# Patient Record
Sex: FEMALE | Race: WHITE | NOT HISPANIC OR LATINO | Employment: FULL TIME | URBAN - METROPOLITAN AREA
[De-identification: names, ages, dates, MRNs, and addresses within clinical notes are randomized per-mention and may not be internally consistent; named-entity substitution may affect disease eponyms.]

---

## 2017-11-28 ENCOUNTER — CLINICAL SUPPORT (OUTPATIENT)
Dept: URGENT CARE | Facility: CLINIC | Age: 37
End: 2017-11-28

## 2017-11-28 DIAGNOSIS — Z02.83 ENCOUNTER FOR DRUG SCREENING: Primary | ICD-10-CM

## 2017-11-28 LAB
CTP QC/QA: YES
POC 5 PANEL DRUG SCREEN: NEGATIVE

## 2017-11-28 PROCEDURE — 80305 DRUG TEST PRSMV DIR OPT OBS: CPT | Mod: QW,S$GLB,, | Performed by: PREVENTIVE MEDICINE

## 2018-12-17 ENCOUNTER — OFFICE VISIT (OUTPATIENT)
Dept: URGENT CARE | Facility: CLINIC | Age: 38
End: 2018-12-17
Payer: COMMERCIAL

## 2018-12-17 VITALS
DIASTOLIC BLOOD PRESSURE: 86 MMHG | BODY MASS INDEX: 37.82 KG/M2 | OXYGEN SATURATION: 98 % | HEIGHT: 65 IN | HEART RATE: 84 BPM | WEIGHT: 227 LBS | RESPIRATION RATE: 18 BRPM | TEMPERATURE: 97 F | SYSTOLIC BLOOD PRESSURE: 150 MMHG

## 2018-12-17 DIAGNOSIS — J02.9 ACUTE PHARYNGITIS, UNSPECIFIED ETIOLOGY: Primary | ICD-10-CM

## 2018-12-17 DIAGNOSIS — J02.9 SORE THROAT: ICD-10-CM

## 2018-12-17 LAB
CTP QC/QA: YES
CTP QC/QA: YES
HETEROPH AB SER QL: NEGATIVE
S PYO RRNA THROAT QL PROBE: NEGATIVE

## 2018-12-17 PROCEDURE — 3008F BODY MASS INDEX DOCD: CPT | Mod: CPTII,S$GLB,, | Performed by: NURSE PRACTITIONER

## 2018-12-17 PROCEDURE — 86308 HETEROPHILE ANTIBODY SCREEN: CPT | Mod: QW,S$GLB,, | Performed by: NURSE PRACTITIONER

## 2018-12-17 PROCEDURE — 87880 STREP A ASSAY W/OPTIC: CPT | Mod: QW,S$GLB,, | Performed by: NURSE PRACTITIONER

## 2018-12-17 PROCEDURE — 99203 OFFICE O/P NEW LOW 30 MIN: CPT | Mod: S$GLB,,, | Performed by: NURSE PRACTITIONER

## 2018-12-17 RX ORDER — ALPRAZOLAM 2 MG/1
1 TABLET ORAL 2 TIMES DAILY
COMMUNITY

## 2018-12-17 RX ORDER — LAMOTRIGINE 25 MG/1
25 TABLET ORAL DAILY
COMMUNITY
End: 2022-08-06

## 2018-12-17 RX ORDER — DEXTROAMPHETAMINE SACCHARATE, AMPHETAMINE ASPARTATE, DEXTROAMPHETAMINE SULFATE AND AMPHETAMINE SULFATE 7.5; 7.5; 7.5; 7.5 MG/1; MG/1; MG/1; MG/1
30 TABLET ORAL 2 TIMES DAILY
COMMUNITY

## 2018-12-17 RX ORDER — RISPERIDONE 2 MG/1
4 TABLET ORAL NIGHTLY
COMMUNITY
End: 2024-03-28

## 2018-12-17 NOTE — PROGRESS NOTES
"Subjective:       Patient ID: Teagan Griffith is a 38 y.o. female.    Vitals:  height is 5' 5" (1.651 m) and weight is 103 kg (227 lb). Her temperature is 97.4 °F (36.3 °C). Her blood pressure is 150/86 (abnormal) and her pulse is 84. Her respiration is 18 and oxygen saturation is 98%.     Chief Complaint: Sore Throat    Patient states she has a sore throat and fatigue. A co-worker was recently diagnosed with mono.       Sore Throat    This is a new problem. The current episode started yesterday. The problem has been gradually worsening. There has been no fever. The pain is at a severity of 8/10. The pain is severe. Pertinent negatives include no congestion, coughing, ear pain, shortness of breath, stridor or vomiting. She has tried acetaminophen for the symptoms. The treatment provided no relief.       Constitution: Positive for fatigue. Negative for chills, sweating and fever.   HENT: Positive for sore throat. Negative for ear pain, congestion, sinus pain, sinus pressure and voice change.    Neck: Negative for painful lymph nodes.   Eyes: Negative for eye redness.   Respiratory: Negative for chest tightness, cough, sputum production, bloody sputum, COPD, shortness of breath, stridor, wheezing and asthma.    Gastrointestinal: Negative for nausea and vomiting.   Musculoskeletal: Negative for muscle ache.   Skin: Negative for rash.   Allergic/Immunologic: Negative for seasonal allergies and asthma.   Hematologic/Lymphatic: Negative for swollen lymph nodes.       Objective:      Physical Exam   Constitutional: She is oriented to person, place, and time. She appears well-developed and well-nourished. She is cooperative.  Non-toxic appearance. She does not appear ill. No distress.   HENT:   Head: Normocephalic and atraumatic.   Right Ear: Hearing, tympanic membrane, external ear and ear canal normal.   Left Ear: Hearing, tympanic membrane, external ear and ear canal normal.   Nose: Nose normal. No mucosal edema, " rhinorrhea or nasal deformity. No epistaxis. Right sinus exhibits no maxillary sinus tenderness and no frontal sinus tenderness. Left sinus exhibits no maxillary sinus tenderness and no frontal sinus tenderness.   Mouth/Throat: Uvula is midline and mucous membranes are normal. No trismus in the jaw. Normal dentition. No uvula swelling. Posterior oropharyngeal erythema (mild) present. Tonsils are 1+ on the right. Tonsils are 1+ on the left.   Eyes: Conjunctivae and lids are normal. No scleral icterus.   Sclera clear bilat   Neck: Trachea normal, full passive range of motion without pain and phonation normal. Neck supple.   Cardiovascular: Normal rate, regular rhythm, normal heart sounds, intact distal pulses and normal pulses.   Pulmonary/Chest: Effort normal and breath sounds normal. No respiratory distress.   Abdominal: Soft. Normal appearance and bowel sounds are normal. She exhibits no distension. There is no tenderness.   Musculoskeletal: Normal range of motion. She exhibits no edema or deformity.   Neurological: She is alert and oriented to person, place, and time. She exhibits normal muscle tone. Coordination normal.   Skin: Skin is warm, dry and intact. She is not diaphoretic. No pallor.   Psychiatric: She has a normal mood and affect. Her speech is normal and behavior is normal. Judgment and thought content normal. Cognition and memory are normal.   Nursing note and vitals reviewed.      Results for orders placed or performed in visit on 12/17/18   POCT rapid strep A   Result Value Ref Range    Rapid Strep A Screen Negative Negative     Acceptable Yes    POCT Infectious mononucleosis antibody   Result Value Ref Range    Monospot Negative Negative     Acceptable Yes      Assessment:       1. Acute pharyngitis, unspecified etiology    2. Sore throat        Plan:         Acute pharyngitis, unspecified etiology    Sore throat  -     POCT rapid strep A  -     POCT Infectious  mononucleosis antibody            Patient Instructions     Increase fluid intake, rest, honey for sore throat.     You must understand that you've received an Urgent Care treatment only and that you may be released before all your medical problems are known or treated. You, the patient, will arrange for follow up care as instructed.  If your condition worsens we recommend that you receive another evaluation at the emergency room immediately or contact your primary medical clinics after hours call service to discuss your concerns.  Please return here or go to the Emergency Department for any concerns or worsening of condition.      Self-Care for Sore Throats    Sore throats happen for many reasons, such as colds, allergies, and infections caused by viruses or bacteria. In any case, your throat becomes red and sore. Your goal for self-care is to reduce your discomfort while giving your throat a chance to heal.  Moisten and soothe your throat  Tips include the following:  · Try a sip of water first thing after waking up.  · Keep your throat moist by drinking 6 or more glasses of clear liquids every day.  · Run a cool-air humidifier in your room overnight.  · Avoid cigarette smoke.   · Suck on throat lozenges, cough drops, hard candy, ice chips, or frozen fruit-juice bars. Use the sugar-free versions if your diet or medical condition requires them.  Gargle to ease irritation  Gargling every hour or 2 can ease irritation. Try gargling with 1 of these solutions:  · 1/4 teaspoon of salt in 1/2 cup of warm water  · An over-the-counter anesthetic gargle  Use medicine for more relief  Over-the-counter medicine can reduce sore throat symptoms. Ask your pharmacist if you have questions about which medicine to use:  · Ease pain with anesthetic sprays. Aspirin or an aspirin substitute also helps. Remember, never give aspirin to anyone 18 or younger, or if you are already taking blood thinners.   · For sore throats caused by  allergies, try antihistamines to block the allergic reaction.  · Remember: unless a sore throat is caused by a bacterial infection, antibiotics wont help you.  Prevent future sore throats  Prevention tips include the following:  · Stop smoking or reduce contact with secondhand smoke. Smoke irritates the tender throat lining.  · Limit contact with pets and with allergy-causing substances, such as pollen and mold.  · When youre around someone with a sore throat or cold, wash your hands often to keep viruses or bacteria from spreading.  · Dont strain your vocal cords.  Call your healthcare provider  Contact your healthcare provider if you have:  · A temperature over 101°F (38.3°C)  · White spots on the throat  · Great difficulty swallowing  · Trouble breathing  · A skin rash  · Recent exposure to someone else with strep bacteria  · Severe hoarseness and swollen glands in the neck or jaw   Date Last Reviewed: 8/1/2016  © 5321-7102 [employer]. [address] [place] [address], [place] [address]. All rights reserved. This information is not intended as a substitute for professional medical care. Always follow your healthcare professional's instructions.

## 2018-12-18 NOTE — PATIENT INSTRUCTIONS
Increase fluid intake, rest, honey for sore throat.     You must understand that you've received an Urgent Care treatment only and that you may be released before all your medical problems are known or treated. You, the patient, will arrange for follow up care as instructed.  If your condition worsens we recommend that you receive another evaluation at the emergency room immediately or contact your primary medical clinics after hours call service to discuss your concerns.  Please return here or go to the Emergency Department for any concerns or worsening of condition.      Self-Care for Sore Throats    Sore throats happen for many reasons, such as colds, allergies, and infections caused by viruses or bacteria. In any case, your throat becomes red and sore. Your goal for self-care is to reduce your discomfort while giving your throat a chance to heal.  Moisten and soothe your throat  Tips include the following:  · Try a sip of water first thing after waking up.  · Keep your throat moist by drinking 6 or more glasses of clear liquids every day.  · Run a cool-air humidifier in your room overnight.  · Avoid cigarette smoke.   · Suck on throat lozenges, cough drops, hard candy, ice chips, or frozen fruit-juice bars. Use the sugar-free versions if your diet or medical condition requires them.  Gargle to ease irritation  Gargling every hour or 2 can ease irritation. Try gargling with 1 of these solutions:  · 1/4 teaspoon of salt in 1/2 cup of warm water  · An over-the-counter anesthetic gargle  Use medicine for more relief  Over-the-counter medicine can reduce sore throat symptoms. Ask your pharmacist if you have questions about which medicine to use:  · Ease pain with anesthetic sprays. Aspirin or an aspirin substitute also helps. Remember, never give aspirin to anyone 18 or younger, or if you are already taking blood thinners.   · For sore throats caused by allergies, try antihistamines to block the allergic  reaction.  · Remember: unless a sore throat is caused by a bacterial infection, antibiotics wont help you.  Prevent future sore throats  Prevention tips include the following:  · Stop smoking or reduce contact with secondhand smoke. Smoke irritates the tender throat lining.  · Limit contact with pets and with allergy-causing substances, such as pollen and mold.  · When youre around someone with a sore throat or cold, wash your hands often to keep viruses or bacteria from spreading.  · Dont strain your vocal cords.  Call your healthcare provider  Contact your healthcare provider if you have:  · A temperature over 101°F (38.3°C)  · White spots on the throat  · Great difficulty swallowing  · Trouble breathing  · A skin rash  · Recent exposure to someone else with strep bacteria  · Severe hoarseness and swollen glands in the neck or jaw   Date Last Reviewed: 8/1/2016  © 0426-7735 [employer]. [address] [place] [address], [place] [address]. All rights reserved. This information is not intended as a substitute for professional medical care. Always follow your healthcare professional's instructions.

## 2019-01-14 ENCOUNTER — OFFICE VISIT (OUTPATIENT)
Dept: URGENT CARE | Facility: CLINIC | Age: 39
End: 2019-01-14
Payer: COMMERCIAL

## 2019-01-14 VITALS
RESPIRATION RATE: 19 BRPM | OXYGEN SATURATION: 99 % | TEMPERATURE: 97 F | BODY MASS INDEX: 37.82 KG/M2 | HEIGHT: 65 IN | WEIGHT: 227 LBS | DIASTOLIC BLOOD PRESSURE: 89 MMHG | SYSTOLIC BLOOD PRESSURE: 146 MMHG | HEART RATE: 93 BPM

## 2019-01-14 DIAGNOSIS — R31.9 URINARY TRACT INFECTION WITH HEMATURIA, SITE UNSPECIFIED: Primary | ICD-10-CM

## 2019-01-14 DIAGNOSIS — R30.0 DYSURIA: ICD-10-CM

## 2019-01-14 DIAGNOSIS — N39.0 URINARY TRACT INFECTION WITH HEMATURIA, SITE UNSPECIFIED: Primary | ICD-10-CM

## 2019-01-14 LAB
BILIRUB UR QL STRIP: NEGATIVE
GLUCOSE UR QL STRIP: NEGATIVE
KETONES UR QL STRIP: NEGATIVE
LEUKOCYTE ESTERASE UR QL STRIP: POSITIVE
PH, POC UA: 5 (ref 5–8)
POC BLOOD, URINE: POSITIVE
POC NITRATES, URINE: NEGATIVE
PROT UR QL STRIP: POSITIVE
SP GR UR STRIP: 1.02 (ref 1–1.03)
UROBILINOGEN UR STRIP-ACNC: NORMAL (ref 0.1–1.1)

## 2019-01-14 PROCEDURE — 81003 URINALYSIS AUTO W/O SCOPE: CPT | Mod: QW,S$GLB,, | Performed by: FAMILY MEDICINE

## 2019-01-14 PROCEDURE — 99214 OFFICE O/P EST MOD 30 MIN: CPT | Mod: 25,S$GLB,, | Performed by: FAMILY MEDICINE

## 2019-01-14 PROCEDURE — 99214 PR OFFICE/OUTPT VISIT, EST, LEVL IV, 30-39 MIN: ICD-10-PCS | Mod: 25,S$GLB,, | Performed by: FAMILY MEDICINE

## 2019-01-14 PROCEDURE — 81003 POCT URINALYSIS, DIPSTICK, AUTOMATED, W/O SCOPE: ICD-10-PCS | Mod: QW,S$GLB,, | Performed by: FAMILY MEDICINE

## 2019-01-14 PROCEDURE — 3008F PR BODY MASS INDEX (BMI) DOCUMENTED: ICD-10-PCS | Mod: CPTII,S$GLB,, | Performed by: FAMILY MEDICINE

## 2019-01-14 PROCEDURE — 3008F BODY MASS INDEX DOCD: CPT | Mod: CPTII,S$GLB,, | Performed by: FAMILY MEDICINE

## 2019-01-14 RX ORDER — NITROFURANTOIN 25; 75 MG/1; MG/1
100 CAPSULE ORAL 2 TIMES DAILY
Qty: 14 CAPSULE | Refills: 0 | Status: SHIPPED | OUTPATIENT
Start: 2019-01-14 | End: 2019-01-21

## 2019-01-14 RX ORDER — PHENAZOPYRIDINE HYDROCHLORIDE 200 MG/1
200 TABLET, FILM COATED ORAL 3 TIMES DAILY PRN
Qty: 10 TABLET | Refills: 0 | Status: SHIPPED | OUTPATIENT
Start: 2019-01-14 | End: 2022-03-31

## 2019-01-15 NOTE — PROGRESS NOTES
"Subjective:       Patient ID: Teagan Griffith is a 38 y.o. female.    Vitals:  height is 5' 5" (1.651 m) and weight is 103 kg (227 lb). Her oral temperature is 97.2 °F (36.2 °C). Her blood pressure is 146/89 (abnormal) and her pulse is 93. Her respiration is 19 and oxygen saturation is 99%.     Chief Complaint: Urinary Tract Infection    Urinary Tract Infection    This is a new problem. The current episode started today. The problem occurs every urination. The problem has been unchanged. The quality of the pain is described as burning. The pain is at a severity of 4/10. The pain is mild. She is sexually active. Pertinent negatives include no chills, frequency, hematuria, nausea, urgency, vomiting or rash. She has tried nothing for the symptoms. The treatment provided no relief.       Constitution: Negative for chills and fever.   Neck: Negative for painful lymph nodes.   Gastrointestinal: Negative for abdominal pain, nausea and vomiting.   Genitourinary: Negative for dysuria, frequency, urgency, urine decreased, hematuria, history of kidney stones, painful menstruation, irregular menstruation, missed menses, heavy menstrual bleeding, ovarian cysts, genital trauma, vaginal pain, vaginal discharge, vaginal bleeding, vaginal odor, painful intercourse, genital sore, painful ejaculation and pelvic pain.   Musculoskeletal: Negative for back pain.   Skin: Negative for rash, lesion and erythema.   Hematologic/Lymphatic: Negative for swollen lymph nodes.       Objective:      Physical Exam   Constitutional: She is oriented to person, place, and time. She appears well-developed and well-nourished.   HENT:   Head: Normocephalic and atraumatic.   Eyes: EOM are normal. Pupils are equal, round, and reactive to light.   Neck: Normal range of motion. Neck supple. No thyromegaly present.   Cardiovascular: Normal rate, regular rhythm and normal heart sounds. Exam reveals no gallop and no friction rub.   No murmur " heard.  Pulmonary/Chest: Breath sounds normal. No respiratory distress. She has no wheezes. She has no rales. She exhibits no tenderness.   Abdominal: Soft. Bowel sounds are normal. She exhibits no distension and no mass. There is no tenderness. There is no rebound and no guarding. No hernia.   Genitourinary:   Genitourinary Comments: Mild bladder pressure discomfort.  No cva tenderness.   Musculoskeletal: Normal range of motion. She exhibits no edema, tenderness or deformity.   Lymphadenopathy:     She has no cervical adenopathy.   Neurological: She is alert and oriented to person, place, and time. She displays normal reflexes. No cranial nerve deficit. She exhibits normal muscle tone. Coordination normal.   Skin: Skin is warm. Capillary refill takes less than 2 seconds. No rash noted. No erythema. No pallor.   Psychiatric: She has a normal mood and affect. Her behavior is normal. Judgment and thought content normal.   Vitals reviewed.      Assessment:       1. Urinary tract infection with hematuria, site unspecified    2. Dysuria        Plan:         Urinary tract infection with hematuria, site unspecified  -     nitrofurantoin, macrocrystal-monohydrate, (MACROBID) 100 MG capsule; Take 1 capsule (100 mg total) by mouth 2 (two) times daily. for 7 days  Dispense: 14 capsule; Refill: 0  -     phenazopyridine (PYRIDIUM) 200 MG tablet; Take 1 tablet (200 mg total) by mouth 3 (three) times daily as needed for Pain.  Dispense: 10 tablet; Refill: 0    Dysuria  -     POCT Urinalysis, Dipstick, Automated, W/O Scope          Patient Instructions     Bladder Infection, Female (Adult)    Urine is normally doesn't have any bacteria in it. But bacteria can get into the urinary tract from the skin around the rectum. Or they can travel in the blood from elsewhere in the body. Once they are in your urinary tract, they can cause infection in the urethra (urethritis), the bladder (cystitis), or the kidneys (pyelonephritis).  The most  "common place for an infection is in the bladder. This is called a bladder infection. This is one of the most common infections in women. Most bladder infections are easily treated. They are not serious unless the infection spreads to the kidney.  The phrases "bladder infection," "UTI," and "cystitis" are often used to describe the same thing. But they are not always the same. Cystitis is an inflammation of the bladder. The most common cause of cystitis is an infection.  Symptoms  The infection causes inflammation in the urethra and bladder. This causes many of the symptoms. The most common symptoms of a bladder infection are:  · Pain or burning when urinating  · Having to urinate more often than usual  · Urgent need to urinate  · Only a small amount of urine comes out  · Blood in urine  · Abdominal discomfort. This is usually in the lower abdomen above the pubic bone.  · Cloudy urine  · Strong- or bad-smelling urine  · Unable to urinate (urinary retention)  · Unable to hold urine in (urinary incontinence)  · Fever  · Loss of appetite  · Confusion (in older adults)  Causes  Bladder infections are not contagious. You can't get one from someone else, from a toilet seat, or from sharing a bath.  The most common cause of bladder infections is bacteria from the bowels. The bacteria get onto the skin around the opening of the urethra. From there, they can get into the urine and travel up to the bladder, causing inflammation and infection. This usually happens because of:  · Wiping improperly after urinating. Always wipe from front to back.  · Bowel incontinence  · Pregnancy  · Procedures such as having a catheter inserted  · Older age  · Not emptying your bladder. This can allow bacteria a chance to grow in your urine.  · Dehydration  · Constipation  · Sex  · Use of a diaphragm for birth control   Treatment  Bladder infections are diagnosed by a urine test. They are treated with antibiotics and usually clear up quickly " without complications. Treatment helps prevent a more serious kidney infection.  Medicines  Medicines can help in the treatment of a bladder infection:  · Take antibiotics until they are used up, even if you feel better. It is important to finish them to make sure the infection has cleared.  · You can use acetaminophen or ibuprofen for pain, fever, or discomfort, unless another medicine was prescribed. If you have chronic liver or kidney disease, talk with your healthcare provider before using these medicines. Also talk with your provider if you've ever had a stomach ulcer or gastrointestinal bleeding, or are taking blood-thinner medicines.  · If you are given phenazopydridine to reduce burning with urination, it will cause your urine to become a bright orange color. This can stain clothing.  Care and prevention  These self-care steps can help prevent future infections:  · Drink plenty of fluids to prevent dehydration and flush out your bladder. Do this unless you must restrict fluids for other health reasons, or your doctor told you not to.  · Proper cleaning after going to the bathroom is important. Wipe from front to back after using the toilet to prevent the spread of bacteria.  · Urinate more often. Don't try to hold urine in for a long time.  · Wear loose-fitting clothes and cotton underwear. Avoid tight-fitting pants.  · Improve your diet and prevent constipation. Eat more fresh fruit and vegetables, and fiber, and less junk and fatty foods.  · Avoid sex until your symptoms are gone.  · Avoid caffeine, alcohol, and spicy foods. These can irritate your bladder.  · Urinate right after intercourse to flush out your bladder.  · If you use birth control pills and have frequent bladder infections, discuss it with your doctor.  Follow-up care  Call your healthcare provider if all symptoms are not gone after 3 days of treatment. This is especially important if you have repeat infections.  If a culture was done, you  will be told if your treatment needs to be changed. If directed, you can call to find out the results.  If X-rays were done, you will be told if the results will affect your treatment.  Call 911  Call 911 if any of the following occur:  · Trouble breathing  · Hard to wake up or confusion  · Fainting or loss of consciousness  · Rapid heart rate  When to seek medical advice  Call your healthcare provider right away if any of these occur:  · Fever of 100.4ºF (38.0ºC) or higher, or as directed by your healthcare provider  · Symptoms are not better by the third day of treatment  · Back or belly (abdominal) pain that gets worse  · Repeated vomiting, or unable to keep medicine down  · Weakness or dizziness  · Vaginal discharge  · Pain, redness, or swelling in the outer vaginal area (labia)  Date Last Reviewed: 10/1/2016  © 5496-9523 [employer]. [address] [place] [address]. All rights reserved. This information is not intended as a substitute for professional medical care. Always follow your healthcare professional's instructions.      Follow up with your doctor in a few days as needed.  Return to the urgent care or go to the ER if symptoms get worse.    Ken Johnson MD

## 2019-01-15 NOTE — PATIENT INSTRUCTIONS

## 2019-10-14 ENCOUNTER — OFFICE VISIT (OUTPATIENT)
Dept: URGENT CARE | Facility: CLINIC | Age: 39
End: 2019-10-14
Payer: COMMERCIAL

## 2019-10-14 VITALS
SYSTOLIC BLOOD PRESSURE: 121 MMHG | BODY MASS INDEX: 37.82 KG/M2 | HEART RATE: 79 BPM | DIASTOLIC BLOOD PRESSURE: 80 MMHG | RESPIRATION RATE: 19 BRPM | HEIGHT: 65 IN | OXYGEN SATURATION: 97 % | WEIGHT: 227 LBS | TEMPERATURE: 97 F

## 2019-10-14 DIAGNOSIS — R05.9 COUGH: ICD-10-CM

## 2019-10-14 DIAGNOSIS — J40 SINOBRONCHITIS: Primary | ICD-10-CM

## 2019-10-14 DIAGNOSIS — J32.9 SINOBRONCHITIS: Primary | ICD-10-CM

## 2019-10-14 PROCEDURE — 99214 PR OFFICE/OUTPT VISIT, EST, LEVL IV, 30-39 MIN: ICD-10-PCS | Mod: 25,S$GLB,, | Performed by: NURSE PRACTITIONER

## 2019-10-14 PROCEDURE — 99214 OFFICE O/P EST MOD 30 MIN: CPT | Mod: 25,S$GLB,, | Performed by: NURSE PRACTITIONER

## 2019-10-14 PROCEDURE — 3008F PR BODY MASS INDEX (BMI) DOCUMENTED: ICD-10-PCS | Mod: CPTII,S$GLB,, | Performed by: NURSE PRACTITIONER

## 2019-10-14 PROCEDURE — 96372 PR INJECTION,THERAP/PROPH/DIAG2ST, IM OR SUBCUT: ICD-10-PCS | Mod: S$GLB,,, | Performed by: EMERGENCY MEDICINE

## 2019-10-14 PROCEDURE — 3008F BODY MASS INDEX DOCD: CPT | Mod: CPTII,S$GLB,, | Performed by: NURSE PRACTITIONER

## 2019-10-14 PROCEDURE — 96372 THER/PROPH/DIAG INJ SC/IM: CPT | Mod: S$GLB,,, | Performed by: EMERGENCY MEDICINE

## 2019-10-14 RX ORDER — DOXYCYCLINE 100 MG/1
100 CAPSULE ORAL 2 TIMES DAILY
Qty: 20 CAPSULE | Refills: 0 | Status: SHIPPED | OUTPATIENT
Start: 2019-10-14 | End: 2019-10-24

## 2019-10-14 RX ORDER — CODEINE PHOSPHATE AND GUAIFENESIN 10; 100 MG/5ML; MG/5ML
5 SOLUTION ORAL 3 TIMES DAILY PRN
Qty: 120 ML | Refills: 0 | Status: SHIPPED | OUTPATIENT
Start: 2019-10-14 | End: 2019-10-21

## 2019-10-14 RX ORDER — BETAMETHASONE SODIUM PHOSPHATE AND BETAMETHASONE ACETATE 3; 3 MG/ML; MG/ML
9 INJECTION, SUSPENSION INTRA-ARTICULAR; INTRALESIONAL; INTRAMUSCULAR; SOFT TISSUE
Status: COMPLETED | OUTPATIENT
Start: 2019-10-14 | End: 2019-10-14

## 2019-10-14 RX ADMIN — BETAMETHASONE SODIUM PHOSPHATE AND BETAMETHASONE ACETATE 9 MG: 3; 3 INJECTION, SUSPENSION INTRA-ARTICULAR; INTRALESIONAL; INTRAMUSCULAR; SOFT TISSUE at 06:10

## 2019-10-14 NOTE — PROGRESS NOTES
"Subjective:       Patient ID: Teagan Griffith is a 39 y.o. female.    Vitals:  height is 5' 5" (1.651 m) and weight is 103 kg (227 lb). Her oral temperature is 97.3 °F (36.3 °C). Her blood pressure is 121/80 and her pulse is 79. Her respiration is 19 and oxygen saturation is 97%.     Chief Complaint: Cough    Patient presents to clinic today with complaints of worsening cough and sinus congestion over the last week.  Patient reports initially start with a mild sore throat and postnasal drip.  Patient reports worsening cough at night.  Patient has been taking over-the-counter Delsym with mild relief.  Denies any fever or chills.    Cough   This is a new problem. The current episode started in the past 7 days (1 week). The problem has been gradually worsening. The problem occurs constantly. The cough is productive of sputum. Associated symptoms include nasal congestion, postnasal drip and a sore throat. Pertinent negatives include no chest pain, chills, ear congestion, ear pain, eye redness, fever, headaches, heartburn, hemoptysis, myalgias, rash, rhinorrhea, shortness of breath, sweats, weight loss or wheezing. The symptoms are aggravated by lying down. Treatments tried: delsym. The treatment provided mild relief. Her past medical history is significant for bronchitis. There is no history of asthma, bronchiectasis, COPD, emphysema, environmental allergies or pneumonia.       Constitution: Negative for chills, sweating, fatigue and fever.   HENT: Positive for congestion, postnasal drip and sore throat. Negative for ear pain, sinus pain, sinus pressure and voice change.    Neck: Negative for painful lymph nodes.   Cardiovascular: Negative for chest pain.   Eyes: Negative for eye redness.   Respiratory: Positive for cough and sputum production. Negative for chest tightness, bloody sputum, COPD, shortness of breath, stridor, wheezing and asthma.    Gastrointestinal: Negative for nausea, vomiting and heartburn. "   Musculoskeletal: Negative for muscle ache.   Skin: Negative for rash.   Allergic/Immunologic: Negative for environmental allergies, seasonal allergies and asthma.   Neurological: Negative for headaches.   Hematologic/Lymphatic: Negative for swollen lymph nodes.       Objective:      Physical Exam   Constitutional: She is oriented to person, place, and time. She appears well-developed and well-nourished. She is cooperative.  Non-toxic appearance. She does not have a sickly appearance. She does not appear ill. No distress.   HENT:   Head: Normocephalic and atraumatic.   Right Ear: Hearing, external ear and ear canal normal. Tympanic membrane is bulging.   Left Ear: Hearing, external ear and ear canal normal. Tympanic membrane is bulging.   Nose: Mucosal edema and rhinorrhea present. No nasal deformity. No epistaxis. Right sinus exhibits no maxillary sinus tenderness and no frontal sinus tenderness. Left sinus exhibits no maxillary sinus tenderness and no frontal sinus tenderness.   Mouth/Throat: Uvula is midline and mucous membranes are normal. No trismus in the jaw. Normal dentition. No uvula swelling. Posterior oropharyngeal erythema present. No oropharyngeal exudate or posterior oropharyngeal edema.   Eyes: Conjunctivae and lids are normal. No scleral icterus.   Neck: Trachea normal, full passive range of motion without pain and phonation normal. Neck supple. No neck rigidity. No edema and no erythema present.   Cardiovascular: Normal rate, regular rhythm, normal heart sounds, intact distal pulses and normal pulses.   Pulmonary/Chest: Effort normal. No respiratory distress. She has no decreased breath sounds. She has wheezes in the right upper field and the left upper field. She has no rhonchi.   Abdominal: Normal appearance.   Musculoskeletal: Normal range of motion. She exhibits no edema or deformity.   Neurological: She is alert and oriented to person, place, and time. She exhibits normal muscle tone.  Coordination normal.   Skin: Skin is warm, dry, intact, not diaphoretic and not pale.   Psychiatric: She has a normal mood and affect. Her speech is normal and behavior is normal. Judgment and thought content normal. Cognition and memory are normal.   Nursing note and vitals reviewed.        Assessment:       1. Sinobronchitis    2. Cough        Plan:         Sinobronchitis  -     betamethasone acetate-betamethasone sodium phosphate injection 9 mg  -     doxycycline (VIBRAMYCIN) 100 MG Cap; Take 1 capsule (100 mg total) by mouth 2 (two) times daily. for 10 days  Dispense: 20 capsule; Refill: 0    Cough  -     betamethasone acetate-betamethasone sodium phosphate injection 9 mg  -     guaifenesin-codeine 100-10 mg/5 ml (TUSSI-ORGANIDIN NR)  mg/5 mL syrup; Take 5 mLs by mouth 3 (three) times daily as needed for Cough.  Dispense: 120 mL; Refill: 0      Patient Instructions     Bronchitis, Antibiotic Treatment (Adult)    Bronchitis is an infection of the air passages (bronchial tubes) in your lungs. It often occurs when you have a cold. This illness is contagious during the first few days and is spread through the air by coughing and sneezing, or by direct contact (touching the sick person and then touching your own eyes, nose, or mouth).  Symptoms of bronchitis include cough with mucus (phlegm) and low-grade fever. Bronchitis usually lasts 7 to 14 days. Mild cases can be treated with simple home remedies. More severe infection is treated with an antibiotic.  Home care  Follow these guidelines when caring for yourself at home:  · If your symptoms are severe, rest at home for the first 2 to 3 days. When you go back to your usual activities, don't let yourself get too tired.  · Do not smoke. Also avoid being exposed to secondhand smoke.  · You may use over-the-counter medicines to control fever or pain, unless another medicine was prescribed. (Note: If you have chronic liver or kidney disease or have ever had a  stomach ulcer or gastrointestinal bleeding, talk with your healthcare provider before using these medicines. Also talk to your provider if you are taking medicine to prevent blood clots.) Aspirin should never be given to anyone younger than 18 years of age who is ill with a viral infection or fever. It may cause severe liver or brain damage.  · Your appetite may be poor, so a light diet is fine. Avoid dehydration by drinking 6 to 8 glasses of fluids per day (such as water, soft drinks, sports drinks, juices, tea, or soup). Extra fluids will help loosen secretions in the nose and lungs.  · Over-the-counter cough, cold, and sore-throat medicines will not shorten the length of the illness, but they may be helpful to reduce symptoms. (Note: Do not use decongestants if you have high blood pressure.)  · Finish all antibiotic medicine. Do this even if you are feeling better after only a few days.  Follow-up care  Follow up with your healthcare provider, or as advised. If you had an X-ray or ECG (electrocardiogram), a specialist will review it. You will be notified of any new findings that may affect your care.  Note: If you are age 65 or older, or if you have a chronic lung disease or condition that affects your immune system, or you smoke, talk to your healthcare provider about having pneumococcal vaccinations and a yearly influenza vaccination (flu shot).  When to seek medical advice  Call your healthcare provider right away if any of these occur:  · Fever of 100.4°F (38°C) or higher  · Coughing up increased amounts of colored sputum  · Weakness, drowsiness, headache, facial pain, ear pain, or a stiff neck  Call 911, or get immediate medical care  Contact emergency services right away if any of these occur.  · Coughing up blood  · Worsening weakness, drowsiness, headache, or stiff neck  · Trouble breathing, wheezing, or pain with breathing  Date Last Reviewed: 9/13/2015 © 2000-2017 [employer] [address]  [place] [address]. All rights reserved. This information is not intended as a substitute for professional medical care. Always follow your healthcare professional's instructions.      -Flonase daily.  -Cough syrup to take at night, do not drive or operate machinery while taking.  -Mucinex during the day.  -10 days of antibiotics.  -Steroid shot given here today.  Please follow up with your Primary care provider within 2-5 days if your signs and symptoms have not resolved or worsen.     If your condition worsens or fails to improve we recommend that you receive another evaluation at the emergency room immediately or contact your primary medical clinic to discuss your concerns.   You must understand that you have received an Urgent Care treatment only and that you may be released before all of your medical problems are known or treated. You, the patient, will arrange for follow up care as instructed.

## 2019-10-14 NOTE — PATIENT INSTRUCTIONS
Bronchitis, Antibiotic Treatment (Adult)    Bronchitis is an infection of the air passages (bronchial tubes) in your lungs. It often occurs when you have a cold. This illness is contagious during the first few days and is spread through the air by coughing and sneezing, or by direct contact (touching the sick person and then touching your own eyes, nose, or mouth).  Symptoms of bronchitis include cough with mucus (phlegm) and low-grade fever. Bronchitis usually lasts 7 to 14 days. Mild cases can be treated with simple home remedies. More severe infection is treated with an antibiotic.  Home care  Follow these guidelines when caring for yourself at home:  · If your symptoms are severe, rest at home for the first 2 to 3 days. When you go back to your usual activities, don't let yourself get too tired.  · Do not smoke. Also avoid being exposed to secondhand smoke.  · You may use over-the-counter medicines to control fever or pain, unless another medicine was prescribed. (Note: If you have chronic liver or kidney disease or have ever had a stomach ulcer or gastrointestinal bleeding, talk with your healthcare provider before using these medicines. Also talk to your provider if you are taking medicine to prevent blood clots.) Aspirin should never be given to anyone younger than 18 years of age who is ill with a viral infection or fever. It may cause severe liver or brain damage.  · Your appetite may be poor, so a light diet is fine. Avoid dehydration by drinking 6 to 8 glasses of fluids per day (such as water, soft drinks, sports drinks, juices, tea, or soup). Extra fluids will help loosen secretions in the nose and lungs.  · Over-the-counter cough, cold, and sore-throat medicines will not shorten the length of the illness, but they may be helpful to reduce symptoms. (Note: Do not use decongestants if you have high blood pressure.)  · Finish all antibiotic medicine. Do this even if you are feeling better after only a  few days.  Follow-up care  Follow up with your healthcare provider, or as advised. If you had an X-ray or ECG (electrocardiogram), a specialist will review it. You will be notified of any new findings that may affect your care.  Note: If you are age 65 or older, or if you have a chronic lung disease or condition that affects your immune system, or you smoke, talk to your healthcare provider about having pneumococcal vaccinations and a yearly influenza vaccination (flu shot).  When to seek medical advice  Call your healthcare provider right away if any of these occur:  · Fever of 100.4°F (38°C) or higher  · Coughing up increased amounts of colored sputum  · Weakness, drowsiness, headache, facial pain, ear pain, or a stiff neck  Call 911, or get immediate medical care  Contact emergency services right away if any of these occur.  · Coughing up blood  · Worsening weakness, drowsiness, headache, or stiff neck  · Trouble breathing, wheezing, or pain with breathing  Date Last Reviewed: 9/13/2015 © 2000-2017 [employer] [address] [place] [address]. All rights reserved. This information is not intended as a substitute for professional medical care. Always follow your healthcare professional's instructions.      -Flonase daily.  -Cough syrup to take at night, do not drive or operate machinery while taking.  -Mucinex during the day.  -10 days of antibiotics.  -Steroid shot given here today.  Please follow up with your Primary care provider within 2-5 days if your signs and symptoms have not resolved or worsen.     If your condition worsens or fails to improve we recommend that you receive another evaluation at the emergency room immediately or contact your primary medical clinic to discuss your concerns.   You must understand that you have received an Urgent Care treatment only and that you may be released before all of your medical problems are known or treated. You, the patient, will arrange  for follow up care as instructed.

## 2020-03-10 ENCOUNTER — OFFICE VISIT (OUTPATIENT)
Dept: URGENT CARE | Facility: CLINIC | Age: 40
End: 2020-03-10
Payer: COMMERCIAL

## 2020-03-10 VITALS
BODY MASS INDEX: 38.32 KG/M2 | HEART RATE: 80 BPM | OXYGEN SATURATION: 97 % | DIASTOLIC BLOOD PRESSURE: 78 MMHG | WEIGHT: 230 LBS | HEIGHT: 65 IN | SYSTOLIC BLOOD PRESSURE: 126 MMHG | RESPIRATION RATE: 18 BRPM | TEMPERATURE: 98 F

## 2020-03-10 DIAGNOSIS — J40 BRONCHITIS: Primary | ICD-10-CM

## 2020-03-10 PROCEDURE — 99214 PR OFFICE/OUTPT VISIT, EST, LEVL IV, 30-39 MIN: ICD-10-PCS | Mod: S$GLB,,, | Performed by: NURSE PRACTITIONER

## 2020-03-10 PROCEDURE — 99214 OFFICE O/P EST MOD 30 MIN: CPT | Mod: S$GLB,,, | Performed by: NURSE PRACTITIONER

## 2020-03-10 RX ORDER — LAMOTRIGINE 200 MG/1
200 TABLET ORAL DAILY
COMMUNITY
Start: 2020-02-15 | End: 2024-03-28 | Stop reason: SDUPTHER

## 2020-03-10 NOTE — PROGRESS NOTES
"Subjective:       Patient ID: Teagan Griffith is a 39 y.o. female.    Vitals:  height is 5' 5" (1.651 m) and weight is 104.3 kg (230 lb). Her temperature is 98.1 °F (36.7 °C). Her blood pressure is 126/78 and her pulse is 80. Her respiration is 18 and oxygen saturation is 97%.     Chief Complaint: URI    Patient reports productive cough x2 days. Cough that hurts her chest and throat when she coughs.  No fever, chills, chest pain, shortness of breath, difficulty breathing, abdominal pain, nausea vomiting diarrhea.  No history of asthma or COPD.  Patient is not concerned about her symptoms and thinks she has a cold but states her boss sent her here to be "checked out".      Constitution: Positive for fatigue. Negative for chills, sweating and fever.   HENT: Negative for sinus pressure and voice change.    Neck: Negative for painful lymph nodes.   Eyes: Negative for eye redness.   Respiratory: Positive for chest tightness. Negative for sputum production, bloody sputum, COPD, shortness of breath, stridor and asthma.    Musculoskeletal: Negative for muscle ache.   Allergic/Immunologic: Negative for seasonal allergies and asthma.   Hematologic/Lymphatic: Negative for swollen lymph nodes.       Objective:      Physical Exam   Constitutional: She is oriented to person, place, and time. She appears well-developed and well-nourished. She is cooperative.  Non-toxic appearance. She does not have a sickly appearance. She does not appear ill. No distress.   HENT:   Head: Normocephalic and atraumatic.   Right Ear: Hearing, tympanic membrane, external ear and ear canal normal.   Left Ear: Hearing, tympanic membrane, external ear and ear canal normal.   Nose: Nose normal. No mucosal edema, rhinorrhea or nasal deformity. No epistaxis. Right sinus exhibits no maxillary sinus tenderness and no frontal sinus tenderness. Left sinus exhibits no maxillary sinus tenderness and no frontal sinus tenderness.   Mouth/Throat: Uvula is " midline, oropharynx is clear and moist and mucous membranes are normal. No trismus in the jaw. Normal dentition. No uvula swelling. Cobblestoning present. No oropharyngeal exudate, posterior oropharyngeal edema or posterior oropharyngeal erythema. No tonsillar exudate.   Eyes: Pupils are equal, round, and reactive to light. Conjunctivae and lids are normal. No scleral icterus.   Neck: Trachea normal, normal range of motion, full passive range of motion without pain and phonation normal. Neck supple. No neck rigidity. No edema and no erythema present.   Cardiovascular: Normal rate, regular rhythm, normal heart sounds, intact distal pulses and normal pulses.   Pulmonary/Chest: Effort normal and breath sounds normal. No respiratory distress. She has no decreased breath sounds. She has no wheezes. She has no rhonchi. She has no rales.   Abdominal: Normal appearance.   Musculoskeletal: Normal range of motion. She exhibits no edema or deformity.   Lymphadenopathy:     She has no cervical adenopathy.   Neurological: She is alert and oriented to person, place, and time. She exhibits normal muscle tone. Coordination normal.   Skin: Skin is warm, dry, intact, not diaphoretic and not pale.   Psychiatric: She has a normal mood and affect. Her speech is normal and behavior is normal. Judgment and thought content normal. Cognition and memory are normal.   Nursing note and vitals reviewed.        Assessment:       1. Bronchitis        Plan:         Bronchitis         Reviewed previous pertinent office visits, PMH, PSH, fam hx  We discussed that this is likely a viral illness and will not benefit from antibiotics.  Recommended otc motrin or tylenol as needed for fever/aches unless contraindicated  Advised on return/follow-up precautions. Advised on ER precautions. Answered all patient questions. Patient verbalized understanding and voiced agreement with current treatment plan.    Patient Instructions     Bronchitis, Viral  (Adult)    You have a viral bronchitis. Bronchitis is inflammation and swelling of the lining of the lungs. This is often caused by an infection. Symptoms include a dry, hacking cough that is worse at night. The cough may bring up yellow-green mucus. You may also feel short of breath or wheeze. Other symptoms may include tiredness, chest discomfort, and chills.  Bronchitis that is caused by a virus is not treated with antibiotics. Instead, medicines may be given to help relieve symptoms. Symptoms can last up to 2 weeks, although the cough may last much longer.  This illness is contagious during the first few days and is spread through the air by coughing and sneezing, or by direct contact (touching the sick person and then touching your own eyes, nose, or mouth).  Most viral illnesses resolve within 10 to 14 days with rest and simple home remedies, although they may sometimes last for several weeks.  Home care  · If symptoms are severe, rest at home for the first 2 to 3 days. When you go back to your usual activities, don't let yourself get too tired.  · Do not smoke. Also avoid being exposed to secondhand smoke.  · You may use over-the-counter medicine to control fever or pain, unless another pain medicine was prescribed. (Note: If you have chronic liver or kidney disease or have ever had a stomach ulcer or gastrointestinal bleeding, talk with your healthcare provider before using these medicines. Also talk to your provider if you are taking medicine to prevent blood clots.) Aspirin should never be given to anyone younger than 18 years of age who is ill with a viral infection or fever. It may cause severe liver or brain damage.  · Your appetite may be poor, so a light diet is fine. Avoid dehydration by drinking 6 to 8 glasses of fluids per day (such as water, soft drinks, sports drinks, juices, tea, or soup). Extra fluids will help loosen secretions in the nose and lungs.  · Over-the-counter cough, cold, and  sore-throat medicines will not shorten the length of the illness, but they may help to reduce symptoms. (Note: Do not use decongestants if you have high blood pressure.)  Follow-up care  Follow up with your healthcare provider, or as advised. If you had an X-ray or ECG (electrocardiogram), a specialist will review it. You will be notified of any new findings that may affect your care.  Note: If you are age 65 or older, or if you have a chronic lung disease or condition that affects your immune system, or you smoke, talk to your healthcare provider about having pneumococcal vaccinations and a yearly influenza vaccination (flu shot).  When to seek medical advice  Call your healthcare provider right away if any of these occur:  · Fever of 100.4°F (38°C) or higher  · Coughing up increased amounts of colored sputum  · Weakness, drowsiness, headache, facial pain, ear pain, or a stiff neck  Call 911, or get immediate medical care  Contact emergency services right away if any of these occur:  · Coughing up blood  · Worsening weakness, drowsiness, headache, or stiff neck  · Trouble breathing, wheezing, or pain with breathing  Date Last Reviewed: 9/13/2015  © 9181-8533 [employer]. [address] [place] [address], [place] [address]. All rights reserved. This information is not intended as a substitute for professional medical care. Always follow your healthcare professional's instructions.

## 2020-03-10 NOTE — PATIENT INSTRUCTIONS
Bronchitis, Viral (Adult)    You have a viral bronchitis. Bronchitis is inflammation and swelling of the lining of the lungs. This is often caused by an infection. Symptoms include a dry, hacking cough that is worse at night. The cough may bring up yellow-green mucus. You may also feel short of breath or wheeze. Other symptoms may include tiredness, chest discomfort, and chills.  Bronchitis that is caused by a virus is not treated with antibiotics. Instead, medicines may be given to help relieve symptoms. Symptoms can last up to 2 weeks, although the cough may last much longer.  This illness is contagious during the first few days and is spread through the air by coughing and sneezing, or by direct contact (touching the sick person and then touching your own eyes, nose, or mouth).  Most viral illnesses resolve within 10 to 14 days with rest and simple home remedies, although they may sometimes last for several weeks.  Home care  · If symptoms are severe, rest at home for the first 2 to 3 days. When you go back to your usual activities, don't let yourself get too tired.  · Do not smoke. Also avoid being exposed to secondhand smoke.  · You may use over-the-counter medicine to control fever or pain, unless another pain medicine was prescribed. (Note: If you have chronic liver or kidney disease or have ever had a stomach ulcer or gastrointestinal bleeding, talk with your healthcare provider before using these medicines. Also talk to your provider if you are taking medicine to prevent blood clots.) Aspirin should never be given to anyone younger than 18 years of age who is ill with a viral infection or fever. It may cause severe liver or brain damage.  · Your appetite may be poor, so a light diet is fine. Avoid dehydration by drinking 6 to 8 glasses of fluids per day (such as water, soft drinks, sports drinks, juices, tea, or soup). Extra fluids will help loosen secretions in the nose and lungs.  · Over-the-counter  cough, cold, and sore-throat medicines will not shorten the length of the illness, but they may help to reduce symptoms. (Note: Do not use decongestants if you have high blood pressure.)  Follow-up care  Follow up with your healthcare provider, or as advised. If you had an X-ray or ECG (electrocardiogram), a specialist will review it. You will be notified of any new findings that may affect your care.  Note: If you are age 65 or older, or if you have a chronic lung disease or condition that affects your immune system, or you smoke, talk to your healthcare provider about having pneumococcal vaccinations and a yearly influenza vaccination (flu shot).  When to seek medical advice  Call your healthcare provider right away if any of these occur:  · Fever of 100.4°F (38°C) or higher  · Coughing up increased amounts of colored sputum  · Weakness, drowsiness, headache, facial pain, ear pain, or a stiff neck  Call 911, or get immediate medical care  Contact emergency services right away if any of these occur:  · Coughing up blood  · Worsening weakness, drowsiness, headache, or stiff neck  · Trouble breathing, wheezing, or pain with breathing  Date Last Reviewed: 9/13/2015  © 3228-0110 [employer]. [address] [place] [address], [place] [address]. All rights reserved. This information is not intended as a substitute for professional medical care. Always follow your healthcare professional's instructions.

## 2020-07-28 ENCOUNTER — OFFICE VISIT (OUTPATIENT)
Dept: URGENT CARE | Facility: CLINIC | Age: 40
End: 2020-07-28
Payer: COMMERCIAL

## 2020-07-28 VITALS
SYSTOLIC BLOOD PRESSURE: 140 MMHG | BODY MASS INDEX: 38.32 KG/M2 | RESPIRATION RATE: 14 BRPM | HEIGHT: 65 IN | DIASTOLIC BLOOD PRESSURE: 97 MMHG | OXYGEN SATURATION: 97 % | WEIGHT: 230 LBS | TEMPERATURE: 97 F | HEART RATE: 88 BPM

## 2020-07-28 DIAGNOSIS — R05.9 COUGH: Primary | ICD-10-CM

## 2020-07-28 PROCEDURE — 99214 PR OFFICE/OUTPT VISIT, EST, LEVL IV, 30-39 MIN: ICD-10-PCS | Mod: S$GLB,,, | Performed by: NURSE PRACTITIONER

## 2020-07-28 PROCEDURE — 99214 OFFICE O/P EST MOD 30 MIN: CPT | Mod: S$GLB,,, | Performed by: NURSE PRACTITIONER

## 2020-07-28 RX ORDER — PROMETHAZINE HYDROCHLORIDE AND DEXTROMETHORPHAN HYDROBROMIDE 6.25; 15 MG/5ML; MG/5ML
5 SYRUP ORAL
Qty: 118 ML | Refills: 0 | Status: SHIPPED | OUTPATIENT
Start: 2020-07-28 | End: 2020-08-07

## 2020-07-28 RX ORDER — ALBUTEROL SULFATE 90 UG/1
2 AEROSOL, METERED RESPIRATORY (INHALATION) EVERY 6 HOURS PRN
Qty: 18 G | Refills: 0 | Status: SHIPPED | OUTPATIENT
Start: 2020-07-28 | End: 2020-07-28 | Stop reason: CLARIF

## 2020-07-28 RX ORDER — SERTRALINE HYDROCHLORIDE 100 MG/1
50 TABLET, FILM COATED ORAL DAILY
COMMUNITY
Start: 2020-07-24 | End: 2024-03-28 | Stop reason: SDUPTHER

## 2020-07-28 NOTE — PATIENT INSTRUCTIONS
You have been diagnosed with a viral syndrome. Viral syndromes are self-limited and usually resolve within 10 days from onset of symptoms. Antibiotics are not effective against viral infections. It is important that you get lots of rest and drink plenty of fluids. Treatment is through symptomatic relief.    Below are suggestions for symptomatic relief:   -Tylenol every 4 hours OR ibuprofen every 6 hours as needed for pain/fever.   -Salt water gargles to soothe throat pain.   -Chloroseptic spray also helps to numb throat pain.   -Nasal saline spray reduces inflammation and dryness.   -Warm face compresses to help with facial sinus pain/pressure.   -Vicks vapor rub at night.   -Flonase OTC or Nasacort OTC for nasal congestion.   -Simple foods like chicken noodle soup.   -Delsym helps with coughing at night   -Zyrtec/Claritin during the day & Benadryl at night may help with allergies.     If you DO NOT have Hypertension or any history of palpitations, it is ok to take over the counter Sudafed or Mucinex D or Allegra-D or Claritin-D or Zyrtec-D.  If you do take one of the above, it is ok to combine that with plain over the counter Mucinex or Allegra or Claritin or Zyrtec. If, for example, you are taking Zyrtec -D, you can combine that with Mucinex, but not Mucinex-D.  If you are taking Mucinex-D, you can combine that with plain Allegra or Claritin or Zyrtec.   If you DO have Hypertension or palpitations, it is safe to take Coricidin HBP for relief of sinus symptoms.    Please return to clinic if symptoms have not subsided 10-14 days from onset, as your viral infection may have developed into a bacterial infection. It is at that time antibiotics would be indicated.         Cough, Chronic, Uncertain Cause (Adult)    Everyone has had a cough as part of the common cold, flu, or bronchitis. This kind of cough occurs along with an achy feeling, low-grade fever, nasal and sinus congestion, and a scratchy or sore throat. This  usually gets better in 2 to 3 weeks. A cough that lasts longer than 3 weeks may be due to other causes.  If your cough does not improve over the next 2 weeks, further testing may be needed. Follow up with your healthcare provider as advised. Cough suppressants may be recommended. Based on your exam today, the exact cause of your cough is not certain. Below are some common causes for persistent cough.  Smokers cough  Smokers cough doesnt go away. If you continue to smoke, it only gets worse. The cough is from irritation in the air passages. Talk to your healthcare provider about quitting. Medicines or nicotine-replacement products, like gum or the patch, may make quitting easier.  Postnasal drip  A cough that is worse at night may be due to postnasal drip. Excess mucus in the nose drains from the back of your nose to your throat. This triggers the cough reflex. Postnasal drip may be due to a sinus infection or allergy. Common allergens include dust, tobacco smoke (both inhaled and secondhand smoke), environmental pollutants, pollen, mold, pets, cleaning agents, room deodorizers, and chemical fumes. Over-the-counter antihistamines or decongestants may be helpful for allergies. A sinus infection may requires antibiotic treatment. See your healthcare provider if symptoms continue.  Medicines  Certain prescribed medicines can cause a chronic cough in some people:  · ACE inhibitors for high blood pressure. These include benazepril, captopril, enalapril, fosinopril, lisinopril, quinapril, ramipril, and others.  · Beta-blockers for high blood pressure and other conditions. These include propranolol, atenolol, metoprolol, nadolol, and others.  Let your healthcare provider know if you are taking any of these.  Asthma  Cough may be the only sign of mild asthma. You may have tests to find out if asthma is causing your cough. You may also take asthma medicine on a trial basis.  Acid reflux (heartburn, GERD)  The esophagus is  the tube that carries food from the mouth to the stomach. A valve at its lower end prevents stomach acids from flowing upward. If this valve does not work properly, acid from the stomach enters the esophagus. This may cause a burning pain in the upper abdomen or lower chest, belching, or cough. Symptoms are often worse when lying flat. Avoid eating or drinking before bedtime. Try using extra pillows to raise your upper body, or place 4-inch blocks under the head of your bed. You may try an over-the-counter antacid or an acid-blocking medicine such as famotidine, cimetidine, ranitidine, esomeprazole, lansoprazole, or omeprazole. Stronger medicines for this condition can be prescribed by your healthcare provider.  Follow-up care  Follow up with your healthcare provider, or as advised, if your cough does not improve. Further testing may be needed.  Note: If an X-ray was taken, a specialist will review it. You will be notified of any new findings that may affect your care.  When to seek medical advice  Call your healthcare provider right away if any of these occur:  · Mild wheezing or difficulty breathing  · Fever of 100.4ºF (38ºC) or higher, or as directed by your healthcare provider  · Unexpected weight loss  · Coughing up large amounts of colored sputum  · Night sweats (sheets and pajamas get soaking wet)  Call 911, or get immediate medical care  Contact emergency services right away if any of these occur:  · Coughing up blood  · Moderate to severe trouble breathing or wheezing  Date Last Reviewed: 9/13/2015 © 2000-2017 [employer]. [address] [place] [address], [place] [address]. All rights reserved. This information is not intended as a substitute for professional medical care. Always follow your healthcare professional's instructions.        Acute Bronchitis  Your healthcare provider has told you that you have acute bronchitis. Bronchitis is infection or inflammation of the bronchial tubes (airways in the  lungs). Normally, air moves easily in and out of the airways. Bronchitis narrows the airways, making it harder for air to flow in and out of the lungs. This causes symptoms such as shortness of breath, coughing up yellow or green mucus, and wheezing. Bronchitis can be acute or chronic. Acute means the condition comes on quickly and goes away in a short time, usually within 3 to 10 days. Chronic means a condition lasts a long time and often comes back.    What causes acute bronchitis?  Acute bronchitis almost always starts as a viral respiratory infection, such as a cold or the flu. Certain factors make it more likely for a cold or flu to turn into bronchitis. These include being very young, being elderly, having a heart or lung problem, or having a weak immune system. Cigarette smoking also makes bronchitis more likely.  When bronchitis develops, the airways become swollen. The airways may also become infected with bacteria. This is known as a secondary infection.  Diagnosing acute bronchitis  Your healthcare provider will examine you and ask about your symptoms and health history. You may also have a sputum culture to test the fluid in your lungs. Chest X-rays may be done to look for infection in the lungs.  Treating acute bronchitis  Bronchitis usually clears up as the cold or flu goes away. You can help feel better faster by doing the following:  · Take medicine as directed. You may be told to take ibuprofen or other over-the-counter medicines. These help relieve inflammation in your bronchial tubes. Your healthcare provider may prescribe an inhaler to help open up the bronchial tubes. Most of the time, acute bronchitis is caused by a viral infection. Antibiotics are usually not prescribed for viral infections.  · Drink plenty of fluids, such as water, juice, or warm soup. Fluids loosen mucus so that you can cough it up. This helps you breathe more easily. Fluids also prevent dehydration.  · Make sure you get  plenty of rest.  · Do not smoke. Do not allow anyone else to smoke in your home.  Recovery and follow-up  Follow up with your doctor as you are told. You will likely feel better in a week or two. But a dry cough can linger beyond that time. Let your doctor know if you still have symptoms (other than a dry cough) after 2 weeks, or if youre prone to getting bronchial infections. Take steps to protect yourself from future infections. These steps include stopping smoking and avoiding tobacco smoke, washing your hands often, and getting a yearly flu shot.  When to call your healthcare provider  Call the healthcare provider if you have any of the following:  · Fever of 100.4°F (38.0°C) or higher, or as advised  · Symptoms that get worse, or new symptoms  · Trouble breathing  · Symptoms that dont start to improve within a week, or within 3 days of taking antibiotics   Date Last Reviewed: 12/1/2016  © 0181-4199 [employer], [employer]. [address] [place] [address], [place] [address]. All rights reserved. This information is not intended as a substitute for professional medical care. Always follow your healthcare professional's instructions.

## 2020-07-28 NOTE — PROGRESS NOTES
"Subjective:       Patient ID: Teagan Griffith is a 40 y.o. female.    Vitals:  height is 5' 5" (1.651 m) and weight is 104.3 kg (230 lb). Her temperature is 97.1 °F (36.2 °C). Her blood pressure is 140/97 (abnormal) and her pulse is 88. Her respiration is 14 and oxygen saturation is 97%.     Chief Complaint: Cough    40 yr old female presents to the Urgent Care with complaint of cough associated with mild sore throat started today. Patient has hx of bronchitis. Patient denies any fever, SOB, nasal congestion, CP.    Cough  This is a new problem. The current episode started today. The problem has been unchanged. Associated symptoms include a sore throat. Pertinent negatives include no chest pain, chills, fever, headaches, myalgias, rash or shortness of breath. She has tried nothing for the symptoms.       Constitution: Negative for chills, fatigue and fever.   HENT: Positive for sore throat. Negative for congestion.    Neck: Negative for painful lymph nodes.   Cardiovascular: Negative for chest pain and leg swelling.   Eyes: Negative for double vision and blurred vision.   Respiratory: Positive for cough. Negative for shortness of breath.    Gastrointestinal: Negative for nausea, vomiting and diarrhea.   Genitourinary: Negative for dysuria, frequency, urgency and history of kidney stones.   Musculoskeletal: Negative for joint pain, joint swelling, muscle cramps and muscle ache.   Skin: Negative for color change, pale, rash and bruising.   Allergic/Immunologic: Negative for seasonal allergies.   Neurological: Negative for dizziness, history of vertigo, light-headedness, passing out and headaches.   Hematologic/Lymphatic: Negative for swollen lymph nodes.   Psychiatric/Behavioral: Negative for nervous/anxious, sleep disturbance and depression. The patient is not nervous/anxious.        Objective:      Physical Exam   Constitutional: She is oriented to person, place, and time. She appears well-developed. She is " cooperative.  Non-toxic appearance. She does not appear ill. No distress.   HENT:   Head: Normocephalic and atraumatic.   Ears:   Right Ear: External ear normal.   Left Ear: External ear normal.   Nose: Nose normal. No mucosal edema, rhinorrhea or nasal deformity. No epistaxis. Right sinus exhibits no maxillary sinus tenderness and no frontal sinus tenderness. Left sinus exhibits no maxillary sinus tenderness and no frontal sinus tenderness.   Mouth/Throat: Uvula is midline, oropharynx is clear and moist and mucous membranes are normal. No trismus in the jaw. Normal dentition. No uvula swelling. No oropharyngeal exudate, posterior oropharyngeal edema or posterior oropharyngeal erythema. Tonsils are 2+ on the right. Tonsils are 2+ on the left. No tonsillar exudate.   Eyes: Pupils are equal, round, and reactive to light. Conjunctivae, EOM and lids are normal. No scleral icterus.   Neck: Trachea normal, full passive range of motion without pain and phonation normal. Neck supple. No neck rigidity. No edema and no erythema present.   Cardiovascular: Normal rate, regular rhythm, normal heart sounds and normal pulses.   Pulses:       Radial pulses are 2+ on the right side and 2+ on the left side.   Pulmonary/Chest: Effort normal and breath sounds normal. No stridor. No respiratory distress. She has no decreased breath sounds. She has no wheezes. She has no rhonchi. She has no rales.   Musculoskeletal: Normal range of motion.         General: No deformity.   Neurological: She is alert and oriented to person, place, and time. She exhibits normal muscle tone. Coordination and gait normal.   Skin: Skin is warm, dry, intact, not diaphoretic and not pale. Capillary refill takes less than 2 seconds. Psychiatric: Her speech is normal and behavior is normal. Judgment and thought content normal.   Nursing note and vitals reviewed.        Assessment:       1. Cough        Plan:         Cough  -     promethazine-dextromethorphan  (PROMETHAZINE-DM) 6.25-15 mg/5 mL Syrp; Take 5 mLs by mouth every 4 to 6 hours as needed.  Dispense: 118 mL; Refill: 0  -     Discontinue: albuterol (PROVENTIL HFA) 90 mcg/actuation inhaler; Inhale 2 puffs into the lungs every 6 (six) hours as needed for Wheezing. Rescue  Dispense: 18 g; Refill: 0      Patient Instructions     You have been diagnosed with a viral syndrome. Viral syndromes are self-limited and usually resolve within 10 days from onset of symptoms. Antibiotics are not effective against viral infections. It is important that you get lots of rest and drink plenty of fluids. Treatment is through symptomatic relief.    Below are suggestions for symptomatic relief:   -Tylenol every 4 hours OR ibuprofen every 6 hours as needed for pain/fever.   -Salt water gargles to soothe throat pain.   -Chloroseptic spray also helps to numb throat pain.   -Nasal saline spray reduces inflammation and dryness.   -Warm face compresses to help with facial sinus pain/pressure.   -Vicks vapor rub at night.   -Flonase OTC or Nasacort OTC for nasal congestion.   -Simple foods like chicken noodle soup.   -Delsym helps with coughing at night   -Zyrtec/Claritin during the day & Benadryl at night may help with allergies.     If you DO NOT have Hypertension or any history of palpitations, it is ok to take over the counter Sudafed or Mucinex D or Allegra-D or Claritin-D or Zyrtec-D.  If you do take one of the above, it is ok to combine that with plain over the counter Mucinex or Allegra or Claritin or Zyrtec. If, for example, you are taking Zyrtec -D, you can combine that with Mucinex, but not Mucinex-D.  If you are taking Mucinex-D, you can combine that with plain Allegra or Claritin or Zyrtec.   If you DO have Hypertension or palpitations, it is safe to take Coricidin HBP for relief of sinus symptoms.    Please return to clinic if symptoms have not subsided 10-14 days from onset, as your viral infection may have developed into a  bacterial infection. It is at that time antibiotics would be indicated.         Cough, Chronic, Uncertain Cause (Adult)    Everyone has had a cough as part of the common cold, flu, or bronchitis. This kind of cough occurs along with an achy feeling, low-grade fever, nasal and sinus congestion, and a scratchy or sore throat. This usually gets better in 2 to 3 weeks. A cough that lasts longer than 3 weeks may be due to other causes.  If your cough does not improve over the next 2 weeks, further testing may be needed. Follow up with your healthcare provider as advised. Cough suppressants may be recommended. Based on your exam today, the exact cause of your cough is not certain. Below are some common causes for persistent cough.  Smokers cough  Smokers cough doesnt go away. If you continue to smoke, it only gets worse. The cough is from irritation in the air passages. Talk to your healthcare provider about quitting. Medicines or nicotine-replacement products, like gum or the patch, may make quitting easier.  Postnasal drip  A cough that is worse at night may be due to postnasal drip. Excess mucus in the nose drains from the back of your nose to your throat. This triggers the cough reflex. Postnasal drip may be due to a sinus infection or allergy. Common allergens include dust, tobacco smoke (both inhaled and secondhand smoke), environmental pollutants, pollen, mold, pets, cleaning agents, room deodorizers, and chemical fumes. Over-the-counter antihistamines or decongestants may be helpful for allergies. A sinus infection may requires antibiotic treatment. See your healthcare provider if symptoms continue.  Medicines  Certain prescribed medicines can cause a chronic cough in some people:  · ACE inhibitors for high blood pressure. These include benazepril, captopril, enalapril, fosinopril, lisinopril, quinapril, ramipril, and others.  · Beta-blockers for high blood pressure and other conditions. These include  propranolol, atenolol, metoprolol, nadolol, and others.  Let your healthcare provider know if you are taking any of these.  Asthma  Cough may be the only sign of mild asthma. You may have tests to find out if asthma is causing your cough. You may also take asthma medicine on a trial basis.  Acid reflux (heartburn, GERD)  The esophagus is the tube that carries food from the mouth to the stomach. A valve at its lower end prevents stomach acids from flowing upward. If this valve does not work properly, acid from the stomach enters the esophagus. This may cause a burning pain in the upper abdomen or lower chest, belching, or cough. Symptoms are often worse when lying flat. Avoid eating or drinking before bedtime. Try using extra pillows to raise your upper body, or place 4-inch blocks under the head of your bed. You may try an over-the-counter antacid or an acid-blocking medicine such as famotidine, cimetidine, ranitidine, esomeprazole, lansoprazole, or omeprazole. Stronger medicines for this condition can be prescribed by your healthcare provider.  Follow-up care  Follow up with your healthcare provider, or as advised, if your cough does not improve. Further testing may be needed.  Note: If an X-ray was taken, a specialist will review it. You will be notified of any new findings that may affect your care.  When to seek medical advice  Call your healthcare provider right away if any of these occur:  · Mild wheezing or difficulty breathing  · Fever of 100.4ºF (38ºC) or higher, or as directed by your healthcare provider  · Unexpected weight loss  · Coughing up large amounts of colored sputum  · Night sweats (sheets and pajamas get soaking wet)  Call 911, or get immediate medical care  Contact emergency services right away if any of these occur:  · Coughing up blood  · Moderate to severe trouble breathing or wheezing  Date Last Reviewed: 9/13/2015  © 9392-3005 [email or web address]. [address] [place] [address], [place]  [address]. All rights reserved. This information is not intended as a substitute for professional medical care. Always follow your healthcare professional's instructions.        Acute Bronchitis  Your healthcare provider has told you that you have acute bronchitis. Bronchitis is infection or inflammation of the bronchial tubes (airways in the lungs). Normally, air moves easily in and out of the airways. Bronchitis narrows the airways, making it harder for air to flow in and out of the lungs. This causes symptoms such as shortness of breath, coughing up yellow or green mucus, and wheezing. Bronchitis can be acute or chronic. Acute means the condition comes on quickly and goes away in a short time, usually within 3 to 10 days. Chronic means a condition lasts a long time and often comes back.    What causes acute bronchitis?  Acute bronchitis almost always starts as a viral respiratory infection, such as a cold or the flu. Certain factors make it more likely for a cold or flu to turn into bronchitis. These include being very young, being elderly, having a heart or lung problem, or having a weak immune system. Cigarette smoking also makes bronchitis more likely.  When bronchitis develops, the airways become swollen. The airways may also become infected with bacteria. This is known as a secondary infection.  Diagnosing acute bronchitis  Your healthcare provider will examine you and ask about your symptoms and health history. You may also have a sputum culture to test the fluid in your lungs. Chest X-rays may be done to look for infection in the lungs.  Treating acute bronchitis  Bronchitis usually clears up as the cold or flu goes away. You can help feel better faster by doing the following:  · Take medicine as directed. You may be told to take ibuprofen or other over-the-counter medicines. These help relieve inflammation in your bronchial tubes. Your healthcare provider may prescribe an inhaler to help open up the bronchial  tubes. Most of the time, acute bronchitis is caused by a viral infection. Antibiotics are usually not prescribed for viral infections.  · Drink plenty of fluids, such as water, juice, or warm soup. Fluids loosen mucus so that you can cough it up. This helps you breathe more easily. Fluids also prevent dehydration.  · Make sure you get plenty of rest.  · Do not smoke. Do not allow anyone else to smoke in your home.  Recovery and follow-up  Follow up with your doctor as you are told. You will likely feel better in a week or two. But a dry cough can linger beyond that time. Let your doctor know if you still have symptoms (other than a dry cough) after 2 weeks, or if youre prone to getting bronchial infections. Take steps to protect yourself from future infections. These steps include stopping smoking and avoiding tobacco smoke, washing your hands often, and getting a yearly flu shot.  When to call your healthcare provider  Call the healthcare provider if you have any of the following:  · Fever of 100.4°F (38.0°C) or higher, or as advised  · Symptoms that get worse, or new symptoms  · Trouble breathing  · Symptoms that dont start to improve within a week, or within 3 days of taking antibiotics   Date Last Reviewed: 12/1/2016  © 2096-4356 [employer]. [address] [place] [address], [place] [address]. All rights reserved. This information is not intended as a substitute for professional medical care. Always follow your healthcare professional's instructions.

## 2022-03-31 ENCOUNTER — OFFICE VISIT (OUTPATIENT)
Dept: URGENT CARE | Facility: CLINIC | Age: 42
End: 2022-03-31
Payer: COMMERCIAL

## 2022-03-31 VITALS
RESPIRATION RATE: 17 BRPM | OXYGEN SATURATION: 96 % | WEIGHT: 248 LBS | BODY MASS INDEX: 41.32 KG/M2 | SYSTOLIC BLOOD PRESSURE: 139 MMHG | HEIGHT: 65 IN | TEMPERATURE: 98 F | HEART RATE: 84 BPM | DIASTOLIC BLOOD PRESSURE: 91 MMHG

## 2022-03-31 DIAGNOSIS — N30.01 ACUTE CYSTITIS WITH HEMATURIA: ICD-10-CM

## 2022-03-31 DIAGNOSIS — R30.0 DYSURIA: Primary | ICD-10-CM

## 2022-03-31 LAB
BILIRUB UR QL STRIP: NEGATIVE
GLUCOSE UR QL STRIP: NEGATIVE
KETONES UR QL STRIP: NEGATIVE
LEUKOCYTE ESTERASE UR QL STRIP: NEGATIVE
PH, POC UA: 5 (ref 5–8)
POC BLOOD, URINE: POSITIVE
POC NITRATES, URINE: NEGATIVE
PROT UR QL STRIP: NEGATIVE
SP GR UR STRIP: 1.01 (ref 1–1.03)
UROBILINOGEN UR STRIP-ACNC: ABNORMAL (ref 0.1–1.1)

## 2022-03-31 PROCEDURE — 3008F BODY MASS INDEX DOCD: CPT | Mod: CPTII,S$GLB,,

## 2022-03-31 PROCEDURE — 3075F PR MOST RECENT SYSTOLIC BLOOD PRESS GE 130-139MM HG: ICD-10-PCS | Mod: CPTII,S$GLB,,

## 2022-03-31 PROCEDURE — 3075F SYST BP GE 130 - 139MM HG: CPT | Mod: CPTII,S$GLB,,

## 2022-03-31 PROCEDURE — 81003 URINALYSIS AUTO W/O SCOPE: CPT | Mod: QW,S$GLB,,

## 2022-03-31 PROCEDURE — 99213 PR OFFICE/OUTPT VISIT, EST, LEVL III, 20-29 MIN: ICD-10-PCS | Mod: S$GLB,,,

## 2022-03-31 PROCEDURE — 1160F RVW MEDS BY RX/DR IN RCRD: CPT | Mod: CPTII,S$GLB,,

## 2022-03-31 PROCEDURE — 1159F PR MEDICATION LIST DOCUMENTED IN MEDICAL RECORD: ICD-10-PCS | Mod: CPTII,S$GLB,,

## 2022-03-31 PROCEDURE — 1159F MED LIST DOCD IN RCRD: CPT | Mod: CPTII,S$GLB,,

## 2022-03-31 PROCEDURE — 1160F PR REVIEW ALL MEDS BY PRESCRIBER/CLIN PHARMACIST DOCUMENTED: ICD-10-PCS | Mod: CPTII,S$GLB,,

## 2022-03-31 PROCEDURE — 3080F PR MOST RECENT DIASTOLIC BLOOD PRESSURE >= 90 MM HG: ICD-10-PCS | Mod: CPTII,S$GLB,,

## 2022-03-31 PROCEDURE — 87086 URINE CULTURE/COLONY COUNT: CPT

## 2022-03-31 PROCEDURE — 3080F DIAST BP >= 90 MM HG: CPT | Mod: CPTII,S$GLB,,

## 2022-03-31 PROCEDURE — 3008F PR BODY MASS INDEX (BMI) DOCUMENTED: ICD-10-PCS | Mod: CPTII,S$GLB,,

## 2022-03-31 PROCEDURE — 81003 POCT URINALYSIS, DIPSTICK, AUTOMATED, W/O SCOPE: ICD-10-PCS | Mod: QW,S$GLB,,

## 2022-03-31 PROCEDURE — 99213 OFFICE O/P EST LOW 20 MIN: CPT | Mod: S$GLB,,,

## 2022-03-31 RX ORDER — PHENAZOPYRIDINE HYDROCHLORIDE 200 MG/1
200 TABLET, FILM COATED ORAL 3 TIMES DAILY PRN
Qty: 30 TABLET | Refills: 0 | Status: SHIPPED | OUTPATIENT
Start: 2022-03-31 | End: 2022-04-10

## 2022-03-31 RX ORDER — NITROFURANTOIN 25; 75 MG/1; MG/1
100 CAPSULE ORAL 2 TIMES DAILY
Qty: 10 CAPSULE | Refills: 0 | Status: SHIPPED | OUTPATIENT
Start: 2022-03-31 | End: 2022-04-05

## 2022-03-31 NOTE — PATIENT INSTRUCTIONS
- We have sent out your urine for a urine culture. We will call you once the results come back and change your antibiotic if appropriate.   - Take pyridium for your symptoms. Beware, this will turn your urine orange and can turn tears orange.    - You have been given an antibiotic to treat your condition today.    - Please complete the antibiotic as directed on the bottle.   - If you are female and on oral birth control pills, use additional methods to prevent pregnancy while on antibiotics and for one cycle after.   - you can take over-the-counter probiotics during and after antibiotic use to help preserve gut sharon and reduce gastrointestinal symptoms        - Rest.    - Drink plenty of fluids.    - Acetaminophen (tylenol) or Ibuprofen (advil,motrin) as directed as needed for fever/pain. Avoid tylenol if you have a history of liver disease. Do not take ibuprofen if you have a history of GI bleeding, kidney disease, or if you take blood thinners.     - You must understand that you have received an Urgent Care treatment only and that you may be released before all of your medical problems are known or treated.   - You, the patient, will arrange for follow up care as instructed.   - If your condition worsens or fails to improve we recommend that you receive another evaluation at the ER immediately or contact your PCP to discuss your concerns or return here.   - Follow up with your PCP or specialty clinic as directed in the next 1-2 weeks if not improved or as needed.  You can call [phone] to schedule an appointment with the appropriate provider.    If your symptoms do not improve or worsen, go to the emergency room immediately.

## 2022-04-02 LAB — BACTERIA UR CULT: NORMAL

## 2022-04-04 ENCOUNTER — TELEPHONE (OUTPATIENT)
Dept: URGENT CARE | Facility: CLINIC | Age: 42
End: 2022-04-04
Payer: COMMERCIAL

## 2022-04-04 NOTE — TELEPHONE ENCOUNTER
Pt did not respond. Left message.     ----- Message from Fany Ca MD sent at 4/4/2022 12:12 PM CDT -----  Ok to notify patient that her urine culture did not grow any bacteria. This indicates no signs of infection at this time. It is ok to complete the antibiotic if already started. Follow up with PCP or gynecologist if symptoms persist.

## 2022-08-05 ENCOUNTER — OFFICE VISIT (OUTPATIENT)
Dept: URGENT CARE | Facility: CLINIC | Age: 42
End: 2022-08-05
Payer: COMMERCIAL

## 2022-08-05 ENCOUNTER — TELEPHONE (OUTPATIENT)
Dept: URGENT CARE | Facility: CLINIC | Age: 42
End: 2022-08-05
Payer: COMMERCIAL

## 2022-08-05 VITALS
BODY MASS INDEX: 40.18 KG/M2 | HEART RATE: 91 BPM | TEMPERATURE: 98 F | RESPIRATION RATE: 20 BRPM | WEIGHT: 250 LBS | HEIGHT: 66 IN | OXYGEN SATURATION: 97 % | DIASTOLIC BLOOD PRESSURE: 72 MMHG | SYSTOLIC BLOOD PRESSURE: 104 MMHG

## 2022-08-05 DIAGNOSIS — R07.89 CHEST DISCOMFORT: Primary | ICD-10-CM

## 2022-08-05 DIAGNOSIS — Z76.89 ESTABLISHING CARE WITH NEW DOCTOR, ENCOUNTER FOR: ICD-10-CM

## 2022-08-05 PROCEDURE — 71046 XR CHEST PA AND LATERAL: ICD-10-PCS | Mod: FY,S$GLB,, | Performed by: RADIOLOGY

## 2022-08-05 PROCEDURE — 3008F BODY MASS INDEX DOCD: CPT | Mod: CPTII,S$GLB,, | Performed by: NURSE PRACTITIONER

## 2022-08-05 PROCEDURE — 99214 OFFICE O/P EST MOD 30 MIN: CPT | Mod: S$GLB,,, | Performed by: NURSE PRACTITIONER

## 2022-08-05 PROCEDURE — 1159F PR MEDICATION LIST DOCUMENTED IN MEDICAL RECORD: ICD-10-PCS | Mod: CPTII,S$GLB,, | Performed by: NURSE PRACTITIONER

## 2022-08-05 PROCEDURE — 3074F PR MOST RECENT SYSTOLIC BLOOD PRESSURE < 130 MM HG: ICD-10-PCS | Mod: CPTII,S$GLB,, | Performed by: NURSE PRACTITIONER

## 2022-08-05 PROCEDURE — 1159F MED LIST DOCD IN RCRD: CPT | Mod: CPTII,S$GLB,, | Performed by: NURSE PRACTITIONER

## 2022-08-05 PROCEDURE — 3078F DIAST BP <80 MM HG: CPT | Mod: CPTII,S$GLB,, | Performed by: NURSE PRACTITIONER

## 2022-08-05 PROCEDURE — 3078F PR MOST RECENT DIASTOLIC BLOOD PRESSURE < 80 MM HG: ICD-10-PCS | Mod: CPTII,S$GLB,, | Performed by: NURSE PRACTITIONER

## 2022-08-05 PROCEDURE — 3008F PR BODY MASS INDEX (BMI) DOCUMENTED: ICD-10-PCS | Mod: CPTII,S$GLB,, | Performed by: NURSE PRACTITIONER

## 2022-08-05 PROCEDURE — 99214 PR OFFICE/OUTPT VISIT, EST, LEVL IV, 30-39 MIN: ICD-10-PCS | Mod: S$GLB,,, | Performed by: NURSE PRACTITIONER

## 2022-08-05 PROCEDURE — 3074F SYST BP LT 130 MM HG: CPT | Mod: CPTII,S$GLB,, | Performed by: NURSE PRACTITIONER

## 2022-08-05 PROCEDURE — 71046 X-RAY EXAM CHEST 2 VIEWS: CPT | Mod: FY,S$GLB,, | Performed by: RADIOLOGY

## 2022-08-05 RX ORDER — LIDOCAINE HYDROCHLORIDE 20 MG/ML
10 SOLUTION OROPHARYNGEAL
Status: DISCONTINUED | OUTPATIENT
Start: 2022-08-05 | End: 2022-08-05 | Stop reason: HOSPADM

## 2022-08-05 RX ORDER — MAG HYDROX/ALUMINUM HYD/SIMETH 200-200-20
30 SUSPENSION, ORAL (FINAL DOSE FORM) ORAL
Status: DISCONTINUED | OUTPATIENT
Start: 2022-08-05 | End: 2022-08-05 | Stop reason: HOSPADM

## 2022-08-05 RX ADMIN — Medication 30 ML: at 10:08

## 2022-08-05 RX ADMIN — LIDOCAINE HYDROCHLORIDE 10 ML: 20 SOLUTION OROPHARYNGEAL at 10:08

## 2022-08-05 NOTE — PATIENT INSTRUCTIONS
Patient Instructions      Red Flag signs include; thunderclap headache, weakness, blurry vision or other sudden sensory deficit, new onset numbness or tingling, shortness of breath, chest pain, nausea, sweating or fatigue. Please go immediately to the Emergency Department with any of these signs.     Follow up with PCP or cardiologist as scheduled to wear your monitor. If you develop CP accompanied by shortness of breath go to ER.         GO STRAIGHT TO THE ER AND DO NOT EAT OR DRINK ANYTHING UNLESS A HEALTHCARE PROVIDER GIVES IT TO YOU.

## 2022-08-05 NOTE — PROGRESS NOTES
"Subjective:       Patient ID: Teagan Griffith is a 42 y.o. female.    Vitals:  height is 5' 6" (1.676 m) and weight is 113.4 kg (250 lb). Her oral temperature is 97.7 °F (36.5 °C). Her blood pressure is 104/72 and her pulse is 91. Her respiration is 20 and oxygen saturation is 97%.     Chief Complaint: Chest Pain    Patient states chest pain been going on for a few weeks off and on. She thought it was acid reflux so she took medication for it but no relief. Moving positions make the pain worse. The pain goes from the middle of her chest to her left and right side. She states she sometimes get pain in the lower back part of neck.    PROVIDER NOTE:    42-year-old female presents to Urgent for evaluation of chest discomfort that has been going on for weeks.  Patient states pain is worse when lying down.  She is soon there was acid reflux so she took some omeprazole from her .  States that it did not work.  Changing position makes pain worse.  It does radiate across the chest at times. Non- exertional pain. Patient also reports intermittent neck pain.  She denies shortness of breath, palpitations, difficulty breathing, blurred vision and dizziness.    Chest Pain   This is a new problem. The current episode started 1 to 4 weeks ago (a few weeks ago). The problem has been waxing and waning. Pain location: moves from middle to both sides. The pain is at a severity of 5/10. The pain is moderate. The pain does not radiate. Pertinent negatives include no abdominal pain, back pain, claudication, cough, diaphoresis, dizziness, exertional chest pressure, fever, headaches, hemoptysis, irregular heartbeat, leg pain, lower extremity edema, malaise/fatigue, nausea, near-syncope, numbness, orthopnea, palpitations, PND, shortness of breath, sputum production, syncope, vomiting or weakness. Associated with: moving positions. She has tried antacids for the symptoms. The treatment provided no relief.   Pertinent negatives for " past medical history include no aneurysm, no anxiety/panic attacks, no aortic aneurysm, no aortic dissection, no arrhythmia, no bicuspid aortic valve, no CAD, no cancer, no congenital heart disease, no connective tissue disease, no COPD, no CHF, no diabetes, no DVT, no hyperhomocysteinemia, no hyperlipidemia, no hypertension, no Kawasaki disease, no Marfan's syndrome, no MI, no mitral valve prolapse, no pacemaker, no PE, no PVD, no recent injury, no rheumatic fever, no seizures, no sickle cell disease, no sleep apnea, no spontaneous pneumothorax, no stimulant use, no strokes, no thyroid problem, no TIA, Doan syndrome and no valve disorder.   Pertinent negatives for family medical history include: no aortic dissection, no CAD, no connective tissue disease, no diabetes, no heart disease, no hyperlipidemia, no hypertension, no Marfan's syndrome, no early MI, no PE, no PVD, no sickle cell disease, no stroke, no sudden death and no TIA.       Constitution: Negative for sweating and fever.   Cardiovascular: Positive for chest pain. Negative for palpitations, sob on exertion and passing out.   Respiratory: Negative for chest tightness, cough, sputum production, bloody sputum, COPD and shortness of breath.    Gastrointestinal: Negative for abdominal pain, nausea and vomiting.   Musculoskeletal: Negative for back pain.   Neurological: Negative for dizziness, headaches, numbness and seizures.       Objective:      Physical Exam   HENT:   Head: Normocephalic and atraumatic.   Ears:   Right Ear: External ear normal.   Left Ear: External ear normal.   Eyes: Extraocular movement intact   Cardiovascular: Normal rate, regular rhythm and normal pulses.   Pulmonary/Chest: Effort normal and breath sounds normal. No accessory muscle usage or stridor. No apnea, no tachypnea and no bradypnea. No respiratory distress. She has no wheezes. She has no rhonchi. She has no rales. She exhibits no mass, no tenderness, no laceration, no  crepitus, no edema, no deformity, no swelling and no retraction.       Abdominal: Normal appearance. Soft.   Neurological: no focal deficit. She is alert.   Nursing note and vitals reviewed.           XR CHEST PA AND LATERAL    Result Date: 8/5/2022  EXAMINATION: XR CHEST PA AND LATERAL CLINICAL HISTORY: Other chest pain TECHNIQUE: PA and lateral views of the chest were performed. COMPARISON: None FINDINGS: Heart size normal.  The lungs are clear.  No pleural effusion     See above Electronically signed by: Rigo Gonsalez MD Date:    08/05/2022 Time:    10:48    Unsure if GI cocktail improved symptoms  Assessment:       1. Chest discomfort    2. Establishing care with new doctor, encounter for          Plan:     No definitive cause of chest pain determined in clinic   Pt will go to [facility] ER.  EKG WNL  EKG with no acute changes or ischemia. Will have pt to follow up in ED to rule out emergent cardiac diagnosis. She is aware that we can not rule out Acute MI, aortic stenosis, Myocardial ischemia, and PE in this setting. States she will go to ED. Refused EMS transport. States  will transport her.     Chest discomfort  -     LIDOcaine HCl 2% oral solution 10 mL  -     aluminum-magnesium hydroxide-simethicone 200-200-20 mg/5 mL suspension 30 mL  -     XR CHEST PA AND LATERAL; Future; Expected date: 08/05/2022  -     Ambulatory referral/consult to Family Practice  -     Refer to Emergency Dept.    Establishing care with new doctor, encounter for  -     Ambulatory referral/consult to Cardiology              Additional MDM:   Differential Diagnosis:   Symptom: Chest pain. <> The follow diagnoses were considered and will be evaluated: Cervical Radiculitis and Chest Wall Pain. The follow diagnoses were considered, but were felt to be of low probability: Acute MI, Aortic Stenosis, Atypical Chest Pain and Myocardial Ischemia.       Heart Failure Score:   COPD = No    Patient Instructions     Patient Instructions      Red  Flag signs include; thunderclap headache, weakness, blurry vision or other sudden sensory deficit, new onset numbness or tingling, shortness of breath, chest pain, nausea, sweating or fatigue. Please go immediately to the Emergency Department with any of these signs.     Follow up with PCP or cardiologist as scheduled to wear your monitor. If you develop CP accompanied by shortness of breath go to ER.         GO STRAIGHT TO THE ER AND DO NOT EAT OR DRINK ANYTHING UNLESS A HEALTHCARE PROVIDER GIVES IT TO YOU.

## 2022-08-05 NOTE — TELEPHONE ENCOUNTER
Called to follow up with patient. Pt states that she couldn't go today. States that she will go tomorrow. Was able to lay down. Pain started with change of position. States she has been sitting up for a while with no pain. Also took Gas X and unsure if it worked. States Tylenol helped with discomfort.

## 2022-08-06 ENCOUNTER — HOSPITAL ENCOUNTER (OUTPATIENT)
Facility: HOSPITAL | Age: 42
Discharge: HOME | End: 2022-08-07
Attending: EMERGENCY MEDICINE | Admitting: FAMILY MEDICINE
Payer: COMMERCIAL

## 2022-08-06 DIAGNOSIS — N28.89 RENAL MASS: Primary | ICD-10-CM

## 2022-08-06 DIAGNOSIS — E66.01 MORBID OBESITY DUE TO EXCESS CALORIES: ICD-10-CM

## 2022-08-06 DIAGNOSIS — R07.9 CHEST PAIN: ICD-10-CM

## 2022-08-06 DIAGNOSIS — R07.89 CHEST WALL PAIN: ICD-10-CM

## 2022-08-06 DIAGNOSIS — N28.89 RENAL MASS OF UNKNOWN NATURE: ICD-10-CM

## 2022-08-06 LAB
ALBUMIN SERPL BCP-MCNC: 3.7 G/DL (ref 3.5–5.2)
ALP SERPL-CCNC: 61 U/L (ref 55–135)
ALT SERPL W/O P-5'-P-CCNC: 19 U/L (ref 10–44)
ANION GAP SERPL CALC-SCNC: 9 MMOL/L (ref 8–16)
AST SERPL-CCNC: 12 U/L (ref 10–40)
BACTERIA #/AREA URNS AUTO: ABNORMAL /HPF
BASOPHILS # BLD AUTO: 0.02 K/UL (ref 0–0.2)
BASOPHILS NFR BLD: 0.2 % (ref 0–1.9)
BILIRUB SERPL-MCNC: 0.4 MG/DL (ref 0.1–1)
BILIRUB UR QL STRIP: NEGATIVE
BNP SERPL-MCNC: 25 PG/ML (ref 0–99)
BUN SERPL-MCNC: 15 MG/DL (ref 6–20)
CALCIUM SERPL-MCNC: 9.5 MG/DL (ref 8.7–10.5)
CHLORIDE SERPL-SCNC: 104 MMOL/L (ref 95–110)
CLARITY UR REFRACT.AUTO: ABNORMAL
CO2 SERPL-SCNC: 24 MMOL/L (ref 23–29)
COLOR UR AUTO: YELLOW
CREAT SERPL-MCNC: 0.8 MG/DL (ref 0.5–1.4)
D DIMER PPP IA.FEU-MCNC: 0.81 MG/L FEU
DIFFERENTIAL METHOD: ABNORMAL
EOSINOPHIL # BLD AUTO: 0.3 K/UL (ref 0–0.5)
EOSINOPHIL NFR BLD: 2.8 % (ref 0–8)
ERYTHROCYTE [DISTWIDTH] IN BLOOD BY AUTOMATED COUNT: 12.1 % (ref 11.5–14.5)
EST. GFR  (NO RACE VARIABLE): >60 ML/MIN/1.73 M^2
GLUCOSE SERPL-MCNC: 114 MG/DL (ref 70–110)
GLUCOSE UR QL STRIP: NEGATIVE
HCT VFR BLD AUTO: 32.4 % (ref 37–48.5)
HGB BLD-MCNC: 10.8 G/DL (ref 12–16)
HGB UR QL STRIP: NEGATIVE
HYALINE CASTS UR QL AUTO: 0 /LPF
IMM GRANULOCYTES # BLD AUTO: 0.04 K/UL (ref 0–0.04)
IMM GRANULOCYTES NFR BLD AUTO: 0.4 % (ref 0–0.5)
KETONES UR QL STRIP: NEGATIVE
LEUKOCYTE ESTERASE UR QL STRIP: ABNORMAL
LYMPHOCYTES # BLD AUTO: 2 K/UL (ref 1–4.8)
LYMPHOCYTES NFR BLD: 20.3 % (ref 18–48)
MCH RBC QN AUTO: 29 PG (ref 27–31)
MCHC RBC AUTO-ENTMCNC: 33.3 G/DL (ref 32–36)
MCV RBC AUTO: 87 FL (ref 82–98)
MICROSCOPIC COMMENT: ABNORMAL
MONOCYTES # BLD AUTO: 0.8 K/UL (ref 0.3–1)
MONOCYTES NFR BLD: 8.1 % (ref 4–15)
NEUTROPHILS # BLD AUTO: 6.7 K/UL (ref 1.8–7.7)
NEUTROPHILS NFR BLD: 68.2 % (ref 38–73)
NITRITE UR QL STRIP: NEGATIVE
NRBC BLD-RTO: 0 /100 WBC
PH UR STRIP: 5 [PH] (ref 5–8)
PLATELET # BLD AUTO: 282 K/UL (ref 150–450)
PMV BLD AUTO: 10.2 FL (ref 9.2–12.9)
POTASSIUM SERPL-SCNC: 3.9 MMOL/L (ref 3.5–5.1)
PROT SERPL-MCNC: 6.6 G/DL (ref 6–8.4)
PROT UR QL STRIP: ABNORMAL
RBC # BLD AUTO: 3.72 M/UL (ref 4–5.4)
RBC #/AREA URNS AUTO: 14 /HPF (ref 0–4)
SARS-COV-2 RDRP RESP QL NAA+PROBE: NEGATIVE
SODIUM SERPL-SCNC: 137 MMOL/L (ref 136–145)
SP GR UR STRIP: 1.01 (ref 1–1.03)
SQUAMOUS #/AREA URNS AUTO: 13 /HPF
TROPONIN I SERPL DL<=0.01 NG/ML-MCNC: <0.006 NG/ML (ref 0–0.03)
URN SPEC COLLECT METH UR: ABNORMAL
WBC # BLD AUTO: 9.85 K/UL (ref 3.9–12.7)
WBC #/AREA URNS AUTO: 34 /HPF (ref 0–5)

## 2022-08-06 PROCEDURE — 93010 EKG 12-LEAD: ICD-10-PCS | Mod: ,,, | Performed by: INTERNAL MEDICINE

## 2022-08-06 PROCEDURE — 93005 ELECTROCARDIOGRAM TRACING: CPT

## 2022-08-06 PROCEDURE — 96375 TX/PRO/DX INJ NEW DRUG ADDON: CPT | Mod: 59

## 2022-08-06 PROCEDURE — G0378 HOSPITAL OBSERVATION PER HR: HCPCS

## 2022-08-06 PROCEDURE — 85025 COMPLETE CBC W/AUTO DIFF WBC: CPT

## 2022-08-06 PROCEDURE — 99220 PR INITIAL OBSERVATION CARE,LEVL III: ICD-10-PCS | Mod: ,,, | Performed by: FAMILY MEDICINE

## 2022-08-06 PROCEDURE — 99284 EMERGENCY DEPT VISIT MOD MDM: CPT | Mod: ,,, | Performed by: EMERGENCY MEDICINE

## 2022-08-06 PROCEDURE — 25000003 PHARM REV CODE 250: Performed by: FAMILY MEDICINE

## 2022-08-06 PROCEDURE — 96374 THER/PROPH/DIAG INJ IV PUSH: CPT

## 2022-08-06 PROCEDURE — 80053 COMPREHEN METABOLIC PANEL: CPT

## 2022-08-06 PROCEDURE — 83880 ASSAY OF NATRIURETIC PEPTIDE: CPT

## 2022-08-06 PROCEDURE — 63600175 PHARM REV CODE 636 W HCPCS: Performed by: FAMILY MEDICINE

## 2022-08-06 PROCEDURE — 96376 TX/PRO/DX INJ SAME DRUG ADON: CPT

## 2022-08-06 PROCEDURE — 88112 PR  CYTOPATH, CELL ENHANCE TECH: ICD-10-PCS | Mod: 26,,, | Performed by: STUDENT IN AN ORGANIZED HEALTH CARE EDUCATION/TRAINING PROGRAM

## 2022-08-06 PROCEDURE — 85379 FIBRIN DEGRADATION QUANT: CPT

## 2022-08-06 PROCEDURE — 96376 TX/PRO/DX INJ SAME DRUG ADON: CPT | Mod: 59

## 2022-08-06 PROCEDURE — 99284 PR EMERGENCY DEPT VISIT,LEVEL IV: ICD-10-PCS | Mod: ,,, | Performed by: EMERGENCY MEDICINE

## 2022-08-06 PROCEDURE — 87086 URINE CULTURE/COLONY COUNT: CPT | Performed by: FAMILY MEDICINE

## 2022-08-06 PROCEDURE — 63600175 PHARM REV CODE 636 W HCPCS

## 2022-08-06 PROCEDURE — 88112 CYTOPATH CELL ENHANCE TECH: CPT | Performed by: STUDENT IN AN ORGANIZED HEALTH CARE EDUCATION/TRAINING PROGRAM

## 2022-08-06 PROCEDURE — 96361 HYDRATE IV INFUSION ADD-ON: CPT

## 2022-08-06 PROCEDURE — 93010 ELECTROCARDIOGRAM REPORT: CPT | Mod: ,,, | Performed by: INTERNAL MEDICINE

## 2022-08-06 PROCEDURE — 84484 ASSAY OF TROPONIN QUANT: CPT

## 2022-08-06 PROCEDURE — 25500020 PHARM REV CODE 255: Performed by: EMERGENCY MEDICINE

## 2022-08-06 PROCEDURE — 88112 CYTOPATH CELL ENHANCE TECH: CPT | Mod: 26,,, | Performed by: STUDENT IN AN ORGANIZED HEALTH CARE EDUCATION/TRAINING PROGRAM

## 2022-08-06 PROCEDURE — 99285 EMERGENCY DEPT VISIT HI MDM: CPT | Mod: 25

## 2022-08-06 PROCEDURE — 25000003 PHARM REV CODE 250

## 2022-08-06 PROCEDURE — 99220 PR INITIAL OBSERVATION CARE,LEVL III: CPT | Mod: ,,, | Performed by: FAMILY MEDICINE

## 2022-08-06 PROCEDURE — U0002 COVID-19 LAB TEST NON-CDC: HCPCS | Performed by: EMERGENCY MEDICINE

## 2022-08-06 PROCEDURE — 81001 URINALYSIS AUTO W/SCOPE: CPT | Performed by: FAMILY MEDICINE

## 2022-08-06 RX ORDER — MORPHINE SULFATE 4 MG/ML
4 INJECTION, SOLUTION INTRAMUSCULAR; INTRAVENOUS
Status: COMPLETED | OUTPATIENT
Start: 2022-08-06 | End: 2022-08-06

## 2022-08-06 RX ORDER — HYDROCODONE BITARTRATE AND ACETAMINOPHEN 10; 325 MG/1; MG/1
1 TABLET ORAL EVERY 6 HOURS PRN
Status: DISCONTINUED | OUTPATIENT
Start: 2022-08-06 | End: 2022-08-07 | Stop reason: HOSPADM

## 2022-08-06 RX ORDER — ALPRAZOLAM 0.5 MG/1
0.5 TABLET ORAL 3 TIMES DAILY PRN
Status: DISCONTINUED | OUTPATIENT
Start: 2022-08-06 | End: 2022-08-07 | Stop reason: HOSPADM

## 2022-08-06 RX ORDER — NAPROXEN SODIUM 220 MG
440 TABLET ORAL 2 TIMES DAILY PRN
Status: ON HOLD | COMMUNITY
End: 2022-08-07 | Stop reason: HOSPADM

## 2022-08-06 RX ORDER — NAPROXEN 500 MG/1
500 TABLET ORAL 2 TIMES DAILY WITH MEALS
Qty: 10 TABLET | Refills: 0 | Status: SHIPPED | OUTPATIENT
Start: 2022-08-06 | End: 2022-08-07 | Stop reason: SDUPTHER

## 2022-08-06 RX ORDER — SERTRALINE HYDROCHLORIDE 50 MG/1
50 TABLET, FILM COATED ORAL DAILY
Status: DISCONTINUED | OUTPATIENT
Start: 2022-08-06 | End: 2022-08-07 | Stop reason: HOSPADM

## 2022-08-06 RX ORDER — ONDANSETRON 2 MG/ML
8 INJECTION INTRAMUSCULAR; INTRAVENOUS EVERY 8 HOURS PRN
Status: DISCONTINUED | OUTPATIENT
Start: 2022-08-06 | End: 2022-08-07 | Stop reason: HOSPADM

## 2022-08-06 RX ORDER — ACETAMINOPHEN 500 MG
1000 TABLET ORAL EVERY 6 HOURS PRN
COMMUNITY
End: 2022-12-14 | Stop reason: SDUPTHER

## 2022-08-06 RX ORDER — KETOROLAC TROMETHAMINE 30 MG/ML
30 INJECTION, SOLUTION INTRAMUSCULAR; INTRAVENOUS EVERY 6 HOURS PRN
Status: DISCONTINUED | OUTPATIENT
Start: 2022-08-06 | End: 2022-08-07 | Stop reason: HOSPADM

## 2022-08-06 RX ORDER — KETOROLAC TROMETHAMINE 30 MG/ML
10 INJECTION, SOLUTION INTRAMUSCULAR; INTRAVENOUS
Status: COMPLETED | OUTPATIENT
Start: 2022-08-06 | End: 2022-08-06

## 2022-08-06 RX ORDER — LAMOTRIGINE 100 MG/1
200 TABLET ORAL DAILY
Status: DISCONTINUED | OUTPATIENT
Start: 2022-08-06 | End: 2022-08-07 | Stop reason: HOSPADM

## 2022-08-06 RX ORDER — HYDROCODONE BITARTRATE AND ACETAMINOPHEN 5; 325 MG/1; MG/1
1 TABLET ORAL EVERY 6 HOURS PRN
Status: DISCONTINUED | OUTPATIENT
Start: 2022-08-06 | End: 2022-08-07 | Stop reason: HOSPADM

## 2022-08-06 RX ORDER — RISPERIDONE 1 MG/1
4 TABLET ORAL NIGHTLY
Status: DISCONTINUED | OUTPATIENT
Start: 2022-08-06 | End: 2022-08-07 | Stop reason: HOSPADM

## 2022-08-06 RX ADMIN — SERTRALINE HYDROCHLORIDE 50 MG: 50 TABLET ORAL at 02:08

## 2022-08-06 RX ADMIN — LAMOTRIGINE 200 MG: 100 TABLET ORAL at 02:08

## 2022-08-06 RX ADMIN — HYDROCODONE BITARTRATE AND ACETAMINOPHEN 1 TABLET: 10; 325 TABLET ORAL at 03:08

## 2022-08-06 RX ADMIN — KETOROLAC TROMETHAMINE 30 MG: 30 INJECTION, SOLUTION INTRAMUSCULAR at 05:08

## 2022-08-06 RX ADMIN — KETOROLAC TROMETHAMINE 10 MG: 30 INJECTION, SOLUTION INTRAMUSCULAR; INTRAVENOUS at 04:08

## 2022-08-06 RX ADMIN — IOHEXOL 100 ML: 350 INJECTION, SOLUTION INTRAVENOUS at 05:08

## 2022-08-06 RX ADMIN — SODIUM CHLORIDE 500 ML: 0.9 INJECTION, SOLUTION INTRAVENOUS at 05:08

## 2022-08-06 RX ADMIN — MORPHINE SULFATE 4 MG: 4 INJECTION INTRAVENOUS at 05:08

## 2022-08-06 RX ADMIN — MORPHINE SULFATE 4 MG: 4 INJECTION INTRAVENOUS at 08:08

## 2022-08-06 NOTE — ED PROVIDER NOTES
ED Resident HAND-OFF NOTE:  6:15 AM 8/6/2022  Teagan Griffith is a 42 y.o. female who presented to the ED on 8/6/2022 and has been managed by  and Dr. Carr, who reports patient C/O intermittent chest pain that radiates to her neck and head.. I assumed care of patient from off-going ED physician team at 6:15 AM pending Second trop and CTA PE study.  CTA PE study displayed no signs of PE, but displayed Large partially visualized enhancing mass in the right kidney measuring at least 12 cm with adjacent fat stranding and prominent vessels highly concerning for renal cell carcinoma.  CT abdomen pelvis ordered to further evaluate renal mass.  Discussed findings with patient.  Repeat troponin negative.  Pain unlikely due to ACS.  Given additional 4 mg of morphine for pain control.  With new enlarged mass of the kidney with persistent pain discussed with Hospital Medicine who will admit patient for continued pain management and further workup of mass.  Plan discussed with patient who is agreeable with admission.    On my evaluation, Teagan Griffith appears well, hemodynamically stable and in NAD. Thus far, Teagan Griffith has received:  Medications   sodium chloride 0.9% bolus 500 mL (500 mLs Intravenous New Bag 8/6/22 0519)   ketorolac injection 9.999 mg (9.999 mg Intravenous Given 8/6/22 0448)   morphine injection 4 mg (4 mg Intravenous Given 8/6/22 0519)   iohexoL (OMNIPAQUE 350) injection 100 mL (100 mLs Intravenous Given 8/6/22 0546)           Disposition:  Patient admitted to observation  I have discussed and counseled Teagan Griffith regarding exam, results, diagnosis, treatment, and plan.  ______________________  Joe Funk MD  Emergency Medicine Resident  6:15 AM 8/6/2022           Joe Funk MD  Resident  08/06/22 4462

## 2022-08-06 NOTE — SUBJECTIVE & OBJECTIVE
Past Medical History:   Diagnosis Date    Depression        Past Surgical History:   Procedure Laterality Date     SECTION         Review of patient's allergies indicates:  No Known Allergies    No current facility-administered medications on file prior to encounter.     Current Outpatient Medications on File Prior to Encounter   Medication Sig    ALPRAZolam (XANAX) 0.5 MG tablet Take 0.5 mg by mouth 3 (three) times daily.    dextroamphetamine-amphetamine 10 mg Tab Take by mouth.    lamoTRIgine (LAMICTAL) 200 MG tablet     lamoTRIgine (LAMICTAL) 25 MG tablet Take 25 mg by mouth once daily.    risperiDONE (RISPERDAL) 0.25 MG Tab Take by mouth.    sertraline (ZOLOFT) 100 MG tablet 50 mg.     Family History    None       Tobacco Use    Smoking status: Former Smoker    Smokeless tobacco: Never Used   Substance and Sexual Activity    Alcohol use: Yes    Drug use: Not on file    Sexual activity: Not on file     Review of Systems   Constitutional:  Negative for activity change, appetite change, chills, diaphoresis, fatigue and fever.   HENT:  Negative for congestion, sinus pressure, sore throat and tinnitus.    Eyes:  Negative for visual disturbance.   Respiratory:  Negative for cough, chest tightness, shortness of breath and wheezing.    Cardiovascular:  Positive for chest pain. Negative for palpitations and leg swelling.   Gastrointestinal:  Negative for abdominal distention, abdominal pain, nausea and vomiting.   Endocrine: Negative for polydipsia, polyphagia and polyuria.   Genitourinary:  Negative for dysuria.   Musculoskeletal:  Negative for arthralgias and back pain.   Skin:  Negative for rash and wound.   Neurological:  Negative for dizziness, syncope, light-headedness and headaches.   Psychiatric/Behavioral:  Negative for confusion.      Objective:     Vital Signs (Most Recent):  Temp: 97.7 °F (36.5 °C) (22)  Pulse: 77 (22)  Resp: 20 (22)  BP: 107/62 (22)  SpO2:  95 % (08/06/22 0512) Vital Signs (24h Range):  Temp:  [97.7 °F (36.5 °C)] 97.7 °F (36.5 °C)  Pulse:  [77-89] 77  Resp:  [15-20] 20  SpO2:  [95 %-96 %] 95 %  BP: (107-123)/(62-66) 107/62        There is no height or weight on file to calculate BMI.    Physical Exam  Vitals and nursing note reviewed.   Constitutional:       General: She is not in acute distress.     Appearance: Normal appearance. She is well-developed. She is morbidly obese. She is not ill-appearing or toxic-appearing.   HENT:      Head: Normocephalic and atraumatic.      Right Ear: External ear normal.      Left Ear: External ear normal.      Nose: Nose normal.      Mouth/Throat:      Pharynx: Uvula midline.   Eyes:      General: Lids are normal.      Extraocular Movements: EOM normal.      Conjunctiva/sclera: Conjunctivae normal.      Pupils: Pupils are equal, round, and reactive to light.   Neck:      Thyroid: No thyroid mass.      Vascular: No JVD.      Trachea: Trachea normal.   Cardiovascular:      Rate and Rhythm: Normal rate and regular rhythm.      Heart sounds: Normal heart sounds, S1 normal and S2 normal.   Pulmonary:      Effort: Pulmonary effort is normal.      Breath sounds: Normal breath sounds.   Abdominal:      General: Bowel sounds are normal. There is no distension.      Palpations: Abdomen is soft.      Tenderness: There is no abdominal tenderness.   Musculoskeletal:      Cervical back: Full passive range of motion without pain, normal range of motion and neck supple.      Right lower leg: No edema.      Left lower leg: No edema.   Neurological:      Mental Status: She is alert.      Cranial Nerves: No cranial nerve deficit.      Sensory: No sensory deficit.   Psychiatric:         Speech: Speech normal.         Behavior: Behavior normal. Behavior is cooperative.         Thought Content: Thought content normal.         CRANIAL NERVES     CN III, IV, VI   Pupils are equal, round, and reactive to light.  Extraocular motions are  normal.      Significant Labs: All pertinent labs within the past 24 hours have been reviewed.  CBC:   Recent Labs   Lab 08/06/22  0325   WBC 9.85   HGB 10.8*   HCT 32.4*        CMP:   Recent Labs   Lab 08/06/22 0325      K 3.9      CO2 24   *   BUN 15   CREATININE 0.8   CALCIUM 9.5   PROT 6.6   ALBUMIN 3.7   BILITOT 0.4   ALKPHOS 61   AST 12   ALT 19   ANIONGAP 9     Cardiac Markers:   Recent Labs   Lab 08/06/22  0332   BNP 25     Magnesium: No results for input(s): MG in the last 48 hours.    Significant Imaging: I have reviewed all pertinent imaging results/findings within the past 24 hours.

## 2022-08-06 NOTE — ED PROVIDER NOTES
"Encounter Date: 2022       History     Chief Complaint   Patient presents with    Chest Pain     C/o intermittent chest pain x 2 days. Seen at urgent care, EKG and chest x-ray were done. Referred to come here for further evaluation. -cardiac history      42-year-old female with no pertinent past medical history presents to the ED complaining of intermittent chest pain that radiates to her neck and head that onset on Thursday afternoon.  Patient describes her pain to be lateral to her bilateral clavicles as well as substernal and rates the pain to be a 10/10 in intensity.  She states the pain is worsened when she is lying supine or changes positions.  She states that the pain feels like "air gas" and her chest.  She denies shortness of breath, abdominal pain, nausea, vomiting, diarrhea, weakness, fatigue, fever, chills, dysuria and hematuria.  No other complaints at this time.    The history is provided by the patient and the spouse.     Review of patient's allergies indicates:  No Known Allergies  Past Medical History:   Diagnosis Date    Depression      Past Surgical History:   Procedure Laterality Date     SECTION       History reviewed. No pertinent family history.  Social History     Tobacco Use    Smoking status: Former Smoker    Smokeless tobacco: Never Used   Substance Use Topics    Alcohol use: Yes     Review of Systems   Constitutional: Negative for activity change, chills and fever.   HENT: Negative for congestion, ear pain and sore throat.    Respiratory: Negative for shortness of breath and stridor.    Cardiovascular: Positive for chest pain. Negative for palpitations.   Gastrointestinal: Negative for abdominal pain, nausea and vomiting.   Genitourinary: Negative for dysuria and urgency.   Musculoskeletal: Positive for neck pain. Negative for back pain.   Skin: Negative for rash.   Allergic/Immunologic: Negative for environmental allergies, food allergies and immunocompromised state. "   Neurological: Positive for headaches. Negative for dizziness, syncope and weakness.   Hematological: Does not bruise/bleed easily.       Physical Exam     Initial Vitals [08/06/22 0227]   BP Pulse Resp Temp SpO2   123/66 89 15 97.7 °F (36.5 °C) 96 %      MAP       --         Physical Exam    Nursing note and vitals reviewed.  Constitutional: Vital signs are normal. She appears well-developed and well-nourished. She is not diaphoretic. She appears distressed.   Appears to be in mild distress due to symptoms.   HENT:   Head: Normocephalic and atraumatic.   Right Ear: External ear normal.   Left Ear: External ear normal.   Neck: Trachea normal. Neck supple. No thyroid mass present.   Normal range of motion.  Cardiovascular: Normal rate, regular rhythm, normal heart sounds and intact distal pulses. Exam reveals no gallop and no friction rub.    No murmur heard.  Pulmonary/Chest: Breath sounds normal. No respiratory distress. She has no wheezes. She has no rhonchi. She has no rales. She exhibits no tenderness.   No tenderness to palpation along the anterior chest wall.   Abdominal: Abdomen is soft. Bowel sounds are normal. She exhibits no distension. There is no abdominal tenderness. There is no rebound and no guarding.   Musculoskeletal:         General: No tenderness or edema. Normal range of motion.      Cervical back: Normal range of motion and neck supple.     Neurological: She is alert and oriented to person, place, and time. She has normal strength. No cranial nerve deficit or sensory deficit. GCS score is 15. GCS eye subscore is 4. GCS verbal subscore is 5. GCS motor subscore is 6.   Skin: Skin is warm and dry. Capillary refill takes less than 2 seconds. No rash noted.   Psychiatric: She has a normal mood and affect.         ED Course   Procedures  Labs Reviewed   CBC W/ AUTO DIFFERENTIAL - Abnormal; Notable for the following components:       Result Value    RBC 3.72 (*)     Hemoglobin 10.8 (*)     Hematocrit  32.4 (*)     All other components within normal limits   COMPREHENSIVE METABOLIC PANEL - Abnormal; Notable for the following components:    Glucose 114 (*)     All other components within normal limits   D DIMER, QUANTITATIVE - Abnormal; Notable for the following components:    D-Dimer 0.81 (*)     All other components within normal limits   URINALYSIS, REFLEX TO URINE CULTURE - Abnormal; Notable for the following components:    Appearance, UA Hazy (*)     Protein, UA 1+ (*)     Leukocytes, UA 1+ (*)     All other components within normal limits    Narrative:     Specimen Source->Urine   URINALYSIS MICROSCOPIC - Abnormal; Notable for the following components:    RBC, UA 14 (*)     WBC, UA 34 (*)     All other components within normal limits    Narrative:     Specimen Source->Urine   CULTURE, URINE   TROPONIN I   TROPONIN I   B-TYPE NATRIURETIC PEPTIDE   TROPONIN I   SARS-COV-2 RNA AMPLIFICATION, QUAL   CYTOLOGY SPECIMEN- URINE     EKG Readings: (Independently Interpreted)   Initial Reading: No STEMI. Rhythm: Normal Sinus Rhythm. Heart Rate: 84. Ectopy: No Ectopy. Conduction: Normal. ST Segments: Normal ST Segments. T Waves Flipped: V1 and V2. Axis: Normal.     ECG Results          EKG 12-lead (Final result)  Result time 08/06/22 10:29:46    Final result by Interface, Lab In [facility] (08/06/22 10:29:46)                 Narrative:    Test Reason : R07.9,    Vent. Rate : 084 BPM     Atrial Rate : 084 BPM     P-R Int : 202 ms          QRS Dur : 118 ms      QT Int : 394 ms       P-R-T Axes : 047 013 035 degrees     QTc Int : 465 ms    Normal sinus rhythm  Low voltage QRS  Incomplete left bundle branch block  Nonspecific ST abnormality  Abnormal ECG  When compared with ECG of 05-AUG-2022 09:41,  No significant change was found  Confirmed by Nathen TORRES MD ([id number]) on 8/6/2022 10:29:34 AM    Referred By: [email or web address]   SELF           Confirmed By:Nathen TORRES MD                            Imaging Results           CT Abdomen Pelvis   Without Contrast (Final result)  Result time 08/06/22 08:48:12    Final result by Aaron Sheriff MD (08/06/22 08:48:12)                 Impression:      Large solid centrally necrotic right renal mass highly concerning for renal cell carcinoma.  Urology follow-up and further evaluation with CT or MRI renal mass protocol recommended.    Scattered mildly prominent retroperitoneal mesenteric lymph nodes demonstrate normal morphology, presumably reactive in etiology, however metastatic disease would be difficult to definitively exclude.    Additional findings detailed above.    This report was flagged in Epic as abnormal.    Findings were communicated to Joe Funk MD by Rick Holder MD at the time of dictation of the concurrent CTA chest.      Electronically signed by: Aaron Sheriff MD  Date:    08/06/2022  Time:    08:48             Narrative:    EXAMINATION:  CT ABDOMEN PELVIS WITHOUT CONTRAST    CLINICAL HISTORY:  Abdominal pain, acute, nonlocalized;Kidney cancer, staging;    TECHNIQUE:  The patient was surveyed from the lung bases through the pelvis without the administration of oral or intravenous contrast and data was reconstructed for coronal, sagittal, and axial images.    COMPARISON:  Concurrent CTA chest 08/06/2022    FINDINGS:  Please refer to concurrent CTA chest report for evaluation of intrathoracic structures.    Liver is normal size and signal with no focal abnormality on this noncontrast evaluation.  Gallbladder is unremarkable.  No biliary dilatation.    Spleen, adrenal glands, and pancreas appear within normal limits.    Both renal collecting systems are opacified related to contrast administration on preceding CTA chest.  There is a 13.7 x 12.2 x 14.3 (AP x TV x CC) intermediate density solid right renal mass with central necrosis and scattered punctate calcifications are concerning for malignancy.  Adjacent peritoneal fat stranding predominantly along the lateral margin of this lesion  without definite peritoneal implants identified.  Lesion abuts and flattens the anterior muscle belly of the right psoas muscle.  No associated hydronephrosis.  Ureters normal course and caliber without asymmetric periureteral fat stranding.  Urinary bladder is distended with excreted contrast layering posteriorly.  Uterus is unremarkable.    Stomach is unremarkable.  Large small bowel are normal caliber without obstruction or inflammation.  The appendix is unremarkable.  Numerous shotty retroperitoneal and mesenteric lymph nodes, largest the midline mesentery measuring 0.9 cm short axis (axial image 128.)  No peritoneal free air.    Nonaneurysmal abdominal aorta with no significant calcific atherosclerosis.    No acute osseous abnormality or age advanced degenerative change.  No osseous lytic or blastic lesions identified.  Small fat containing umbilical hernia.                                CTA Chest Non-Coronary (PE Study) (Final result)  Result time 08/06/22 07:13:08    Final result by Rick Holder MD (08/06/22 07:13:08)                 Impression:      1. Evaluation is limited by suboptimal bolus timing.  No central or proximal segmental pulmonary emboli allowing for limitations.  2. Large partially visualized enhancing mass in the right kidney measuring at least 12 cm with adjacent fat stranding and prominent vessels highly concerning for renal cell carcinoma.  Recommend short-term urology follow-up and further evaluation with CT or MRI renal mass protocol when clinically appropriate.  3. Scattered linear subsegmental opacities and ground-glass densities in the lung bases, left greater than right, likely related to subsegmental atelectasis or developing infectious/inflammatory process.  4. Hepatic steatosis.  This report was flagged in Epic as containing an incidental finding.    Findings regarding right renal mass communicated to Joe Funk MD by Rick Holder MD via [employer] secure chat (with  acknowledgment) on 08/06/2022 at 07:10.    Electronically signed by resident: Genoveva Saab  Date:    08/06/2022  Time:    06:17    Electronically signed by: Rick Holder MD  Date:    08/06/2022  Time:    07:13             Narrative:    EXAMINATION:  CTA CHEST NON CORONARY    CLINICAL HISTORY:  Pulmonary embolism (PE) suspected, positive D-dimer;    TECHNIQUE:  Low dose axial images, sagittal and coronal reformations were obtained from the thoracic inlet to the lung bases following the IV administration of 100 mL of Omnipaque-350.  Scan technique was optimized to evaluate the pulmonary arteries.  MIP images were performed.    COMPARISON:  Chest x-ray 08/06/2022.    FINDINGS:  Suboptimal timing of contrast bolus.  Density of the main pulmonary artery is 190 HU (good quality study is greater than 300 HU).  With allowances for limitations there are no pulmonary artery filling defects to the proximal segmental level.  Linear subsegmental opacities and scattered ground-glass densities in the lung bases, left side greater than right.  No definite pulmonary nodules.  Mild pulmonary emphysema suspected.    Heart size is upper limit of normal.  No pericardial effusion.  The aorta is normal in course and caliber.  The main pulmonary artery is normal in diameter.  No mediastinal, hilar or axillary lymphadenopathy.  The visualized thyroid gland is normal.    Partially visualized heterogeneously enhancing right renal mass measuring up to 12.1 cm.  There is adjacent fat stranding and prominent vessels.  Hepatic steatosis noted.    Visualized bones and soft tissues are normal.                               X-Ray Chest PA And Lateral (Final result)  Result time 08/06/22 04:31:21    Final result by Danie Dodson MD (08/06/22 04:31:21)                 Impression:      No radiographic evidence of acute intrathoracic process.    Electronically signed by resident: Genoveva Saab  Date:    08/06/2022  Time:    04:17    Electronically  signed by: Danie Dodson MD  Date:    08/06/2022  Time:    04:31             Narrative:    EXAMINATION:  XR CHEST PA AND LATERAL    CLINICAL HISTORY:  Chest Pain;    TECHNIQUE:  PA and lateral views of the chest were performed.    COMPARISON:  Chest x-ray 08/05/2022    FINDINGS:  Cardiac silhouette appears within normal limits.  Lungs are symmetrically expanded without evidence of confluent airspace consolidation.  No significant volume of pleural fluid or pneumothorax appreciated.  Visualized osseous structures appear intact.                                 Medications   ALPRAZolam tablet 0.5 mg (has no administration in time range)   ondansetron injection 8 mg (has no administration in time range)   HYDROcodone-acetaminophen 5-325 mg per tablet 1 tablet (has no administration in time range)   HYDROcodone-acetaminophen  mg per tablet 1 tablet (1 tablet Oral Given 8/6/22 1501)   lamoTRIgine tablet 200 mg (200 mg Oral Given 8/6/22 1433)   risperiDONE tablet 4 mg (4 mg Oral Not Given 8/6/22 2100)   sertraline tablet 50 mg (50 mg Oral Given 8/6/22 1433)   NON FORMULARY MEDICATION 60 mg (has no administration in time range)   ketorolac injection 30 mg (30 mg Intravenous Given 8/6/22 1736)   ketorolac injection 9.999 mg (9.999 mg Intravenous Given 8/6/22 0448)   morphine injection 4 mg (4 mg Intravenous Given 8/6/22 0519)   sodium chloride 0.9% bolus 500 mL (0 mLs Intravenous Stopped 8/6/22 0635)   iohexoL (OMNIPAQUE 350) injection 100 mL (100 mLs Intravenous Given 8/6/22 0546)   morphine injection 4 mg (4 mg Intravenous Given 8/6/22 0803)     Medical Decision Making:   Initial Assessment:   42-year-old female who appears to be in mild distress due to symptoms presents the ED complaining of chest pain.  ABC's intact.  Physical exam is grossly unremarkable.  Differential Diagnosis:   ACS  Pneumonia/pneumothorax  Pericarditis  Costochondritis  Muscle strain  ED Management:  Following initial evaluation I gave the  patient 10 mg of Toradol to alleviate her reported chest pain symptoms.  Initial troponin, CBC, CXR, CMP and BNP are unremarkable.  D-dimer is elevated so I ordered a CT PE to rule out thrombo embolism in the lung.  On repeat evaluation patient reports consistent pain so I dosed her with 4 mg of morphine.  Patient is active pending COVID screening, repeat troponin and results of the CT PE.  Will sign out to oncoming provider.              Attending Attestation:   Physician Attestation Statement for Resident:  As the supervising MD   Physician Attestation Statement: I have personally seen and examined this patient.   I agree with the above history. -:   As the supervising MD I agree with the above PE.    As the supervising MD I agree with the above treatment, course, plan, and disposition.  I have reviewed the following: old records at this facility.                         Clinical Impression:   Final diagnoses:  [R07.9] Chest pain (Primary)  [R07.89] Chest wall pain          ED Disposition Condition    Observation               Cassia Mejia MD  Resident  08/06/22 [id number]       Kathie Carr MD  08/06/22 2711

## 2022-08-06 NOTE — HPI
Teagan Griffith is a 42 year old female with no prior past medical history presenting for chest pain. Urology was consulted for renal mass.     Patient states that she has had chest pain that radiates to her neck and head since Thursday. Denies any SOB, abdominal pain. Denies n/v. Denies any fevers, chills. Denies hematuria. No family history of  malignancy. Prior surgical history of C-sec. 20 p-y smoking history.    On assessment, patient is AF, HDS. WBC wnl, Cr 0.8 (baseline unclear). UA pending. CT shows a 13.7 x 13.7 x 12.0 right renal mass. There is no hydronephrosis bilaterally. Contralateral kidney normal in appearance. No evidence of local invasion or renal vein fullness. CT chest without any evidence of metastatic disease.

## 2022-08-06 NOTE — ED NOTES
Patient states that her chest pain has been ongoing for month, but that it worsened in the last few days. She reports that she has been unable lay down or move into other positions, without having pain. She denies any shortness of breath. She reports that the pain is exacerbated with deep breaths. States that the pain is in her bilateral chest as well as in the middle. She states that she gets a headache with this pain, and that it radiates into her neck and teeth. She states that the pain is severe and is a 10 out of 10.

## 2022-08-06 NOTE — HPI
41 yo F w medical hx significant for psychiatric problems was sent to the ER for further evaluation of CP. Chest pain been going on for a few weeks off and on. She thought it was acid reflux so she took medication for it but no relief. Moving positions make the pain worse. The pain goes from the middle of her chest to her left and right side. She states she sometimes get pain in the lower back part of neck. Pain is worse when lying down.     In the ED, she was given IV morphine and 10 mg of Toradol to alleviate her reported chest pain symptoms. Initial troponin, CBC, CXR, CMP and BNP are unremarkable. D-dimer is elevated so CTA PE ordered.  CTA PE study displayed no signs of PE, but displayed Large partially visualized enhancing mass in the right kidney measuring at least 12 cm with adjacent fat stranding and prominent vessels highly concerning for renal cell carcinoma. CT abdomen pelvis ordered to further evaluate renal mass. HM consulted to facilitate admission.

## 2022-08-06 NOTE — NURSING
Patient arrived to room awake and alert with family at bedside, no signs of distress noted, breathing even and unlabored, oriented to room and call bell system, bed in low position, call bell within reach, see flow sheet for assessment.

## 2022-08-06 NOTE — ASSESSMENT & PLAN NOTE
Teagan Griffith is a 42 year old female with no prior past medical history presenting for chest pain. Urology was consulted for renal mass.   - no present indication for urologic intervention  - recommend short interval urology follow up to discuss management of renal mass  - consider genetic screening in 43 yo F with renal mass, will discuss at follow up  - remainder of care per primary

## 2022-08-06 NOTE — ED NOTES
C/o upper chest/neck pain that remains 8/10, despite morphine admin 2 hours ago. States it did not decrease the pain. Movement exacerbates pain. Will notify med team.

## 2022-08-06 NOTE — CONSULTS
[facility] - Emergency Dept  Urology  Consult Note    Patient Name: Teagan Griffith  MRN: [id number]  Admission Date: 2022  Hospital Length of Stay: 0   Code Status: No Order   Attending Provider: Huyen Recinos MD   Consulting Provider: Herman Schultz MD  Primary Care Physician: Primary Doctor No  Principal Problem:Renal mass    Inpatient consult to Urology  Consult performed by: Herman Schultz MD  Consult ordered by: Huyen Recinos MD          Subjective:     HPI:  Teagan Griffith is a 42 year old female with no prior past medical history presenting for chest pain. Urology was consulted for renal mass.     Patient states that she has had chest pain that radiates to her neck and head since Thursday. Denies any SOB, abdominal pain. Denies n/v. Denies any fevers, chills. Denies hematuria. No family history of  malignancy. Prior surgical history of C-sec. 20 p-y smoking history.    On assessment, patient is AF, HDS. WBC wnl, Cr 0.8 (baseline unclear). UA pending. CT shows a 13.7 x 13.7 x 12.0 right renal mass. There is no hydronephrosis bilaterally. Contralateral kidney normal in appearance. No evidence of local invasion or renal vein fullness. CT chest without any evidence of metastatic disease.          Past Medical History:   Diagnosis Date    Depression        Past Surgical History:   Procedure Laterality Date     SECTION         Review of patient's allergies indicates:  No Known Allergies    Family History    None         Tobacco Use    Smoking status: Former Smoker    Smokeless tobacco: Never Used   Substance and Sexual Activity    Alcohol use: Yes    Drug use: Not on file    Sexual activity: Not on file       Review of Systems   Constitutional:  Negative for activity change, chills and fever.   Respiratory:  Negative for shortness of breath.    Cardiovascular:  Positive for chest pain.   Gastrointestinal:  Negative for abdominal pain, diarrhea, nausea and vomiting.    Genitourinary:  Negative for decreased urine volume, difficulty urinating, flank pain and hematuria.   Neurological:  Negative for dizziness and weakness.     Objective:     Temp:  [97.7 °F (36.5 °C)] 97.7 °F (36.5 °C)  Pulse:  [77-89] 77  Resp:  [15-20] 20  SpO2:  [95 %-96 %] 95 %  BP: (107-123)/(62-66) 107/62     There is no height or weight on file to calculate BMI.           Drains       None                   Physical Exam  Vitals reviewed.   Constitutional:       General: She is not in acute distress.     Appearance: She is well-developed. She is not diaphoretic.   HENT:      Head: Normocephalic and atraumatic.   Eyes:      General: No scleral icterus.  Cardiovascular:      Rate and Rhythm: Normal rate.   Pulmonary:      Effort: Pulmonary effort is normal. No respiratory distress.      Breath sounds: No stridor.   Abdominal:      General: There is no distension.      Palpations: Abdomen is soft.      Tenderness: There is no abdominal tenderness. There is no right CVA tenderness or left CVA tenderness.   Musculoskeletal:         General: Normal range of motion.      Cervical back: Normal range of motion.   Skin:     General: Skin is warm and dry.   Neurological:      Mental Status: She is alert.   Psychiatric:         Behavior: Behavior normal.       Significant Labs:    BMP:  Recent Labs   Lab 08/06/22  0325      K 3.9      CO2 24   BUN 15   CREATININE 0.8   CALCIUM 9.5       CBC:  Recent Labs   Lab 08/06/22  0325   WBC 9.85   HGB 10.8*   HCT 32.4*          Blood Culture: No results for input(s): LABBLOO in the last 168 hours.  Urine Culture: No results for input(s): LABURIN in the last 168 hours.  Urine Studies: No results for input(s): COLORU, APPEARANCEUA, PHUR, SPECGRAV, PROTEINUA, GLUCUA, KETONESU, BILIRUBINUA, OCCULTUA, NITRITE, UROBILINOGEN, LEUKOCYTESUR, RBCUA, WBCUA, BACTERIA, SQUAMEPITHEL, HYALINECASTS in the last 168 hours.    Invalid input(s): WRIGHTSUR    Significant  Imaging:  Findings as above                      Assessment and Plan:     * Renal mass  Teagan Griffith is a 42 year old female with no prior past medical history presenting for chest pain. Urology was consulted for renal mass.   - no present indication for urologic intervention  - recommend short interval urology follow up to discuss management of renal mass  - consider genetic screening in 43 yo F with renal mass, will discuss at follow up  - remainder of care per primary  - add on urine cytology      VTE Risk Mitigation (From admission, onward)    None          Herman Schultz MD  Urology  [facility] - Emergency Dept

## 2022-08-06 NOTE — H&P
[facility] - Emergency Dept  [facility] Medicine  History & Physical    Patient Name: Teagan Griffith  MRN: [id number]  Patient Class: OP- Observation  Admission Date: 2022  Attending Physician: Huyen Recinos MD  Primary Care Provider: Primary Doctor No      Patient information was obtained from patient, past medical records and ER records.     Subjective:     Principal Problem:Renal mass    Chief Complaint:   Chief Complaint   Patient presents with    Chest Pain     C/o intermittent chest pain x 2 days. Seen at urgent care, EKG and chest x-ray were done. Referred to come here for further evaluation. -cardiac history         HPI: 43 yo F w medical hx significant for psychiatric problems was sent to the ER for further evaluation of CP. Chest pain been going on for a few weeks off and on. She thought it was acid reflux so she took medication for it but no relief. Moving positions make the pain worse. The pain goes from the middle of her chest to her left and right side. She states she sometimes get pain in the lower back part of neck. Pain is worse when lying down.     In the ED, she was given IV morphine and 10 mg of Toradol to alleviate her reported chest pain symptoms. Initial troponin, CBC, CXR, CMP and BNP are unremarkable. D-dimer is elevated so CTA PE ordered.  CTA PE study displayed no signs of PE, but displayed Large partially visualized enhancing mass in the right kidney measuring at least 12 cm with adjacent fat stranding and prominent vessels highly concerning for renal cell carcinoma. CT abdomen pelvis ordered to further evaluate renal mass.  consulted to facilitate admission.       Past Medical History:   Diagnosis Date    Depression        Past Surgical History:   Procedure Laterality Date     SECTION         Review of patient's allergies indicates:  No Known Allergies    No current facility-administered medications on file prior to encounter.     Current Outpatient Medications on  File Prior to Encounter   Medication Sig    ALPRAZolam (XANAX) 0.5 MG tablet Take 0.5 mg by mouth 3 (three) times daily.    dextroamphetamine-amphetamine 10 mg Tab Take by mouth.    lamoTRIgine (LAMICTAL) 200 MG tablet     lamoTRIgine (LAMICTAL) 25 MG tablet Take 25 mg by mouth once daily.    risperiDONE (RISPERDAL) 0.25 MG Tab Take by mouth.    sertraline (ZOLOFT) 100 MG tablet 50 mg.     Family History    None       Tobacco Use    Smoking status: Former Smoker    Smokeless tobacco: Never Used   Substance and Sexual Activity    Alcohol use: Yes    Drug use: Not on file    Sexual activity: Not on file     Review of Systems   Constitutional:  Negative for activity change, appetite change, chills, diaphoresis, fatigue and fever.   HENT:  Negative for congestion, sinus pressure, sore throat and tinnitus.    Eyes:  Negative for visual disturbance.   Respiratory:  Negative for cough, chest tightness, shortness of breath and wheezing.    Cardiovascular:  Negative for chest pain, palpitations and leg swelling.   Gastrointestinal:  Negative for abdominal distention, abdominal pain, nausea and vomiting.   Endocrine: Negative for polydipsia, polyphagia and polyuria.   Genitourinary:  Negative for dysuria.   Musculoskeletal:  Negative for arthralgias and back pain.   Skin:  Negative for rash and wound.   Neurological:  Negative for dizziness, syncope, light-headedness and headaches.   Psychiatric/Behavioral:  Negative for confusion.      Objective:     Vital Signs (Most Recent):  Temp: 97.7 °F (36.5 °C) (08/06/22 0227)  Pulse: 77 (08/06/22 0512)  Resp: 20 (08/06/22 0803)  BP: 107/62 (08/06/22 0512)  SpO2: 95 % (08/06/22 0512) Vital Signs (24h Range):  Temp:  [97.7 °F (36.5 °C)] 97.7 °F (36.5 °C)  Pulse:  [77-89] 77  Resp:  [15-20] 20  SpO2:  [95 %-96 %] 95 %  BP: (107-123)/(62-66) 107/62        There is no height or weight on file to calculate BMI.    Physical Exam  Vitals and nursing note reviewed.    Constitutional:       General: She is not in acute distress.     Appearance: Normal appearance. She is well-developed. She is not ill-appearing or toxic-appearing.   HENT:      Head: Normocephalic and atraumatic.      Right Ear: External ear normal.      Left Ear: External ear normal.      Nose: Nose normal.      Mouth/Throat:      Pharynx: Uvula midline.   Eyes:      General: Lids are normal.      Extraocular Movements: EOM normal.      Conjunctiva/sclera: Conjunctivae normal.      Pupils: Pupils are equal, round, and reactive to light.   Neck:      Thyroid: No thyroid mass.      Vascular: No JVD.      Trachea: Trachea normal.   Cardiovascular:      Rate and Rhythm: Normal rate and regular rhythm.      Heart sounds: Normal heart sounds, S1 normal and S2 normal.   Pulmonary:      Effort: Pulmonary effort is normal.      Breath sounds: Normal breath sounds.   Abdominal:      General: Bowel sounds are normal. There is no distension.      Palpations: Abdomen is soft.      Tenderness: There is no abdominal tenderness.   Musculoskeletal:      Cervical back: Full passive range of motion without pain, normal range of motion and neck supple.      Right lower leg: No edema.      Left lower leg: No edema.   Neurological:      Mental Status: She is alert.      Cranial Nerves: No cranial nerve deficit.      Sensory: No sensory deficit.   Psychiatric:         Speech: Speech normal.         Behavior: Behavior normal. Behavior is cooperative.         Thought Content: Thought content normal.         CRANIAL NERVES     CN III, IV, VI   Pupils are equal, round, and reactive to light.  Extraocular motions are normal.      Significant Labs: All pertinent labs within the past 24 hours have been reviewed.  CBC:   Recent Labs   Lab 08/06/22  0325   WBC 9.85   HGB 10.8*   HCT 32.4*        CMP:   Recent Labs   Lab 08/06/22  0325      K 3.9      CO2 24   *   BUN 15   CREATININE 0.8   CALCIUM 9.5   PROT 6.6    ALBUMIN 3.7   BILITOT 0.4   ALKPHOS 61   AST 12   ALT 19   ANIONGAP 9     Cardiac Markers:   Recent Labs   Lab 08/06/22  0332   BNP 25     Magnesium: No results for input(s): MG in the last 48 hours.    Significant Imaging: I have reviewed all pertinent imaging results/findings within the past 24 hours.    Assessment/Plan:     Renal mass  Urology consulted  Discussed with resident-- biopsy considerations to be considered outpt  - no present indication for urologic intervention  - recommend short interval urology follow up to discuss management of renal mass    Chest pain  ?referred pain from mass. Pain meds prn  The pain feels better when she rubs her chest deeply  Check echo    Bipolar disorder/ MDD  - resume chronic home meds       VTE Risk Mitigation (From admission, onward)    None          Huyen Recinos MD  Department of Hospital Medicine   [facility] - Emergency Dept

## 2022-08-06 NOTE — ED NOTES
I have assumed care of the pt from LUKE Rosenbaum; two pt identifiers verbally confirmed w/ pt; pt is Teagan Griffith, : 1980    Pt awake and alert; resting on stretcher. Rec'd to BHall 2. VS stable; Pt denies pain at this time; no acute distress or discomfort reported or observed.  Pt ambulatory to restroom without assistance at this time, steady gait noted. Able to reposition self on stretcher. Bed locked in lowest position; side rails up and locked x 2;  personal belongings within reach. Pt denies needs or complaints at this time; will continue to monitor.

## 2022-08-06 NOTE — PHARMACY MED REC
"Admission Medication History     The home medication history was taken by Mary Mckee.    You may go to "Admission" then "Reconcile Home Medications" tabs to review and/or act upon these items.      The home medication list has been updated by the Pharmacy department.    Please read ALL comments highlighted in yellow.    Please address this information as you see fit.     Feel free to contact us if you have any questions or require assistance.        Medications listed below were obtained from: Patient/family and Medications brought from home  Medication Sig    acetaminophen (TYLENOL) 500 MG tablet   Take 1,000 mg by mouth every 6 (six) hours as needed for Pain.    ALPRAZolam (XANAX) 2 MG Tab   Take 1 mg by mouth 2 (two) times a day.    dextroamphetamine-amphetamine 30 mg Tab   Take 30 mg by mouth 2 (two) times a day.    lamoTRIgine (LAMICTAL) 200 MG tablet   Take 200 mg by mouth once daily.    naproxen sodium (ANAPROX) 220 MG tablet   Take 440 mg by mouth 2 (two) times daily as needed (pain).    risperiDONE (RISPERDAL) 2 MG tablet   Take 4 mg by mouth every evening.    sertraline (ZOLOFT) 100 MG tablet   Take 50 mg by mouth once daily.             Mary Mckee  EXT [id number]                    .          "

## 2022-08-06 NOTE — SUBJECTIVE & OBJECTIVE
Past Medical History:   Diagnosis Date    Depression        Past Surgical History:   Procedure Laterality Date     SECTION         Review of patient's allergies indicates:  No Known Allergies    Family History    None         Tobacco Use    Smoking status: Former Smoker    Smokeless tobacco: Never Used   Substance and Sexual Activity    Alcohol use: Yes    Drug use: Not on file    Sexual activity: Not on file       Review of Systems   Constitutional:  Negative for activity change, chills and fever.   Respiratory:  Negative for shortness of breath.    Cardiovascular:  Positive for chest pain.   Gastrointestinal:  Negative for abdominal pain, diarrhea, nausea and vomiting.   Genitourinary:  Negative for decreased urine volume, difficulty urinating, flank pain and hematuria.   Neurological:  Negative for dizziness and weakness.     Objective:     Temp:  [97.7 °F (36.5 °C)] 97.7 °F (36.5 °C)  Pulse:  [77-89] 77  Resp:  [15-20] 20  SpO2:  [95 %-96 %] 95 %  BP: (107-123)/(62-66) 107/62     There is no height or weight on file to calculate BMI.           Drains       None                   Physical Exam  Vitals reviewed.   Constitutional:       General: She is not in acute distress.     Appearance: She is well-developed. She is not diaphoretic.   HENT:      Head: Normocephalic and atraumatic.   Eyes:      General: No scleral icterus.  Cardiovascular:      Rate and Rhythm: Normal rate.   Pulmonary:      Effort: Pulmonary effort is normal. No respiratory distress.      Breath sounds: No stridor.   Abdominal:      General: There is no distension.      Palpations: Abdomen is soft.      Tenderness: There is no abdominal tenderness. There is no right CVA tenderness or left CVA tenderness.   Musculoskeletal:         General: Normal range of motion.      Cervical back: Normal range of motion.   Skin:     General: Skin is warm and dry.   Neurological:      Mental Status: She is alert.   Psychiatric:         Behavior:  Behavior normal.       Significant Labs:    BMP:  Recent Labs   Lab 08/06/22  0325      K 3.9      CO2 24   BUN 15   CREATININE 0.8   CALCIUM 9.5       CBC:  Recent Labs   Lab 08/06/22  0325   WBC 9.85   HGB 10.8*   HCT 32.4*          Blood Culture: No results for input(s): LABBLOO in the last 168 hours.  Urine Culture: No results for input(s): LABURIN in the last 168 hours.  Urine Studies: No results for input(s): COLORU, APPEARANCEUA, PHUR, SPECGRAV, PROTEINUA, GLUCUA, KETONESU, BILIRUBINUA, OCCULTUA, NITRITE, UROBILINOGEN, LEUKOCYTESUR, RBCUA, WBCUA, BACTERIA, SQUAMEPITHEL, HYALINECASTS in the last 168 hours.    Invalid input(s): WRIGHTSUR    Significant Imaging:  Findings as above

## 2022-08-07 VITALS
WEIGHT: 250 LBS | SYSTOLIC BLOOD PRESSURE: 96 MMHG | RESPIRATION RATE: 18 BRPM | HEART RATE: 68 BPM | TEMPERATURE: 97 F | BODY MASS INDEX: 40.18 KG/M2 | OXYGEN SATURATION: 93 % | DIASTOLIC BLOOD PRESSURE: 51 MMHG | HEIGHT: 66 IN

## 2022-08-07 LAB
ASCENDING AORTA: 2.99 CM
AV INDEX (PROSTH): 0.88
AV MEAN GRADIENT: 6 MMHG
AV PEAK GRADIENT: 10 MMHG
AV VALVE AREA: 3.18 CM2
AV VELOCITY RATIO: 0.85
BACTERIA UR CULT: NORMAL
BACTERIA UR CULT: NORMAL
BSA FOR ECHO PROCEDURE: 2.3 M2
CV ECHO LV RWT: 0.26 CM
DOP CALC AO PEAK VEL: 1.56 M/S
DOP CALC AO VTI: 30.42 CM
DOP CALC LVOT AREA: 3.6 CM2
DOP CALC LVOT DIAMETER: 2.14 CM
DOP CALC LVOT PEAK VEL: 1.33 M/S
DOP CALC LVOT STROKE VOLUME: 96.78 CM3
DOP CALCLVOT PEAK VEL VTI: 26.92 CM
E WAVE DECELERATION TIME: 323.76 MSEC
E/A RATIO: 1.61
E/E' RATIO: 8.18 M/S
ECHO LV POSTERIOR WALL: 0.68 CM (ref 0.6–1.1)
EJECTION FRACTION: 60 %
FRACTIONAL SHORTENING: 39 % (ref 28–44)
INTERVENTRICULAR SEPTUM: 0.77 CM (ref 0.6–1.1)
IVRT: 100.86 MSEC
LA MAJOR: 6.15 CM
LA MINOR: 5.75 CM
LA WIDTH: 4.05 CM
LEFT ATRIUM SIZE: 4.17 CM
LEFT ATRIUM VOLUME INDEX MOD: 28.5 ML/M2
LEFT ATRIUM VOLUME INDEX: 38.8 ML/M2
LEFT ATRIUM VOLUME MOD: 62.63 CM3
LEFT ATRIUM VOLUME: 85.32 CM3
LEFT INTERNAL DIMENSION IN SYSTOLE: 3.21 CM (ref 2.1–4)
LEFT VENTRICLE DIASTOLIC VOLUME INDEX: 60.18 ML/M2
LEFT VENTRICLE DIASTOLIC VOLUME: 132.4 ML
LEFT VENTRICLE MASS INDEX: 59 G/M2
LEFT VENTRICLE SYSTOLIC VOLUME INDEX: 18.7 ML/M2
LEFT VENTRICLE SYSTOLIC VOLUME: 41.21 ML
LEFT VENTRICULAR INTERNAL DIMENSION IN DIASTOLE: 5.25 CM (ref 3.5–6)
LEFT VENTRICULAR MASS: 130.44 G
LV LATERAL E/E' RATIO: 7.5 M/S
LV SEPTAL E/E' RATIO: 9 M/S
MV PEAK A VEL: 0.56 M/S
MV PEAK E VEL: 0.9 M/S
PISA TR MAX VEL: 2.26 M/S
RA MAJOR: 5.18 CM
RA PRESSURE: 3 MMHG
RA WIDTH: 3.85 CM
RIGHT VENTRICULAR END-DIASTOLIC DIMENSION: 3.26 CM
SINUS: 3.4 CM
STJ: 2.66 CM
TDI LATERAL: 0.12 M/S
TDI SEPTAL: 0.1 M/S
TDI: 0.11 M/S
TR MAX PG: 20 MMHG
TRICUSPID ANNULAR PLANE SYSTOLIC EXCURSION: 2.5 CM
TV REST PULMONARY ARTERY PRESSURE: 23 MMHG

## 2022-08-07 PROCEDURE — 99217 PR OBSERVATION CARE DISCHARGE: CPT | Mod: ,,, | Performed by: FAMILY MEDICINE

## 2022-08-07 PROCEDURE — 25000003 PHARM REV CODE 250: Performed by: FAMILY MEDICINE

## 2022-08-07 PROCEDURE — 99217 PR OBSERVATION CARE DISCHARGE: ICD-10-PCS | Mod: ,,, | Performed by: FAMILY MEDICINE

## 2022-08-07 PROCEDURE — G0378 HOSPITAL OBSERVATION PER HR: HCPCS

## 2022-08-07 RX ORDER — NAPROXEN 500 MG/1
500 TABLET ORAL 2 TIMES DAILY WITH MEALS
Qty: 10 TABLET | Refills: 0 | Status: SHIPPED | OUTPATIENT
Start: 2022-08-07 | End: 2022-08-12

## 2022-08-07 RX ORDER — CYCLOBENZAPRINE HCL 5 MG
5 TABLET ORAL 3 TIMES DAILY PRN
Qty: 15 TABLET | Refills: 0 | Status: SHIPPED | OUTPATIENT
Start: 2022-08-07 | End: 2022-08-17

## 2022-08-07 RX ADMIN — LAMOTRIGINE 200 MG: 100 TABLET ORAL at 08:08

## 2022-08-07 RX ADMIN — SERTRALINE HYDROCHLORIDE 50 MG: 50 TABLET ORAL at 08:08

## 2022-08-07 NOTE — DISCHARGE SUMMARY
[facility] Medicine  Discharge Summary      Patient Name: Teagan Griffith  MRN: [id number]  Patient Class: OP- Observation  Admission Date: 8/6/2022  Hospital Length of Stay: 0 days  Discharge Date and Time: 8/7/2022  3:45 PM  Discharging Provider: Huyen Recinos MD  Primary Care Provider: Primary Doctor [facility] Medicine Team: [facility] MED B Huyen Recinos MD      HPI:   41 yo F w medical hx significant for psychiatric problems was sent to the ER for further evaluation of CP. Chest pain been going on for a few weeks off and on. She thought it was acid reflux so she took medication for it but no relief. Moving positions make the pain worse. The pain goes from the middle of her chest to her left and right side. She states she sometimes get pain in the lower back part of neck. Pain is worse when lying down.     In the ED, she was given IV morphine and 10 mg of Toradol to alleviate her reported chest pain symptoms. Initial troponin, CBC, CXR, CMP and BNP are unremarkable. D-dimer is elevated so CTA PE ordered.  CTA PE study displayed no signs of PE, but displayed Large partially visualized enhancing mass in the right kidney measuring at least 12 cm with adjacent fat stranding and prominent vessels highly concerning for renal cell carcinoma. CT abdomen pelvis ordered to further evaluate renal mass.  consulted to facilitate admission.     Hospital Course:  She was given IV an oral anelgesics. Her pain improved and eventually resolved. Urology recommended outpt fu. Echo was wnl. She was cleared for discharge.       Consults:   Consults (From admission, onward)        Status Ordering Provider     Inpatient consult to Urology  Once        Provider:  (Not yet assigned)    HUYEN Kirkland        Renal mass  outpt fu w Urology.     Chest pain  ?referred pain from mass. Pain meds prn     Bipolar disorder/ MDD  - continue chronic home meds       Final Active Diagnoses:    Diagnosis  Date Noted POA    PRINCIPAL PROBLEM:  Renal mass [N28.89] 08/06/2022 Yes      Problems Resolved During this Admission:       Discharged Condition: stable    Disposition: Home or Self Care    Follow Up:   Follow-up Information     Your primary care doctor. Schedule an appointment as soon as possible for a visit .           [facility]. Schedule an appointment as soon as possible for a visit in 1 week.    Specialty: Internal Medicine  Contact information:  [address] [facility]  [facility] [address] [phone]  Additional information:  [facility]   Please park in surface lot and check in at central registration desk           Primary Doctor No Follow up.                     Patient Instructions:      Ambulatory referral/consult to Urology   Standing Status: Future   Referral Priority: Routine Referral Type: Consultation   Referral Reason: Specialty Services Required   Requested Specialty: Urology   Number of Visits Requested: 1     Diet Cardiac     Activity as tolerated       Significant Diagnostic Studies: Labs:   CMP   Recent Labs   Lab 08/06/22  0325      K 3.9      CO2 24   *   BUN 15   CREATININE 0.8   CALCIUM 9.5   PROT 6.6   ALBUMIN 3.7   BILITOT 0.4   ALKPHOS 61   AST 12   ALT 19   ANIONGAP 9    and CBC   Recent Labs   Lab 08/06/22  0325   WBC 9.85   HGB 10.8*   HCT 32.4*        Cardiac Graphics: Echocardiogram:   Transthoracic echo (TTE) complete (Cupid Only):   Results for orders placed or performed during the hospital encounter of 08/06/22   Echo   Result Value Ref Range    Ascending aorta 2.99 cm    STJ 2.66 cm    AV mean gradient 6 mmHg    Ao peak harsh 1.56 m/s    Ao VTI 30.42 cm    IVRT 100.86 msec    IVS 0.77 0.6 - 1.1 cm    LA size 4.17 cm    Left Atrium Major Axis 6.15 cm    Left Atrium Minor Axis 5.75 cm    LVIDd 5.25 3.5 - 6.0 cm    LVIDs 3.21 2.1 - 4.0 cm    LVOT diameter 2.14 cm    LVOT peak VTI 26.92 cm     Posterior Wall 0.68 0.6 - 1.1 cm    MV Peak A Zoran 0.56 m/s    E wave deceleration time 323.76 msec    MV Peak E Zoran 0.90 m/s    RA Major Axis 5.18 cm    RA Width 3.85 cm    RVDD 3.26 cm    Sinus 3.40 cm    TAPSE 2.50 cm    TR Max Zoran 2.26 m/s    TDI LATERAL 0.12 m/s    TDI SEPTAL 0.10 m/s    LA WIDTH 4.05 cm    LV Diastolic Volume 132.40 mL    LV Systolic Volume 41.21 mL    LVOT peak zoran 1.33 m/s    LA volume (mod) 62.63 cm3    LV LATERAL E/E' RATIO 7.50 m/s    LV SEPTAL E/E' RATIO 9.00 m/s    FS 39 %    LA volume 85.32 cm3    LV mass 130.44 g    Left Ventricle Relative Wall Thickness 0.26 cm    AV valve area 3.18 cm2    AV Velocity Ratio 0.85     AV index (prosthetic) 0.88     E/A ratio 1.61     Mean e' 0.11 m/s    LVOT area 3.6 cm2    LVOT stroke volume 96.78 cm3    AV peak gradient 10 mmHg    E/E' ratio 8.18 m/s    Triscuspid Valve Regurgitation Peak Gradient 20 mmHg    BSA 2.3 m2    LV Systolic Volume Index 18.7 mL/m2    LV Diastolic Volume Index 60.18 mL/m2    LA Volume Index 38.8 mL/m2    LV Mass Index 59 g/m2    LA Volume Index (Mod) 28.5 mL/m2    Right Atrial Pressure (from IVC) 3 mmHg    EF 60 %    TV rest pulmonary artery pressure 23 mmHg    Narrative    · The left ventricle is normal in size with normal systolic function.  · The estimated ejection fraction is 60%.  · Normal left ventricular diastolic function.  · Normal right ventricular size with normal right ventricular systolic   function.  · Mild left atrial enlargement.  · Mild right atrial enlargement.  · Normal central venous pressure (3 mmHg).  · The estimated PA systolic pressure is 23 mmHg.          Pending Diagnostic Studies:     Procedure Component Value Units Date/Time    Cytology, Urine [[id number]] Collected: 08/06/22 7500    Order Status: Sent Lab Status: In process Updated: 08/06/22 5114    Specimen: Urine          Medications:  Reconciled Home Medications:      Medication List      START taking these medications    cyclobenzaprine 5 MG  tablet  Commonly known as: FLEXERIL  Take 1 tablet (5 mg total) by mouth 3 (three) times daily as needed for Muscle spasms.     naproxen 500 MG tablet  Commonly known as: NAPROSYN  Take 1 tablet (500 mg total) by mouth 2 (two) times daily with meals. for 5 days        CONTINUE taking these medications    acetaminophen 500 MG tablet  Commonly known as: TYLENOL  Take 1,000 mg by mouth every 6 (six) hours as needed for Pain.     ALPRAZolam 2 MG Tab  Commonly known as: XANAX  Take 1 mg by mouth 2 (two) times a day.     dextroamphetamine-amphetamine 30 mg Tab  Take 30 mg by mouth 2 (two) times a day.     lamoTRIgine 200 MG tablet  Commonly known as: LAMICTAL  Take 200 mg by mouth once daily.     risperiDONE 2 MG tablet  Commonly known as: RISPERDAL  Take 4 mg by mouth every evening.     sertraline 100 MG tablet  Commonly known as: ZOLOFT  Take 50 mg by mouth once daily.        STOP taking these medications    naproxen sodium 220 MG tablet  Commonly known as: ANAPROX            Indwelling Lines/Drains at time of discharge:   Lines/Drains/Airways     None                 Time spent on the discharge of patient: 31 minutes  Patient examined at bedside on day of discharge. Exam findings stable. She was deemed safe for discharge.       Huyen Recinos MD  Department of Hospital Medicine  [facility] Surg

## 2022-08-07 NOTE — PLAN OF CARE
Problem: Adult Inpatient Plan of Care  Goal: Plan of Care Review  Outcome: Ongoing, Progressing  Goal: Patient-Specific Goal (Individualized)  Outcome: Ongoing, Progressing  Goal: Absence of Hospital-Acquired Illness or Injury  Outcome: Ongoing, Progressing  Goal: Optimal Comfort and Wellbeing  Outcome: Ongoing, Progressing  Goal: Readiness for Transition of Care  Outcome: Ongoing, Progressing     Problem: Bariatric Environmental Safety  Goal: Safety Maintained with Care  Outcome: Ongoing, Progressing       Plan of care reviewed with pt. VSS. PT took her own prescribed nightly medicine of Risperdal 4 mg. Nurse withheld hospital dose.

## 2022-08-08 ENCOUNTER — TELEPHONE (OUTPATIENT)
Dept: URGENT CARE | Facility: CLINIC | Age: 42
End: 2022-08-08
Payer: COMMERCIAL

## 2022-08-08 NOTE — HOSPITAL COURSE
She was given IV an oral anelgesics. Her pain improved and eventually resolved. Urology recommended outpt fu. Echo was wnl. She was cleared for discharge.

## 2022-08-08 NOTE — TELEPHONE ENCOUNTER
Patient aware and was recently hospitalized.    No concerns on EKG. Was hospitalized with elevated D-dimer; fortunately no PE on CTA. Incidental finding of kidney mass. Plan to follow up with Urology.

## 2022-08-09 ENCOUNTER — OFFICE VISIT (OUTPATIENT)
Dept: UROLOGY | Facility: CLINIC | Age: 42
End: 2022-08-09
Payer: COMMERCIAL

## 2022-08-09 ENCOUNTER — HOSPITAL ENCOUNTER (OUTPATIENT)
Dept: RADIOLOGY | Facility: HOSPITAL | Age: 42
Discharge: HOME | End: 2022-08-09
Attending: UROLOGY
Payer: COMMERCIAL

## 2022-08-09 VITALS
HEART RATE: 79 BPM | DIASTOLIC BLOOD PRESSURE: 81 MMHG | BODY MASS INDEX: 40.18 KG/M2 | HEIGHT: 66 IN | SYSTOLIC BLOOD PRESSURE: 122 MMHG | WEIGHT: 250 LBS

## 2022-08-09 DIAGNOSIS — N28.89 OTHER SPECIFIED DISORDERS OF KIDNEY AND URETER: ICD-10-CM

## 2022-08-09 DIAGNOSIS — N28.89 RENAL MASS: ICD-10-CM

## 2022-08-09 DIAGNOSIS — N28.89 RENAL MASS: Primary | ICD-10-CM

## 2022-08-09 LAB
FINAL PATHOLOGIC DIAGNOSIS: NORMAL
Lab: NORMAL
MICROSCOPIC EXAM: NORMAL

## 2022-08-09 PROCEDURE — 74183 MRI ABDOMEN W WO CONTRAST: ICD-10-PCS | Mod: 26,,, | Performed by: RADIOLOGY

## 2022-08-09 PROCEDURE — 74183 MRI ABD W/O CNTR FLWD CNTR: CPT | Mod: TC

## 2022-08-09 PROCEDURE — 1159F PR MEDICATION LIST DOCUMENTED IN MEDICAL RECORD: ICD-10-PCS | Mod: CPTII,S$GLB,, | Performed by: UROLOGY

## 2022-08-09 PROCEDURE — 74183 MRI ABD W/O CNTR FLWD CNTR: CPT | Mod: 26,,, | Performed by: RADIOLOGY

## 2022-08-09 PROCEDURE — 3074F PR MOST RECENT SYSTOLIC BLOOD PRESSURE < 130 MM HG: ICD-10-PCS | Mod: CPTII,S$GLB,, | Performed by: UROLOGY

## 2022-08-09 PROCEDURE — 3008F BODY MASS INDEX DOCD: CPT | Mod: CPTII,S$GLB,, | Performed by: UROLOGY

## 2022-08-09 PROCEDURE — 3074F SYST BP LT 130 MM HG: CPT | Mod: CPTII,S$GLB,, | Performed by: UROLOGY

## 2022-08-09 PROCEDURE — 99999 PR PBB SHADOW E&M-EST. PATIENT-LVL IV: ICD-10-PCS | Mod: PBBFAC,,, | Performed by: UROLOGY

## 2022-08-09 PROCEDURE — 1160F PR REVIEW ALL MEDS BY PRESCRIBER/CLIN PHARMACIST DOCUMENTED: ICD-10-PCS | Mod: CPTII,S$GLB,, | Performed by: UROLOGY

## 2022-08-09 PROCEDURE — 1160F RVW MEDS BY RX/DR IN RCRD: CPT | Mod: CPTII,S$GLB,, | Performed by: UROLOGY

## 2022-08-09 PROCEDURE — 99999 PR PBB SHADOW E&M-EST. PATIENT-LVL IV: CPT | Mod: PBBFAC,,, | Performed by: UROLOGY

## 2022-08-09 PROCEDURE — 99204 OFFICE O/P NEW MOD 45 MIN: CPT | Mod: S$GLB,,, | Performed by: UROLOGY

## 2022-08-09 PROCEDURE — A9585 GADOBUTROL INJECTION: HCPCS | Performed by: UROLOGY

## 2022-08-09 PROCEDURE — 3008F PR BODY MASS INDEX (BMI) DOCUMENTED: ICD-10-PCS | Mod: CPTII,S$GLB,, | Performed by: UROLOGY

## 2022-08-09 PROCEDURE — 99204 PR OFFICE/OUTPT VISIT, NEW, LEVL IV, 45-59 MIN: ICD-10-PCS | Mod: S$GLB,,, | Performed by: UROLOGY

## 2022-08-09 PROCEDURE — 25500020 PHARM REV CODE 255: Performed by: UROLOGY

## 2022-08-09 PROCEDURE — 1159F MED LIST DOCD IN RCRD: CPT | Mod: CPTII,S$GLB,, | Performed by: UROLOGY

## 2022-08-09 PROCEDURE — 3079F DIAST BP 80-89 MM HG: CPT | Mod: CPTII,S$GLB,, | Performed by: UROLOGY

## 2022-08-09 PROCEDURE — 3079F PR MOST RECENT DIASTOLIC BLOOD PRESSURE 80-89 MM HG: ICD-10-PCS | Mod: CPTII,S$GLB,, | Performed by: UROLOGY

## 2022-08-09 RX ORDER — GADOBUTROL 604.72 MG/ML
10 INJECTION INTRAVENOUS
Status: COMPLETED | OUTPATIENT
Start: 2022-08-09 | End: 2022-08-09

## 2022-08-09 RX ADMIN — GADOBUTROL 10 ML: 604.72 INJECTION INTRAVENOUS at 08:08

## 2022-08-09 NOTE — PROGRESS NOTES
Subjective:       Patient ID: Teagan Griffith is a 42 y.o. female.    Chief Complaint:  Right renal mass      History of Present Illness  HPI  Patient is a 42 y.o. female who is new to our clinic and referred by their PCP, Dr. Recinos for evaluation of a right renal mass.   Patient developed chest pain ~3-4 days ago.  Acute onset.  Was seen in urgent care and they recommended she be evaluated in the ER.  CXR was normal. CTA was negative for intrathoracic abnormality but did catch a right renal mass.  Subsequently had a CT abdomen w/o contrast--  CT scan from 22 was independently reviewed today and reveals a 14.3cm right renal mass, concerning for RCC.   Patient has a h/o .  No h/o smoking.     On anxiety/depression meds, no other meds.  No HTN.  No further CP.  No SOB.    Has been trying to lose weight---does get some HIGGINS, but not severe.     Works at a desk.     Has a family history of lymphoma, prostate cancer, lung cancer.  She has 1 kid who is a 20-year-old male.      Review of Systems  Review of Systems  All other systems reviewed and negative except pertinent positives noted in HPI.       Objective:     Physical Exam  Constitutional:       Appearance: Normal appearance. She is well-developed. She is not toxic-appearing.   HENT:      Head: Normocephalic.   Cardiovascular:      Rate and Rhythm: Normal rate.      Pulses: Normal pulses.   Pulmonary:      Effort: Pulmonary effort is normal. No tachypnea, accessory muscle usage or respiratory distress.   Abdominal:      General: There is no distension.      Palpations: Abdomen is soft. There is no fluid wave or mass.      Tenderness: There is no abdominal tenderness. There is no rebound.      Hernia: No hernia is present.          Comments: Liver/spleen non-palpable   Musculoskeletal:         General: Normal range of motion.   Skin:     Findings: No bruising or rash.   Neurological:      Mental Status: She is alert and oriented to person, place,  and time.   Psychiatric:         Speech: Speech normal.         Behavior: Behavior normal. Behavior is cooperative.         Thought Content: Thought content normal.         Lab Review  Lab Results   Component Value Date    COLORU Yellow 08/06/2022    SPECGRAV 1.015 08/06/2022    PHUR 5.0 08/06/2022    NITRITE Negative 08/06/2022    KETONESU Negative 08/06/2022         Assessment:        1. Renal mass            Plan:     Renal mass  -     Ambulatory referral/consult to Urology      - Long talk about renal mass and it's management.  Reviewed images.Discussed options including active surveillance, biopsy, minimally invasive techniques including HFRA, cryo. Discussed open and laparascopic surgical approaches. Discussed partial and radical nephrectomy. Discussed surgery preparation, surgery, recuperation, recovery, exercise restrictions. Discussed risks, benefits, and complications. Answered questions and addressed their concerns.    -MRI abdomen to assess renal vein involvement and local extent of renal mass.     -I have explained the risk, benefits, and alternatives of the procedure in detail.  The risks including but not limited to bleeding, injury to adjacent structures including the spleen, liver, lung, pancreas, colon and intestines, blood vessels in the abdomen including the renal artery or renal vein, or need for conversion to open nephrectomy were explained to the patient in depth. The patient voices understanding and all questions have been answered. The patient agrees to proceed as planned with a right robotic nephrectomy

## 2022-08-10 ENCOUNTER — PATIENT MESSAGE (OUTPATIENT)
Dept: SURGERY | Facility: HOSPITAL | Age: 42
End: 2022-08-10
Payer: COMMERCIAL

## 2022-08-10 DIAGNOSIS — N28.89 RENAL MASS: Primary | ICD-10-CM

## 2022-08-16 ENCOUNTER — TELEPHONE (OUTPATIENT)
Dept: PREADMISSION TESTING | Facility: HOSPITAL | Age: 42
End: 2022-08-16
Payer: COMMERCIAL

## 2022-08-16 NOTE — ANESTHESIA PAT ROS NOTE
08/16/2022  Teagan Griffith is a 42 y.o., female.      Pre-op Assessment          Review of Systems         Anesthesia Assessment: Preoperative EQUATION    Planned Procedure: Procedure(s) (LRB):  XI ROBOTIC NEPHRECTOMY (Right)  Requested Anesthesia Type:General/Regional  Surgeon: Justin Riley MD  Service: Urology  Known or anticipated Date of Surgery:9/9/2022    Surgeon notes: reviewed    Previous anesthesia records:Not available    Last PCP note: > 1 year ago   Subspecialty notes: Psychiatry, Urology    Other important co-morbidities:Per Epic: Morbid Obesity and Renal Mass, Depression      Tests already available:  Available tests,  within 3 months , within [facility] .   8/6/2022 CMP, CBC, EKG, ADELE (EF 60%)      Optimization:  Anesthesia Preop Clinic Assessment  Indicated.    Medical Opinion Indicated.           Plan:    Testing:  T&S   Pre-anesthesia  visit       Visit focus: concerns in complex and/or prolonged anesthesia, position other than supine     Consultation:IM Perioperative Hospitalist     Patient  has previously scheduled Medical Appointment: 8/29/2022    Navigation: Tests Scheduled.              Consults scheduled.             Results will be tracked by Preop Clinic.

## 2022-08-24 NOTE — PRE-PROCEDURE INSTRUCTIONS
Preop and medication instructions given and reviewed; instructed to hold vitamins, herbal supplements and NSAIDS one week prior to surgery;  Patient verbalized understanding.

## 2022-08-28 PROBLEM — F32.A DEPRESSION: Status: ACTIVE | Noted: 2022-08-28

## 2022-08-28 PROBLEM — K76.0 HEPATIC STEATOSIS: Status: ACTIVE | Noted: 2022-08-28

## 2022-08-28 NOTE — ASSESSMENT & PLAN NOTE
Does not have significant coagulopathy.    Recent INR: 1.0  Platelet count: 282,000  LFTs normal  Drinks 6 pack of beer daily  Spleen size normal      Encouraged healthy diet: avoid sugar intake and  fatty foods; avoid alcohol consumption. Increase physical activity; exercise at least 150 minutes weekly.  Would benefit from weight loss.

## 2022-08-28 NOTE — ASSESSMENT & PLAN NOTE
Treated with:  sertraline 100 mg daily/risperidone/Lamictal; controlled.   Followed per outside Psych  Denies suicidal/homicidal ideations  Recommend good sleep (7-8 hrs), healthy eating, and limit use of alcohol.

## 2022-08-29 ENCOUNTER — OFFICE VISIT (OUTPATIENT)
Dept: INTERNAL MEDICINE | Facility: CLINIC | Age: 42
End: 2022-08-29
Payer: COMMERCIAL

## 2022-08-29 ENCOUNTER — OFFICE VISIT (OUTPATIENT)
Dept: UROLOGY | Facility: CLINIC | Age: 42
End: 2022-08-29
Payer: COMMERCIAL

## 2022-08-29 ENCOUNTER — RESEARCH ENCOUNTER (OUTPATIENT)
Dept: RESEARCH | Facility: HOSPITAL | Age: 42
End: 2022-08-29
Payer: COMMERCIAL

## 2022-08-29 ENCOUNTER — LAB VISIT (OUTPATIENT)
Dept: LAB | Facility: HOSPITAL | Age: 42
End: 2022-08-29
Attending: ANESTHESIOLOGY
Payer: COMMERCIAL

## 2022-08-29 VITALS
HEIGHT: 66 IN | BODY MASS INDEX: 40.35 KG/M2 | DIASTOLIC BLOOD PRESSURE: 56 MMHG | OXYGEN SATURATION: 96 % | SYSTOLIC BLOOD PRESSURE: 114 MMHG | TEMPERATURE: 98 F | HEART RATE: 79 BPM

## 2022-08-29 DIAGNOSIS — N28.89 RENAL MASS: ICD-10-CM

## 2022-08-29 DIAGNOSIS — K76.0 HEPATIC STEATOSIS: ICD-10-CM

## 2022-08-29 DIAGNOSIS — Z01.818 PREOPERATIVE TESTING: ICD-10-CM

## 2022-08-29 DIAGNOSIS — E66.01 CLASS 3 SEVERE OBESITY WITH BODY MASS INDEX (BMI) OF 40.0 TO 44.9 IN ADULT, UNSPECIFIED OBESITY TYPE, UNSPECIFIED WHETHER SERIOUS COMORBIDITY PRESENT: ICD-10-CM

## 2022-08-29 DIAGNOSIS — R73.09 ELEVATED HEMOGLOBIN A1C: ICD-10-CM

## 2022-08-29 DIAGNOSIS — M54.50 RIGHT-SIDED LOW BACK PAIN WITHOUT SCIATICA, UNSPECIFIED CHRONICITY: ICD-10-CM

## 2022-08-29 DIAGNOSIS — Z78.9 ALCOHOL CONSUMPTION OF MORE THAN FOUR DRINKS PER DAY: ICD-10-CM

## 2022-08-29 DIAGNOSIS — F98.8 ATTENTION DEFICIT DISORDER, UNSPECIFIED HYPERACTIVITY PRESENCE: ICD-10-CM

## 2022-08-29 DIAGNOSIS — F32.A DEPRESSION, UNSPECIFIED DEPRESSION TYPE: ICD-10-CM

## 2022-08-29 DIAGNOSIS — R06.83 SNORING: ICD-10-CM

## 2022-08-29 DIAGNOSIS — R73.09 ELEVATED HEMOGLOBIN A1C: Primary | ICD-10-CM

## 2022-08-29 DIAGNOSIS — R94.31 ABNORMAL EKG: ICD-10-CM

## 2022-08-29 PROBLEM — E66.813 CLASS 3 SEVERE OBESITY WITH BODY MASS INDEX (BMI) OF 40.0 TO 44.9 IN ADULT: Status: ACTIVE | Noted: 2022-08-29

## 2022-08-29 LAB
ABO + RH BLD: NORMAL
BLD GP AB SCN CELLS X3 SERPL QL: NORMAL
ESTIMATED AVG GLUCOSE: 105 MG/DL (ref 68–131)
HBA1C MFR BLD: 5.3 % (ref 4–5.6)
INR PPP: 1 (ref 0.8–1.2)
PROTHROMBIN TIME: 10.3 SEC (ref 9–12.5)

## 2022-08-29 PROCEDURE — 1159F PR MEDICATION LIST DOCUMENTED IN MEDICAL RECORD: ICD-10-PCS | Mod: CPTII,S$GLB,, | Performed by: UROLOGY

## 2022-08-29 PROCEDURE — 3044F HG A1C LEVEL LT 7.0%: CPT | Mod: CPTII,S$GLB,, | Performed by: NURSE PRACTITIONER

## 2022-08-29 PROCEDURE — 3008F PR BODY MASS INDEX (BMI) DOCUMENTED: ICD-10-PCS | Mod: CPTII,S$GLB,, | Performed by: NURSE PRACTITIONER

## 2022-08-29 PROCEDURE — 36415 COLL VENOUS BLD VENIPUNCTURE: CPT | Performed by: NURSE PRACTITIONER

## 2022-08-29 PROCEDURE — 99999 PR PBB SHADOW E&M-EST. PATIENT-LVL III: CPT | Mod: PBBFAC,,, | Performed by: NURSE PRACTITIONER

## 2022-08-29 PROCEDURE — 99214 PR OFFICE/OUTPT VISIT, EST, LEVL IV, 30-39 MIN: ICD-10-PCS | Mod: S$GLB,,, | Performed by: NURSE PRACTITIONER

## 2022-08-29 PROCEDURE — 99214 OFFICE O/P EST MOD 30 MIN: CPT | Mod: S$GLB,,, | Performed by: NURSE PRACTITIONER

## 2022-08-29 PROCEDURE — 99499 NO LOS: ICD-10-PCS | Mod: S$GLB,,, | Performed by: UROLOGY

## 2022-08-29 PROCEDURE — 99999 PR PBB SHADOW E&M-EST. PATIENT-LVL II: CPT | Mod: PBBFAC,,,

## 2022-08-29 PROCEDURE — 1160F PR REVIEW ALL MEDS BY PRESCRIBER/CLIN PHARMACIST DOCUMENTED: ICD-10-PCS | Mod: CPTII,S$GLB,, | Performed by: UROLOGY

## 2022-08-29 PROCEDURE — 3044F PR MOST RECENT HEMOGLOBIN A1C LEVEL <7.0%: ICD-10-PCS | Mod: CPTII,S$GLB,, | Performed by: UROLOGY

## 2022-08-29 PROCEDURE — 99499 UNLISTED E&M SERVICE: CPT | Mod: S$GLB,,, | Performed by: UROLOGY

## 2022-08-29 PROCEDURE — 85610 PROTHROMBIN TIME: CPT | Performed by: NURSE PRACTITIONER

## 2022-08-29 PROCEDURE — 1160F RVW MEDS BY RX/DR IN RCRD: CPT | Mod: CPTII,S$GLB,, | Performed by: UROLOGY

## 2022-08-29 PROCEDURE — 83036 HEMOGLOBIN GLYCOSYLATED A1C: CPT | Performed by: NURSE PRACTITIONER

## 2022-08-29 PROCEDURE — 3008F BODY MASS INDEX DOCD: CPT | Mod: CPTII,S$GLB,, | Performed by: NURSE PRACTITIONER

## 2022-08-29 PROCEDURE — 99999 PR PBB SHADOW E&M-EST. PATIENT-LVL II: ICD-10-PCS | Mod: PBBFAC,,,

## 2022-08-29 PROCEDURE — 1159F MED LIST DOCD IN RCRD: CPT | Mod: CPTII,S$GLB,, | Performed by: UROLOGY

## 2022-08-29 PROCEDURE — 3044F HG A1C LEVEL LT 7.0%: CPT | Mod: CPTII,S$GLB,, | Performed by: UROLOGY

## 2022-08-29 PROCEDURE — 3074F SYST BP LT 130 MM HG: CPT | Mod: CPTII,S$GLB,, | Performed by: NURSE PRACTITIONER

## 2022-08-29 PROCEDURE — 3074F PR MOST RECENT SYSTOLIC BLOOD PRESSURE < 130 MM HG: ICD-10-PCS | Mod: CPTII,S$GLB,, | Performed by: NURSE PRACTITIONER

## 2022-08-29 PROCEDURE — 86901 BLOOD TYPING SEROLOGIC RH(D): CPT | Performed by: ANESTHESIOLOGY

## 2022-08-29 PROCEDURE — 99999 PR PBB SHADOW E&M-EST. PATIENT-LVL III: ICD-10-PCS | Mod: PBBFAC,,, | Performed by: NURSE PRACTITIONER

## 2022-08-29 PROCEDURE — 3044F PR MOST RECENT HEMOGLOBIN A1C LEVEL <7.0%: ICD-10-PCS | Mod: CPTII,S$GLB,, | Performed by: NURSE PRACTITIONER

## 2022-08-29 PROCEDURE — 3078F DIAST BP <80 MM HG: CPT | Mod: CPTII,S$GLB,, | Performed by: NURSE PRACTITIONER

## 2022-08-29 PROCEDURE — 3078F PR MOST RECENT DIASTOLIC BLOOD PRESSURE < 80 MM HG: ICD-10-PCS | Mod: CPTII,S$GLB,, | Performed by: NURSE PRACTITIONER

## 2022-08-29 RX ORDER — HEPARIN SODIUM 5000 [USP'U]/ML
5000 INJECTION, SOLUTION INTRAVENOUS; SUBCUTANEOUS EVERY 8 HOURS
Status: CANCELLED | OUTPATIENT
Start: 2022-08-29

## 2022-08-29 RX ORDER — KETOROLAC TROMETHAMINE 30 MG/ML
15 INJECTION, SOLUTION INTRAMUSCULAR; INTRAVENOUS
Status: CANCELLED | OUTPATIENT
Start: 2022-08-29 | End: 2022-09-01

## 2022-08-29 RX ORDER — CEFAZOLIN SODIUM 2 G/50ML
2 SOLUTION INTRAVENOUS
Status: CANCELLED | OUTPATIENT
Start: 2022-08-29

## 2022-08-29 RX ORDER — ACETAMINOPHEN 500 MG
1000 TABLET ORAL
Status: CANCELLED | OUTPATIENT
Start: 2022-08-29

## 2022-08-29 RX ORDER — SODIUM CHLORIDE 9 MG/ML
INJECTION, SOLUTION INTRAVENOUS CONTINUOUS
Status: CANCELLED | OUTPATIENT
Start: 2022-08-29

## 2022-08-29 RX ORDER — PREGABALIN 150 MG/1
150 CAPSULE ORAL
Status: CANCELLED | OUTPATIENT
Start: 2022-08-29 | End: 2022-08-30

## 2022-08-29 RX ORDER — LIDOCAINE HYDROCHLORIDE 10 MG/ML
1 INJECTION, SOLUTION EPIDURAL; INFILTRATION; INTRACAUDAL; PERINEURAL ONCE
Status: CANCELLED | OUTPATIENT
Start: 2022-08-29 | End: 2022-08-29

## 2022-08-29 NOTE — ASSESSMENT & PLAN NOTE
Drinks 6 pack of beer daily  Denies alcohol abuse- states started drinking heavily when she found out about renal mass  Audit-C score: 10 (alcohol misuse and possible liver damage)  At increased risk for alcohol withdrawal during the perioperative period.

## 2022-08-29 NOTE — PROGRESS NOTES
Urology ([facility]) H&P for upcoming procedure  Staff:  Justin Riley MD    CC: right renal mass    HPI:  Teagan Griffith is a 42 y.o. female with a right renal mass.     Patient was seen in ED for acute chest pain.  CXR was normal. CTA was negative for intrathoracic abnormality but did catch a right renal mass.  Subsequently had a CT abdomen w/o contrast-- CT scan from 22 was independently reviewed and reveals a 14.3 cm right renal mass, concerning for RCC.   Patient has a h/o .  No h/o smoking.      On anxiety/depression meds, no other meds.        She denies hematuria. Some complaints of right flank pain.    PMHX: depression  PSHX:     Hgb 10.8, Cr 0.8  Echo 22 EF 60%    Preop appointment with Malina Hernandez this morning. Labs to follow appointment    Patient wants to keep her tumor if possible.    Has a family history of lymphoma, prostate cancer, lung cancer.       ROS: Negative except for as stated above    Past Medical History:   Diagnosis Date    Depression        Past Surgical History:   Procedure Laterality Date     SECTION         Social History     Socioeconomic History    Marital status:    Tobacco Use    Smoking status: Former     Years: 20.00     Types: Cigarettes     Quit date: 2018     Years since quittin.6    Smokeless tobacco: Never   Substance and Sexual Activity    Alcohol use: Yes     Alcohol/week: 42.0 standard drinks     Types: 42 Cans of beer per week       Family History   Problem Relation Age of Onset    Drug abuse Mother     Prostate cancer Father        Review of patient's allergies indicates:  No Known Allergies    Current Outpatient Medications on File Prior to Visit   Medication Sig Dispense Refill    acetaminophen (TYLENOL) 500 MG tablet Take 1,000 mg by mouth every 6 (six) hours as needed for Pain.      ALPRAZolam (XANAX) 2 MG Tab Take 1 mg by mouth 2 (two) times a day.      dextroamphetamine-amphetamine 30 mg Tab Take 30 mg  "by mouth 2 (two) times a day.      lamoTRIgine (LAMICTAL) 200 MG tablet Take 200 mg by mouth once daily.      risperiDONE (RISPERDAL) 2 MG tablet Take 4 mg by mouth every evening.      sertraline (ZOLOFT) 100 MG tablet Take 50 mg by mouth once daily.       No current facility-administered medications on file prior to visit.       Anticoagulation:  No    Physical Exam:  Estimated body mass index is 40.35 kg/m² as calculated from the following:    Height as of an earlier encounter on 8/29/22: 5' 6" (1.676 m).    Weight as of 8/9/22: 113.4 kg (250 lb).     General: No acute distress, well developed. AAOx3  Head: Normocephalic, Atraumatic  Eyes: Extra-occular movements intact, No discharge  Neck: supple, symmetrical, trachea midline  Lungs: normal respiratory effort, no respiratory distress, no wheezes  CV: regular rate, 2+ pulses  Abdomen: soft, non-tender, non-distended, no organomegaly   MSK: no edema, no deformities, normal ROM  Skin: skin color, texture, turgor normal.  Neurologic: no focal deficits, sensation intact     Labs:    Urine dipstick today shows negative for all components.    Lab Results   Component Value Date    WBC 9.85 08/06/2022    HGB 10.8 (L) 08/06/2022    HCT 32.4 (L) 08/06/2022    MCV 87 08/06/2022     08/06/2022           BMP  Lab Results   Component Value Date     08/06/2022    K 3.9 08/06/2022     08/06/2022    CO2 24 08/06/2022    BUN 15 08/06/2022    CREATININE 0.8 08/06/2022    CALCIUM 9.5 08/06/2022    ANIONGAP 9 08/06/2022       Imaging:   MRI from 8/9/22 - right renal mass, 14 cm, enhancing at  mid and lower  pole, 1 right renal artery/ies, 2 right renal veins.    Chest imaging: CXR 8/6/22 - no signs of malignancy    Assessment: Teagan Griffith is a 42 y.o. female with right renal mass, suspicious for [id number] RCC    Plan:     1. To OR on 9/9/22 for right robotic nephrectomy  2. Ancef  3. Type and screen ordered preoperatively. The risks, benefits, and indications " of a blood transfusion were discussed. The patient was given a chance to ask questions and all questions answered to her satisfaction. Consent obtained.   4. The risks and benefits of participating in our clinical trial have been discussed and the patient has consented for the research study here at [facility].   5. The risks and benefits of nephrectomy were discussed with the patient in detail. The risks include but are not limited to bleeding possibly requiring a blood transfusion, infection, pain, injury to internal organs such as lung, liver, spleen, bowel, adrenal, incomplete removal of tumor if present. Risks may vary depending on reasons for removal of kidney, persistent flank pain or hernia at area of incision, urinary infection requiring antibiotics, air embolus, pulmonary embolus and possible conversion to an open surgery. The patient will also be consented for blood. Patient understands these risks and has agreed to proceed with surgery. Consent was obtained.  6. F/u Malina Hernandez note    Kodak Lr MD

## 2022-08-29 NOTE — H&P (VIEW-ONLY)
Urology ([facility]) H&P for upcoming procedure  Staff:  Justin Riley MD    CC: right renal mass    HPI:  Teagan Griffith is a 42 y.o. female with a right renal mass.     Patient was seen in ED for acute chest pain.  CXR was normal. CTA was negative for intrathoracic abnormality but did catch a right renal mass.  Subsequently had a CT abdomen w/o contrast-- CT scan from 22 was independently reviewed and reveals a 14.3 cm right renal mass, concerning for RCC.   Patient has a h/o .  No h/o smoking.      On anxiety/depression meds, no other meds.        She denies hematuria. Some complaints of right flank pain.    PMHX: depression  PSHX:     Hgb 10.8, Cr 0.8  Echo 22 EF 60%    Preop appointment with Malina Hernandez this morning. Labs to follow appointment    Patient wants to keep her tumor if possible.    Has a family history of lymphoma, prostate cancer, lung cancer.       ROS: Negative except for as stated above    Past Medical History:   Diagnosis Date    Depression        Past Surgical History:   Procedure Laterality Date     SECTION         Social History     Socioeconomic History    Marital status:    Tobacco Use    Smoking status: Former     Years: 20.00     Types: Cigarettes     Quit date: 2018     Years since quittin.6    Smokeless tobacco: Never   Substance and Sexual Activity    Alcohol use: Yes     Alcohol/week: 42.0 standard drinks     Types: 42 Cans of beer per week       Family History   Problem Relation Age of Onset    Drug abuse Mother     Prostate cancer Father        Review of patient's allergies indicates:  No Known Allergies    Current Outpatient Medications on File Prior to Visit   Medication Sig Dispense Refill    acetaminophen (TYLENOL) 500 MG tablet Take 1,000 mg by mouth every 6 (six) hours as needed for Pain.      ALPRAZolam (XANAX) 2 MG Tab Take 1 mg by mouth 2 (two) times a day.      dextroamphetamine-amphetamine 30 mg Tab Take 30 mg  "by mouth 2 (two) times a day.      lamoTRIgine (LAMICTAL) 200 MG tablet Take 200 mg by mouth once daily.      risperiDONE (RISPERDAL) 2 MG tablet Take 4 mg by mouth every evening.      sertraline (ZOLOFT) 100 MG tablet Take 50 mg by mouth once daily.       No current facility-administered medications on file prior to visit.       Anticoagulation:  No    Physical Exam:  Estimated body mass index is 40.35 kg/m² as calculated from the following:    Height as of an earlier encounter on 8/29/22: 5' 6" (1.676 m).    Weight as of 8/9/22: 113.4 kg (250 lb).     General: No acute distress, well developed. AAOx3  Head: Normocephalic, Atraumatic  Eyes: Extra-occular movements intact, No discharge  Neck: supple, symmetrical, trachea midline  Lungs: normal respiratory effort, no respiratory distress, no wheezes  CV: regular rate, 2+ pulses  Abdomen: soft, non-tender, non-distended, no organomegaly   MSK: no edema, no deformities, normal ROM  Skin: skin color, texture, turgor normal.  Neurologic: no focal deficits, sensation intact     Labs:    Urine dipstick today shows negative for all components.    Lab Results   Component Value Date    WBC 9.85 08/06/2022    HGB 10.8 (L) 08/06/2022    HCT 32.4 (L) 08/06/2022    MCV 87 08/06/2022     08/06/2022           BMP  Lab Results   Component Value Date     08/06/2022    K 3.9 08/06/2022     08/06/2022    CO2 24 08/06/2022    BUN 15 08/06/2022    CREATININE 0.8 08/06/2022    CALCIUM 9.5 08/06/2022    ANIONGAP 9 08/06/2022       Imaging:   MRI from 8/9/22 - right renal mass, 14 cm, enhancing at  mid and lower  pole, 1 right renal artery/ies, 2 right renal veins.    Chest imaging: CXR 8/6/22 - no signs of malignancy    Assessment: Teagan Griffith is a 42 y.o. female with right renal mass, suspicious for [id number] RCC    Plan:     1. To OR on 9/9/22 for right robotic nephrectomy  2. Ancef  3. Type and screen ordered preoperatively. The risks, benefits, and indications " of a blood transfusion were discussed. The patient was given a chance to ask questions and all questions answered to her satisfaction. Consent obtained.   4. The risks and benefits of participating in our clinical trial have been discussed and the patient has consented for the research study here at [facility].   5. The risks and benefits of nephrectomy were discussed with the patient in detail. The risks include but are not limited to bleeding possibly requiring a blood transfusion, infection, pain, injury to internal organs such as lung, liver, spleen, bowel, adrenal, incomplete removal of tumor if present. Risks may vary depending on reasons for removal of kidney, persistent flank pain or hernia at area of incision, urinary infection requiring antibiotics, air embolus, pulmonary embolus and possible conversion to an open surgery. The patient will also be consented for blood. Patient understands these risks and has agreed to proceed with surgery. Consent was obtained.  6. F/u Malina Hernandez note    Kodak Lr MD

## 2022-08-29 NOTE — PROGRESS NOTES
"[facility]  Progress Note    Patient Name: Teagan Griffith  MRN: [id number]  Date of Evaluation- 2022  PCP- Primary Doctor No    Future cases for Teagan Griffith [[id number]]       Case ID Status Date Time Guillermo Procedure Provider Location    [id number] [facility] 2022 11:30  XI ROBOTIC NEPHRECTOMY Justin Riley MD [[id number]] [facility] OR 2ND FLR            HPI:  This is a 42 y.o. female  who presents today with  for a preoperative evaluation in preparation for a Urology  procedure.  Scheduled for  right  robotic nephrectomy.  Surgery is indicated for right renal mass.   Patient is new to me.  Details of current problem: The duration of problem is  two weeks.  Patient presented to ER on 22 for chest pain. Workup followed with CTA chest done revealing incidental finding the right renal mass. Follow-up imaging with MRI abdomen shows enhancing right renal mass highly concerning for renal cell carcinoma.   Reports symptoms of  "uncomfortable" feeling to lower back and right flank area.  There are no aggravating factors.   Takes Tylenol ES prn for lower back and right flank pain.   Denies pain while sitting.   The history has been obtained by speaking with the patient and reviewing the electronic medical record and/or outside health information. Significant health conditions for the perioperative period are discussed below in assessment and plan.     Patient reports current health status to be Good.  Denies any new symptoms before surgery.        Subjective/ Objective:     Chief Complaint: Preoperative evaulation, perioperative medical management, and complication reduction plan.     Functional Capacity:  Can walk up one flight of stairs without CP/reports some SOB with activity.       Anesthesia issues: difficulty breathing during     Difficulty mouth opening: No    Steroid use in the last 12 months:  No    Dental Issues: No    Family anesthesia difficulty: None       Last " monthly period : 2022- reports irregular cycles    Family Hx of Thrombosis: None    Past Medical History:   Diagnosis Date    Depression          Past Medical History Pertinent Negatives:   Diagnosis Date Noted    Anemia 2022    Asthma 2022    CHF (congestive heart failure) 2022    Coronary artery disease 2022    Deep vein thrombosis 2022    Diabetes mellitus, type 2 2022    Disorder of kidney and ureter 2022    GERD (gastroesophageal reflux disease) 2022    Hyperlipidemia 2022    Hypertension 2022    Pulmonary embolism 2022    Seizures 2022    Stroke 2022    Thyroid disease 2022         Past Surgical History:   Procedure Laterality Date     SECTION         Review of Systems   Constitutional:  Negative for chills, fatigue, fever and unexpected weight change.   HENT:  Negative for dental problem, hearing loss, postnasal drip, rhinorrhea, sore throat, tinnitus and trouble swallowing.    Eyes:  Negative for photophobia, pain, discharge and visual disturbance.   Respiratory:  Negative for apnea, cough, chest tightness, shortness of breath and wheezing.         STOP BANG risk factors:  Snoring  BMI > 35     Cardiovascular:  Positive for chest pain (off and on; moves around from upper left to right side of chest). Negative for palpitations and leg swelling.   Gastrointestinal:  Positive for diarrhea. Negative for abdominal pain, blood in stool, constipation, nausea and vomiting.   Endocrine: Negative for cold intolerance and heat intolerance.   Genitourinary:  Positive for flank pain (right). Negative for decreased urine volume, difficulty urinating, dysuria, frequency, hematuria and urgency.        Recurrent UTIs  incomplete bladder emptying   Musculoskeletal:  Positive for back pain (lower; right mid back). Negative for arthralgias, neck pain and neck stiffness.   Skin:  Negative for rash and wound.   Neurological:   "Positive for numbness (N/T - BUE- reports carpal tunnel). Negative for dizziness, tremors, seizures, syncope, weakness and headaches.   Hematological:  Negative for adenopathy. Does not bruise/bleed easily.   Psychiatric/Behavioral:  Negative for confusion, sleep disturbance and suicidal ideas.             VITALS  Visit Vitals  BP (!) 114/56 (BP Location: Left arm, Patient Position: Sitting)   Pulse 79   Temp 97.7 °F (36.5 °C) (Oral)   Ht 5' 6" (1.676 m)   SpO2 96%   BMI 40.35 kg/m²          Physical Exam  Vitals reviewed.   Constitutional:       General: She is not in acute distress.     Appearance: She is well-developed. She is morbidly obese.   HENT:      Head: Normocephalic.      Nose: Nose normal.      Mouth/Throat:      Pharynx: No oropharyngeal exudate.   Eyes:      General:         Right eye: No discharge.         Left eye: No discharge.      Conjunctiva/sclera: Conjunctivae normal.      Pupils: Pupils are equal, round, and reactive to light.   Neck:      Thyroid: No thyromegaly.      Vascular: No carotid bruit or JVD.      Trachea: No tracheal deviation.   Cardiovascular:      Rate and Rhythm: Normal rate and regular rhythm.      Pulses:           Carotid pulses are 2+ on the right side and 2+ on the left side.       Dorsalis pedis pulses are 2+ on the right side and 2+ on the left side.        Posterior tibial pulses are 2+ on the right side and 2+ on the left side.      Heart sounds: Normal heart sounds. No murmur heard.     Comments: trace edema- BLE  Pulmonary:      Effort: Pulmonary effort is normal. No respiratory distress.      Breath sounds: Normal breath sounds. No stridor. No wheezing, rhonchi or rales.   Abdominal:      General: Bowel sounds are normal. There is no distension.      Palpations: Abdomen is soft.      Tenderness: There is no abdominal tenderness. There is no guarding.   Musculoskeletal:      Cervical back: Normal range of motion.   Lymphadenopathy:      Cervical: No cervical " adenopathy.   Skin:     General: Skin is warm and dry.      Capillary Refill: Capillary refill takes less than 2 seconds.      Findings: No erythema or rash.   Neurological:      Mental Status: She is alert and oriented to person, place, and time.      Coordination: Coordination normal.        Significant Labs:  Lab Results   Component Value Date    WBC 9.85 08/06/2022    HGB 10.8 (L) 08/06/2022    HCT 32.4 (L) 08/06/2022     08/06/2022    ALT 19 08/06/2022    AST 12 08/06/2022     08/06/2022    K 3.9 08/06/2022     08/06/2022    CREATININE 0.8 08/06/2022    BUN 15 08/06/2022    CO2 24 08/06/2022    INR 1.0 08/29/2022    HGBA1C 5.3 08/29/2022       Diagnostic Studies: No relevant studies.    EKG:   Results for orders placed or performed during the hospital encounter of 08/06/22   EKG 12-lead    Collection Time: 08/06/22  2:32 AM    Narrative    Test Reason : R07.9,    Vent. Rate : 084 BPM     Atrial Rate : 084 BPM     P-R Int : 202 ms          QRS Dur : 118 ms      QT Int : 394 ms       P-R-T Axes : 047 013 035 degrees     QTc Int : 465 ms    Normal sinus rhythm  Low voltage QRS  Incomplete left bundle branch block  Nonspecific ST abnormality  Abnormal ECG  When compared with ECG of 05-AUG-2022 09:41,  No significant change was found  Confirmed by Nathen TORRES MD ([id number]) on 8/6/2022 10:29:34 AM    Referred By: [email or web address]   SELF           Confirmed By:Nathen TORRES MD       2D ECHO:  TTE:  Results for orders placed or performed during the hospital encounter of 08/06/22   Echo   Result Value Ref Range    Ascending aorta 2.99 cm    STJ 2.66 cm    AV mean gradient 6 mmHg    Ao peak zoran 1.56 m/s    Ao VTI 30.42 cm    IVRT 100.86 msec    IVS 0.77 0.6 - 1.1 cm    LA size 4.17 cm    Left Atrium Major Axis 6.15 cm    Left Atrium Minor Axis 5.75 cm    LVIDd 5.25 3.5 - 6.0 cm    LVIDs 3.21 2.1 - 4.0 cm    LVOT diameter 2.14 cm    LVOT peak VTI 26.92 cm    Posterior Wall 0.68 0.6 - 1.1 cm    MV Peak A Zoran 0.56 m/s     E wave deceleration time 323.76 msec    MV Peak E Zoran 0.90 m/s    RA Major Axis 5.18 cm    RA Width 3.85 cm    RVDD 3.26 cm    Sinus 3.40 cm    TAPSE 2.50 cm    TR Max Zoran 2.26 m/s    TDI LATERAL 0.12 m/s    TDI SEPTAL 0.10 m/s    LA WIDTH 4.05 cm    LV Diastolic Volume 132.40 mL    LV Systolic Volume 41.21 mL    LVOT peak zoran 1.33 m/s    LA volume (mod) 62.63 cm3    LV LATERAL E/E' RATIO 7.50 m/s    LV SEPTAL E/E' RATIO 9.00 m/s    FS 39 %    LA volume 85.32 cm3    LV mass 130.44 g    Left Ventricle Relative Wall Thickness 0.26 cm    AV valve area 3.18 cm2    AV Velocity Ratio 0.85     AV index (prosthetic) 0.88     E/A ratio 1.61     Mean e' 0.11 m/s    LVOT area 3.6 cm2    LVOT stroke volume 96.78 cm3    AV peak gradient 10 mmHg    E/E' ratio 8.18 m/s    Triscuspid Valve Regurgitation Peak Gradient 20 mmHg    BSA 2.3 m2    LV Systolic Volume Index 18.7 mL/m2    LV Diastolic Volume Index 60.18 mL/m2    LA Volume Index 38.8 mL/m2    LV Mass Index 59 g/m2    LA Volume Index (Mod) 28.5 mL/m2    Right Atrial Pressure (from IVC) 3 mmHg    EF 60 %    TV rest pulmonary artery pressure 23 mmHg    Narrative    · The left ventricle is normal in size with normal systolic function.  · The estimated ejection fraction is 60%.  · Normal left ventricular diastolic function.  · Normal right ventricular size with normal right ventricular systolic   function.  · Mild left atrial enlargement.  · Mild right atrial enlargement.  · Normal central venous pressure (3 mmHg).  · The estimated PA systolic pressure is 23 mmHg.            Imaging  CTA chest 8/6/22  Impression:     1. Evaluation is limited by suboptimal bolus timing.  No central or proximal segmental pulmonary emboli allowing for limitations.  2. Large partially visualized enhancing mass in the right kidney measuring at least 12 cm with adjacent fat stranding and prominent vessels highly concerning for renal cell carcinoma.  Recommend short-term urology follow-up and further  evaluation with CT or MRI renal mass protocol when clinically appropriate.  3. Scattered linear subsegmental opacities and ground-glass densities in the lung bases, left greater than right, likely related to subsegmental atelectasis or developing infectious/inflammatory process.  4. Hepatic steatosis.  This report was flagged in Epic as containing an incidental finding.     Findings regarding right renal mass communicated to Joe Funk MD by Rick Holder MD via [facility] secure chat (with acknowledgment) on 08/06/2022 at 07:10.     Electronically signed by resident: Genoveva Saab  Date:                                            08/06/2022  Time:                                           06:17     Electronically signed by: Rick Holder MD  Date:                                            08/06/2022  Time:                                           07:13      MRI abdomen 8/9/22  Impression:     1. Heterogeneously enhancing solid right renal mass highly concerning for RCC. No evidence of filling defect or enhancing tumor thrombus in the right renal artery or vein.  Mildly prominent mesenteric lymph nodes, as above.  Metastatic disease difficult to definitively exclude.  2. Cholelithiasis.  3. Additional findings detailed above.  This report was flagged in Epic as abnormal.     Electronically signed by resident: Aaron Sheriff MD  Date:                                            08/10/2022  Time:                                           09:51     Electronically signed by: Pedrito Calloway MD  Date:                                            08/10/2022  Time:                                           10:15      Active Cardiac Conditions: None      Revised Cardiac Risk Index   High -Risk Surgery  Intraperitoneal; Intrathoracic; suprainguinal vascular Yes- + 1 No- 0  robotic   History of Ischemic Heart Disease   (Hx of MI/positive exercise test/current chest pain due to ischemia/use of nitrate therapy/EKG with  pathological Q waves) Yes- + 1 No- 0   History of CHF  (Pulmonary edema/bilateral rales or S3 gallop/PND/CXR showing pulmonary vascular redistribution) Yes- + 1 No- 0   History of CVA   (Prior stroke or TIA) Yes- + 1 No- 0   Pre-operative treatment with insulin Yes- + 1 No- 0   Pre-operative creatinine > 2mg/dl Yes- + 1 No- 0   Total: 0      Risk Status:  Estimated risk of cardiac complications after non-cardiac surgery using the Revised Cardiac Risk Index for Preoperative risk is 3.9 %      ARISCAT (Canet) risk index: Low: 1.6% risk of post-op pulmonary complications.    American Society of Anesthesiologists Physical Status classification (ASA): 3             No further cardiac workup needed prior to surgery.        Orders Placed This Encounter    Hemoglobin A1C    Protime-INR           Assessment/Plan:     Depression  Treated with:  sertraline 100 mg daily/risperidone/Lamictal; controlled.   Followed per outside Psych  Denies suicidal/homicidal ideations  Recommend good sleep (7-8 hrs), healthy eating, and limit use of alcohol.    Hepatic steatosis  Does not have significant coagulopathy.    Recent INR: 1.0  Platelet count: 282,000  LFTs normal  Drinks 6 pack of beer daily  Spleen size normal      Encouraged healthy diet: avoid sugar intake and  fatty foods; avoid alcohol consumption. Increase physical activity; exercise at least 150 minutes weekly.  Would benefit from weight loss.     Renal mass  Scheduled with Dr. Riley on 9/9/22 for right robotic nephrectomy.    ADD (attention deficit disorder)  Treated with Adderall; controlled. Managed per outside Psych.      Snoring  Denies INDIO. Possible sleep apnea: recommend caution with sedating medication in the perioperative period.   Not interested in Sleep Study    Class 3 severe obesity with body mass index (BMI) of 40.0 to 44.9 in adult  Patient would benefit from weight loss and has not set goals to achieve success.   Lifestyle changes should be made by eating  healthy, exercising at least 150 minutes weekly, and avoiding sedentary behavior.   Reports losing 75 lbs by walking and will start back after renal surgery.       Alcohol consumption of more than four drinks per day  Drinks 6 pack of beer daily  Denies alcohol abuse- states started drinking heavily when she found out about renal mass  Audit-C score: 10 (alcohol misuse and possible liver damage)  At increased risk for alcohol withdrawal during the perioperative period.       Discussion/Management of Perioperative Care    Thromboembolic prophylaxis (VTE) Care: Risk factors for thrombosis include: morbid obesity and surgical procedure.  I recommend prophylaxis of thromboembolism with the use of compression stockings/pneumatic devices, and/or pharmacologic agents. The benefits should outweigh the risks for pharmacologic prophylaxis in the perioperative period. I also encourage early ambulation if not contraindicated during the post-operative period.    Risk factors for post-operative pulmonary complications include:surgery > 3 hours. To reduce the risk of pulmonary complications, prophylactic recommendations include: incentive spirometry use/deep breathing, end tidal carbon dioxide monitoring, and pain control.    I recommend monitoring sodium during the perioperative period. Pt. is currently on an SSRI which can be associated with SIADH. Surgical procedures are associated with hypersecretion of ADH as well.    To reduce the risk of postoperative renal complications, I recommend the patient maintain adequate fluid volume status by drinking 2 liters of water daily.  Avoid/reduce NSAIDS and BRIDGES-2 inhibitors use as well as IV contrast for renal protection.    I recommend the use of appropriate prophylactic antibiotics to reduce the risk of surgical site infections.    The patient is at an increased risk for urinary retention due to : urological procedure. I recommend to avoid/decrease the use of benzodiazepines,  anticholinergics, and Benadryl in the perioperative period. I also recommend using opioids for the shortest period of time if possible.          This visit was focused on Preoperative evaluation, Perioperative Medical management, complication reduction plans. I suggest that the patient follows up with primary care or relevant sub specialists for ongoing health care.    I appreciate the opportunity to be involved in this patients care. Please feel free to contact me if there were any questions about this consultation.    Patient is optimized for surgery.    A1c ordered for elevated glucose on labs- normal result (5.3).  Discussed with anesthesia (re: abnormal EKG)- OK to proceed since Echo was normal.       Malina Hernandez, [place]  Perioperative Medicine  [facility]

## 2022-08-29 NOTE — ASSESSMENT & PLAN NOTE
Denies INDIO. Possible sleep apnea: recommend caution with sedating medication in the perioperative period.   Not interested in Sleep Study

## 2022-08-29 NOTE — OUTPATIENT SUBJECTIVE & OBJECTIVE
Outpatient Subjective & Objective      Chief Complaint: Preoperative evaulation, perioperative medical management, and complication reduction plan.     Functional Capacity:  Can walk up one flight of stairs without CP/reports some SOB with activity.       Anesthesia issues: difficulty breathing during     Difficulty mouth opening: No    Steroid use in the last 12 months:  No    Dental Issues: No    Family anesthesia difficulty: None       Last monthly period : 2022- reports irregular cycles    Family Hx of Thrombosis: None    Past Medical History:   Diagnosis Date    Depression          Past Medical History Pertinent Negatives:   Diagnosis Date Noted    Anemia 2022    Asthma 2022    CHF (congestive heart failure) 2022    Coronary artery disease 2022    Deep vein thrombosis 2022    Diabetes mellitus, type 2 2022    Disorder of kidney and ureter 2022    GERD (gastroesophageal reflux disease) 2022    Hyperlipidemia 2022    Hypertension 2022    Pulmonary embolism 2022    Seizures 2022    Stroke 2022    Thyroid disease 2022         Past Surgical History:   Procedure Laterality Date     SECTION         Review of Systems   Constitutional:  Negative for chills, fatigue, fever and unexpected weight change.   HENT:  Negative for dental problem, hearing loss, postnasal drip, rhinorrhea, sore throat, tinnitus and trouble swallowing.    Eyes:  Negative for photophobia, pain, discharge and visual disturbance.   Respiratory:  Negative for apnea, cough, chest tightness, shortness of breath and wheezing.         STOP BANG risk factors:  Snoring  BMI > 35     Cardiovascular:  Positive for chest pain (off and on; moves around from upper left to right side of chest). Negative for palpitations and leg swelling.   Gastrointestinal:  Positive for diarrhea. Negative for abdominal pain, blood in stool, constipation, nausea and  "vomiting.   Endocrine: Negative for cold intolerance and heat intolerance.   Genitourinary:  Positive for flank pain (right). Negative for decreased urine volume, difficulty urinating, dysuria, frequency, hematuria and urgency.        Recurrent UTIs  incomplete bladder emptying   Musculoskeletal:  Positive for back pain (lower; right mid back). Negative for arthralgias, neck pain and neck stiffness.   Skin:  Negative for rash and wound.   Neurological:  Positive for numbness (N/T - BUE- reports carpal tunnel). Negative for dizziness, tremors, seizures, syncope, weakness and headaches.   Hematological:  Negative for adenopathy. Does not bruise/bleed easily.   Psychiatric/Behavioral:  Negative for confusion, sleep disturbance and suicidal ideas.             VITALS  Visit Vitals  BP (!) 114/56 (BP Location: Left arm, Patient Position: Sitting)   Pulse 79   Temp 97.7 °F (36.5 °C) (Oral)   Ht 5' 6" (1.676 m)   SpO2 96%   BMI 40.35 kg/m²          Physical Exam  Vitals reviewed.   Constitutional:       General: She is not in acute distress.     Appearance: She is well-developed. She is morbidly obese.   HENT:      Head: Normocephalic.      Nose: Nose normal.      Mouth/Throat:      Pharynx: No oropharyngeal exudate.   Eyes:      General:         Right eye: No discharge.         Left eye: No discharge.      Conjunctiva/sclera: Conjunctivae normal.      Pupils: Pupils are equal, round, and reactive to light.   Neck:      Thyroid: No thyromegaly.      Vascular: No carotid bruit or JVD.      Trachea: No tracheal deviation.   Cardiovascular:      Rate and Rhythm: Normal rate and regular rhythm.      Pulses:           Carotid pulses are 2+ on the right side and 2+ on the left side.       Dorsalis pedis pulses are 2+ on the right side and 2+ on the left side.        Posterior tibial pulses are 2+ on the right side and 2+ on the left side.      Heart sounds: Normal heart sounds. No murmur heard.     Comments: trace edema- " BLE  Pulmonary:      Effort: Pulmonary effort is normal. No respiratory distress.      Breath sounds: Normal breath sounds. No stridor. No wheezing, rhonchi or rales.   Abdominal:      General: Bowel sounds are normal. There is no distension.      Palpations: Abdomen is soft.      Tenderness: There is no abdominal tenderness. There is no guarding.   Musculoskeletal:      Cervical back: Normal range of motion.   Lymphadenopathy:      Cervical: No cervical adenopathy.   Skin:     General: Skin is warm and dry.      Capillary Refill: Capillary refill takes less than 2 seconds.      Findings: No erythema or rash.   Neurological:      Mental Status: She is alert and oriented to person, place, and time.      Coordination: Coordination normal.        Significant Labs:  Lab Results   Component Value Date    WBC 9.85 08/06/2022    HGB 10.8 (L) 08/06/2022    HCT 32.4 (L) 08/06/2022     08/06/2022    ALT 19 08/06/2022    AST 12 08/06/2022     08/06/2022    K 3.9 08/06/2022     08/06/2022    CREATININE 0.8 08/06/2022    BUN 15 08/06/2022    CO2 24 08/06/2022    INR 1.0 08/29/2022    HGBA1C 5.3 08/29/2022       Diagnostic Studies: No relevant studies.    EKG:   Results for orders placed or performed during the hospital encounter of 08/06/22   EKG 12-lead    Collection Time: 08/06/22  2:32 AM    Narrative    Test Reason : R07.9,    Vent. Rate : 084 BPM     Atrial Rate : 084 BPM     P-R Int : 202 ms          QRS Dur : 118 ms      QT Int : 394 ms       P-R-T Axes : 047 013 035 degrees     QTc Int : 465 ms    Normal sinus rhythm  Low voltage QRS  Incomplete left bundle branch block  Nonspecific ST abnormality  Abnormal ECG  When compared with ECG of 05-AUG-2022 09:41,  No significant change was found  Confirmed by Nathen TORRES MD ([id number]) on 8/6/2022 10:29:34 AM    Referred By: LETIERR   SELF           Confirmed By:Nathen TORRES MD       2D ECHO:  TTE:  Results for orders placed or performed during the hospital  encounter of 08/06/22   Echo   Result Value Ref Range    Ascending aorta 2.99 cm    STJ 2.66 cm    AV mean gradient 6 mmHg    Ao peak zoran 1.56 m/s    Ao VTI 30.42 cm    IVRT 100.86 msec    IVS 0.77 0.6 - 1.1 cm    LA size 4.17 cm    Left Atrium Major Axis 6.15 cm    Left Atrium Minor Axis 5.75 cm    LVIDd 5.25 3.5 - 6.0 cm    LVIDs 3.21 2.1 - 4.0 cm    LVOT diameter 2.14 cm    LVOT peak VTI 26.92 cm    Posterior Wall 0.68 0.6 - 1.1 cm    MV Peak A Zoran 0.56 m/s    E wave deceleration time 323.76 msec    MV Peak E Zoran 0.90 m/s    RA Major Axis 5.18 cm    RA Width 3.85 cm    RVDD 3.26 cm    Sinus 3.40 cm    TAPSE 2.50 cm    TR Max Zoran 2.26 m/s    TDI LATERAL 0.12 m/s    TDI SEPTAL 0.10 m/s    LA WIDTH 4.05 cm    LV Diastolic Volume 132.40 mL    LV Systolic Volume 41.21 mL    LVOT peak zoran 1.33 m/s    LA volume (mod) 62.63 cm3    LV LATERAL E/E' RATIO 7.50 m/s    LV SEPTAL E/E' RATIO 9.00 m/s    FS 39 %    LA volume 85.32 cm3    LV mass 130.44 g    Left Ventricle Relative Wall Thickness 0.26 cm    AV valve area 3.18 cm2    AV Velocity Ratio 0.85     AV index (prosthetic) 0.88     E/A ratio 1.61     Mean e' 0.11 m/s    LVOT area 3.6 cm2    LVOT stroke volume 96.78 cm3    AV peak gradient 10 mmHg    E/E' ratio 8.18 m/s    Triscuspid Valve Regurgitation Peak Gradient 20 mmHg    BSA 2.3 m2    LV Systolic Volume Index 18.7 mL/m2    LV Diastolic Volume Index 60.18 mL/m2    LA Volume Index 38.8 mL/m2    LV Mass Index 59 g/m2    LA Volume Index (Mod) 28.5 mL/m2    Right Atrial Pressure (from IVC) 3 mmHg    EF 60 %    TV rest pulmonary artery pressure 23 mmHg    Narrative    · The left ventricle is normal in size with normal systolic function.  · The estimated ejection fraction is 60%.  · Normal left ventricular diastolic function.  · Normal right ventricular size with normal right ventricular systolic   function.  · Mild left atrial enlargement.  · Mild right atrial enlargement.  · Normal central venous pressure (3 mmHg).  ·  The estimated PA systolic pressure is 23 mmHg.            Imaging  CTA chest 8/6/22  Impression:     1. Evaluation is limited by suboptimal bolus timing.  No central or proximal segmental pulmonary emboli allowing for limitations.  2. Large partially visualized enhancing mass in the right kidney measuring at least 12 cm with adjacent fat stranding and prominent vessels highly concerning for renal cell carcinoma.  Recommend short-term urology follow-up and further evaluation with CT or MRI renal mass protocol when clinically appropriate.  3. Scattered linear subsegmental opacities and ground-glass densities in the lung bases, left greater than right, likely related to subsegmental atelectasis or developing infectious/inflammatory process.  4. Hepatic steatosis.  This report was flagged in Epic as containing an incidental finding.     Findings regarding right renal mass communicated to Joe Funk MD by Rick Holder MD via [employer] secure chat (with acknowledgment) on 08/06/2022 at 07:10.     Electronically signed by resident: Genoveva Saab  Date:                                            08/06/2022  Time:                                           06:17     Electronically signed by: Rick Holder MD  Date:                                            08/06/2022  Time:                                           07:13      MRI abdomen 8/9/22  Impression:     1. Heterogeneously enhancing solid right renal mass highly concerning for RCC. No evidence of filling defect or enhancing tumor thrombus in the right renal artery or vein.  Mildly prominent mesenteric lymph nodes, as above.  Metastatic disease difficult to definitively exclude.  2. Cholelithiasis.  3. Additional findings detailed above.  This report was flagged in Epic as abnormal.     Electronically signed by resident: Aaron Sheriff MD  Date:                                            08/10/2022  Time:                                           09:51      Electronically signed by: Pedrito Calloway MD  Date:                                            08/10/2022  Time:                                           10:15      Active Cardiac Conditions: None      Revised Cardiac Risk Index   High -Risk Surgery  Intraperitoneal; Intrathoracic; suprainguinal vascular Yes- + 1 No- 0  robotic   History of Ischemic Heart Disease   (Hx of MI/positive exercise test/current chest pain due to ischemia/use of nitrate therapy/EKG with pathological Q waves) Yes- + 1 No- 0   History of CHF  (Pulmonary edema/bilateral rales or S3 gallop/PND/CXR showing pulmonary vascular redistribution) Yes- + 1 No- 0   History of CVA   (Prior stroke or TIA) Yes- + 1 No- 0   Pre-operative treatment with insulin Yes- + 1 No- 0   Pre-operative creatinine > 2mg/dl Yes- + 1 No- 0   Total: 0      Risk Status:  Estimated risk of cardiac complications after non-cardiac surgery using the Revised Cardiac Risk Index for Preoperative risk is 3.9 %      ARISCAT (Canet) risk index: Low: 1.6% risk of post-op pulmonary complications.    American Society of Anesthesiologists Physical Status classification (ASA): 3             No further cardiac workup needed prior to surgery.    Outpatient Subjective & Objective

## 2022-08-29 NOTE — HPI
"This is a 42 y.o. female  who presents today with  for a preoperative evaluation in preparation for a Urology  procedure.  Scheduled for  right  robotic nephrectomy.  Surgery is indicated for right renal mass.   Patient is new to me.  Details of current problem: The duration of problem is  two weeks.  Patient presented to ER on 8/5/22 for chest pain. Workup followed with CTA chest done revealing incidental finding the right renal mass. Follow-up imaging with MRI abdomen shows enhancing right renal mass highly concerning for renal cell carcinoma.   Reports symptoms of  "uncomfortable" feeling to lower back and right flank area.  There are no aggravating factors.   Takes Tylenol ES prn for lower back and right flank pain.   Denies pain while sitting.   The history has been obtained by speaking with the patient and reviewing the electronic medical record and/or outside health information. Significant health conditions for the perioperative period are discussed below in assessment and plan.     Patient reports current health status to be Good.  Denies any new symptoms before surgery.    "

## 2022-08-29 NOTE — ASSESSMENT & PLAN NOTE
Patient would benefit from weight loss and has not set goals to achieve success.   Lifestyle changes should be made by eating healthy, exercising at least 150 minutes weekly, and avoiding sedentary behavior.   Reports losing 75 lbs by walking and will start back after renal surgery.

## 2022-08-31 NOTE — PROGRESS NOTES
Participant was consented for Dr. Ifeoma Dia 's renal stem cell study, IRB 2011.222 and the informed consent process was conducted by [employer] Lr MD. Please see scanned documents in Media tab reflecting the consenting process.

## 2022-09-08 ENCOUNTER — ANESTHESIA EVENT (OUTPATIENT)
Dept: SURGERY | Facility: HOSPITAL | Age: 42
DRG: 657 | End: 2022-09-08
Payer: COMMERCIAL

## 2022-09-08 ENCOUNTER — TELEPHONE (OUTPATIENT)
Dept: UROLOGY | Facility: CLINIC | Age: 42
End: 2022-09-08
Payer: COMMERCIAL

## 2022-09-08 NOTE — TELEPHONE ENCOUNTER
Called pt to confirm arrival time of 5 am for procedure on Fri 9/9/22. Gave pt NPO instructions and gave pt opportunity to ask questions. Pt verbalized understanding.     Pt was informed that only 1 person would be allowed to accompany them the morning of surgery.  Pt verbalized understanding.

## 2022-09-08 NOTE — ANESTHESIA PREPROCEDURE EVALUATION
[facility]  Anesthesia Pre-Operative Evaluation        Patient Name: Teagan Griffith  YOB: 1980  MRN: [id number]    SUBJECTIVE:     Pre-operative Evaluation for Procedure(s) (LRB):  XI ROBOTIC NEPHRECTOMY (Right)     2022    Teagan Griffith is a 42 y.o. female with a PMHx significant for morbid obesity, ADD, hepatic steatosis, and newly discovered right renal mass.     She now presents for the above procedure.      TTE:  Results for orders placed during the hospital encounter of 22  Interpretation Summary  · The left ventricle is normal in size with normal systolic function.  · The estimated ejection fraction is 60%.  · Normal left ventricular diastolic function.  · Normal right ventricular size with normal right ventricular systolic function.  · Mild left atrial enlargement.  · Mild right atrial enlargement.  · Normal central venous pressure (3 mmHg).  · The estimated PA systolic pressure is 23 mmHg.      Previous Airway: None documented.      Patient Active Problem List   Diagnosis    Renal mass    Depression    Hepatic steatosis    ADD (attention deficit disorder)    Snoring    Class 3 severe obesity with body mass index (BMI) of 40.0 to 44.9 in adult    Alcohol consumption of more than four drinks per day       Review of patient's allergies indicates:  No Known Allergies    Current Outpatient Medications   Medication Instructions    acetaminophen (TYLENOL) 1,000 mg, Oral, Every 6 hours PRN    ALPRAZolam (XANAX) 1 mg, Oral, 2 times daily    dextroamphetamine-amphetamine 30 mg Tab 30 mg, Oral, 2 times daily    lamoTRIgine (LAMICTAL) 200 mg, Oral, Daily    risperiDONE (RISPERDAL) 4 mg, Oral, Nightly    sertraline (ZOLOFT) 50 mg, Oral, Daily       Past Surgical History:   Procedure Laterality Date     SECTION         Social History     Substance and Sexual Activity   Drug Use Not on file     Alcohol Use: Not on file     Tobacco Use: Medium  Risk    Smoking Tobacco Use: Former    Smokeless Tobacco Use: Never       OBJECTIVE:     Vital Signs Range (Last 24H):         Significant Labs    Heme Profile  Lab Results   Component Value Date    WBC 9.85 08/06/2022    HGB 10.8 (L) 08/06/2022    HCT 32.4 (L) 08/06/2022     08/06/2022       Coagulation Studies  Lab Results   Component Value Date    LABPROT 10.3 08/29/2022    INR 1.0 08/29/2022       BMP  Lab Results   Component Value Date     08/06/2022    K 3.9 08/06/2022     08/06/2022    CO2 24 08/06/2022    BUN 15 08/06/2022    CREATININE 0.8 08/06/2022       Liver Function Tests  Lab Results   Component Value Date    AST 12 08/06/2022    ALT 19 08/06/2022    ALKPHOS 61 08/06/2022    BILITOT 0.4 08/06/2022    PROT 6.6 08/06/2022    ALBUMIN 3.7 08/06/2022       Lipid Profile  No results found for: CHOL, HDL, LDLDIRECT, TRIG    Endocrine Profile  Lab Results   Component Value Date    HGBA1C 5.3 08/29/2022       Diagnostic Studies  MRI abdomen 8/9/22  Impression:     1. Heterogeneously enhancing solid right renal mass highly concerning for RCC. No evidence of filling defect or enhancing tumor thrombus in the right renal artery or vein.  Mildly prominent mesenteric lymph nodes, as above.  Metastatic disease difficult to definitively exclude.  2. Cholelithiasis.  3. Additional findings detailed above.  This report was flagged in Epic as abnormal.      Cardiac Studies    EKG:   Results for orders placed or performed during the hospital encounter of 08/06/22   EKG 12-lead    Collection Time: 08/06/22  2:32 AM    Narrative    Test Reason : R07.9,    Vent. Rate : 084 BPM     Atrial Rate : 084 BPM     P-R Int : 202 ms          QRS Dur : 118 ms      QT Int : 394 ms       P-R-T Axes : 047 013 035 degrees     QTc Int : 465 ms    Normal sinus rhythm  Low voltage QRS  Incomplete left bundle branch block  Nonspecific ST abnormality  Abnormal ECG  When compared with ECG of 05-AUG-2022 09:41,  No  significant change was found  Confirmed by Nathen TORRES MD ([id number]) on 8/6/2022 10:29:34 AM    Referred By: [email or web address]   SELF           Confirmed By:Nathen TORRES MD         ASSESSMENT/PLAN:    09/08/2022  Teagan Griffith is a 42 y.o., female.      Pre-op Assessment    I have reviewed the Patient Summary Reports.     I have reviewed the Nursing Notes.    I have reviewed the Medications.     Review of Systems  Anesthesia Hx:  No problems with previous Anesthesia   Denies Personal Hx of Anesthesia complications.   Social:  Non-Smoker    Hematology/Oncology:  Hematology Normal   Oncology Normal     EENT/Dental:EENT/Dental Normal   Cardiovascular:  Cardiovascular Normal   Functional Capacity good / => 4 METS    Pulmonary:  Pulmonary Normal    Hepatic/GI:   Liver Disease,    Neurological:  Neurology Normal    Endocrine:  Morbid Obesity / BMI > 40  Psych:   Psychiatric History          Physical Exam  General: Alert, Well nourished, Cooperative and Oriented    Airway:  Mallampati: II   Mouth Opening: Normal  TM Distance: Normal  Tongue: Normal  Neck ROM: Normal ROM    Dental:        Anesthesia Plan  Type of Anesthesia, risks & benefits discussed:    Anesthesia Type: Gen ETT, Regional  Intra-op Monitoring Plan: Standard ASA Monitors and Art Line  Post Op Pain Control Plan: multimodal analgesia and IV/PO Opioids PRN  Induction:  IV  Airway Plan: Direct, Post-Induction  Informed Consent: Informed consent signed with the Patient and all parties understand the risks and agree with anesthesia plan.  All questions answered.   ASA Score: 2  Day of Surgery Review of History & Physical: H&P Update referred to the surgeon/provider.    Ready For Surgery From Anesthesia Perspective.     .

## 2022-09-09 ENCOUNTER — TELEPHONE (OUTPATIENT)
Dept: UROLOGY | Facility: CLINIC | Age: 42
End: 2022-09-09
Payer: COMMERCIAL

## 2022-09-09 ENCOUNTER — ANESTHESIA (OUTPATIENT)
Dept: SURGERY | Facility: HOSPITAL | Age: 42
DRG: 657 | End: 2022-09-09
Payer: COMMERCIAL

## 2022-09-09 NOTE — TELEPHONE ENCOUNTER
Called pt to confirm arrival time of 6 am for procedure on Wed Sep 14, 2022. Gave pt NPO instructions and gave pt opportunity to ask questions. Pt verbalized understanding.     Pt was informed that only 1 person would be allowed to accompany them the morning of surgery.  Pt verbalized understanding.

## 2022-09-13 ENCOUNTER — TELEPHONE (OUTPATIENT)
Dept: UROLOGY | Facility: CLINIC | Age: 42
End: 2022-09-13
Payer: COMMERCIAL

## 2022-09-14 ENCOUNTER — HOSPITAL ENCOUNTER (INPATIENT)
Facility: HOSPITAL | Age: 42
LOS: 1 days | Discharge: HOME | DRG: 657 | End: 2022-09-15
Attending: UROLOGY | Admitting: UROLOGY
Payer: COMMERCIAL

## 2022-09-14 DIAGNOSIS — N28.89 RENAL MASS: Primary | ICD-10-CM

## 2022-09-14 DIAGNOSIS — Z01.818 PREOPERATIVE TESTING: ICD-10-CM

## 2022-09-14 LAB
ABO + RH BLD: NORMAL
ANION GAP SERPL CALC-SCNC: 9 MMOL/L (ref 8–16)
BASOPHILS # BLD AUTO: 0.02 K/UL (ref 0–0.2)
BASOPHILS NFR BLD: 0.1 % (ref 0–1.9)
BLD GP AB SCN CELLS X3 SERPL QL: NORMAL
BUN SERPL-MCNC: 18 MG/DL (ref 6–20)
CALCIUM SERPL-MCNC: 8.5 MG/DL (ref 8.7–10.5)
CHLORIDE SERPL-SCNC: 107 MMOL/L (ref 95–110)
CO2 SERPL-SCNC: 22 MMOL/L (ref 23–29)
CREAT SERPL-MCNC: 1.1 MG/DL (ref 0.5–1.4)
DIFFERENTIAL METHOD: ABNORMAL
EOSINOPHIL # BLD AUTO: 0.1 K/UL (ref 0–0.5)
EOSINOPHIL NFR BLD: 0.4 % (ref 0–8)
ERYTHROCYTE [DISTWIDTH] IN BLOOD BY AUTOMATED COUNT: 13 % (ref 11.5–14.5)
EST. GFR  (NO RACE VARIABLE): >60 ML/MIN/1.73 M^2
GLUCOSE SERPL-MCNC: 149 MG/DL (ref 70–110)
HCT VFR BLD AUTO: 32.2 % (ref 37–48.5)
HGB BLD-MCNC: 10.8 G/DL (ref 12–16)
IMM GRANULOCYTES # BLD AUTO: 0.07 K/UL (ref 0–0.04)
IMM GRANULOCYTES NFR BLD AUTO: 0.5 % (ref 0–0.5)
LYMPHOCYTES # BLD AUTO: 1.2 K/UL (ref 1–4.8)
LYMPHOCYTES NFR BLD: 9 % (ref 18–48)
MCH RBC QN AUTO: 29.8 PG (ref 27–31)
MCHC RBC AUTO-ENTMCNC: 33.5 G/DL (ref 32–36)
MCV RBC AUTO: 89 FL (ref 82–98)
MONOCYTES # BLD AUTO: 0.3 K/UL (ref 0.3–1)
MONOCYTES NFR BLD: 2.2 % (ref 4–15)
NEUTROPHILS # BLD AUTO: 11.8 K/UL (ref 1.8–7.7)
NEUTROPHILS NFR BLD: 87.8 % (ref 38–73)
NRBC BLD-RTO: 0 /100 WBC
PLATELET # BLD AUTO: 300 K/UL (ref 150–450)
PMV BLD AUTO: 9.6 FL (ref 9.2–12.9)
POTASSIUM SERPL-SCNC: 4.2 MMOL/L (ref 3.5–5.1)
RBC # BLD AUTO: 3.63 M/UL (ref 4–5.4)
SODIUM SERPL-SCNC: 138 MMOL/L (ref 136–145)
WBC # BLD AUTO: 13.48 K/UL (ref 3.9–12.7)

## 2022-09-14 PROCEDURE — 76942 PR U/S GUIDANCE FOR NEEDLE GUIDANCE: ICD-10-PCS | Mod: 26,,, | Performed by: ANESTHESIOLOGY

## 2022-09-14 PROCEDURE — 25000003 PHARM REV CODE 250: Performed by: STUDENT IN AN ORGANIZED HEALTH CARE EDUCATION/TRAINING PROGRAM

## 2022-09-14 PROCEDURE — 71000033 HC RECOVERY, INTIAL HOUR: Performed by: UROLOGY

## 2022-09-14 PROCEDURE — 85025 COMPLETE CBC W/AUTO DIFF WBC: CPT | Performed by: STUDENT IN AN ORGANIZED HEALTH CARE EDUCATION/TRAINING PROGRAM

## 2022-09-14 PROCEDURE — 88342 IMHCHEM/IMCYTCHM 1ST ANTB: CPT | Performed by: PATHOLOGY

## 2022-09-14 PROCEDURE — 63600175 PHARM REV CODE 636 W HCPCS

## 2022-09-14 PROCEDURE — 63600175 PHARM REV CODE 636 W HCPCS: Performed by: STUDENT IN AN ORGANIZED HEALTH CARE EDUCATION/TRAINING PROGRAM

## 2022-09-14 PROCEDURE — 76942 ECHO GUIDE FOR BIOPSY: CPT | Performed by: STUDENT IN AN ORGANIZED HEALTH CARE EDUCATION/TRAINING PROGRAM

## 2022-09-14 PROCEDURE — 64999 UNLISTED PX NERVOUS SYSTEM: CPT | Mod: ,,, | Performed by: ANESTHESIOLOGY

## 2022-09-14 PROCEDURE — 25000003 PHARM REV CODE 250

## 2022-09-14 PROCEDURE — 88341 PR IHC OR ICC EACH ADD'L SINGLE ANTIBODY  STAINPR: ICD-10-PCS | Mod: 26,,, | Performed by: PATHOLOGY

## 2022-09-14 PROCEDURE — 88341 IMHCHEM/IMCYTCHM EA ADD ANTB: CPT | Mod: 26,,, | Performed by: PATHOLOGY

## 2022-09-14 PROCEDURE — 64999 PERIPHERAL BLOCK: ICD-10-PCS | Mod: ,,, | Performed by: ANESTHESIOLOGY

## 2022-09-14 PROCEDURE — 36620 ARTERIAL: ICD-10-PCS | Mod: 59,,, | Performed by: ANESTHESIOLOGY

## 2022-09-14 PROCEDURE — 88307 TISSUE EXAM BY PATHOLOGIST: CPT | Mod: 26,,, | Performed by: PATHOLOGY

## 2022-09-14 PROCEDURE — 36000712 HC OR TIME LEV V 1ST 15 MIN: Performed by: UROLOGY

## 2022-09-14 PROCEDURE — 36620 INSERTION CATHETER ARTERY: CPT | Mod: 59,,, | Performed by: ANESTHESIOLOGY

## 2022-09-14 PROCEDURE — 36000713 HC OR TIME LEV V EA ADD 15 MIN: Performed by: UROLOGY

## 2022-09-14 PROCEDURE — 27201423 OPTIME MED/SURG SUP & DEVICES STERILE SUPPLY: Performed by: UROLOGY

## 2022-09-14 PROCEDURE — 94761 N-INVAS EAR/PLS OXIMETRY MLT: CPT

## 2022-09-14 PROCEDURE — D9220A PRA ANESTHESIA: Mod: ,,, | Performed by: ANESTHESIOLOGY

## 2022-09-14 PROCEDURE — 11000001 HC ACUTE MED/SURG PRIVATE ROOM

## 2022-09-14 PROCEDURE — 71000015 HC POSTOP RECOV 1ST HR: Performed by: UROLOGY

## 2022-09-14 PROCEDURE — 86901 BLOOD TYPING SEROLOGIC RH(D): CPT | Performed by: STUDENT IN AN ORGANIZED HEALTH CARE EDUCATION/TRAINING PROGRAM

## 2022-09-14 PROCEDURE — 27201037 HC PRESSURE MONITORING SET UP

## 2022-09-14 PROCEDURE — D9220A PRA ANESTHESIA: ICD-10-PCS | Mod: ,,, | Performed by: ANESTHESIOLOGY

## 2022-09-14 PROCEDURE — 88307 PR  SURG PATH,LEVEL V: ICD-10-PCS | Mod: 26,,, | Performed by: PATHOLOGY

## 2022-09-14 PROCEDURE — 37000009 HC ANESTHESIA EA ADD 15 MINS: Performed by: UROLOGY

## 2022-09-14 PROCEDURE — 88342 CHG IMMUNOCYTOCHEMISTRY: ICD-10-PCS | Mod: 26,,, | Performed by: PATHOLOGY

## 2022-09-14 PROCEDURE — 37000008 HC ANESTHESIA 1ST 15 MINUTES: Performed by: UROLOGY

## 2022-09-14 PROCEDURE — 88307 TISSUE EXAM BY PATHOLOGIST: CPT | Performed by: PATHOLOGY

## 2022-09-14 PROCEDURE — 63600175 PHARM REV CODE 636 W HCPCS: Performed by: ANESTHESIOLOGY

## 2022-09-14 PROCEDURE — 50545 PR LAP, RADICAL NEPHRECTOMY: ICD-10-PCS | Mod: 22,RT,, | Performed by: UROLOGY

## 2022-09-14 PROCEDURE — 88341 IMHCHEM/IMCYTCHM EA ADD ANTB: CPT | Performed by: PATHOLOGY

## 2022-09-14 PROCEDURE — 76942 ECHO GUIDE FOR BIOPSY: CPT | Mod: 26,,, | Performed by: ANESTHESIOLOGY

## 2022-09-14 PROCEDURE — 80048 BASIC METABOLIC PNL TOTAL CA: CPT | Performed by: STUDENT IN AN ORGANIZED HEALTH CARE EDUCATION/TRAINING PROGRAM

## 2022-09-14 PROCEDURE — 88342 IMHCHEM/IMCYTCHM 1ST ANTB: CPT | Mod: 26,,, | Performed by: PATHOLOGY

## 2022-09-14 PROCEDURE — 50545 LAPARO RADICAL NEPHRECTOMY: CPT | Mod: 22,RT,, | Performed by: UROLOGY

## 2022-09-14 RX ORDER — POLYETHYLENE GLYCOL 3350 17 G/17G
17 POWDER, FOR SOLUTION ORAL DAILY
Status: DISCONTINUED | OUTPATIENT
Start: 2022-09-14 | End: 2022-09-15 | Stop reason: HOSPADM

## 2022-09-14 RX ORDER — SODIUM CHLORIDE 9 MG/ML
INJECTION, SOLUTION INTRAVENOUS CONTINUOUS
Status: DISCONTINUED | OUTPATIENT
Start: 2022-09-14 | End: 2022-09-15 | Stop reason: HOSPADM

## 2022-09-14 RX ORDER — PHENYLEPHRINE HCL IN 0.9% NACL 1 MG/10 ML
SYRINGE (ML) INTRAVENOUS
Status: DISCONTINUED | OUTPATIENT
Start: 2022-09-14 | End: 2022-09-14

## 2022-09-14 RX ORDER — ACETAMINOPHEN 500 MG
1000 TABLET ORAL
Status: COMPLETED | OUTPATIENT
Start: 2022-09-14 | End: 2022-09-14

## 2022-09-14 RX ORDER — PREGABALIN 150 MG/1
150 CAPSULE ORAL DAILY
Status: DISCONTINUED | OUTPATIENT
Start: 2022-09-15 | End: 2022-09-15 | Stop reason: HOSPADM

## 2022-09-14 RX ORDER — NEOSTIGMINE METHYLSULFATE 0.5 MG/ML
INJECTION, SOLUTION INTRAVENOUS
Status: DISCONTINUED | OUTPATIENT
Start: 2022-09-14 | End: 2022-09-14

## 2022-09-14 RX ORDER — RISPERIDONE 1 MG/1
4 TABLET ORAL NIGHTLY
Status: DISCONTINUED | OUTPATIENT
Start: 2022-09-14 | End: 2022-09-15 | Stop reason: HOSPADM

## 2022-09-14 RX ORDER — CEFAZOLIN SODIUM/WATER 2 G/20 ML
2 SYRINGE (ML) INTRAVENOUS
Status: COMPLETED | OUTPATIENT
Start: 2022-09-14 | End: 2022-09-14

## 2022-09-14 RX ORDER — OXYCODONE HYDROCHLORIDE 5 MG/1
5 TABLET ORAL EVERY 4 HOURS PRN
Status: DISCONTINUED | OUTPATIENT
Start: 2022-09-14 | End: 2022-09-15 | Stop reason: HOSPADM

## 2022-09-14 RX ORDER — SCOLOPAMINE TRANSDERMAL SYSTEM 1 MG/1
1 PATCH, EXTENDED RELEASE TRANSDERMAL
Status: DISCONTINUED | OUTPATIENT
Start: 2022-09-14 | End: 2022-09-14

## 2022-09-14 RX ORDER — CALCIUM CARBONATE 200(500)MG
1000 TABLET,CHEWABLE ORAL ONCE
Status: COMPLETED | OUTPATIENT
Start: 2022-09-14 | End: 2022-09-14

## 2022-09-14 RX ORDER — PROPOFOL 10 MG/ML
VIAL (ML) INTRAVENOUS
Status: DISCONTINUED | OUTPATIENT
Start: 2022-09-14 | End: 2022-09-14

## 2022-09-14 RX ORDER — SODIUM CHLORIDE, SODIUM LACTATE, POTASSIUM CHLORIDE, CALCIUM CHLORIDE 600; 310; 30; 20 MG/100ML; MG/100ML; MG/100ML; MG/100ML
INJECTION, SOLUTION INTRAVENOUS CONTINUOUS
Status: DISCONTINUED | OUTPATIENT
Start: 2022-09-14 | End: 2022-09-15 | Stop reason: HOSPADM

## 2022-09-14 RX ORDER — MIDAZOLAM HYDROCHLORIDE 1 MG/ML
2 INJECTION INTRAMUSCULAR; INTRAVENOUS
Status: DISPENSED | OUTPATIENT
Start: 2022-09-14

## 2022-09-14 RX ORDER — FENTANYL CITRATE 50 UG/ML
25 INJECTION, SOLUTION INTRAMUSCULAR; INTRAVENOUS EVERY 5 MIN PRN
Status: ACTIVE | OUTPATIENT
Start: 2022-09-14

## 2022-09-14 RX ORDER — ALPRAZOLAM 0.5 MG/1
1 TABLET ORAL 2 TIMES DAILY
Status: DISCONTINUED | OUTPATIENT
Start: 2022-09-14 | End: 2022-09-15 | Stop reason: HOSPADM

## 2022-09-14 RX ORDER — PROCHLORPERAZINE EDISYLATE 5 MG/ML
5 INJECTION INTRAMUSCULAR; INTRAVENOUS EVERY 30 MIN PRN
Status: ACTIVE | OUTPATIENT
Start: 2022-09-14

## 2022-09-14 RX ORDER — PREGABALIN 75 MG/1
150 CAPSULE ORAL
Status: COMPLETED | OUTPATIENT
Start: 2022-09-14 | End: 2022-09-14

## 2022-09-14 RX ORDER — HEPARIN SODIUM 5000 [USP'U]/ML
5000 INJECTION, SOLUTION INTRAVENOUS; SUBCUTANEOUS EVERY 8 HOURS
Status: DISCONTINUED | OUTPATIENT
Start: 2022-09-14 | End: 2022-09-14

## 2022-09-14 RX ORDER — ACETAMINOPHEN 500 MG
1000 TABLET ORAL EVERY 6 HOURS
Status: DISCONTINUED | OUTPATIENT
Start: 2022-09-14 | End: 2022-09-15 | Stop reason: HOSPADM

## 2022-09-14 RX ORDER — EPHEDRINE SULFATE 50 MG/ML
INJECTION, SOLUTION INTRAVENOUS
Status: DISCONTINUED | OUTPATIENT
Start: 2022-09-14 | End: 2022-09-14

## 2022-09-14 RX ORDER — KETAMINE HCL IN 0.9 % NACL 50 MG/5 ML
SYRINGE (ML) INTRAVENOUS
Status: DISCONTINUED | OUTPATIENT
Start: 2022-09-14 | End: 2022-09-14

## 2022-09-14 RX ORDER — KETOROLAC TROMETHAMINE 30 MG/ML
15 INJECTION, SOLUTION INTRAMUSCULAR; INTRAVENOUS
Status: DISCONTINUED | OUTPATIENT
Start: 2022-09-14 | End: 2022-09-14 | Stop reason: HOSPADM

## 2022-09-14 RX ORDER — HYDROMORPHONE HYDROCHLORIDE 1 MG/ML
0.2 INJECTION, SOLUTION INTRAMUSCULAR; INTRAVENOUS; SUBCUTANEOUS EVERY 5 MIN PRN
Status: DISPENSED | OUTPATIENT
Start: 2022-09-14

## 2022-09-14 RX ORDER — ROPIVACAINE HYDROCHLORIDE 5 MG/ML
INJECTION, SOLUTION EPIDURAL; INFILTRATION; PERINEURAL
Status: COMPLETED | OUTPATIENT
Start: 2022-09-14 | End: 2022-09-14

## 2022-09-14 RX ORDER — ROCURONIUM BROMIDE 10 MG/ML
INJECTION, SOLUTION INTRAVENOUS
Status: DISCONTINUED | OUTPATIENT
Start: 2022-09-14 | End: 2022-09-14

## 2022-09-14 RX ORDER — ONDANSETRON 2 MG/ML
INJECTION INTRAMUSCULAR; INTRAVENOUS
Status: DISCONTINUED | OUTPATIENT
Start: 2022-09-14 | End: 2022-09-14

## 2022-09-14 RX ORDER — VASOPRESSIN 20 [USP'U]/ML
INJECTION, SOLUTION INTRAMUSCULAR; SUBCUTANEOUS
Status: DISCONTINUED | OUTPATIENT
Start: 2022-09-14 | End: 2022-09-14

## 2022-09-14 RX ORDER — LAMOTRIGINE 100 MG/1
200 TABLET ORAL DAILY
Status: DISCONTINUED | OUTPATIENT
Start: 2022-09-14 | End: 2022-09-15 | Stop reason: HOSPADM

## 2022-09-14 RX ORDER — SCOLOPAMINE TRANSDERMAL SYSTEM 1 MG/1
1 PATCH, EXTENDED RELEASE TRANSDERMAL
Status: DISCONTINUED | OUTPATIENT
Start: 2022-09-14 | End: 2022-09-15 | Stop reason: HOSPADM

## 2022-09-14 RX ORDER — ONDANSETRON 8 MG/1
8 TABLET, ORALLY DISINTEGRATING ORAL EVERY 8 HOURS PRN
Status: DISCONTINUED | OUTPATIENT
Start: 2022-09-14 | End: 2022-09-15 | Stop reason: HOSPADM

## 2022-09-14 RX ORDER — SODIUM CHLORIDE 0.9 % (FLUSH) 0.9 %
10 SYRINGE (ML) INJECTION
Status: ACTIVE | OUTPATIENT
Start: 2022-09-14

## 2022-09-14 RX ORDER — METHOCARBAMOL 500 MG/1
1000 TABLET, FILM COATED ORAL 4 TIMES DAILY PRN
Status: DISCONTINUED | OUTPATIENT
Start: 2022-09-14 | End: 2022-09-15 | Stop reason: HOSPADM

## 2022-09-14 RX ORDER — FENTANYL CITRATE 50 UG/ML
INJECTION, SOLUTION INTRAMUSCULAR; INTRAVENOUS
Status: DISCONTINUED | OUTPATIENT
Start: 2022-09-14 | End: 2022-09-14

## 2022-09-14 RX ORDER — LIDOCAINE HYDROCHLORIDE 10 MG/ML
1 INJECTION, SOLUTION EPIDURAL; INFILTRATION; INTRACAUDAL; PERINEURAL ONCE
Status: DISCONTINUED | OUTPATIENT
Start: 2022-09-14 | End: 2022-09-14 | Stop reason: HOSPADM

## 2022-09-14 RX ORDER — DIPHENHYDRAMINE HYDROCHLORIDE 50 MG/ML
25 INJECTION INTRAMUSCULAR; INTRAVENOUS EVERY 6 HOURS PRN
Status: ACTIVE | OUTPATIENT
Start: 2022-09-14

## 2022-09-14 RX ORDER — FENTANYL CITRATE 50 UG/ML
25-200 INJECTION, SOLUTION INTRAMUSCULAR; INTRAVENOUS
Status: DISPENSED | OUTPATIENT
Start: 2022-09-14

## 2022-09-14 RX ORDER — DEXAMETHASONE SODIUM PHOSPHATE 4 MG/ML
INJECTION, SOLUTION INTRA-ARTICULAR; INTRALESIONAL; INTRAMUSCULAR; INTRAVENOUS; SOFT TISSUE
Status: DISCONTINUED | OUTPATIENT
Start: 2022-09-14 | End: 2022-09-14

## 2022-09-14 RX ORDER — HEPARIN SODIUM 5000 [USP'U]/ML
5000 INJECTION, SOLUTION INTRAVENOUS; SUBCUTANEOUS EVERY 8 HOURS
Status: DISCONTINUED | OUTPATIENT
Start: 2022-09-14 | End: 2022-09-15 | Stop reason: HOSPADM

## 2022-09-14 RX ORDER — SERTRALINE HYDROCHLORIDE 50 MG/1
50 TABLET, FILM COATED ORAL DAILY
Status: DISCONTINUED | OUTPATIENT
Start: 2022-09-14 | End: 2022-09-15 | Stop reason: HOSPADM

## 2022-09-14 RX ADMIN — Medication 100 MCG: at 09:09

## 2022-09-14 RX ADMIN — Medication 100 MCG: at 12:09

## 2022-09-14 RX ADMIN — PROPOFOL 150 MG: 10 INJECTION, EMULSION INTRAVENOUS at 08:09

## 2022-09-14 RX ADMIN — DEXAMETHASONE SODIUM PHOSPHATE 4 MG: 4 INJECTION INTRA-ARTICULAR; INTRALESIONAL; INTRAMUSCULAR; INTRAVENOUS; SOFT TISSUE at 09:09

## 2022-09-14 RX ADMIN — ROCURONIUM BROMIDE 20 MG: 10 INJECTION INTRAVENOUS at 11:09

## 2022-09-14 RX ADMIN — HEPARIN SODIUM 5000 UNITS: 5000 INJECTION INTRAVENOUS; SUBCUTANEOUS at 02:09

## 2022-09-14 RX ADMIN — OXYCODONE 5 MG: 5 TABLET ORAL at 11:09

## 2022-09-14 RX ADMIN — ROPIVACAINE HYDROCHLORIDE 20 ML: 5 INJECTION EPIDURAL; INFILTRATION; PERINEURAL at 07:09

## 2022-09-14 RX ADMIN — EPHEDRINE SULFATE 5 MG: 50 INJECTION INTRAVENOUS at 09:09

## 2022-09-14 RX ADMIN — ONDANSETRON 4 MG: 2 INJECTION INTRAMUSCULAR; INTRAVENOUS at 01:09

## 2022-09-14 RX ADMIN — ALPRAZOLAM 1 MG: 0.5 TABLET ORAL at 02:09

## 2022-09-14 RX ADMIN — ROCURONIUM BROMIDE 10 MG: 10 INJECTION INTRAVENOUS at 09:09

## 2022-09-14 RX ADMIN — HYDROMORPHONE HYDROCHLORIDE 0.2 MG: 1 INJECTION, SOLUTION INTRAMUSCULAR; INTRAVENOUS; SUBCUTANEOUS at 02:09

## 2022-09-14 RX ADMIN — OXYCODONE 5 MG: 5 TABLET ORAL at 07:09

## 2022-09-14 RX ADMIN — GLYCOPYRROLATE 0.6 MG: 0.2 INJECTION INTRAMUSCULAR; INTRAVENOUS at 01:09

## 2022-09-14 RX ADMIN — ALPRAZOLAM 1 MG: 0.5 TABLET ORAL at 09:09

## 2022-09-14 RX ADMIN — SODIUM CHLORIDE, SODIUM LACTATE, POTASSIUM CHLORIDE, AND CALCIUM CHLORIDE: .6; .31; .03; .02 INJECTION, SOLUTION INTRAVENOUS at 11:09

## 2022-09-14 RX ADMIN — SODIUM CHLORIDE, SODIUM GLUCONATE, SODIUM ACETATE, POTASSIUM CHLORIDE, MAGNESIUM CHLORIDE, SODIUM PHOSPHATE, DIBASIC, AND POTASSIUM PHOSPHATE: .53; .5; .37; .037; .03; .012; .00082 INJECTION, SOLUTION INTRAVENOUS at 10:09

## 2022-09-14 RX ADMIN — SODIUM CHLORIDE: 9 INJECTION, SOLUTION INTRAVENOUS at 07:09

## 2022-09-14 RX ADMIN — ROCURONIUM BROMIDE 20 MG: 10 INJECTION INTRAVENOUS at 12:09

## 2022-09-14 RX ADMIN — OXYCODONE 5 MG: 5 TABLET ORAL at 03:09

## 2022-09-14 RX ADMIN — PREGABALIN 150 MG: 75 CAPSULE ORAL at 06:09

## 2022-09-14 RX ADMIN — FENTANYL CITRATE 50 MCG: 50 INJECTION, SOLUTION INTRAMUSCULAR; INTRAVENOUS at 07:09

## 2022-09-14 RX ADMIN — Medication 200 MCG: at 12:09

## 2022-09-14 RX ADMIN — GLYCOPYRROLATE 0.2 MG: 0.2 INJECTION INTRAMUSCULAR; INTRAVENOUS at 09:09

## 2022-09-14 RX ADMIN — ROCURONIUM BROMIDE 10 MG: 10 INJECTION INTRAVENOUS at 10:09

## 2022-09-14 RX ADMIN — KETOROLAC TROMETHAMINE 15 MG: 30 INJECTION, SOLUTION INTRAMUSCULAR; INTRAVENOUS at 06:09

## 2022-09-14 RX ADMIN — NEOSTIGMINE METHYLSULFATE 5 MG: 0.5 INJECTION, SOLUTION INTRAVENOUS at 01:09

## 2022-09-14 RX ADMIN — SODIUM CHLORIDE, SODIUM GLUCONATE, SODIUM ACETATE, POTASSIUM CHLORIDE, MAGNESIUM CHLORIDE, SODIUM PHOSPHATE, DIBASIC, AND POTASSIUM PHOSPHATE: .53; .5; .37; .037; .03; .012; .00082 INJECTION, SOLUTION INTRAVENOUS at 08:09

## 2022-09-14 RX ADMIN — FENTANYL CITRATE 25 MCG: 50 INJECTION, SOLUTION INTRAMUSCULAR; INTRAVENOUS at 01:09

## 2022-09-14 RX ADMIN — Medication 10 MG: at 09:09

## 2022-09-14 RX ADMIN — ACETAMINOPHEN 1000 MG: 500 TABLET ORAL at 11:09

## 2022-09-14 RX ADMIN — SCOPALAMINE 1 PATCH: 1 PATCH, EXTENDED RELEASE TRANSDERMAL at 06:09

## 2022-09-14 RX ADMIN — METHOCARBAMOL 1000 MG: 500 TABLET ORAL at 04:09

## 2022-09-14 RX ADMIN — ROCURONIUM BROMIDE 20 MG: 10 INJECTION INTRAVENOUS at 09:09

## 2022-09-14 RX ADMIN — PROPOFOL 50 MG: 10 INJECTION, EMULSION INTRAVENOUS at 08:09

## 2022-09-14 RX ADMIN — Medication 2 G: at 12:09

## 2022-09-14 RX ADMIN — Medication 2 G: at 08:09

## 2022-09-14 RX ADMIN — CALCIUM CARBONATE (ANTACID) CHEW TAB 500 MG 1000 MG: 500 CHEW TAB at 04:09

## 2022-09-14 RX ADMIN — ACETAMINOPHEN 1000 MG: 500 TABLET ORAL at 06:09

## 2022-09-14 RX ADMIN — Medication 100 MCG: at 11:09

## 2022-09-14 RX ADMIN — FENTANYL CITRATE 100 MCG: 50 INJECTION, SOLUTION INTRAMUSCULAR; INTRAVENOUS at 08:09

## 2022-09-14 RX ADMIN — HEPARIN SODIUM 5000 UNITS: 5000 INJECTION INTRAVENOUS; SUBCUTANEOUS at 07:09

## 2022-09-14 RX ADMIN — ROCURONIUM BROMIDE 20 MG: 10 INJECTION INTRAVENOUS at 08:09

## 2022-09-14 RX ADMIN — HEPARIN SODIUM 5000 UNITS: 5000 INJECTION INTRAVENOUS; SUBCUTANEOUS at 10:09

## 2022-09-14 RX ADMIN — POLYETHYLENE GLYCOL 3350 17 G: 17 POWDER, FOR SOLUTION ORAL at 02:09

## 2022-09-14 RX ADMIN — RISPERIDONE 4 MG: 1 TABLET ORAL at 09:09

## 2022-09-14 RX ADMIN — ROCURONIUM BROMIDE 10 MG: 10 INJECTION INTRAVENOUS at 11:09

## 2022-09-14 RX ADMIN — Medication 20 MG: at 08:09

## 2022-09-14 RX ADMIN — MIDAZOLAM 2 MG: 1 INJECTION INTRAMUSCULAR; INTRAVENOUS at 07:09

## 2022-09-14 RX ADMIN — Medication 200 MCG: at 09:09

## 2022-09-14 RX ADMIN — SERTRALINE HYDROCHLORIDE 50 MG: 50 TABLET ORAL at 02:09

## 2022-09-14 RX ADMIN — ROCURONIUM BROMIDE 50 MG: 10 INJECTION INTRAVENOUS at 08:09

## 2022-09-14 RX ADMIN — Medication 10 MG: at 11:09

## 2022-09-14 RX ADMIN — Medication 10 MG: at 10:09

## 2022-09-14 RX ADMIN — SODIUM CHLORIDE, SODIUM LACTATE, POTASSIUM CHLORIDE, AND CALCIUM CHLORIDE: .6; .31; .03; .02 INJECTION, SOLUTION INTRAVENOUS at 02:09

## 2022-09-14 RX ADMIN — ACETAMINOPHEN 1000 MG: 500 TABLET ORAL at 05:09

## 2022-09-14 RX ADMIN — ROCURONIUM BROMIDE 10 MG: 10 INJECTION INTRAVENOUS at 08:09

## 2022-09-14 RX ADMIN — VASOPRESSIN 2 UNITS: 20 INJECTION INTRAVENOUS at 12:09

## 2022-09-14 RX ADMIN — Medication 100 MCG: at 10:09

## 2022-09-14 NOTE — OP NOTE
[facility]  Operative Note     Date: 09/14/2022      Pre-Op Diagnosis: right renal mass suspicious for renal cell carcinoma    Patient Active Problem List   Diagnosis    Renal mass    Depression    Hepatic steatosis    ADD (attention deficit disorder)    Snoring    Class 3 severe obesity with body mass index (BMI) of 40.0 to 44.9 in adult    Alcohol consumption of more than four drinks per day        Post-Op Diagnosis: same     Procedure(s) Performed:   1.  Right robotic radical nephrectomy    This was a complex case that went over and above the technical work required for a standard robotic nephrectomy billed under the appropriate code.  I am attaching a -22 modifier to reflect the additional 100% hours of work demanded by the size of the renal mass and saprophytic vessels, along with complex intraoperative decision making.       Specimen(s):   - Right kidney     Surgeon: Justin Riley MD     Bedside assistant: LAWRENCE Jose (no qualified resident available for bedside assistance)     Assistant: Patric Dejesus MD     Anesthesia: General endotracheal anesthesia     Indications: Teagan Griffith is a 42 y.o. female  with a 15 cm right renal mass suspicious for renal cell carcinoma.  After discussion of management options and risks and benefits associated with each the patient has elected to pursue surgical management.       Findings:   - very large right renal mass with many saprophytic vessels; kidney removed     EBL: 250 mL     Drains:   1.  16 Fr zaldivar catheter  2. 15 Fr Tino drain     Anesthesia: General endotracheal anesthesia     Complications:  none     Procedure in Detail: After discussion of risks and benefits of the procedure, informed consent was obtained.  The patient was brought to the operating room and placed supine on the operating table.  SCDs were applied and working prior to induction of anesthesia.  General anesthesia was administered. A 16 Fr Zaldivar catheter was  placed in the standard fashion with 10 ml sterile water used to inflate the balloon. An OG tube was placed. The patient was then moved into the modified flank position with the right side up. The patient was appropriately padded and secured to the table. The patient was then prepped and draped in the usual sterile fashion. Timeout was performed and preoperative antibiotics were confirmed.       A Veress needle was introduced into the abdomen. Entry into the peritoneal cavity was confirmed with the drop test and an initial insufflation pressure of <10mmHg. Aspiration during our drop test revealed no blood or succus. The peritoneal cavity was insufflated up to 15 mmHg and the Veress was removed. The location of the 8 mm camera port was marked with a marking pen and incised sharply using a 15 blade. The 8 mm port was then introduced.  The camera was passed through the port and the abdomen was inspected and found to be free of bowel or vascular injury. Using direct vision the other 2 robotic ports were placed, just below the right costal margin and lower on the right abdomen, ensuring at least 8 cm between ports to allow robotic mobility. A 12 mm assistant port was placed in the lower midline and a 5 mm assistant port was placed in the upper midline. Each trocar was introduced under direct vision ensuring no injury to the underlying abdominal contents.       Dissection began along the white line of Toldt, reflecting the colon medially away from the kidney. The hepatic reflection was released.  Care was taken to avoid injury to the duodenum. Once the colon was reflected, dissection was carried out until we identified the renal hilum.      Careful dissection of the hilum was performed. Numerous saprophytic vessels were taken down with clips and with bipolar cautery.  The renal vein was easily identified anteriorly.  There was 1 renal vein and 1 renal artery. The vessels were then skeletonized adequately. The vessels were  taken en-bloc with two staple loads. Hemostasis was excellent.      The kidney was then freed from the remaining superior, posterior, inferior, and lateral attachments. During the inferior dissection, there was a tear in the gonadal vein which was controlled with clips. Hemostasis was achieved. Once the kidney was completely freed from its attachments,  the robot was undocked and all trocars were removed. The 12 mm midline incision was extended from skin down to fascia to allow removal of the specimen. This was inspected and found to be intact. This was passed off the field for pathologic analysis.       The extraction site fascia was closed using running  0 PDS in a running fashion.  All skin incisions were closed using 4-0 monocryl. Dermabond was applied to all of the incisions.     The patient tolerated the procedure well and was transferred to the recovery room in stable condition.     Disposition: The patient will remain on the urology service overnight for observation.       Patric Dejesus MD     I have reviewed the operative note performed by Dr. Dejesus, and I concur with her/his documentation of Teagan Griffith. I was present for the critical or key portions of the procedure.

## 2022-09-14 NOTE — ANESTHESIA PROCEDURE NOTES
Peripheral Block    Patient location during procedure: pre-op   Block not for primary anesthetic.  Reason for block: at surgeon's request and post-op pain management   Post-op Pain Location: Right flank   Start time: 9/14/2022 7:44 AM  Timeout: 9/14/2022 7:38 AM   End time: 9/14/2022 7:50 AM    Staffing  Authorizing Provider: Dang Arredondo MD  Performing Provider: Matt Ellison DO    Preanesthetic Checklist  Completed: patient identified, IV checked, site marked, risks and benefits discussed, surgical consent, monitors and equipment checked, pre-op evaluation and timeout performed  Peripheral Block  Patient position: sitting  Prep: ChloraPrep  Patient monitoring: heart rate, cardiac monitor, continuous pulse ox, continuous capnometry and frequent blood pressure checks  Block type: erector spinae plane  Laterality: right  Injection technique: single shot  Interspace: T8-9    Needle  Needle type: Tuohy   Needle gauge: 17 G  Needle length: 3.5 in  Needle localization: anatomical landmarks and ultrasound guidance   -ultrasound image captured on disc.  Assessment  Injection assessment: negative aspiration, negative parasthesia and local visualized surrounding nerve  Paresthesia pain: none  Heart rate change: no  Slow fractionated injection: yes    Medications:    Medications: ropivacaine (NAROPIN) injection 0.5% - Perineural   20 mL - 9/14/2022 7:50:00 AM    Additional Notes  Patient tolerated well.  See [facility] RN record for vitals.

## 2022-09-14 NOTE — ANESTHESIA PROCEDURE NOTES
Arterial    Diagnosis: Kidney mass    Patient location during procedure: done in OR  Procedure start time: 9/14/2022 8:14 AM  Timeout: 9/14/2022 8:13 AM  Procedure end time: 9/14/2022 8:20 AM    Staffing  Authorizing Provider: Sreekanth Almonte Jr., MD  Performing Provider: Patric English DO    Anesthesiologist was present at the time of the procedure.    Preanesthetic Checklist  Completed: patient identified, IV checked, site marked, risks and benefits discussed, surgical consent, monitors and equipment checked, pre-op evaluation, timeout performed and anesthesia consent givenArterial  Skin Prep: chlorhexidine gluconate  Local Infiltration: none  Orientation: left  Location: radial    Catheter Size: 20 G  Catheter placement by Anatomical landmarks. Heme positive aspiration all ports. Insertion Attempts: 2  Assessment  Dressing: secured with tape and tegaderm and steri-strips  Patient: Tolerated well

## 2022-09-14 NOTE — NURSING TRANSFER
Nursing Transfer Note      9/14/2022     Reason patient is being transferred: post op    Transfer To: 542    Transfer via bed    Transfer with 2L NC O2    Transported by pct    Medicines sent: LR    Any special needs or follow-up needed: routine    Chart send with patient: Yes    Notified: spouse    Patient reassessed at: 1500

## 2022-09-14 NOTE — ANESTHESIA PROCEDURE NOTES
Intubation    Date/Time: 9/14/2022 8:14 AM  Performed by: Patric English DO  Authorized by: Sreekanth Almonte Jr., MD     Intubation:     Induction:  Intravenous    Intubated:  Postinduction    Mask Ventilation:  Easy with oral airway    Attempts:  1    Attempted By:  Resident anesthesiologist    Method of Intubation:  Direct    Blade:  Fuentes 3    Laryngeal View Grade: Grade I - full view of cords      Difficult Airway Encountered?: No      Airway Device:  Oral endotracheal tube    Airway Device Size:  7.0    Style/Cuff Inflation:  Cuffed (inflated to minimal occlusive pressure)    Inflation Amount (mL):  7    Tube secured:  21    Secured at:  The lips    Placement Verified By:  Capnometry and Revisualization with laryngoscopy    Complicating Factors:  None    Findings Post-Intubation:  BS equal bilateral and atraumatic/condition of teeth unchanged

## 2022-09-14 NOTE — TRANSFER OF CARE
Anesthesia Transfer of Care Note    Patient: Teagan Griffith    Procedure(s) Performed: Procedure(s) (LRB):  XI ROBOTIC NEPHRECTOMY (Right)    Patient location: PACU    Anesthesia Type: general    Transport from OR: Transported from OR on 6-10 L/min O2 by face mask with adequate spontaneous ventilation    Post pain: adequate analgesia    Post assessment: no apparent anesthetic complications and tolerated procedure well    Post vital signs: stable    Level of consciousness: awake    Nausea/Vomiting: no nausea/vomiting    Complications: none    Transfer of care protocol was followed      Last vitals:   Visit Vitals  BP (!) 115/59 (BP Location: Left arm, Patient Position: Lying)   Pulse 66   Temp 36.7 °C (98 °F) (Oral)   Resp 14   Wt 117.9 kg (260 lb)   SpO2 98%   Breastfeeding No   BMI 41.97 kg/m²

## 2022-09-15 ENCOUNTER — TELEPHONE (OUTPATIENT)
Dept: UROLOGY | Facility: CLINIC | Age: 42
End: 2022-09-15
Payer: COMMERCIAL

## 2022-09-15 VITALS
DIASTOLIC BLOOD PRESSURE: 73 MMHG | HEART RATE: 70 BPM | TEMPERATURE: 97 F | BODY MASS INDEX: 41.78 KG/M2 | RESPIRATION RATE: 21 BRPM | SYSTOLIC BLOOD PRESSURE: 109 MMHG | WEIGHT: 260 LBS | OXYGEN SATURATION: 95 % | HEIGHT: 66 IN

## 2022-09-15 LAB
ANION GAP SERPL CALC-SCNC: 9 MMOL/L (ref 8–16)
BASOPHILS # BLD AUTO: 0.01 K/UL (ref 0–0.2)
BASOPHILS NFR BLD: 0.1 % (ref 0–1.9)
BUN SERPL-MCNC: 12 MG/DL (ref 6–20)
CALCIUM SERPL-MCNC: 8.8 MG/DL (ref 8.7–10.5)
CHLORIDE SERPL-SCNC: 105 MMOL/L (ref 95–110)
CO2 SERPL-SCNC: 21 MMOL/L (ref 23–29)
CREAT SERPL-MCNC: 1 MG/DL (ref 0.5–1.4)
DIFFERENTIAL METHOD: ABNORMAL
EOSINOPHIL # BLD AUTO: 0 K/UL (ref 0–0.5)
EOSINOPHIL NFR BLD: 0.3 % (ref 0–8)
ERYTHROCYTE [DISTWIDTH] IN BLOOD BY AUTOMATED COUNT: 13.1 % (ref 11.5–14.5)
EST. GFR  (NO RACE VARIABLE): >60 ML/MIN/1.73 M^2
GLUCOSE SERPL-MCNC: 117 MG/DL (ref 70–110)
HCT VFR BLD AUTO: 29.2 % (ref 37–48.5)
HGB BLD-MCNC: 9.8 G/DL (ref 12–16)
IMM GRANULOCYTES # BLD AUTO: 0.03 K/UL (ref 0–0.04)
IMM GRANULOCYTES NFR BLD AUTO: 0.3 % (ref 0–0.5)
LYMPHOCYTES # BLD AUTO: 2.6 K/UL (ref 1–4.8)
LYMPHOCYTES NFR BLD: 26.2 % (ref 18–48)
MCH RBC QN AUTO: 29.4 PG (ref 27–31)
MCHC RBC AUTO-ENTMCNC: 33.6 G/DL (ref 32–36)
MCV RBC AUTO: 88 FL (ref 82–98)
MONOCYTES # BLD AUTO: 0.6 K/UL (ref 0.3–1)
MONOCYTES NFR BLD: 5.8 % (ref 4–15)
NEUTROPHILS # BLD AUTO: 6.7 K/UL (ref 1.8–7.7)
NEUTROPHILS NFR BLD: 67.3 % (ref 38–73)
NRBC BLD-RTO: 0 /100 WBC
PLATELET # BLD AUTO: 312 K/UL (ref 150–450)
PMV BLD AUTO: 10.2 FL (ref 9.2–12.9)
POTASSIUM SERPL-SCNC: 3.6 MMOL/L (ref 3.5–5.1)
RBC # BLD AUTO: 3.33 M/UL (ref 4–5.4)
SODIUM SERPL-SCNC: 135 MMOL/L (ref 136–145)
WBC # BLD AUTO: 9.95 K/UL (ref 3.9–12.7)

## 2022-09-15 PROCEDURE — 99900035 HC TECH TIME PER 15 MIN (STAT)

## 2022-09-15 PROCEDURE — 80048 BASIC METABOLIC PNL TOTAL CA: CPT | Performed by: STUDENT IN AN ORGANIZED HEALTH CARE EDUCATION/TRAINING PROGRAM

## 2022-09-15 PROCEDURE — 94799 UNLISTED PULMONARY SVC/PX: CPT

## 2022-09-15 PROCEDURE — 63600175 PHARM REV CODE 636 W HCPCS: Performed by: STUDENT IN AN ORGANIZED HEALTH CARE EDUCATION/TRAINING PROGRAM

## 2022-09-15 PROCEDURE — 25000003 PHARM REV CODE 250: Performed by: STUDENT IN AN ORGANIZED HEALTH CARE EDUCATION/TRAINING PROGRAM

## 2022-09-15 PROCEDURE — 36415 COLL VENOUS BLD VENIPUNCTURE: CPT | Performed by: STUDENT IN AN ORGANIZED HEALTH CARE EDUCATION/TRAINING PROGRAM

## 2022-09-15 PROCEDURE — 85025 COMPLETE CBC W/AUTO DIFF WBC: CPT | Performed by: STUDENT IN AN ORGANIZED HEALTH CARE EDUCATION/TRAINING PROGRAM

## 2022-09-15 PROCEDURE — 94761 N-INVAS EAR/PLS OXIMETRY MLT: CPT

## 2022-09-15 RX ORDER — OXYCODONE HYDROCHLORIDE 5 MG/1
5 TABLET ORAL EVERY 4 HOURS PRN
Qty: 10 TABLET | Refills: 0 | Status: SHIPPED | OUTPATIENT
Start: 2022-09-15 | End: 2022-10-04

## 2022-09-15 RX ORDER — IBUPROFEN 800 MG/1
800 TABLET ORAL 3 TIMES DAILY
Qty: 42 TABLET | Refills: 0 | Status: SHIPPED | OUTPATIENT
Start: 2022-09-15 | End: 2022-09-29

## 2022-09-15 RX ORDER — POLYETHYLENE GLYCOL 3350 17 G/17G
17 POWDER, FOR SOLUTION ORAL DAILY
Qty: 238 G | Refills: 0 | Status: SHIPPED | OUTPATIENT
Start: 2022-09-15 | End: 2022-09-29

## 2022-09-15 RX ADMIN — POLYETHYLENE GLYCOL 3350 17 G: 17 POWDER, FOR SOLUTION ORAL at 09:09

## 2022-09-15 RX ADMIN — ACETAMINOPHEN 1000 MG: 500 TABLET ORAL at 05:09

## 2022-09-15 RX ADMIN — OXYCODONE 5 MG: 5 TABLET ORAL at 05:09

## 2022-09-15 RX ADMIN — LAMOTRIGINE 200 MG: 100 TABLET ORAL at 09:09

## 2022-09-15 RX ADMIN — PREGABALIN 150 MG: 150 CAPSULE ORAL at 05:09

## 2022-09-15 RX ADMIN — HEPARIN SODIUM 5000 UNITS: 5000 INJECTION INTRAVENOUS; SUBCUTANEOUS at 06:09

## 2022-09-15 RX ADMIN — ALPRAZOLAM 1 MG: 0.5 TABLET ORAL at 09:09

## 2022-09-15 RX ADMIN — SERTRALINE HYDROCHLORIDE 50 MG: 50 TABLET ORAL at 09:09

## 2022-09-15 NOTE — SUBJECTIVE & OBJECTIVE
Interval History: AFVSS. Adequate UOP. Drain output of 170/90. Pain controlled. Tolerated diet.     Objective:     Temp:  [97.6 °F (36.4 °C)-99 °F (37.2 °C)] 98.4 °F (36.9 °C)  Pulse:  [63-80] 80  Resp:  [14-22] 16  SpO2:  [92 %-99 %] 93 %  BP: ()/(51-84) 94/51     Body mass index is 41.97 kg/m².           Drains       Drain  Duration                  Closed/Suction Drain 09/14/22 1301 Right Abdomen Bulb 15 Fr. <1 day         Urethral Catheter 09/14/22 0812 Non-latex;Straight-tip 16 Fr. <1 day                    Physical Exam  Vitals reviewed.   Constitutional:       General: She is not in acute distress.     Appearance: She is well-developed.   HENT:      Head: Normocephalic and atraumatic.   Cardiovascular:      Rate and Rhythm: Normal rate.   Pulmonary:      Effort: Pulmonary effort is normal. No respiratory distress.      Breath sounds: No stridor.   Abdominal:      General: There is no distension.      Palpations: Abdomen is soft.      Tenderness: There is no abdominal tenderness.      Comments: SSI CDI. Appropriately tender. RLQ drain with SSO.    Genitourinary:     Comments: Albright draining clear yellow  Musculoskeletal:         General: Normal range of motion.      Cervical back: Normal range of motion.   Skin:     General: Skin is warm and dry.   Neurological:      Mental Status: She is alert.   Psychiatric:         Behavior: Behavior normal.       Significant Labs:    BMP:  Recent Labs   Lab 09/14/22  1415      K 4.2      CO2 22*   BUN 18   CREATININE 1.1   CALCIUM 8.5*       CBC:   Recent Labs   Lab 09/14/22  1415   WBC 13.48*   HGB 10.8*   HCT 32.2*          All pertinent labs results from the past 24 hours have been reviewed.    Significant Imaging:  All pertinent imaging results/findings from the past 24 hours have been reviewed.

## 2022-09-15 NOTE — PLAN OF CARE
[facility]  Initial Discharge Assessment       Primary Care Provider: Primary Doctor No    Admission Diagnosis: Renal mass [N28.89]    Admission Date: 9/14/2022  Expected Discharge Date: 9/16/2022    Discharge Barriers Identified: None    Payor: BLUE CROSS BLUE SHIELD / Plan: [place] HMO / Product Type: HMO /     Extended Emergency Contact Information  Primary Emergency Contact: Matt Griffith   [facility]  Home Phone: [phone]  Mobile Phone: [phone]  Relation: Spouse    Discharge Plan A: Home with family  Discharge Plan B: Home Health, Home with family      [facility] - [place] - [address]  [address]  [place] [address]  Phone: [phone] Fax: [phone]    [id number] - [place] - [address] [place] [address] AT [facility]  [address] [place] [address]  [place] [address]  Phone: [phone] Fax: [phone]      Initial Assessment (most recent)       Adult Discharge Assessment - 09/15/22 0851          Discharge Assessment    Assessment Type Discharge Planning Assessment     Confirmed/corrected address, phone number and insurance Yes     Confirmed Demographics Correct on Facesheet     Source of Information patient;family   son    Communicated ARNULFO with patient/caregiver Yes     Lives With child(maverick), adult;spouse     Do you expect to return to your current living situation? Yes     Do you have help at home or someone to help you manage your care at home? Yes     Who are your caregiver(s) and their phone number(s)? Spouse     Prior to hospitilization cognitive status: Alert/Oriented     Current cognitive status: Alert/Oriented     Home Layout Bathroom on 2nd floor;Bedroom on 2nd floor     Equipment Currently Used at Home none     Patient currently being followed by outpatient case management? No     Do you currently have service(s) that help you manage your care at home? No     Do you take  prescription medications? Yes     Do you have prescription coverage? Yes     Do you have any problems affording any of your prescribed medications? No     Is the patient taking medications as prescribed? yes     How do you get to doctors appointments? family or friend will provide     Are you on dialysis? No     Do you take coumadin? No     Discharge Plan A Home with family     Discharge Plan B Home Health;Home with family     DME Needed Upon Discharge  none     Discharge Plan discussed with: Patient;Adult children     Discharge Barriers Identified None                   Patient lives with her  and 19 y/o son in a two story home with her bed/bath on second floor. She does not feel she will need any post acute services upon hospital discharge. Patient spouse will provide assistance/help with care once home. He also will provide transportation home.

## 2022-09-15 NOTE — PROGRESS NOTES
[facility]  Urology  Progress Note    Patient Name: Teagan Griffith  MRN: [id number]  Admission Date: 2022  Hospital Length of Stay: 1 days  Code Status: Full Code   Attending Provider: Justin Riley MD   Primary Care Physician: Primary Doctor No    Subjective:     HPI:  Teagan Griffith is a 42 y.o. female with a right renal mass.      Patient was seen in ED for acute chest pain.  CXR was normal. CTA was negative for intrathoracic abnormality but did catch a right renal mass.  Subsequently had a CT abdomen w/o contrast-- CT scan from 22 was independently reviewed and reveals a 14.3 cm right renal mass, concerning for RCC.   Patient has a h/o .  No h/o smoking.      On anxiety/depression meds, no other meds.         She denies hematuria. Some complaints of right flank pain.     PMHX: depression  PSHX:      Hgb 10.8, Cr 0.8  Echo 22 EF 60%     Preop appointment with Malina Hernandez this morning. Labs to follow appointment     Patient wants to keep her tumor if possible.     Has a family history of lymphoma, prostate cancer, lung cancer.       Interval History: AFVSS. Adequate UOP. Drain output of 170/90. Pain controlled. Tolerated diet.     Objective:     Temp:  [97.6 °F (36.4 °C)-99 °F (37.2 °C)] 98.4 °F (36.9 °C)  Pulse:  [63-80] 80  Resp:  [14-22] 16  SpO2:  [92 %-99 %] 93 %  BP: ()/(51-84) 94/51     Body mass index is 41.97 kg/m².           Drains       Drain  Duration                  Closed/Suction Drain 22 1301 Right Abdomen Bulb 15 Fr. <1 day         Urethral Catheter 22 0812 Non-latex;Straight-tip 16 Fr. <1 day                    Physical Exam  Vitals reviewed.   Constitutional:       General: She is not in acute distress.     Appearance: She is well-developed.   HENT:      Head: Normocephalic and atraumatic.   Cardiovascular:      Rate and Rhythm: Normal rate.   Pulmonary:      Effort: Pulmonary effort is normal. No respiratory distress.       Breath sounds: No stridor.   Abdominal:      General: There is no distension.      Palpations: Abdomen is soft.      Tenderness: There is no abdominal tenderness.      Comments: SSI CDI. Appropriately tender. RLQ drain with SSO.    Genitourinary:     Comments: Albright draining clear yellow  Musculoskeletal:         General: Normal range of motion.      Cervical back: Normal range of motion.   Skin:     General: Skin is warm and dry.   Neurological:      Mental Status: She is alert.   Psychiatric:         Behavior: Behavior normal.       Significant Labs:    BMP:  Recent Labs   Lab 09/14/22  1415      K 4.2      CO2 22*   BUN 18   CREATININE 1.1   CALCIUM 8.5*       CBC:   Recent Labs   Lab 09/14/22  1415   WBC 13.48*   HGB 10.8*   HCT 32.2*          All pertinent labs results from the past 24 hours have been reviewed.    Significant Imaging:  All pertinent imaging results/findings from the past 24 hours have been reviewed.                    Assessment/Plan:     * Renal mass  Teagan Griffith is a 42 y.o. female with a right renal mass.     -s/p right robotic nephrectomy 9/14  -Albright catheter removed. VT today  -f/u labs  -Drain with 190/70 output. Continue to monitor  -OOBTC  -Ambulate TID  -Regular diet        VTE Risk Mitigation (From admission, onward)         Ordered     heparin (porcine) injection 5,000 Units  Every 8 hours         09/14/22 1328     Place sequential compression device  Until discontinued         09/14/22 0613                ERIC DOLAN MD  Urology  [facility] - Surgery

## 2022-09-15 NOTE — PLAN OF CARE
[facility]  Discharge Final Note    Primary Care Provider: Primary Doctor No    Expected Discharge Date: 9/15/2022    Final Discharge Note (most recent)       Final Note - 09/15/22 1327          Final Note    Assessment Type Final Discharge Note     Anticipated Discharge Disposition Home or Self Care     What phone number can be called within the next 1-3 days to see how you are doing after discharge? [phone]     Hospital Resources/Appts/Education Provided Provided patient/caregiver with written discharge plan information;Appointments scheduled by Navigator/Coordinator                     Future Appointments   Date Time Provider Department Center   10/4/2022  9:15 AM Justin Riley MD [facility] UROLOG [facility]   11/2/2022  9:30 AM Emilee Rios MD [facility]          Contact Info       Justin Riley MD   Specialty: Urology    [address]  [place] [address]   Phone: [phone]       Next Steps: Follow up in 3 week(s)    Justin Riley MD   Specialty: Urology    [address] [place] [address]   Phone: [phone]       Next Steps: Follow up in 1 week(s)

## 2022-09-15 NOTE — TELEPHONE ENCOUNTER
9/21/2022 Status: Yamileth   Appt Time: 8:45 AM    Pt uses the Portal    ----- Message from Herman Schultz MD sent at 9/15/2022 12:09 PM CDT -----  Please make follow up appointment for the attached patient. 1 week with Dr. Riley, drain removal.    Thank you,  Wilber Schultz

## 2022-09-15 NOTE — NURSING
Nurses Note -- 4 Eyes      9/15/2022   3:00 PM      Skin assessed during: Admit      [x] No Pressure Injuries Present    []Prevention Measures Documented      [] Yes- Altered Skin Integrity Present or Discovered   [] LDA Added if Not in Epic (Describe Wound)   [] New Altered Skin Integrity was Present on Admit and Documented in LDA   [] Wound Image Taken    Wound Care Consulted? No    Attending Nurse:  Chris Bauman RN     Second RN/Staff Member:  PIEDAD Culp

## 2022-09-15 NOTE — NURSING
Patient's azldivar removed by Urology physician at 0632.  Patient is due to void. She is in no apparent distress, or pain.

## 2022-09-15 NOTE — NURSING
Dr. Schultz notified as patient is experiencing bleeding at the site of surgical operation. MD stated he is on way as dressing is beyond shadow of outlined blood at shift change. I can confirm that position of bulb drain was unchanged from shift change and that the presentation of drain remained as it was prior to patient becoming in my care, untouched by me. Patient states that she has been ambulating much throughout day. Gown saturated with blood, education provided. Verbal understanding of plan of care voiced.

## 2022-09-15 NOTE — PROGRESS NOTES
Urology paged regarding concerning bleeding from incisions that required assessment and beyond the scope of dressing changes. On assessment, drain not correctly placed on suction, serosanguineous drainage around drain saturating dressing. Drain placed back on suction and dressing changed. Nursing instructed on proper drain and wound care.

## 2022-09-15 NOTE — ASSESSMENT & PLAN NOTE
Teagan Griffith is a 42 y.o. female with a right renal mass.     -s/p right robotic nephrectomy 9/14  -Albright catheter removed. VT today  -f/u labs  -Drain with 190/70 output. Continue to monitor  -f/u labs

## 2022-09-15 NOTE — PROGRESS NOTES
AVS reviewed. IV removed. Discharge medications given, Patient transported off unit via wheelchair.

## 2022-09-15 NOTE — DISCHARGE INSTRUCTIONS
What to expect with your Nephrectomy.  [facility]  After surgery  You may or may not have a drain that is shaped like a grenade and put to suction  This drain usually may or may not come out on Post op day 1. If you go home with a drain, the nurses will teach you how to record the output and you will come back 3-5 days after you leave to have the drain removed in clinic.  You will have a catheter after your surgery. It should come out the next day and you should be able to void on your own before you leave. If you are a male and on a BPH medicine, we will need to make sure you restart that the night of your surgery.  The night of surgery we expect and hope that you will:  Walk - walking helps get the bowels moving. Also after your surgery, you are at a risk for a deep venous thrombosis (which is a clot in the legs that can form by remaining inactive or still for extended periods of time) and this can travel to your lungs and make you feel short of breath. This is a very serious condition. Walking helps prevent a DVT from occurring.  Eat - you do not have to eat a whole meal, but we want to make sure you can tolerate liquid and/or solid food without nausea and vomiting  Use your incentive spirometer - this is the breathing apparatus that helps you expand your lungs. If and when you have pain you will not want to take deep breaths. But if you dont take deep breaths, you are at risk for pneumonia. The incentive spirometer will help prevent this from occurring by expanding your lungs.  Symptoms you may experience immediately post-op:  Bloating and/or shoulder pain if you had a laparoscopic procedure - when we do this operation, we fill up your abdominal cavity with gas to better help us visualize the organs and allow our instruments to fit. After the surgery, not all the air can be removed and your body will eventually absorb this small amount of air. However this can make you feel bloated. In addition, when you  sit up, the air can sit right under a muscle (the diaphragm) which has connecting nerves to the shoulders, which could explain why you have shoulder pain.  Do not expect necessarily to have gas or to have a bowel movement - this goes along with the bloating, you may feel like you want to pass gas or have a bowel movement but you cant. This is normal and you will feel like this for a couple days. There are no pills to help with this. Small walks throughout the day should help with this.  Pain - your pain should be able to be controlled with medicines by mouth that we prescribe. It is important for you to tell us if you are on any pain medications at home before the surgery as you may need stronger pain meds while in the hospital.  You can go home when:  Pain is controlled with medicines by mouth  You are able to walk without difficulty or pain  You are tolerating a regulating diet  Your are voiding. If you cannot void we may have to replace a catheter and you will follow up 3-5 days after you leave to have a voiding trial. The nurses will teach you how to care for your catheter.  When you go home:  Activity  Continue to walk - small walks throughout the day are better than one long walk.   Do not lift anything greater than 8 pounds for 6 weeks - we want your abdominal wall muscles to heal.  Bowel Movements - Do not strain to have a bowel movement - the pain medicines will make you constipated. That is why we also ask you to take colace 2-3 x per day to help keep your bowels regular. If you are still having trouble, then you can also add Miralax once a day. Do not take any stool softeners if you are having diarrhea.  Drain - If you have a drain (not your catheter, but a separate drain) then record the output and bring it with you to your next appointment  Smoking - If you smoke, we encourage you STOP. Smoking interferes with the healing process and your prolong your healing with continued smoking.  Driving - Do not  drive while you are on pain meds or with your catheter in place.  Bathing - If you do not have a drain, you can shower 48 hours after your surgery. If you do have a drain, sponge bathe only until the drain is out.  Dressing - you can remove the dressings if there is no drainage or change them as needed if there is. The little sterile band-aid strips will fall off on their own in 10-14 days. If they have not fallen off then you can remove them yourself.  Restarting medicines -especially blood thinners (Aspirin, Plavix, Coumadin), Fish Oil. Discuss this with your physician prior to discharge.  When to return to the ER  Fever - If you have a fever >101.5, this could be due to a number of reasons such as infection of the urine or incision. If your catheter has been removed, you could possibly have a leak. It would be best to come to the ER so they can better evaluate you.  Severe pain - pain is expected, but severe or new onset of pain is not normal.   Inability to tolerate food or liquid with nausea and vomiting - it would be best to go to the ER for them to better evaluate you.

## 2022-09-15 NOTE — HPI
Teagan Griffith is a 42 y.o. female with a right renal mass.      Patient was seen in ED for acute chest pain.  CXR was normal. CTA was negative for intrathoracic abnormality but did catch a right renal mass.  Subsequently had a CT abdomen w/o contrast-- CT scan from 22 was independently reviewed and reveals a 14.3 cm right renal mass, concerning for RCC.   Patient has a h/o .  No h/o smoking.      On anxiety/depression meds, no other meds.         She denies hematuria. Some complaints of right flank pain.     PMHX: depression  PSHX:      Hgb 10.8, Cr 0.8  Echo 22 EF 60%     Preop appointment with Malina Hernandez this morning. Labs to follow appointment     Patient wants to keep her tumor if possible.     Has a family history of lymphoma, prostate cancer, lung cancer.

## 2022-09-16 NOTE — ANESTHESIA POSTPROCEDURE EVALUATION
Anesthesia Post Evaluation    Patient: Teagan Griffith    Procedure(s) Performed: Procedure(s) (LRB):  XI ROBOTIC NEPHRECTOMY (Right)    Final Anesthesia Type: general      Patient location during evaluation: PACU  Patient participation: Yes- Able to Participate  Level of consciousness: awake and alert  Post-procedure vital signs: reviewed and stable  Pain management: adequate  Airway patency: patent    PONV status at discharge: No PONV  Anesthetic complications: no      Cardiovascular status: blood pressure returned to baseline  Respiratory status: spontaneous ventilation and room air  Hydration status: euvolemic  Follow-up not needed.          Vitals Value Taken Time   /72 09/15/22 1228   Temp 36.2 °C (97.2 °F) 09/15/22 1133   Pulse 0 09/15/22 1228   Resp 20 09/15/22 1227   SpO2 91 % 09/15/22 1227   Vitals shown include unvalidated device data.      Event Time   Out of Recovery 15:00:00         Pain/Michelle Score: Pain Rating Prior to Med Admin: 7 (9/15/2022  5:22 AM)

## 2022-09-16 NOTE — DISCHARGE SUMMARY
[facility] - Surgery  Urology  Discharge Summary      Patient Name: Teagan Griffith  MRN: [id number]  Admission Date: 2022  Hospital Length of Stay: 1 days  Discharge Date and Time:  2022 6:27 AM  Attending Physician: No att. providers found   Discharging Provider: Herman Schultz MD  Primary Care Physician: Primary Doctor No    HPI:   Teagan Griffith is a 42 y.o. female with a right renal mass.      Patient was seen in ED for acute chest pain.  CXR was normal. CTA was negative for intrathoracic abnormality but did catch a right renal mass.  Subsequently had a CT abdomen w/o contrast-- CT scan from 22 was independently reviewed and reveals a 14.3 cm right renal mass, concerning for RCC.   Patient has a h/o .  No h/o smoking.      On anxiety/depression meds, no other meds.         She denies hematuria. Some complaints of right flank pain.     PMHX: depression  PSHX:      Hgb 10.8, Cr 0.8  Echo 22 EF 60%     Preop appointment with Malina Hernandez this morning. Labs to follow appointment     Patient wants to keep her tumor if possible.     Has a family history of lymphoma, prostate cancer, lung cancer.      Procedure(s) (LRB):  XI ROBOTIC NEPHRECTOMY (Right)     Indwelling Lines/Drains at time of discharge:   Lines/Drains/Airways       Drain  Duration                  Closed/Suction Drain 22 1301 Right Abdomen Bulb 15 Fr. 1 day                    Hospital Course (synopsis of major diagnoses, care, treatment, and services provided during the course of the hospital stay):    Patient was admitted to the hospital for procedures as described above, please see operative note for full details. Patient tolerated the procedure well, and was taken to PACU postoperatively for observation during their continued recovery from anesthesia. After an appropriate duration of observation, patient was deemed stable for transfer to the floor to continue their recovery. The postoperative course  was largely normal. Patient was able to void and ambulate normally. Patient was able to tolerate prescribed diet. Nausea and pain were controlled with oral medications. Patient was deemed stable for discharge on POD 1 and was discharged with appropriate medications, instructions, and follow up.    Medications and instructions as below.  For more thorough information, please refer to the hospital record and operative report.    Consults:     Significant Diagnostic Studies:     Pending Diagnostic Studies:       Procedure Component Value Units Date/Time    Specimen to Pathology, Surgery Urology [[id number]] Collected: 09/14/22 1307    Order Status: Sent Lab Status: In process Updated: 09/14/22 2231    Specimen: Tissue             Final Active Diagnoses:    Diagnosis Date Noted POA    PRINCIPAL PROBLEM:  Renal mass [N28.89] 08/06/2022 Yes      Problems Resolved During this Admission:       Discharged Condition: good    Disposition: Home or Self Care    Follow Up:   Follow-up Information       Justin Riley MD Follow up in 3 week(s).    Specialty: Urology  Contact information:  [address]  [facility] [address]  [phone]               Justin Riley MD Follow up in 1 week(s).    Specialty: Urology  Contact information:  [address]  [facility] [address]  [phone]                           Patient Instructions:      No driving until:   Order Comments: No driving while taking opioid pain medications.     Notify your health care provider if you experience any of the following:  temperature >100.4     Notify your health care provider if you experience any of the following:  persistent nausea and vomiting or diarrhea     Notify your health care provider if you experience any of the following:  severe uncontrolled pain     Notify your health care provider if you experience any of the following:  difficulty breathing or increased cough     Notify your health care provider if you experience any of the  following:  severe persistent headache     Notify your health care provider if you experience any of the following:  persistent dizziness, light-headedness, or visual disturbances     Notify your health care provider if you experience any of the following:  increased confusion or weakness     Lifting restrictions   Order Comments: Please do not lift more than 10 pounds for at least 6 weeks. Reassess at follow up clinic visit.     Change dressing (specify)   Order Comments: Dressing change as needed, continue to monitor and record drain output.     Type & Screen   Standing Status: Future Number of Occurrences: 1 Standing Exp. Date: 10/15/23     Shower on day dressing removed (No bath)   Order Comments: Okay to shower 48 hours after surgery. Gentle shower daily with mild soap and water. Please do not soak or submerge in water for at least 6 weeks. Reassess at follow up clinic visit.     Medications:  Reconciled Home Medications:      Medication List        START taking these medications      ibuprofen 800 MG tablet  Commonly known as: ADVIL,MOTRIN  Take 1 tablet (800 mg total) by mouth 3 (three) times daily. for 14 days     oxyCODONE 5 MG immediate release tablet  Commonly known as: ROXICODONE  Take 1 tablet (5 mg total) by mouth every 4 (four) hours as needed for Pain.     polyethylene glycol 17 gram/dose powder  Commonly known as: GLYCOLAX  Mix 1 capful (17 g) in liquid and drink by mouth once daily. for 14 days            CONTINUE taking these medications      acetaminophen 500 MG tablet  Commonly known as: TYLENOL  Take 1,000 mg by mouth every 6 (six) hours as needed for Pain.     ALPRAZolam 2 MG Tab  Commonly known as: XANAX  Take 1 mg by mouth 2 (two) times a day.     dextroamphetamine-amphetamine 30 mg Tab  Take 30 mg by mouth 2 (two) times a day.     lamoTRIgine 200 MG tablet  Commonly known as: LAMICTAL  Take 200 mg by mouth once daily.     risperiDONE 2 MG tablet  Commonly known as: RISPERDAL  Take 4 mg by  mouth every evening.     sertraline 100 MG tablet  Commonly known as: ZOLOFT  Take 50 mg by mouth once daily.              Time spent on the discharge of patient: 15 minutes    Herman Schultz MD  Urology  [facility]

## 2022-09-26 LAB
FINAL PATHOLOGIC DIAGNOSIS: NORMAL
GROSS: NORMAL
Lab: NORMAL

## 2022-09-28 DIAGNOSIS — C64.1 RENAL CELL CARCINOMA OF RIGHT KIDNEY: Primary | ICD-10-CM

## 2022-10-04 ENCOUNTER — OFFICE VISIT (OUTPATIENT)
Dept: UROLOGY | Facility: CLINIC | Age: 42
End: 2022-10-04
Payer: COMMERCIAL

## 2022-10-04 VITALS
DIASTOLIC BLOOD PRESSURE: 99 MMHG | HEIGHT: 66 IN | SYSTOLIC BLOOD PRESSURE: 143 MMHG | WEIGHT: 258 LBS | HEART RATE: 83 BPM | BODY MASS INDEX: 41.46 KG/M2

## 2022-10-04 DIAGNOSIS — C64.1 RENAL CELL CARCINOMA OF RIGHT KIDNEY: Primary | ICD-10-CM

## 2022-10-04 PROCEDURE — 3080F PR MOST RECENT DIASTOLIC BLOOD PRESSURE >= 90 MM HG: ICD-10-PCS | Mod: CPTII,S$GLB,, | Performed by: UROLOGY

## 2022-10-04 PROCEDURE — 3077F SYST BP >= 140 MM HG: CPT | Mod: CPTII,S$GLB,, | Performed by: UROLOGY

## 2022-10-04 PROCEDURE — 99999 PR PBB SHADOW E&M-EST. PATIENT-LVL III: CPT | Mod: PBBFAC,,, | Performed by: UROLOGY

## 2022-10-04 PROCEDURE — 1159F PR MEDICATION LIST DOCUMENTED IN MEDICAL RECORD: ICD-10-PCS | Mod: CPTII,S$GLB,, | Performed by: UROLOGY

## 2022-10-04 PROCEDURE — 3044F HG A1C LEVEL LT 7.0%: CPT | Mod: CPTII,S$GLB,, | Performed by: UROLOGY

## 2022-10-04 PROCEDURE — 3008F BODY MASS INDEX DOCD: CPT | Mod: CPTII,S$GLB,, | Performed by: UROLOGY

## 2022-10-04 PROCEDURE — 1160F RVW MEDS BY RX/DR IN RCRD: CPT | Mod: CPTII,S$GLB,, | Performed by: UROLOGY

## 2022-10-04 PROCEDURE — 99024 POSTOP FOLLOW-UP VISIT: CPT | Mod: S$GLB,,, | Performed by: UROLOGY

## 2022-10-04 PROCEDURE — 1160F PR REVIEW ALL MEDS BY PRESCRIBER/CLIN PHARMACIST DOCUMENTED: ICD-10-PCS | Mod: CPTII,S$GLB,, | Performed by: UROLOGY

## 2022-10-04 PROCEDURE — 3044F PR MOST RECENT HEMOGLOBIN A1C LEVEL <7.0%: ICD-10-PCS | Mod: CPTII,S$GLB,, | Performed by: UROLOGY

## 2022-10-04 PROCEDURE — 1159F MED LIST DOCD IN RCRD: CPT | Mod: CPTII,S$GLB,, | Performed by: UROLOGY

## 2022-10-04 PROCEDURE — 3008F PR BODY MASS INDEX (BMI) DOCUMENTED: ICD-10-PCS | Mod: CPTII,S$GLB,, | Performed by: UROLOGY

## 2022-10-04 PROCEDURE — 3080F DIAST BP >= 90 MM HG: CPT | Mod: CPTII,S$GLB,, | Performed by: UROLOGY

## 2022-10-04 PROCEDURE — 99999 PR PBB SHADOW E&M-EST. PATIENT-LVL III: ICD-10-PCS | Mod: PBBFAC,,, | Performed by: UROLOGY

## 2022-10-04 PROCEDURE — 99024 PR POST-OP FOLLOW-UP VISIT: ICD-10-PCS | Mod: S$GLB,,, | Performed by: UROLOGY

## 2022-10-04 PROCEDURE — 3077F PR MOST RECENT SYSTOLIC BLOOD PRESSURE >= 140 MM HG: ICD-10-PCS | Mod: CPTII,S$GLB,, | Performed by: UROLOGY

## 2022-10-04 NOTE — PROGRESS NOTES
Subjective:       Patient ID: Teagan Griffith is a 42 y.o. female.    Chief Complaint:  Right renal mass      History of Present Illness  HPI  Patient is a 42 y.o. female who is established to our clinic and referred by their PCP, Dr. Recinos for evaluation of a right renal mass.   Patient developed chest pain and was seen in urgent care--> ER.  CXR was normal. CTA was negative for intrathoracic abnormality but did catch a right renal mass.  Subsequently had a CT abdomen w/o contrast--revealed a 14.3cm right renal mass, concerning for RCC.  Subsequent MRI from 22 revealed 14cm right renal mass, no renal vein thrombus.  Underwent right robotic nephrectomy on 22. Was discharged on pod#1 after an uneventful post-op course.   Here to review pathology.  Doing well.  Plans to go back to work tomorrow.     PATHOLOGY:  Final Pathologic Diagnosis Kidney, right, nephrectomy:   - Clear cell renal cell carcinoma, nuclear grade 4   - Tumor measures 13.3 cm maximal dimension   - Tumor appears confined to the kidney   - Margins of resection are negative for malignancy   - Please see SYNOPTIC REPORT below SYNOPTIC REPORT   SPECIMEN, LATERALITY, PROCEDURE:  Kidney, right, nephrectomy   HISTOLOGIC TYPE:  Cell renal cell carcinoma   HISTOLOGIC GRADE:  G4   TUMOR NECROSIS:  Present; 10%   TUMOR SIZE:  13.3 cm   TUMOR FOCALITY:  Single focus   SARCOMATOID FEATURES:  Not identified   RHABDOID FEATURES:  Present, 10%   TUMOR EXTENSION:  Confined to the kidney   MARGINS:  Uninvolved by tumor   LYMPHOVASCULAR INVASION:  Not identified   REGIONAL LYMPH NODES:  None submitted or identified within specimen   ADDITIONAL FINDINGS:  Immunostains for CK7, , and vimentin support the   above interpretation (controls appropriate)   PATHOLOGIC STAGE: pT2b pNX        Patient has a h/o .  No h/o smoking.     On anxiety/depression meds, no other meds.  No HTN.  No further CP.  No SOB.    Has been trying to lose weight---does  get some HIGGINS, but not severe.     Works at a desk.     Has a family history of lymphoma, prostate cancer, lung cancer.  She has 1 kid who is a 20-year-old male.      Review of Systems  Review of Systems  All other systems reviewed and negative except pertinent positives noted in HPI.       Objective:     Physical Exam  Constitutional:       Appearance: Normal appearance. She is well-developed. She is not toxic-appearing.   HENT:      Head: Normocephalic.   Cardiovascular:      Rate and Rhythm: Normal rate.      Pulses: Normal pulses.   Pulmonary:      Effort: Pulmonary effort is normal. No tachypnea, accessory muscle usage or respiratory distress.   Abdominal:      General: There is no distension.      Palpations: Abdomen is soft. There is no fluid wave or mass.      Tenderness: There is no abdominal tenderness. There is no rebound.      Hernia: No hernia is present.          Comments: Liver/spleen non-palpable   Musculoskeletal:         General: Normal range of motion.   Skin:     Findings: No bruising or rash.   Neurological:      Mental Status: She is alert and oriented to person, place, and time.   Psychiatric:         Speech: Speech normal.         Behavior: Behavior normal. Behavior is cooperative.         Thought Content: Thought content normal.       Lab Review  Lab Results   Component Value Date    COLORU Yellow 08/06/2022    SPECGRAV 1.015 08/06/2022    PHUR 5.0 08/06/2022    NITRITE Negative 08/06/2022    KETONESU Negative 08/06/2022         Assessment:        1. Renal cell carcinoma of right kidney              Plan:     Renal cell carcinoma of right kidney      -pathology reviewed; high grade, Stage 2 w/rhabdoid features.  -will see Dr. Jane today to discuss possible adjuvant immunotherapy.  -per NCCN guidelines, will need imaging in at least 6 months (could justify 3 month imaging based on high grade and rhabdoid features).

## 2022-10-06 PROBLEM — C64.9 RENAL CELL CARCINOMA: Status: ACTIVE | Noted: 2022-08-06

## 2022-10-07 ENCOUNTER — TELEPHONE (OUTPATIENT)
Dept: HEMATOLOGY/ONCOLOGY | Facility: CLINIC | Age: 42
End: 2022-10-07
Payer: COMMERCIAL

## 2022-10-07 ENCOUNTER — OFFICE VISIT (OUTPATIENT)
Dept: HEMATOLOGY/ONCOLOGY | Facility: CLINIC | Age: 42
End: 2022-10-07
Payer: COMMERCIAL

## 2022-10-07 ENCOUNTER — LAB VISIT (OUTPATIENT)
Dept: LAB | Facility: HOSPITAL | Age: 42
End: 2022-10-07
Attending: HOSPITALIST
Payer: COMMERCIAL

## 2022-10-07 VITALS
RESPIRATION RATE: 18 BRPM | SYSTOLIC BLOOD PRESSURE: 105 MMHG | WEIGHT: 252.19 LBS | OXYGEN SATURATION: 98 % | HEART RATE: 81 BPM | HEIGHT: 66 IN | BODY MASS INDEX: 40.53 KG/M2 | TEMPERATURE: 98 F | DIASTOLIC BLOOD PRESSURE: 68 MMHG

## 2022-10-07 DIAGNOSIS — F32.4 MAJOR DEPRESSIVE DISORDER IN PARTIAL REMISSION, UNSPECIFIED WHETHER RECURRENT: ICD-10-CM

## 2022-10-07 DIAGNOSIS — C64.1 RENAL CELL CARCINOMA OF RIGHT KIDNEY: ICD-10-CM

## 2022-10-07 LAB
ALBUMIN SERPL BCP-MCNC: 4.1 G/DL (ref 3.5–5.2)
ALP SERPL-CCNC: 77 U/L (ref 55–135)
ALT SERPL W/O P-5'-P-CCNC: 39 U/L (ref 10–44)
ANION GAP SERPL CALC-SCNC: 11 MMOL/L (ref 8–16)
AST SERPL-CCNC: 23 U/L (ref 10–40)
BILIRUB SERPL-MCNC: 0.3 MG/DL (ref 0.1–1)
BUN SERPL-MCNC: 20 MG/DL (ref 6–20)
CALCIUM SERPL-MCNC: 10.1 MG/DL (ref 8.7–10.5)
CHLORIDE SERPL-SCNC: 105 MMOL/L (ref 95–110)
CO2 SERPL-SCNC: 22 MMOL/L (ref 23–29)
CREAT SERPL-MCNC: 1.2 MG/DL (ref 0.5–1.4)
ERYTHROCYTE [DISTWIDTH] IN BLOOD BY AUTOMATED COUNT: 12.3 % (ref 11.5–14.5)
EST. GFR  (NO RACE VARIABLE): 57.6 ML/MIN/1.73 M^2
GLUCOSE SERPL-MCNC: 100 MG/DL (ref 70–110)
HCT VFR BLD AUTO: 35.7 % (ref 37–48.5)
HGB BLD-MCNC: 11.8 G/DL (ref 12–16)
IMM GRANULOCYTES # BLD AUTO: 0.03 K/UL (ref 0–0.04)
MCH RBC QN AUTO: 28.5 PG (ref 27–31)
MCHC RBC AUTO-ENTMCNC: 33.1 G/DL (ref 32–36)
MCV RBC AUTO: 86 FL (ref 82–98)
NEUTROPHILS # BLD AUTO: 4.6 K/UL (ref 1.8–7.7)
PLATELET # BLD AUTO: 364 K/UL (ref 150–450)
PMV BLD AUTO: 10 FL (ref 9.2–12.9)
POTASSIUM SERPL-SCNC: 4.4 MMOL/L (ref 3.5–5.1)
PROT SERPL-MCNC: 7.5 G/DL (ref 6–8.4)
RBC # BLD AUTO: 4.14 M/UL (ref 4–5.4)
SODIUM SERPL-SCNC: 138 MMOL/L (ref 136–145)
TSH SERPL DL<=0.005 MIU/L-ACNC: 1.29 UIU/ML (ref 0.4–4)
WBC # BLD AUTO: 9.48 K/UL (ref 3.9–12.7)

## 2022-10-07 PROCEDURE — 1111F PR DISCHARGE MEDS RECONCILED W/ CURRENT OUTPATIENT MED LIST: ICD-10-PCS | Mod: CPTII,S$GLB,, | Performed by: HOSPITALIST

## 2022-10-07 PROCEDURE — 3074F SYST BP LT 130 MM HG: CPT | Mod: CPTII,S$GLB,, | Performed by: HOSPITALIST

## 2022-10-07 PROCEDURE — 3078F PR MOST RECENT DIASTOLIC BLOOD PRESSURE < 80 MM HG: ICD-10-PCS | Mod: CPTII,S$GLB,, | Performed by: HOSPITALIST

## 2022-10-07 PROCEDURE — 3044F HG A1C LEVEL LT 7.0%: CPT | Mod: CPTII,S$GLB,, | Performed by: HOSPITALIST

## 2022-10-07 PROCEDURE — 3044F PR MOST RECENT HEMOGLOBIN A1C LEVEL <7.0%: ICD-10-PCS | Mod: CPTII,S$GLB,, | Performed by: HOSPITALIST

## 2022-10-07 PROCEDURE — 99215 PR OFFICE/OUTPT VISIT, EST, LEVL V, 40-54 MIN: ICD-10-PCS | Mod: S$GLB,,, | Performed by: HOSPITALIST

## 2022-10-07 PROCEDURE — 3008F BODY MASS INDEX DOCD: CPT | Mod: CPTII,S$GLB,, | Performed by: HOSPITALIST

## 2022-10-07 PROCEDURE — 36415 COLL VENOUS BLD VENIPUNCTURE: CPT | Performed by: HOSPITALIST

## 2022-10-07 PROCEDURE — 1160F PR REVIEW ALL MEDS BY PRESCRIBER/CLIN PHARMACIST DOCUMENTED: ICD-10-PCS | Mod: CPTII,S$GLB,, | Performed by: HOSPITALIST

## 2022-10-07 PROCEDURE — 1159F MED LIST DOCD IN RCRD: CPT | Mod: CPTII,S$GLB,, | Performed by: HOSPITALIST

## 2022-10-07 PROCEDURE — 1159F PR MEDICATION LIST DOCUMENTED IN MEDICAL RECORD: ICD-10-PCS | Mod: CPTII,S$GLB,, | Performed by: HOSPITALIST

## 2022-10-07 PROCEDURE — 80053 COMPREHEN METABOLIC PANEL: CPT | Performed by: HOSPITALIST

## 2022-10-07 PROCEDURE — 99999 PR PBB SHADOW E&M-EST. PATIENT-LVL V: ICD-10-PCS | Mod: PBBFAC,,, | Performed by: HOSPITALIST

## 2022-10-07 PROCEDURE — 3008F PR BODY MASS INDEX (BMI) DOCUMENTED: ICD-10-PCS | Mod: CPTII,S$GLB,, | Performed by: HOSPITALIST

## 2022-10-07 PROCEDURE — 85027 COMPLETE CBC AUTOMATED: CPT | Performed by: HOSPITALIST

## 2022-10-07 PROCEDURE — 3078F DIAST BP <80 MM HG: CPT | Mod: CPTII,S$GLB,, | Performed by: HOSPITALIST

## 2022-10-07 PROCEDURE — 1111F DSCHRG MED/CURRENT MED MERGE: CPT | Mod: CPTII,S$GLB,, | Performed by: HOSPITALIST

## 2022-10-07 PROCEDURE — 84443 ASSAY THYROID STIM HORMONE: CPT | Performed by: HOSPITALIST

## 2022-10-07 PROCEDURE — 3074F PR MOST RECENT SYSTOLIC BLOOD PRESSURE < 130 MM HG: ICD-10-PCS | Mod: CPTII,S$GLB,, | Performed by: HOSPITALIST

## 2022-10-07 PROCEDURE — 99999 PR PBB SHADOW E&M-EST. PATIENT-LVL V: CPT | Mod: PBBFAC,,, | Performed by: HOSPITALIST

## 2022-10-07 PROCEDURE — 1160F RVW MEDS BY RX/DR IN RCRD: CPT | Mod: CPTII,S$GLB,, | Performed by: HOSPITALIST

## 2022-10-07 PROCEDURE — 99215 OFFICE O/P EST HI 40 MIN: CPT | Mod: S$GLB,,, | Performed by: HOSPITALIST

## 2022-10-07 NOTE — PROGRESS NOTES
MEDICAL ONCOLOGY - NEW PATIENT VISIT    Best Contact Phone Number(s): [phone] (home)      Cancer/Stage/TNM:    Cancer Staging   Renal cell carcinoma  Staging form: Kidney, AJCC 8th Edition  - Pathologic: Stage Unknown (pT2b, pNX, cM0) - Signed by Moshe Jane MD on 10/6/2022       Reason for visit: Ms. 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 She presents to medical oncology clinic for discussion of adjuvant therapy for high risk ccRCC.    History has been obtained by chart review and discussion with the patient.    HPI:   Patient found to have large 14 cm right kidney mass in imaging performed for chest discomfort on 08/06/22. Subsequent MRI confirmed large right RCC without involving the renal vein or aretery. She underwent right nephrectomy on 9/14/22. Pathology showed ccRCC with nuclear grade 4, necrosis, and rhabdoid features. It was confined to the kidney. pT2bNx.    Since surgery she has been recovering well. She reports only rare incisional pain and is not routinely using analgesics. She does note some worsening low back pain after sitting up for prolonged periods. No FC. No CP, SOB or cough. No N/V/D. No rash. No history of AI disease.    Oncology History   Renal cell carcinoma   8/6/2022 Imaging Significant Findings    CTA performed for chest pain incidentally found a large partially visualized enhancing mass in the right kidney measuring at least 12 cm with adjacent fat stranding and prominent vessels highly concerning for renal cell carcinoma.    Subsequent non contrast CT a/p confirms 13.7 x 12.2 x 14.3 (AP x TV x CC) intermediate density solid right renal mass with central necrosis and scattered punctate calcifications are concerning for malignancy.     8/9/2022 Imaging Significant Findings    MRI a/p shows eterogeneously enhancing solid right renal mass highly concerning for RCC. No evidence of filling  defect or enhancing tumor thrombus in the right renal artery or vein.  Mildly prominent mesenteric lymph nodes, as above.  Metastatic disease difficult to definitively exclude     2022 Surgery    Right nephrectomy - pathology with ccRCC nuclear grade 4 up to 13.3 cm. Tumor confined to the kidney. Margins negative Rhabdoid features present with associated necrosis. [id number]     10/6/2022 Cancer Staged    Staging form: Kidney, AJCC 8th Edition  - Pathologic: Stage Unknown (pT2b, pNX, cM0)       2022 -  Chemotherapy    Treatment Summary   Plan Name: OP PEMBROLIZUMAB 200MG Q3W  Treatment Goal: Curative  Status: Active  Start Date: 2022 (Planned)  End Date: 10/23/2024 (Planned)  Provider: Moshe Jane MD  Chemotherapy: [No matching medication found in this treatment plan]            Review of Systems   Constitutional:  Negative for appetite change, chills, fever and unexpected weight change.   HENT:  Negative for mouth sores, sore throat and trouble swallowing.    Eyes:  Negative for visual disturbance.   Respiratory:  Negative for cough and shortness of breath.    Cardiovascular:  Negative for chest pain, palpitations and leg swelling.   Gastrointestinal:  Negative for abdominal distention, abdominal pain, blood in stool, constipation, diarrhea, nausea and vomiting.   Genitourinary:  Negative for difficulty urinating and dysuria.   Musculoskeletal:  Positive for back pain. Negative for arthralgias and joint swelling.   Skin:  Negative for rash and wound.   Neurological:  Negative for dizziness, weakness, light-headedness and headaches.      Past Medical History:   Diagnosis Date    Depression     Renal cell carcinoma 2022        Past Surgical History:   Procedure Laterality Date     SECTION      LAPAROSCOPIC ROBOT-ASSISTED SURGICAL REMOVAL OF KIDNEY USING DA CAESAR XI Right 2022    Procedure: XI ROBOTIC NEPHRECTOMY;  Surgeon: Justin Riley MD;  Location: [facility] OR [address] [place] [address];  Service:  Urology;  Laterality: Right;  4 hours        Family History   Problem Relation Age of Onset    Drug abuse Mother     Prostate cancer Father         Social History     Tobacco Use    Smoking status: Former     Years: 20.00     Types: Cigarettes     Quit date: 2018     Years since quittin.7    Smokeless tobacco: Never   Substance Use Topics    Alcohol use: Yes     Alcohol/week: 42.0 standard drinks     Types: 42 Cans of beer per week        I have reviewed and updated the patient's past medical, surgical, family and social histories.    Review of patient's allergies indicates:  No Known Allergies     Current Outpatient Medications   Medication Sig Dispense Refill    acetaminophen (TYLENOL) 500 MG tablet Take 1,000 mg by mouth every 6 (six) hours as needed for Pain.      ALPRAZolam (XANAX) 2 MG Tab Take 1 mg by mouth 2 (two) times a day.      dextroamphetamine-amphetamine 30 mg Tab Take 30 mg by mouth 2 (two) times a day.      lamoTRIgine (LAMICTAL) 200 MG tablet Take 200 mg by mouth once daily.      risperiDONE (RISPERDAL) 2 MG tablet Take 4 mg by mouth every evening.      sertraline (ZOLOFT) 100 MG tablet Take 50 mg by mouth once daily.       No current facility-administered medications for this visit.     Facility-Administered Medications Ordered in Other Visits   Medication Dose Route Frequency Provider Last Rate Last Admin    diphenhydrAMINE injection 25 mg  25 mg Intravenous Q6H PRN Elias Doss MD        fentaNYL 50 mcg/mL injection 25 mcg  25 mcg Intravenous Q5 Min PRN Elias Doss MD        fentaNYL 50 mcg/mL injection  mcg   mcg Intravenous PRN Matt Ellison, DO   50 mcg at 22 0735    HYDROmorphone injection 0.2 mg  0.2 mg Intravenous Q5 Min PRN Elias Doss MD   0.2 mg at 22 1455    midazolam (VERSED) 1 mg/mL injection 2 mg  2 mg Intravenous PRN Matt Ellison, DO   2 mg at 22 0735    prochlorperazine injection Soln 5 mg  5 mg Intravenous Q30 Min PRN Elias  "MD Selam        sodium chloride 0.9% flush 10 mL  10 mL Intravenous PRN Elias Doss MD            Physical Exam:   /68 (BP Location: Left arm, Patient Position: Sitting, BP Method: Large (Automatic))   Pulse 81   Temp 98.1 °F (36.7 °C) (Oral)   Resp 18   Ht 5' 6" (1.676 m)   Wt 114.4 kg (252 lb 3.3 oz)   LMP 09/05/2022   SpO2 98%   BMI 40.71 kg/m²      ECOG Performance status: 0            Physical Exam  Constitutional:       General: She is not in acute distress.     Appearance: She is obese.   HENT:      Head: Normocephalic.      Nose: Nose normal.      Mouth/Throat:      Mouth: Mucous membranes are moist.      Pharynx: Oropharynx is clear. No oropharyngeal exudate or posterior oropharyngeal erythema.   Eyes:      General: No scleral icterus.     Conjunctiva/sclera: Conjunctivae normal.      Pupils: Pupils are equal, round, and reactive to light.   Cardiovascular:      Rate and Rhythm: Normal rate and regular rhythm.      Heart sounds: No murmur heard.    No friction rub. No gallop.   Pulmonary:      Effort: Pulmonary effort is normal. No respiratory distress.      Breath sounds: Normal breath sounds. No wheezing, rhonchi or rales.   Abdominal:      General: There is no distension.      Palpations: Abdomen is soft.      Tenderness: There is no abdominal tenderness. There is no guarding or rebound.      Comments: Well healing surgical incisions   Musculoskeletal:         General: Normal range of motion.      Cervical back: Normal range of motion and neck supple.      Right lower leg: No edema.      Left lower leg: No edema.   Lymphadenopathy:      Cervical: No cervical adenopathy.   Skin:     General: Skin is warm.      Coloration: Skin is not jaundiced.      Findings: No bruising or rash.   Neurological:      General: No focal deficit present.      Mental Status: She is alert and oriented to person, place, and time.      Cranial Nerves: No cranial nerve deficit.      Motor: No weakness. "   Psychiatric:         Mood and Affect: Mood normal.         Behavior: Behavior normal.         Thought Content: Thought content normal.         Labs:   No results found for this or any previous visit (from the past 48 hour(s)).       Imaging:         I have personally reviewed the imaging which is notable for CTA, CT a/p, and MRI as summarized in oncology history.    Path:   Reviewed pathology as documented in oncology history      Assessment and Plan:   1. Renal cell carcinoma of right kidney  Overview:  Patient found to have high risk likely stage II (bF2eWhQ9) with nuclear grade 4 and rhabdoid features on nephrectomy 9/14/22.    Assessment & Plan:  Discussed risk of recurrence of her ccRCC, especially in light of high nuclear grade and rhabdoid features. Also explained recent KEYNOTE data showing 32% reduction in risk of recurrence or death with adjuvant pembrolizumab vs placebo. Although notably with other negative adjuvant ICI trials and no long term OS data. Given her high risk features, elected to proceed with adjuvant pembrolizuamb.    - Restage with CT torso prior to next visit  - Safety labs today  - FU on 11/9/22 to start pembrolizumab    Orders:  -     Ambulatory referral/consult to Hematology / Oncology  -     Comprehensive Metabolic Panel; Standing  -     CBC Oncology; Standing  -     TSH; Standing  -     CT Chest Abdomen Pelvis With Contrast; Future; Expected date: 10/07/2022    2. Major depressive disorder in partial remission, unspecified whether recurrent  Assessment & Plan:  Currently well controlled. She is well plugged in with her therapist and psychologist. Declined SW referral.  - Continue home meds             Follow up:   Route Chart for Scheduling    Med Onc Chart Routing      Follow up with physician 1 month. FU with Dr. Jane on 11/9/22 to start adjuvant pembrolizumab   Follow up with RAUL    Infusion scheduling note Start pembrolizumab q3 weeks on 11/9/22   Injection scheduling note     Labs CBC, CMP and TSH   Lab interval: every 3 weeks     Imaging CT chest abdomen pelvis   Repeat CT torso week prior to next MD visit   Pharmacy appointment    Other referrals        Treatment Plan Information   OP PEMBROLIZUMAB 200MG Q3W   Moshe Jane MD   Upcoming Treatment Dates - OP PEMBROLIZUMAB 200MG Q3W    11/9/2022       Chemotherapy       pembrolizumab (KEYTRUDA) 200 mg in sodium chloride 0.9% 108 mL infusion  11/30/2022       Chemotherapy       pembrolizumab (KEYTRUDA) 200 mg in sodium chloride 0.9% 108 mL infusion  12/21/2022       Chemotherapy       pembrolizumab (KEYTRUDA) 200 mg in sodium chloride 0.9% 108 mL infusion  1/11/2023       Chemotherapy       pembrolizumab (KEYTRUDA) 200 mg in sodium chloride 0.9% 108 mL infusion      The above information has been reviewed with the patient and all questions have been answered to their apparent satisfaction.  They understand that they can call the clinic with any questions.    Moshe Jane MD  Hematology/Oncology  [facility]

## 2022-10-07 NOTE — PATIENT INSTRUCTIONS
You had a high-risk clear cell renal cell carcinoma (kidney cancer) removed by Dr. Riley. Although we hope we have removed all the cancer, there is signficant risk of the cancer coming back. As a result we recommend treatment with pembrolizumab for up to one year.    - Labs today on the third floor  - Repeat CT scan week prior to treatment  - Plan to start treatment on 11/9/22 and see Dr. Jane in clinic

## 2022-10-07 NOTE — PLAN OF CARE
START ON PATHWAY REGIMEN - Renal Cell    [id number]        Pembrolizumab (Keytruda)           Additional Orders: Serious immune-mediated adverse events can occur with   pembrolizumab. Please monitor your patient and refer to the linked   immune-mediated adverse reaction management materials for more information.    **Always confirm dose/schedule in your pharmacy ordering system**    Patient Characteristics:  Postoperative without Neoadjuvant Therapy, M0 (Pathologic Staging), Stage I, II,   III, or Resected T4M0 Stage IV, High Risk  Therapeutic Status: Postoperative without Neoadjuvant Therapy, M0 (Pathologic   Staging)  AJCC M Category: cM0  AJCC 8 Stage Grouping: Unknown  AJCC T Category: pT2b  AJCC N Category: pNX  Risk Status: High Risk  Intent of Therapy:  Curative Intent, Discussed with Patient

## 2022-10-07 NOTE — ASSESSMENT & PLAN NOTE
Currently well controlled. She is well plugged in with her therapist and psychologist. Declined SW referral.  - Continue home meds

## 2022-10-07 NOTE — TELEPHONE ENCOUNTER
I spoke to Matt, he wants to set up some kind of payment plan or find out about financial assistance. I told him I would forward his message to our financial department and they would probably follow up with him on Monday.

## 2022-10-07 NOTE — ASSESSMENT & PLAN NOTE
Discussed risk of recurrence of her ccRCC, especially in light of high nuclear grade and rhabdoid features. Also explained recent KEYNOTE data showing 32% reduction in risk of recurrence or death with adjuvant pembrolizumab vs placebo. Although notably with other negative adjuvant ICI trials and no long term OS data. Given her high risk features, elected to proceed with adjuvant pembrolizuamb.    - Restage with CT torso prior to next visit  - Safety labs today  - FU on 11/9/22 to start pembrolizumab

## 2022-10-07 NOTE — TELEPHONE ENCOUNTER
"----- Message from Av Martin sent at 10/7/2022  3:33 PM CDT -----  Consult/Advisory:          Name Of Caller: Matt Griffith (Spouse)       Contact Preference?: [phone] (Mobile)      Provider Name: Buck      Does patient feel the need to be seen today? No      What is the nature of the call?: Calling to speak w/ nurse about setting up payment plan/pt assistance           Additional Notes:  "Thank you for all that you do for our patients"      "

## 2022-10-26 ENCOUNTER — PATIENT MESSAGE (OUTPATIENT)
Dept: UROLOGY | Facility: CLINIC | Age: 42
End: 2022-10-26
Payer: COMMERCIAL

## 2022-11-01 ENCOUNTER — HOSPITAL ENCOUNTER (OUTPATIENT)
Dept: RADIOLOGY | Facility: HOSPITAL | Age: 42
Discharge: HOME | End: 2022-11-01
Attending: HOSPITALIST
Payer: COMMERCIAL

## 2022-11-01 DIAGNOSIS — C64.1 RENAL CELL CARCINOMA OF RIGHT KIDNEY: ICD-10-CM

## 2022-11-01 PROCEDURE — 74177 CT ABD & PELVIS W/CONTRAST: CPT | Mod: 26,,, | Performed by: RADIOLOGY

## 2022-11-01 PROCEDURE — 71260 CT THORAX DX C+: CPT | Mod: 26,,, | Performed by: RADIOLOGY

## 2022-11-01 PROCEDURE — 74177 CT ABD & PELVIS W/CONTRAST: CPT | Mod: TC

## 2022-11-01 PROCEDURE — 74177 CT CHEST ABDOMEN PELVIS WITH CONTRAST (XPD): ICD-10-PCS | Mod: 26,,, | Performed by: RADIOLOGY

## 2022-11-01 PROCEDURE — 71260 CT THORAX DX C+: CPT | Mod: TC

## 2022-11-01 PROCEDURE — 71260 CT CHEST ABDOMEN PELVIS WITH CONTRAST (XPD): ICD-10-PCS | Mod: 26,,, | Performed by: RADIOLOGY

## 2022-11-01 PROCEDURE — 25500020 PHARM REV CODE 255: Performed by: HOSPITALIST

## 2022-11-01 RX ADMIN — IOHEXOL 100 ML: 350 INJECTION, SOLUTION INTRAVENOUS at 02:11

## 2022-11-07 NOTE — PROGRESS NOTES
MEDICAL ONCOLOGY FOLLOW-UP VISIT.     Best Contact Phone Number(s): [phone] (home)      Cancer/Stage/TNM:    Cancer Staging   Renal cell carcinoma  Staging form: Kidney, AJCC 8th Edition  - Pathologic: Stage Unknown (pT2b, pNX, cM0) - Signed by Moshe Jane MD on 10/6/2022  - Pathologic: Stage II (pT2, pN0, cM0) - Signed by Moshe Jane MD on 11/9/2022       Reason for visit: Ms. 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 She presents to medical oncology clinic for initiation of adjuvant pembrolizumab    Interval History:   Today patient reports increased anxiety regarding her diagnosis and treatment. She has been drinking more - now drinks most days. She is scheduled weekly with her therapist and has an upcoming appointment with her psychiatrist.    Otherwise no new symptoms. No CP, SOB or cough. No N/V/D. No FC.     Review of Systems   Constitutional:  Negative for appetite change, chills, fever and unexpected weight change.   HENT:  Negative for mouth sores, sore throat and trouble swallowing.    Eyes:  Positive for visual disturbance (chronic low vision in right eye).   Respiratory:  Negative for cough and shortness of breath.    Cardiovascular:  Negative for chest pain, palpitations and leg swelling.   Gastrointestinal:  Negative for abdominal distention, abdominal pain, blood in stool, constipation, diarrhea, nausea and vomiting.   Genitourinary:  Negative for difficulty urinating and dysuria.   Musculoskeletal:  Negative for arthralgias and joint swelling.   Neurological:  Negative for dizziness, weakness, light-headedness and headaches.   Hematological:  Negative for adenopathy. Does not bruise/bleed easily.   Psychiatric/Behavioral:  The patient is nervous/anxious.       Oncology History   Renal cell carcinoma   8/6/2022 Imaging Significant Findings    CTA performed for chest pain incidentally found a  "large partially visualized enhancing mass in the right kidney measuring at least 12 cm with adjacent fat stranding and prominent vessels highly concerning for renal cell carcinoma.    Subsequent non contrast CT a/p confirms 13.7 x 12.2 x 14.3 (AP x TV x CC) intermediate density solid right renal mass with central necrosis and scattered punctate calcifications are concerning for malignancy.     8/9/2022 Imaging Significant Findings    MRI a/p shows eterogeneously enhancing solid right renal mass highly concerning for RCC. No evidence of filling defect or enhancing tumor thrombus in the right renal artery or vein.  Mildly prominent mesenteric lymph nodes, as above.  Metastatic disease difficult to definitively exclude     9/14/2022 Surgery    Right nephrectomy - pathology with ccRCC nuclear grade 4 up to 13.3 cm. Tumor confined to the kidney. Margins negative Rhabdoid features present with associated necrosis. [id number]     11/1/2022 Imaging Significant Findings    CT torso with several up to 3mm micronodules in the lung and prominent but small mesenteric nodes overall felt generally stable.     11/9/2022 -  Chemotherapy    Treatment Summary   Plan Name: OP PEMBROLIZUMAB 200MG Q3W  Treatment Goal: Curative  Status: Active  Start Date: 11/9/2022  End Date: 10/23/2024 (Planned)  Provider: Moshe Jane MD  Chemotherapy: [No matching medication found in this treatment plan]       11/9/2022 Cancer Staged    Staging form: Kidney, AJCC 8th Edition  - Pathologic: Stage II (pT2, pN0, cM0)              Physical Exam:   /62 (BP Location: Left arm, Patient Position: Sitting, BP Method: Large (Automatic))   Pulse 100   Temp 98.1 °F (36.7 °C) (Oral)   Resp 18   Ht 5' 6" (1.676 m)   Wt 119.7 kg (263 lb 14.3 oz)   SpO2 96%   BMI 42.59 kg/m²      ECOG Performance Status: (foot note - ECOG PS provided by [employer] Co[employer]cology Group) 0 - Asymptomatic    Physical Exam  Constitutional:       General: She is not in " acute distress.  HENT:      Head: Normocephalic.      Mouth/Throat:      Mouth: Mucous membranes are moist.      Pharynx: Oropharynx is clear.   Eyes:      General: No scleral icterus.     Conjunctiva/sclera: Conjunctivae normal.      Pupils: Pupils are equal, round, and reactive to light.   Cardiovascular:      Rate and Rhythm: Normal rate.   Pulmonary:      Effort: Pulmonary effort is normal. No respiratory distress.   Abdominal:      General: There is no distension.      Palpations: Abdomen is soft.      Tenderness: There is no abdominal tenderness.   Musculoskeletal:         General: Normal range of motion.      Cervical back: Normal range of motion and neck supple.      Right lower leg: No edema.      Left lower leg: No edema.   Skin:     General: Skin is warm.      Coloration: Skin is not jaundiced.      Findings: No bruising.   Neurological:      General: No focal deficit present.      Mental Status: She is alert and oriented to person, place, and time.      Cranial Nerves: No cranial nerve deficit.      Motor: No weakness.   Psychiatric:         Mood and Affect: Mood normal.         Behavior: Behavior normal.         Thought Content: Thought content normal.         Labs:   Recent Results (from the past 48 hour(s))   [place] [facility] [place] Time: 11/09/22 12:24 PM   Result Value Ref Range    Drug Study Test Name Drug Study     Drug Study Specimen Type blood     Drug Study Test Result Result sent directly to physician    Comprehensive Metabolic Panel    Collection Time: 11/09/22 12:26 PM   Result Value Ref Range    Sodium 136 136 - 145 mmol/L    Potassium 4.2 3.5 - 5.1 mmol/L    Chloride 102 95 - 110 mmol/L    CO2 24 23 - 29 mmol/L    Glucose 90 70 - 110 mg/dL    BUN 12 6 - 20 mg/dL    Creatinine 1.2 0.5 - 1.4 mg/dL    Calcium 9.7 8.7 - 10.5 mg/dL    Total Protein 7.2 6.0 - 8.4 g/dL    Albumin 4.1 3.5 - 5.2 g/dL    Total Bilirubin 0.4 0.1 - 1.0 mg/dL    Alkaline Phosphatase 72 55 - 135 U/L     AST 24 10 - 40 U/L    ALT 33 10 - 44 U/L    Anion Gap 10 8 - 16 mmol/L    eGFR 57.6 (A) >60 mL/min/1.73 m^2   CBC Oncology    Collection Time: 11/09/22 12:26 PM   Result Value Ref Range    WBC 9.25 3.90 - 12.70 K/uL    RBC 4.11 4.00 - 5.40 M/uL    Hemoglobin 11.7 (L) 12.0 - 16.0 g/dL    Hematocrit 35.8 (L) 37.0 - 48.5 %    MCV 87 82 - 98 fL    MCH 28.5 27.0 - 31.0 pg    MCHC 32.7 32.0 - 36.0 g/dL    RDW 12.6 11.5 - 14.5 %    Platelets 350 150 - 450 K/uL    MPV 10.6 9.2 - 12.9 fL    Gran # (ANC) 5.1 1.8 - 7.7 K/uL    Immature Grans (Abs) 0.04 0.00 - 0.04 K/uL   TSH    Collection Time: 11/09/22 12:26 PM   Result Value Ref Range    TSH 2.532 0.400 - 4.000 uIU/mL          Imaging:     CT Chest Abdomen Pelvis With Contrast  Narrative: EXAMINATION:  CT CHEST ABDOMEN PELVIS WITH CONTRAST (XPD)    CLINICAL HISTORY:  Metastatic disease evaluation;Kidney cancer, staging; Malignant neoplasm of right kidney, except renal pelvis    TECHNIQUE:  Low dose axial images, sagittal and coronal reformations were obtained from the thoracic inlet to the pubic symphysis following the IV administration of 100 mL of Omnipaque 350    COMPARISON:  None    FINDINGS:  CT chest:    Mediastinum: Mildly enlarged inferior thyroid gland, partially imaged.  No mediastinal or hilar lymphadenopathy.  Heart size within normal limits.  No pericardial pleural effusion.  Thoracic aorta is normal caliber.    Lungs: Multiple 3 mm subpleural pulmonary micro nodules in the right lung (series 3, images 23, 26, 46, 58).  There is trace amount of dependent peripheral interstitial thickening.  No emphysematous lung changes.  The tracheobronchial tree is clear.  No consolidation.    Abdomen: Low-attenuation in the liver suggesting fatty change.  Gallbladder is unremarkable.  No biliary or pancreatic ductal dilatation.  Mild pancreatic atrophy.  Splenic size within normal limits.  Adrenal glands unremarkable.  Prior right nephrectomy noted.  No left renal mass or  hydronephrosis.  Prominent focus of fat attenuation in the pancreatic head, unchanged.    There are an increased number of small lymph nodes in the mesentery, similar to the prior exam.    Pelvis: Bladder, uterus, and adnexa are unremarkable.  No fluid collections.  No inguinal or pelvic lymphadenopathy.    Bowel: No pericolonic fat stranding/inflammatory changes.  The terminal ileum and appendix are unremarkable.  No dilated loops of bowel.  No evidence of obstruction.    Bones: No marrow replacement process.  Impression: Interval right nephrectomy for renal carcinoma.    Increased number of small mesenteric lymph nodes, similar to the prior exam.    Right lung pulmonary micro nodules (3 mm), as above.    Electronically signed by: Rafiq Desir MD  Date:    11/01/2022  Time:    15:49            Diagnoses:       1. Renal cell carcinoma of right kidney    2. Alcohol consumption of more than four drinks per day    3. Major depressive disorder in partial remission, unspecified whether recurrent          Assessment and Plan:     1. Renal cell carcinoma of right kidney  Overview:  Patient found to have high risk likely stage II ([id number]) with nuclear grade 4 and rhabdoid features on nephrectomy 9/14/22. Starting adjuvant pembrolizumab 11/9/22    Assessment & Plan:  - C1D1 pembrolizumab 200mg IV today  - FU in three weeks for C2  - Repeat CT scan in 3 months    Orders:  -     Cancel: pembrolizumab (KEYTRUDA) 200 mg in sodium chloride 0.9% 108 mL infusion  -     Cancel: EPINEPHrine (EPIPEN) 0.3 mg/0.3 mL pen injection 0.3 mg  -     Cancel: diphenhydrAMINE injection 50 mg  -     Cancel: hydrocortisone sodium succinate injection 100 mg  -     Cancel: sodium chloride 0.9% 100 mL flush bag  -     Cancel: sodium chloride 0.9% flush 10 mL  -     Cancel: heparin, porcine (PF) 100 unit/mL injection flush 500 Units  -     Cancel: alteplase injection 2 mg  -     B-HCG; Future; Expected date: 11/09/2022    2. Alcohol consumption of  more than four drinks per day  Assessment & Plan:  Instructed patient to signficantly decrease her drinking so as not to interfere with ongoing immunotherapy. She was in agreement. Will follow up with her therapist and her psychiatrist.      3. Major depressive disorder in partial remission, unspecified whether recurrent  Overview:  Home medications include sertraline, risperidone, and lamotrigine.    Assessment & Plan:  - She sees her therapist weekly and is scheduled to see her psychiatrist           Route Chart for Scheduling    Med Onc Chart Routing      Follow up with physician . Appropriate follow up is already scheduled   Follow up with RAUL    Infusion scheduling note    Injection scheduling note    Labs    Imaging    Pharmacy appointment    Other referrals        Treatment Plan Information   OP PEMBROLIZUMAB 200MG Q3W   Moshe Jane MD   Upcoming Treatment Dates - OP PEMBROLIZUMAB 200MG Q3W    11/30/2022       Chemotherapy       pembrolizumab (KEYTRUDA) 200 mg in sodium chloride 0.9% 108 mL infusion  12/21/2022       Chemotherapy       pembrolizumab (KEYTRUDA) 200 mg in sodium chloride 0.9% 108 mL infusion  1/11/2023       Chemotherapy       pembrolizumab (KEYTRUDA) 200 mg in sodium chloride 0.9% 108 mL infusion  2/1/2023       Chemotherapy       pembrolizumab (KEYTRUDA) 200 mg in sodium chloride 0.9% 108 mL infusion       Moshe Jane MD  Hematology/Oncology  [facility]

## 2022-11-09 ENCOUNTER — RESEARCH ENCOUNTER (OUTPATIENT)
Dept: RESEARCH | Facility: HOSPITAL | Age: 42
End: 2022-11-09
Payer: COMMERCIAL

## 2022-11-09 ENCOUNTER — OFFICE VISIT (OUTPATIENT)
Dept: HEMATOLOGY/ONCOLOGY | Facility: CLINIC | Age: 42
End: 2022-11-09
Payer: COMMERCIAL

## 2022-11-09 ENCOUNTER — INFUSION (OUTPATIENT)
Dept: INFUSION THERAPY | Facility: HOSPITAL | Age: 42
End: 2022-11-09
Attending: HOSPITALIST
Payer: COMMERCIAL

## 2022-11-09 ENCOUNTER — LAB VISIT (OUTPATIENT)
Dept: LAB | Facility: HOSPITAL | Age: 42
End: 2022-11-09
Attending: HOSPITALIST
Payer: COMMERCIAL

## 2022-11-09 VITALS
DIASTOLIC BLOOD PRESSURE: 59 MMHG | BODY MASS INDEX: 42.41 KG/M2 | RESPIRATION RATE: 18 BRPM | HEIGHT: 66 IN | HEART RATE: 80 BPM | WEIGHT: 263.88 LBS | TEMPERATURE: 98 F | SYSTOLIC BLOOD PRESSURE: 118 MMHG

## 2022-11-09 VITALS
OXYGEN SATURATION: 96 % | HEIGHT: 66 IN | RESPIRATION RATE: 18 BRPM | HEART RATE: 100 BPM | TEMPERATURE: 98 F | WEIGHT: 263.88 LBS | BODY MASS INDEX: 42.41 KG/M2 | SYSTOLIC BLOOD PRESSURE: 138 MMHG | DIASTOLIC BLOOD PRESSURE: 62 MMHG

## 2022-11-09 DIAGNOSIS — C64.1 RENAL CELL CARCINOMA OF RIGHT KIDNEY: Primary | ICD-10-CM

## 2022-11-09 DIAGNOSIS — Z00.6 EXAMINATION OF PARTICIPANT IN CLINICAL TRIAL: ICD-10-CM

## 2022-11-09 DIAGNOSIS — C64.1 RENAL CELL CARCINOMA OF RIGHT KIDNEY: ICD-10-CM

## 2022-11-09 DIAGNOSIS — F32.4 MAJOR DEPRESSIVE DISORDER IN PARTIAL REMISSION, UNSPECIFIED WHETHER RECURRENT: ICD-10-CM

## 2022-11-09 DIAGNOSIS — Z78.9 ALCOHOL CONSUMPTION OF MORE THAN FOUR DRINKS PER DAY: ICD-10-CM

## 2022-11-09 LAB
ALBUMIN SERPL BCP-MCNC: 4.1 G/DL (ref 3.5–5.2)
ALP SERPL-CCNC: 72 U/L (ref 55–135)
ALT SERPL W/O P-5'-P-CCNC: 33 U/L (ref 10–44)
ANION GAP SERPL CALC-SCNC: 10 MMOL/L (ref 8–16)
AST SERPL-CCNC: 24 U/L (ref 10–40)
BILIRUB SERPL-MCNC: 0.4 MG/DL (ref 0.1–1)
BUN SERPL-MCNC: 12 MG/DL (ref 6–20)
CALCIUM SERPL-MCNC: 9.7 MG/DL (ref 8.7–10.5)
CHLORIDE SERPL-SCNC: 102 MMOL/L (ref 95–110)
CO2 SERPL-SCNC: 24 MMOL/L (ref 23–29)
CREAT SERPL-MCNC: 1.2 MG/DL (ref 0.5–1.4)
DRUG STUDY SPECIMEN TYPE: NORMAL
DRUG STUDY TEST NAME: NORMAL
DRUG STUDY TEST RESULT: NORMAL
ERYTHROCYTE [DISTWIDTH] IN BLOOD BY AUTOMATED COUNT: 12.6 % (ref 11.5–14.5)
EST. GFR  (NO RACE VARIABLE): 57.6 ML/MIN/1.73 M^2
GLUCOSE SERPL-MCNC: 90 MG/DL (ref 70–110)
HCT VFR BLD AUTO: 35.8 % (ref 37–48.5)
HGB BLD-MCNC: 11.7 G/DL (ref 12–16)
IMM GRANULOCYTES # BLD AUTO: 0.04 K/UL (ref 0–0.04)
MCH RBC QN AUTO: 28.5 PG (ref 27–31)
MCHC RBC AUTO-ENTMCNC: 32.7 G/DL (ref 32–36)
MCV RBC AUTO: 87 FL (ref 82–98)
NEUTROPHILS # BLD AUTO: 5.1 K/UL (ref 1.8–7.7)
PLATELET # BLD AUTO: 350 K/UL (ref 150–450)
PMV BLD AUTO: 10.6 FL (ref 9.2–12.9)
POTASSIUM SERPL-SCNC: 4.2 MMOL/L (ref 3.5–5.1)
PROT SERPL-MCNC: 7.2 G/DL (ref 6–8.4)
RBC # BLD AUTO: 4.11 M/UL (ref 4–5.4)
SODIUM SERPL-SCNC: 136 MMOL/L (ref 136–145)
TSH SERPL DL<=0.005 MIU/L-ACNC: 2.53 UIU/ML (ref 0.4–4)
WBC # BLD AUTO: 9.25 K/UL (ref 3.9–12.7)

## 2022-11-09 PROCEDURE — 96413 CHEMO IV INFUSION 1 HR: CPT

## 2022-11-09 PROCEDURE — 84443 ASSAY THYROID STIM HORMONE: CPT | Performed by: HOSPITALIST

## 2022-11-09 PROCEDURE — 3044F HG A1C LEVEL LT 7.0%: CPT | Mod: CPTII,S$GLB,, | Performed by: HOSPITALIST

## 2022-11-09 PROCEDURE — 99214 PR OFFICE/OUTPT VISIT, EST, LEVL IV, 30-39 MIN: ICD-10-PCS | Mod: S$GLB,,, | Performed by: HOSPITALIST

## 2022-11-09 PROCEDURE — 3008F BODY MASS INDEX DOCD: CPT | Mod: CPTII,S$GLB,, | Performed by: HOSPITALIST

## 2022-11-09 PROCEDURE — 3078F DIAST BP <80 MM HG: CPT | Mod: CPTII,S$GLB,, | Performed by: HOSPITALIST

## 2022-11-09 PROCEDURE — 36415 COLL VENOUS BLD VENIPUNCTURE: CPT | Performed by: HOSPITALIST

## 2022-11-09 PROCEDURE — 3044F PR MOST RECENT HEMOGLOBIN A1C LEVEL <7.0%: ICD-10-PCS | Mod: CPTII,S$GLB,, | Performed by: HOSPITALIST

## 2022-11-09 PROCEDURE — 3008F PR BODY MASS INDEX (BMI) DOCUMENTED: ICD-10-PCS | Mod: CPTII,S$GLB,, | Performed by: HOSPITALIST

## 2022-11-09 PROCEDURE — 85027 COMPLETE CBC AUTOMATED: CPT | Performed by: HOSPITALIST

## 2022-11-09 PROCEDURE — 3078F PR MOST RECENT DIASTOLIC BLOOD PRESSURE < 80 MM HG: ICD-10-PCS | Mod: CPTII,S$GLB,, | Performed by: HOSPITALIST

## 2022-11-09 PROCEDURE — 63600175 PHARM REV CODE 636 W HCPCS: Mod: JG | Performed by: HOSPITALIST

## 2022-11-09 PROCEDURE — 80053 COMPREHEN METABOLIC PANEL: CPT | Performed by: HOSPITALIST

## 2022-11-09 PROCEDURE — 25000003 PHARM REV CODE 250: Performed by: HOSPITALIST

## 2022-11-09 PROCEDURE — 99214 OFFICE O/P EST MOD 30 MIN: CPT | Mod: S$GLB,,, | Performed by: HOSPITALIST

## 2022-11-09 PROCEDURE — 3075F SYST BP GE 130 - 139MM HG: CPT | Mod: CPTII,S$GLB,, | Performed by: HOSPITALIST

## 2022-11-09 PROCEDURE — 3075F PR MOST RECENT SYSTOLIC BLOOD PRESS GE 130-139MM HG: ICD-10-PCS | Mod: CPTII,S$GLB,, | Performed by: HOSPITALIST

## 2022-11-09 PROCEDURE — 99999 PR PBB SHADOW E&M-EST. PATIENT-LVL IV: ICD-10-PCS | Mod: PBBFAC,,, | Performed by: HOSPITALIST

## 2022-11-09 PROCEDURE — 99999 PR PBB SHADOW E&M-EST. PATIENT-LVL IV: CPT | Mod: PBBFAC,,, | Performed by: HOSPITALIST

## 2022-11-09 PROCEDURE — 99000 SPECIMEN HANDLING OFFICE-LAB: CPT | Performed by: HOSPITALIST

## 2022-11-09 RX ORDER — EPINEPHRINE 0.3 MG/.3ML
0.3 INJECTION SUBCUTANEOUS ONCE AS NEEDED
Status: CANCELLED | OUTPATIENT
Start: 2022-11-09

## 2022-11-09 RX ORDER — EPINEPHRINE 0.3 MG/.3ML
0.3 INJECTION SUBCUTANEOUS ONCE AS NEEDED
Status: DISCONTINUED | OUTPATIENT
Start: 2022-11-09 | End: 2022-11-09 | Stop reason: HOSPADM

## 2022-11-09 RX ORDER — HEPARIN 100 UNIT/ML
500 SYRINGE INTRAVENOUS
Status: DISCONTINUED | OUTPATIENT
Start: 2022-11-09 | End: 2022-11-09 | Stop reason: HOSPADM

## 2022-11-09 RX ORDER — DIPHENHYDRAMINE HYDROCHLORIDE 50 MG/ML
50 INJECTION INTRAMUSCULAR; INTRAVENOUS ONCE AS NEEDED
Status: DISCONTINUED | OUTPATIENT
Start: 2022-11-09 | End: 2022-11-09 | Stop reason: HOSPADM

## 2022-11-09 RX ORDER — SODIUM CHLORIDE 0.9 % (FLUSH) 0.9 %
10 SYRINGE (ML) INJECTION
Status: CANCELLED | OUTPATIENT
Start: 2022-11-09

## 2022-11-09 RX ORDER — DIPHENHYDRAMINE HYDROCHLORIDE 50 MG/ML
50 INJECTION INTRAMUSCULAR; INTRAVENOUS ONCE AS NEEDED
Status: CANCELLED | OUTPATIENT
Start: 2022-11-09

## 2022-11-09 RX ORDER — SODIUM CHLORIDE 0.9 % (FLUSH) 0.9 %
10 SYRINGE (ML) INJECTION
Status: DISCONTINUED | OUTPATIENT
Start: 2022-11-09 | End: 2022-11-09 | Stop reason: HOSPADM

## 2022-11-09 RX ORDER — HEPARIN 100 UNIT/ML
500 SYRINGE INTRAVENOUS
Status: CANCELLED | OUTPATIENT
Start: 2022-11-09

## 2022-11-09 RX ADMIN — SODIUM CHLORIDE 200 MG: 9 INJECTION, SOLUTION INTRAVENOUS at 03:11

## 2022-11-09 RX ADMIN — SODIUM CHLORIDE: 9 INJECTION, SOLUTION INTRAVENOUS at 02:11

## 2022-11-09 NOTE — PATIENT INSTRUCTIONS
OK to start treatment today with pembrolizumab.    Monitor for side effects of treatment:    - Increasing diarrhea and abdominal pain  - New rash  - New shortness of breath and cough  - New joint pains  - Other unexplained symptoms    FU in 3 weeks for next treatment     Repeat CT scan in 3 months

## 2022-11-09 NOTE — PROGRESS NOTES
November 09, 2022    Protocol: [id number], Disparities in Results of Immune Checkpoint Inhibitor Treatment (DIRECT): A Prospective Cohort Study of Cancer Survivors Treated with anti-PD-L1 Immunotherapy in a Community Oncology Setting  IRB# 2022.126  Version Date: 1/11/2022  Study # 224       Informed Consent Note:    Silvia Buck, research coordinator, met with the patient to discuss the above-mentioned protocol. She is alert and oriented x 3. Mood and affect appropriate to the situation.  Patient states that she is consenting to collection of blood, salvia, and tissue samples along with questionnaires.  Purpose of the study is to understand the treatment experiences and outcomes of both Black and White patients on Immune Checkpoint Inhibitors. Reviewed with the patient. Patient states understanding of this information.   Length of study and number of participants reviewed with the patient.  She also verbalized understanding that [facility] will be enrolling about 80 subjects.    Study procedures discussed in detail with the patient to include baseline and follow-up specimen collection and questionnaires collection as well as all information that we be taken from her chart.  Patient verbalized understanding of this information.  Patient responsibilities discussed over in detail with patient. Patient also informed about what will happened to her test samples.  Pt voiced understanding.   Risks discussed over with patient to include blood draws, psychological and economic in nature.  Pt voices understanding.  Benefits, costs and/or payment reimbursement and source of funding all reviewed with the patient. Patient states she understands that her insurance will cover cost of all standards of care medications and procedures.  The patient verbalized understanding of this information.   Alternative treatment methods discussed with patient; she states understanding of this information.   Study related questions/compensation for  injury, and whom to contact regarding rights as a research subject all reviewed with the patient; she verbalizes understanding of this information.   Explained to the patient that researchers will be studying her DNA Sequencing in her blood and tissue samples. She was also informed that during the testing process there may be unexpected findings but since they are not of the same quality as she would receive during regular health care assessments she will not be informed of these findings.  Pt voiced understanding.     Voluntary participation and withdrawal from research stressed to patient; she states she understands that she may withdraw consent at any time without compromise to her care.   Confidentiality and HIPAA discussed with patient; process of protection and security of database explained to patient; she verbalizes understanding of this information. Informed the patient that detailed information on this trial can be found at www.clinicaltrials.gov.        Throughout the consenting process, the patient was given several opportunities to ask questions; all questions addressed and answered to patient's satisfaction per Dr. Jane.  Patient states her willingness to participate in the study; patient signed and dated the consent form; copy of the consent form given to patient along with my contact information.  Instructed patient to notify Dr. Jane for any questions and/or concerns. Patient verbalized understanding.

## 2022-11-09 NOTE — PLAN OF CARE
Patient tolerated Keytruda with no complications. VSS. Pt instructed to call MD with any problems. Pt discharged home independently.

## 2022-11-09 NOTE — ASSESSMENT & PLAN NOTE
Instructed patient to signficantly decrease her drinking so as not to interfere with ongoing immunotherapy. She was in agreement. Will follow up with her therapist and her psychiatrist.

## 2022-11-10 ENCOUNTER — RESEARCH ENCOUNTER (OUTPATIENT)
Dept: RESEARCH | Facility: HOSPITAL | Age: 42
End: 2022-11-10
Payer: COMMERCIAL

## 2022-11-10 NOTE — PROGRESS NOTES
IRB# 2011.222.A  [facility] Renal Protocol  Date: November 10, 2022    Patient's right robotic nephrectomy had been rescheduled from September 9th to another later date. The specimen was not retrieved from the OR on that rescheduled date  and subject is considered a screen fail for this study. Selina Koch RN

## 2022-11-10 NOTE — PROGRESS NOTES
November 9, 2022    Protocol: [id number], Disparities in Results of Immune Checkpoint Inhibitor Treatment (DIRECT): A Prospective Cohort Study of Cancer Survivors Treated with anti-PD-L1 Immunotherapy in a Community Oncology Setting  IRB# 2022.126  Version Date: 1/11/2022  Treating Physician: Dr. Moshe Jane  Study # 224       Eligibility/Registration Note:     All pre-study testing/procedures required prior to OPEN registration were completed per protocol. CRC Silvia Buck & CRC Jannet Vicente reviewed all protocol eligibility criteria for the above-mentioned study, and patient was deemed eligible to participate.   Patient consented to an observational study, Understanding Microenvironment of Cancer Stem Cells in Renal Cell Carcinoma, 8/2022.  Patient was registered in OPEN to [facility] [id number]: Observational study.  Patient was informed she had been registered in the trial and was given ample opportunity to ask questions and her questions were answered to her satisfaction. Patient performed all baseline (A1) requirements: collected blood and saliva; completed PROs. Patient was encouraged to contact either me or Dr. Jane's team with any questions or concerns. Patient verbalized her understanding and is scheduled to start Cycle 1 Day 1 on 11/09/2022 (Pembrolizumab/Keytruda).

## 2022-11-21 ENCOUNTER — PATIENT MESSAGE (OUTPATIENT)
Dept: HEMATOLOGY/ONCOLOGY | Facility: CLINIC | Age: 42
End: 2022-11-21
Payer: COMMERCIAL

## 2022-11-30 ENCOUNTER — RESEARCH ENCOUNTER (OUTPATIENT)
Dept: RESEARCH | Facility: HOSPITAL | Age: 42
End: 2022-11-30
Payer: COMMERCIAL

## 2022-11-30 ENCOUNTER — LAB VISIT (OUTPATIENT)
Dept: LAB | Facility: HOSPITAL | Age: 42
End: 2022-11-30
Attending: HOSPITALIST
Payer: COMMERCIAL

## 2022-11-30 ENCOUNTER — OFFICE VISIT (OUTPATIENT)
Dept: HEMATOLOGY/ONCOLOGY | Facility: CLINIC | Age: 42
End: 2022-11-30
Payer: COMMERCIAL

## 2022-11-30 ENCOUNTER — INFUSION (OUTPATIENT)
Dept: INFUSION THERAPY | Facility: HOSPITAL | Age: 42
End: 2022-11-30
Payer: COMMERCIAL

## 2022-11-30 ENCOUNTER — PATIENT MESSAGE (OUTPATIENT)
Dept: HEMATOLOGY/ONCOLOGY | Facility: CLINIC | Age: 42
End: 2022-11-30
Payer: COMMERCIAL

## 2022-11-30 VITALS
DIASTOLIC BLOOD PRESSURE: 75 MMHG | SYSTOLIC BLOOD PRESSURE: 125 MMHG | HEART RATE: 78 BPM | OXYGEN SATURATION: 98 % | RESPIRATION RATE: 18 BRPM | TEMPERATURE: 98 F

## 2022-11-30 VITALS
SYSTOLIC BLOOD PRESSURE: 143 MMHG | HEART RATE: 85 BPM | HEIGHT: 66 IN | WEIGHT: 256.81 LBS | OXYGEN SATURATION: 98 % | DIASTOLIC BLOOD PRESSURE: 87 MMHG | BODY MASS INDEX: 41.27 KG/M2 | RESPIRATION RATE: 18 BRPM | TEMPERATURE: 98 F

## 2022-11-30 DIAGNOSIS — R19.7 DIARRHEA, UNSPECIFIED TYPE: ICD-10-CM

## 2022-11-30 DIAGNOSIS — R53.83 FATIGUE, UNSPECIFIED TYPE: ICD-10-CM

## 2022-11-30 DIAGNOSIS — Z78.9 ALCOHOL CONSUMPTION OF MORE THAN FOUR DRINKS PER DAY: ICD-10-CM

## 2022-11-30 DIAGNOSIS — F32.4 MAJOR DEPRESSIVE DISORDER IN PARTIAL REMISSION, UNSPECIFIED WHETHER RECURRENT: ICD-10-CM

## 2022-11-30 DIAGNOSIS — R45.89 ANXIETY ABOUT HEALTH: ICD-10-CM

## 2022-11-30 DIAGNOSIS — C64.1 RENAL CELL CARCINOMA OF RIGHT KIDNEY: Primary | ICD-10-CM

## 2022-11-30 DIAGNOSIS — C64.1 RENAL CELL CARCINOMA OF RIGHT KIDNEY: ICD-10-CM

## 2022-11-30 DIAGNOSIS — Z00.6 EXAMINATION OF PARTICIPANT IN CLINICAL TRIAL: ICD-10-CM

## 2022-11-30 LAB
ALBUMIN SERPL BCP-MCNC: 3.7 G/DL (ref 3.5–5.2)
ALP SERPL-CCNC: 83 U/L (ref 55–135)
ALT SERPL W/O P-5'-P-CCNC: 33 U/L (ref 10–44)
ANION GAP SERPL CALC-SCNC: 11 MMOL/L (ref 8–16)
AST SERPL-CCNC: 18 U/L (ref 10–40)
BILIRUB SERPL-MCNC: 0.2 MG/DL (ref 0.1–1)
BUN SERPL-MCNC: 13 MG/DL (ref 6–20)
CALCIUM SERPL-MCNC: 9.8 MG/DL (ref 8.7–10.5)
CHLORIDE SERPL-SCNC: 104 MMOL/L (ref 95–110)
CO2 SERPL-SCNC: 22 MMOL/L (ref 23–29)
CREAT SERPL-MCNC: 1.2 MG/DL (ref 0.5–1.4)
DRUG STUDY SPECIMEN TYPE: NORMAL
DRUG STUDY TEST NAME: NORMAL
DRUG STUDY TEST RESULT: NORMAL
ERYTHROCYTE [DISTWIDTH] IN BLOOD BY AUTOMATED COUNT: 12.7 % (ref 11.5–14.5)
EST. GFR  (NO RACE VARIABLE): 57.6 ML/MIN/1.73 M^2
GLUCOSE SERPL-MCNC: 149 MG/DL (ref 70–110)
HCG INTACT+B SERPL-ACNC: <2.4 MIU/ML
HCT VFR BLD AUTO: 34.8 % (ref 37–48.5)
HGB BLD-MCNC: 11.5 G/DL (ref 12–16)
IMM GRANULOCYTES # BLD AUTO: 0.05 K/UL (ref 0–0.04)
MCH RBC QN AUTO: 28.2 PG (ref 27–31)
MCHC RBC AUTO-ENTMCNC: 33 G/DL (ref 32–36)
MCV RBC AUTO: 85 FL (ref 82–98)
NEUTROPHILS # BLD AUTO: 4.2 K/UL (ref 1.8–7.7)
PLATELET # BLD AUTO: 357 K/UL (ref 150–450)
PMV BLD AUTO: 10.1 FL (ref 9.2–12.9)
POTASSIUM SERPL-SCNC: 4.2 MMOL/L (ref 3.5–5.1)
PROT SERPL-MCNC: 7 G/DL (ref 6–8.4)
RBC # BLD AUTO: 4.08 M/UL (ref 4–5.4)
SODIUM SERPL-SCNC: 137 MMOL/L (ref 136–145)
TSH SERPL DL<=0.005 MIU/L-ACNC: 1.34 UIU/ML (ref 0.4–4)
WBC # BLD AUTO: 7.4 K/UL (ref 3.9–12.7)

## 2022-11-30 PROCEDURE — 99215 OFFICE O/P EST HI 40 MIN: CPT | Mod: S$GLB,,, | Performed by: REGISTERED NURSE

## 2022-11-30 PROCEDURE — 99999 PR PBB SHADOW E&M-EST. PATIENT-LVL V: ICD-10-PCS | Mod: PBBFAC,,, | Performed by: REGISTERED NURSE

## 2022-11-30 PROCEDURE — 96413 CHEMO IV INFUSION 1 HR: CPT

## 2022-11-30 PROCEDURE — 80053 COMPREHEN METABOLIC PANEL: CPT | Performed by: HOSPITALIST

## 2022-11-30 PROCEDURE — 63600175 PHARM REV CODE 636 W HCPCS: Mod: JG | Performed by: REGISTERED NURSE

## 2022-11-30 PROCEDURE — 99000 SPECIMEN HANDLING OFFICE-LAB: CPT | Performed by: HOSPITALIST

## 2022-11-30 PROCEDURE — 25000003 PHARM REV CODE 250: Performed by: REGISTERED NURSE

## 2022-11-30 PROCEDURE — 3008F PR BODY MASS INDEX (BMI) DOCUMENTED: ICD-10-PCS | Mod: CPTII,S$GLB,, | Performed by: REGISTERED NURSE

## 2022-11-30 PROCEDURE — 85027 COMPLETE CBC AUTOMATED: CPT | Performed by: HOSPITALIST

## 2022-11-30 PROCEDURE — 3008F BODY MASS INDEX DOCD: CPT | Mod: CPTII,S$GLB,, | Performed by: REGISTERED NURSE

## 2022-11-30 PROCEDURE — 99215 PR OFFICE/OUTPT VISIT, EST, LEVL V, 40-54 MIN: ICD-10-PCS | Mod: S$GLB,,, | Performed by: REGISTERED NURSE

## 2022-11-30 PROCEDURE — 84443 ASSAY THYROID STIM HORMONE: CPT | Performed by: HOSPITALIST

## 2022-11-30 PROCEDURE — 3044F HG A1C LEVEL LT 7.0%: CPT | Mod: CPTII,S$GLB,, | Performed by: REGISTERED NURSE

## 2022-11-30 PROCEDURE — 3077F SYST BP >= 140 MM HG: CPT | Mod: CPTII,S$GLB,, | Performed by: REGISTERED NURSE

## 2022-11-30 PROCEDURE — 3079F PR MOST RECENT DIASTOLIC BLOOD PRESSURE 80-89 MM HG: ICD-10-PCS | Mod: CPTII,S$GLB,, | Performed by: REGISTERED NURSE

## 2022-11-30 PROCEDURE — 3077F PR MOST RECENT SYSTOLIC BLOOD PRESSURE >= 140 MM HG: ICD-10-PCS | Mod: CPTII,S$GLB,, | Performed by: REGISTERED NURSE

## 2022-11-30 PROCEDURE — 3044F PR MOST RECENT HEMOGLOBIN A1C LEVEL <7.0%: ICD-10-PCS | Mod: CPTII,S$GLB,, | Performed by: REGISTERED NURSE

## 2022-11-30 PROCEDURE — 3079F DIAST BP 80-89 MM HG: CPT | Mod: CPTII,S$GLB,, | Performed by: REGISTERED NURSE

## 2022-11-30 PROCEDURE — 84702 CHORIONIC GONADOTROPIN TEST: CPT | Performed by: HOSPITALIST

## 2022-11-30 PROCEDURE — 99999 PR PBB SHADOW E&M-EST. PATIENT-LVL V: CPT | Mod: PBBFAC,,, | Performed by: REGISTERED NURSE

## 2022-11-30 RX ORDER — DIPHENHYDRAMINE HYDROCHLORIDE 50 MG/ML
50 INJECTION INTRAMUSCULAR; INTRAVENOUS ONCE AS NEEDED
Status: DISCONTINUED | OUTPATIENT
Start: 2022-11-30 | End: 2022-11-30 | Stop reason: HOSPADM

## 2022-11-30 RX ORDER — EPINEPHRINE 0.3 MG/.3ML
0.3 INJECTION SUBCUTANEOUS ONCE AS NEEDED
Status: CANCELLED | OUTPATIENT
Start: 2022-11-30

## 2022-11-30 RX ORDER — HEPARIN 100 UNIT/ML
500 SYRINGE INTRAVENOUS
Status: DISCONTINUED | OUTPATIENT
Start: 2022-11-30 | End: 2022-11-30 | Stop reason: HOSPADM

## 2022-11-30 RX ORDER — SODIUM CHLORIDE 0.9 % (FLUSH) 0.9 %
10 SYRINGE (ML) INJECTION
Status: DISCONTINUED | OUTPATIENT
Start: 2022-11-30 | End: 2022-11-30 | Stop reason: HOSPADM

## 2022-11-30 RX ORDER — DIPHENHYDRAMINE HYDROCHLORIDE 50 MG/ML
50 INJECTION INTRAMUSCULAR; INTRAVENOUS ONCE AS NEEDED
Status: CANCELLED | OUTPATIENT
Start: 2022-11-30

## 2022-11-30 RX ORDER — HEPARIN 100 UNIT/ML
500 SYRINGE INTRAVENOUS
Status: CANCELLED | OUTPATIENT
Start: 2022-11-30

## 2022-11-30 RX ORDER — DIPHENOXYLATE HYDROCHLORIDE AND ATROPINE SULFATE 2.5; .025 MG/1; MG/1
1 TABLET ORAL 4 TIMES DAILY PRN
Qty: 60 TABLET | Refills: 0 | Status: SHIPPED | OUTPATIENT
Start: 2022-11-30 | End: 2023-02-22 | Stop reason: ALTCHOICE

## 2022-11-30 RX ORDER — EPINEPHRINE 0.3 MG/.3ML
0.3 INJECTION SUBCUTANEOUS ONCE AS NEEDED
Status: DISCONTINUED | OUTPATIENT
Start: 2022-11-30 | End: 2022-11-30 | Stop reason: HOSPADM

## 2022-11-30 RX ORDER — SODIUM CHLORIDE 0.9 % (FLUSH) 0.9 %
10 SYRINGE (ML) INJECTION
Status: CANCELLED | OUTPATIENT
Start: 2022-11-30

## 2022-11-30 RX ADMIN — SODIUM CHLORIDE 200 MG: 9 INJECTION, SOLUTION INTRAVENOUS at 11:11

## 2022-11-30 RX ADMIN — SODIUM CHLORIDE: 0.9 INJECTION, SOLUTION INTRAVENOUS at 11:11

## 2022-11-30 NOTE — PLAN OF CARE
1100 Patient here for keytruda. Labs, hx and meds reviewed. Patient seen in MD office and orders signed for treatment. Assessment done and VSS. PIV inserted to left hand and NS started at 25ml/hr. Snack and blanket offered.

## 2022-11-30 NOTE — PLAN OF CARE
1230 Patient tolerated keytruda with no s/s of a reaction and no complaints. PIV removed and catheter tip intact. She declined the AVS. Instructed to call MD office for any concerns. Ambulated out the mom.

## 2022-11-30 NOTE — ASSESSMENT & PLAN NOTE
- Was previously asked to significantly decrease her drinking to not interfere with ongoing immunotherapy treatment.  - Notes considerable decrease in amount and frequency since last visit.   - Continue to follow with therapist and psychiatrist. Plans to increase frequency with psychiatrist to q3m when she sees them in January.

## 2022-11-30 NOTE — PROGRESS NOTES
MEDICAL ONCOLOGY FOLLOW-UP VISIT.     Best Contact Phone Number(s): [phone] (home)      Cancer/Stage/TNM:    Cancer Staging   Renal cell carcinoma  Staging form: Kidney, AJCC 8th Edition  - Pathologic: Stage Unknown (pT2b, pNX, cM0) - Signed by Moshe Jane MD on 10/6/2022  - Pathologic: Stage II (pT2, pN0, cM0) - Signed by Moshe Jane MD on 11/9/2022       Reason for visit: Ms. 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 She presents to medical oncology clinic for initiation of adjuvant pembrolizumab    Interval History:   Today, she is feeling well overall but still with some anxiety regarding side effects. She notes fatigue and body aches, irritation at the gums, generalized pruritis, and decreased appetite. Her main complaint, however, is diarrhea requiring her to take Pepto-Bismol multiple times per day. Prior to taking Pepto, she was having 4-5 watery BMs per day, but now she is having 1-2. The frequency has her concerned about working and being in public as it occurs about an hour after eating and feels sudden. She does believe her decreased appetite is at least partially related to the fear of continued diarrhea. She denies abdominal pain or distention. She also notes that since surgery, she sometimes has to strain to fully empty her bladder. Her drinking has substantially decreased since last visit, and she continues to follow with her therapist and psychiatrist. Denies fever/chills, SOB, CP, palpitations, N/V/C, pain, blood in urine/stool, paresthesias.     Presents with her mother. ECOG 0.       Review of Systems   Constitutional:  Negative for appetite change, chills, fever and unexpected weight change.        +pruritis   HENT:  Negative for mouth sores, sore throat, trouble swallowing and voice change.         Irritation to gum line, no visible sores   Eyes:  Positive for visual disturbance (chronic  low vision in right eye).   Respiratory:  Negative for cough and shortness of breath.    Cardiovascular:  Negative for chest pain, palpitations and leg swelling.   Gastrointestinal:  Positive for diarrhea. Negative for abdominal distention, abdominal pain, blood in stool, constipation, nausea and vomiting.   Genitourinary:  Negative for difficulty urinating, dysuria, frequency, hematuria and urgency.        +incomplete emptying   Musculoskeletal:  Positive for myalgias. Negative for arthralgias, gait problem and joint swelling.   Skin:  Negative for rash and wound.   Neurological:  Negative for dizziness, weakness, light-headedness and headaches.   Hematological:  Negative for adenopathy. Does not bruise/bleed easily.   Psychiatric/Behavioral:  Negative for agitation, behavioral problems, confusion and sleep disturbance. The patient is nervous/anxious.       Oncology History   Renal cell carcinoma   8/6/2022 Imaging Significant Findings    CTA performed for chest pain incidentally found a large partially visualized enhancing mass in the right kidney measuring at least 12 cm with adjacent fat stranding and prominent vessels highly concerning for renal cell carcinoma.    Subsequent non contrast CT a/p confirms 13.7 x 12.2 x 14.3 (AP x TV x CC) intermediate density solid right renal mass with central necrosis and scattered punctate calcifications are concerning for malignancy.     8/9/2022 Imaging Significant Findings    MRI a/p shows eterogeneously enhancing solid right renal mass highly concerning for RCC. No evidence of filling defect or enhancing tumor thrombus in the right renal artery or vein.  Mildly prominent mesenteric lymph nodes, as above.  Metastatic disease difficult to definitively exclude     9/14/2022 Surgery    Right nephrectomy - pathology with ccRCC nuclear grade 4 up to 13.3 cm. Tumor confined to the kidney. Margins negative Rhabdoid features present with associated necrosis. [id number]     11/1/2022  "Imaging Significant Findings    CT torso with several up to 3mm micronodules in the lung and prominent but small mesenteric nodes overall felt generally stable.     11/9/2022 -  Chemotherapy    Treatment Summary   Plan Name: OP PEMBROLIZUMAB 200MG Q3W  Treatment Goal: Curative  Status: Active  Start Date: 11/9/2022  End Date: 10/23/2024 (Planned)  Provider: Moshe Jane MD  Chemotherapy: [No matching medication found in this treatment plan]       11/9/2022 Cancer Staged    Staging form: Kidney, AJCC 8th Edition  - Pathologic: Stage II (pT2, pN0, cM0)          Physical Exam:   BP (!) 143/87 (BP Location: Left arm, Patient Position: Sitting, BP Method: Large (Automatic))   Pulse 85   Temp 98.3 °F (36.8 °C) (Oral)   Resp 18   Ht 5' 6" (1.676 m)   Wt 116.5 kg (256 lb 13.4 oz)   SpO2 98%   BMI 41.45 kg/m²      ECOG Performance Status: (foot note - ECOG PS provided by Eastern Cooperative Oncology Group) 0 - Asymptomatic    Physical Exam  Vitals reviewed.   Constitutional:       General: She is not in acute distress.     Appearance: Normal appearance. She is obese. She is not ill-appearing, toxic-appearing or diaphoretic.   HENT:      Head: Normocephalic.      Right Ear: External ear normal.      Left Ear: External ear normal.   Eyes:      General: No scleral icterus.     Extraocular Movements: Extraocular movements intact.      Conjunctiva/sclera: Conjunctivae normal.      Pupils: Pupils are equal, round, and reactive to light.   Cardiovascular:      Rate and Rhythm: Normal rate.   Pulmonary:      Effort: Pulmonary effort is normal. No respiratory distress.      Breath sounds: No wheezing.   Abdominal:      General: There is no distension.      Palpations: Abdomen is soft.      Tenderness: There is no abdominal tenderness. There is no guarding.   Musculoskeletal:         General: Normal range of motion.      Cervical back: Normal range of motion and neck supple.      Right lower leg: No edema.      Left lower " leg: No edema.   Skin:     General: Skin is warm.      Coloration: Skin is not jaundiced or pale.      Findings: No bruising or rash.   Neurological:      General: No focal deficit present.      Mental Status: She is alert and oriented to person, place, and time. Mental status is at baseline.      Cranial Nerves: No cranial nerve deficit.      Sensory: No sensory deficit.      Motor: No weakness.      Gait: Gait normal.   Psychiatric:         Mood and Affect: Mood normal.         Behavior: Behavior normal.         Thought Content: Thought content normal.         Labs:   Recent Results (from the past 48 hour(s))   Comprehensive Metabolic Panel    Collection Time: 11/30/22  9:01 AM   Result Value Ref Range    Sodium 137 136 - 145 mmol/L    Potassium 4.2 3.5 - 5.1 mmol/L    Chloride 104 95 - 110 mmol/L    CO2 22 (L) 23 - 29 mmol/L    Glucose 149 (H) 70 - 110 mg/dL    BUN 13 6 - 20 mg/dL    Creatinine 1.2 0.5 - 1.4 mg/dL    Calcium 9.8 8.7 - 10.5 mg/dL    Total Protein 7.0 6.0 - 8.4 g/dL    Albumin 3.7 3.5 - 5.2 g/dL    Total Bilirubin 0.2 0.1 - 1.0 mg/dL    Alkaline Phosphatase 83 55 - 135 U/L    AST 18 10 - 40 U/L    ALT 33 10 - 44 U/L    Anion Gap 11 8 - 16 mmol/L    eGFR 57.6 (A) >60 mL/min/1.73 m^2   CBC Oncology    Collection Time: 11/30/22  9:01 AM   Result Value Ref Range    WBC 7.40 3.90 - 12.70 K/uL    RBC 4.08 4.00 - 5.40 M/uL    Hemoglobin 11.5 (L) 12.0 - 16.0 g/dL    Hematocrit 34.8 (L) 37.0 - 48.5 %    MCV 85 82 - 98 fL    MCH 28.2 27.0 - 31.0 pg    MCHC 33.0 32.0 - 36.0 g/dL    RDW 12.7 11.5 - 14.5 %    Platelets 357 150 - 450 K/uL    MPV 10.1 9.2 - 12.9 fL    Gran # (ANC) 4.2 1.8 - 7.7 K/uL    Immature Grans (Abs) 0.05 (H) 0.00 - 0.04 K/uL   TSH    Collection Time: 11/30/22  9:01 AM   Result Value Ref Range    TSH 1.337 0.400 - 4.000 uIU/mL   [place] [facility] [place] Time: 11/30/22  9:01 AM   Result Value Ref Range    Drug Study Test Name Drug Study     Drug Study Specimen Type blood      Drug Study Test Result Result sent directly to physician    B-HCG    Collection Time: 11/30/22  9:01 AM   Result Value Ref Range    HCG Quant <2.4 See Text mIU/mL     We reviewed the pertinent labs drawn today. Mild anemia, but otherwise unremarkable.        Imaging:     CT Chest Abdomen Pelvis With Contrast  Narrative: EXAMINATION:  CT CHEST ABDOMEN PELVIS WITH CONTRAST (XPD)    CLINICAL HISTORY:  Metastatic disease evaluation;Kidney cancer, staging; Malignant neoplasm of right kidney, except renal pelvis    TECHNIQUE:  Low dose axial images, sagittal and coronal reformations were obtained from the thoracic inlet to the pubic symphysis following the IV administration of 100 mL of Omnipaque 350    COMPARISON:  None    FINDINGS:  CT chest:    Mediastinum: Mildly enlarged inferior thyroid gland, partially imaged.  No mediastinal or hilar lymphadenopathy.  Heart size within normal limits.  No pericardial pleural effusion.  Thoracic aorta is normal caliber.    Lungs: Multiple 3 mm subpleural pulmonary micro nodules in the right lung (series 3, images 23, 26, 46, 58).  There is trace amount of dependent peripheral interstitial thickening.  No emphysematous lung changes.  The tracheobronchial tree is clear.  No consolidation.    Abdomen: Low-attenuation in the liver suggesting fatty change.  Gallbladder is unremarkable.  No biliary or pancreatic ductal dilatation.  Mild pancreatic atrophy.  Splenic size within normal limits.  Adrenal glands unremarkable.  Prior right nephrectomy noted.  No left renal mass or hydronephrosis.  Prominent focus of fat attenuation in the pancreatic head, unchanged.    There are an increased number of small lymph nodes in the mesentery, similar to the prior exam.    Pelvis: Bladder, uterus, and adnexa are unremarkable.  No fluid collections.  No inguinal or pelvic lymphadenopathy.    Bowel: No pericolonic fat stranding/inflammatory changes.  The terminal ileum and appendix are unremarkable.  No  dilated loops of bowel.  No evidence of obstruction.    Bones: No marrow replacement process.  Impression: Interval right nephrectomy for renal carcinoma.    Increased number of small mesenteric lymph nodes, similar to the prior exam.    Right lung pulmonary micro nodules (3 mm), as above.    Electronically signed by: Rafiq Desir MD  Date:    11/01/2022  Time:    15:49            Diagnoses:       1. Renal cell carcinoma of right kidney    2. Major depressive disorder in partial remission, unspecified whether recurrent    3. Alcohol consumption of more than four drinks per day    4. Diarrhea, unspecified type    5. Fatigue, unspecified type    6. Anxiety about health        Assessment and Plan:     1. Renal cell carcinoma of right kidney  Overview:  Patient found to have high risk likely stage II ([id number]) with nuclear grade 4 and rhabdoid features on nephrectomy 9/14/22. Starting adjuvant pembrolizumab 11/9/22    Assessment & Plan:  - C2D1 pembrolizumab 200 mg IV today.   - RTC in 3 weeks for C3  - Repeat CT scan in 2/2023.     Orders:  -     diphenoxylate-atropine 2.5-0.025 mg (LOMOTIL) 2.5-0.025 mg per tablet; Take 1 tablet by mouth 4 (four) times daily as needed for Diarrhea.  Dispense: 60 tablet; Refill: 0  -     Ambulatory referral/consult to Hematology/Oncology/Nutrition; Future; Expected date: 12/07/2022  -     VITAMIN B12; Future; Expected date: 11/30/2022  -     Discontinue: diphenhydrAMINE-aluminum-magnesium hydroxide-simethicone-LIDOcaine HCl 2%; Swish and spit 15 mLs every 4 (four) hours as needed.  Dispense: 400 each; Refill: 1  -     Cancel: pembrolizumab (KEYTRUDA) 200 mg in sodium chloride 0.9% 108 mL infusion  -     Cancel: EPINEPHrine (EPIPEN) 0.3 mg/0.3 mL pen injection 0.3 mg  -     Cancel: diphenhydrAMINE injection 50 mg  -     Cancel: hydrocortisone sodium succinate injection 100 mg  -     Cancel: sodium chloride 0.9% 100 mL flush bag  -     Cancel: sodium chloride 0.9% flush 10 mL  -      Cancel: heparin, porcine (PF) 100 unit/mL injection flush 500 Units  -     Cancel: alteplase injection 2 mg    2. Major depressive disorder in partial remission, unspecified whether recurrent  Overview:  Home medications include sertraline, risperidone, and lamotrigine.    Assessment & Plan:  - Continue to follow with weekly therapist visits and regular psychiatrist visits.      3. Alcohol consumption of more than four drinks per day  Assessment & Plan:  - Was previously asked to significantly decrease her drinking to not interfere with ongoing immunotherapy treatment.  - Notes considerable decrease in amount and frequency since last visit.   - Continue to follow with therapist and psychiatrist. Plans to increase frequency with psychiatrist to q3m when she sees them in January.        4. Diarrhea, unspecified type  -     diphenoxylate-atropine 2.5-0.025 mg (LOMOTIL) 2.5-0.025 mg per tablet; Take 1 tablet by mouth 4 (four) times daily as needed for Diarrhea.  Dispense: 60 tablet; Refill: 0  -     Ambulatory referral/consult to Hematology/Oncology/Nutrition; Future; Expected date: 12/07/2022  -     Discontinue: diphenhydrAMINE-aluminum-magnesium hydroxide-simethicone-LIDOcaine HCl 2%; Swish and spit 15 mLs every 4 (four) hours as needed.  Dispense: 400 each; Refill: 1    5. Fatigue, unspecified type  -     VITAMIN B12; Future; Expected date: 11/30/2022    6. Anxiety about health      - Will refer to nutrition to help with proper diet recommendations to assist with energy level, weight management, and diarrhea.   - Rx lomotil sent to pharmacy for diarrhea. Discussed use of probiotics as well.   - Check B12 level with low energy level. Interested in injections if level low.   - Magic mouthwash sent to pharmacy to help with gum irritation.       Patient is in agreement with the proposed treatment plan. All questions were answered to the patient's satisfaction. Pt knows to call clinic if anything is needed before the next  clinic visit.      Sharon Walker, MSN, APRN, [facility]  Hematology and Medical Oncology  Clinical Nurse Specialist to Dr. Reich, Dr. Sandoval & Dr. Fields Chart for Scheduling    Med Onc Chart Routing      Follow up with physician 3 weeks. RTC in 3 weeks with labs (CBC,CMP,TSH,B HCG,Vit B12) to see Dr. Jane for infusion   Follow up with RAUL 6 weeks. RTC in 6 weeks with labs (CBC,CMP,TSH,B HCG) to see Sharon for infusion   Infusion scheduling note    Injection scheduling note    Labs CBC, CMP, B HCG and TSH   Lab interval:  vitamin b12 to be drawn with next labs only (1 time order)   Imaging    Pharmacy appointment    Other referrals        Treatment Plan Information   OP PEMBROLIZUMAB 200MG Q3W   Moshe Jane MD   Upcoming Treatment Dates - OP PEMBROLIZUMAB 200MG Q3W    12/21/2022       Chemotherapy       pembrolizumab (KEYTRUDA) 200 mg in sodium chloride 0.9% 108 mL infusion  1/11/2023       Chemotherapy       pembrolizumab (KEYTRUDA) 200 mg in sodium chloride 0.9% 108 mL infusion  2/1/2023       Chemotherapy       pembrolizumab (KEYTRUDA) 200 mg in sodium chloride 0.9% 108 mL infusion  2/22/2023       Chemotherapy       pembrolizumab (KEYTRUDA) 200 mg in sodium chloride 0.9% 108 mL infusion

## 2022-12-01 ENCOUNTER — TELEPHONE (OUTPATIENT)
Dept: HEMATOLOGY/ONCOLOGY | Facility: CLINIC | Age: 42
End: 2022-12-01
Payer: COMMERCIAL

## 2022-12-01 NOTE — PROGRESS NOTES
The patient location is: home  The chief complaint leading to consultation is: GI symptoms, poor diet quality     Visit type: audiovisual    Face to Face time with patient: 37 minutes   46 minutes of total time spent on the encounter, which includes face to face time and non-face to face time preparing to see the patient (eg, review of tests), Obtaining and/or reviewing separately obtained history, Documenting clinical information in the electronic or other health record, Independently interpreting results (not separately reported) and communicating results to the patient/family/caregiver, or Care coordination (not separately reported).     Each patient to whom he or she provides medical services by telemedicine is:  (1) informed of the relationship between the physician and patient and the respective role of any other health care provider with respect to management of the patient; and (2) notified that he or she may decline to receive medical services by telemedicine and may withdraw from such care at any time.    Oncology Nutrition Assessment for Medical Nutrition Therapy  Initial Visit    Teagan Saldanabury   1980    Referring Provider:  Sharon Walker CNS      Reason for Visit: Pt in for education and nutrition counseling     PMHx:   Past Medical History:   Diagnosis Date    Depression     Renal cell carcinoma 8/6/2022       Nutrition Assessment    This is a 42 y.o.female with ccRCC with rhabdoid features s/p right nephrectomy 9/14/22. Currently on adjuvant Keytruda.   Referred to nutrition to help with proper diet recommendations to assist with energy level, weight management, and diarrhea.   She reports she has not been able to eat as healthy as should would like. She does not have the time/energy to cook currently and before reports she did not cook healthy.   She avoids eating at work because she is worried about diarrhea. Reports cravings for chocolate chip cookies. Otherwise eating canned soups.  "She has been drinking a lot of Gatorade; plans to switch to Gatorade Zero. Does not like water. She was drinking Atkins shakes or Premier Protein but got out of the habit.   Diarrhea is happening 3-4 times per day. Lomotil is helping- 2 yesterday stopped the diarrhea. Also reports some dysgeusia which seems to be related to mucositis/burning in her mouth.   Previously tried keto but reports she felt terrible eating all the fat. She did lose 30lb.   She has a pedal machine under her desk at work and just bought a treadmill.     Weight:116.1 kg (256 lb)  Height:5' 6" (1.676 m)  BMI:Body mass index is 41.32 kg/m².   IBW: Ideal body weight: 59.3 kg (130 lb 11.7 oz)  Adjusted ideal body weight: 82 kg (180 lb 13.4 oz)    Allergies: Patient has no known allergies.    Current Medications:    Current Outpatient Medications:     acetaminophen (TYLENOL) 500 MG tablet, Take 1,000 mg by mouth every 6 (six) hours as needed for Pain., Disp: , Rfl:     ALPRAZolam (XANAX) 2 MG Tab, Take 1 mg by mouth 2 (two) times a day., Disp: , Rfl:     dextroamphetamine-amphetamine 30 mg Tab, Take 30 mg by mouth 2 (two) times a day., Disp: , Rfl:     diphenoxylate-atropine 2.5-0.025 mg (LOMOTIL) 2.5-0.025 mg per tablet, Take 1 tablet by mouth 4 (four) times daily as needed for Diarrhea., Disp: 60 tablet, Rfl: 0    lamoTRIgine (LAMICTAL) 200 MG tablet, Take 200 mg by mouth once daily., Disp: , Rfl:     magic mouthwash diphen/antac/lidoc, Swish and spit 15 mls by mouth every 4 hours as needed, Disp: 400 mL, Rfl: 1    risperiDONE (RISPERDAL) 2 MG tablet, Take 4 mg by mouth every evening., Disp: , Rfl:     sertraline (ZOLOFT) 100 MG tablet, Take 50 mg by mouth once daily., Disp: , Rfl:   No current facility-administered medications for this visit.    Facility-Administered Medications Ordered in Other Visits:     diphenhydrAMINE injection 25 mg, 25 mg, Intravenous, Q6H PRN, Elias Doss MD    fentaNYL 50 mcg/mL injection 25 mcg, 25 mcg, " "Intravenous, Q5 Min PRN, Elias Doss MD    fentaNYL 50 mcg/mL injection  mcg,  mcg, Intravenous, PRN, Matt Ellison DO, 50 mcg at 09/14/22 0735    HYDROmorphone injection 0.2 mg, 0.2 mg, Intravenous, Q5 Min PRN, Elias Doss MD, 0.2 mg at 09/14/22 1455    midazolam (VERSED) 1 mg/mL injection 2 mg, 2 mg, Intravenous, PRN, Matt Ellison DO, 2 mg at 09/14/22 0735    prochlorperazine injection Soln 5 mg, 5 mg, Intravenous, Q30 Min PRN, Elias Doss MD    sodium chloride 0.9% flush 10 mL, 10 mL, Intravenous, PRN, Elias Doss MD    Vitamins/Supplements: none     Labs: Reviewed from 11/30    Nutrition Diagnosis    Problem: inadequate protein/energy intake  Etiology (related to): mucositis , diarrhea, and dysgeusia   Signs/Symptoms (as evidenced by):  eating poor quality foods and one meal per day     Nutrition Intervention    Nutrition Prescription   6268-5850 Kcals (15-20kcal/kg)  70-93 g protein (0.6-0.8g/kg)   2900 mL fluid (25mL/kg)    Recommendations:  Water to Gatorade 2:1  -try flavored water or "spa water" with fruits, mint, cucumber, etc   Breakfast  -toast and nut butters   -Belvita and yogurt   -oatmeal   Lunch/snacks  -apple slices and cheese   -Perfect Bars and a banana  -protein drinks and a piece of fruit   Dinner  -Hello Fresh (Mediterranean options) or similar plans   -quick Mediterranean homemade meals (recipes sent)   Enterade may help diarrhea and mucositis   -2 per day for 3-4 days then drop to one   Increase activity   -try 10 minutes at at time, more cooking/chores at home     Materials Provided/Reviewed   recipes   Enterade samples     Nutrition Monitoring and Evaluation    Monitor: energy intake, diet tolerance , weight, and diet education needs     Goals: improve diet quality, improved GI symptoms     Follow up In 4 weeks     Communication to referring provider/care team: note available in chart     Counseling time: 30 Minutes    Kina Nelson, MPH, RD, , LDN, " [facility]   [phone]

## 2022-12-01 NOTE — TELEPHONE ENCOUNTER
"----- Message from Av Martin sent at 12/1/2022  2:21 PM CST -----  Consult/Advisory:          Name Of Caller: Self      Contact Preference?: [phone]        What is the nature of the call?: Returning call to office.          Additional Notes:  "Thank you for all that you do for our patients"       "

## 2022-12-02 ENCOUNTER — PATIENT MESSAGE (OUTPATIENT)
Dept: HEMATOLOGY/ONCOLOGY | Facility: CLINIC | Age: 42
End: 2022-12-02

## 2022-12-02 ENCOUNTER — CLINICAL SUPPORT (OUTPATIENT)
Dept: HEMATOLOGY/ONCOLOGY | Facility: CLINIC | Age: 42
End: 2022-12-02
Payer: COMMERCIAL

## 2022-12-02 VITALS — WEIGHT: 256 LBS | BODY MASS INDEX: 41.14 KG/M2 | HEIGHT: 66 IN

## 2022-12-02 DIAGNOSIS — E66.01 CLASS 3 SEVERE OBESITY WITH BODY MASS INDEX (BMI) OF 40.0 TO 44.9 IN ADULT, UNSPECIFIED OBESITY TYPE, UNSPECIFIED WHETHER SERIOUS COMORBIDITY PRESENT: ICD-10-CM

## 2022-12-02 DIAGNOSIS — Z71.3 NUTRITIONAL COUNSELING: Primary | ICD-10-CM

## 2022-12-02 DIAGNOSIS — C64.1 RENAL CELL CARCINOMA OF RIGHT KIDNEY: ICD-10-CM

## 2022-12-02 DIAGNOSIS — R19.7 DIARRHEA, UNSPECIFIED TYPE: ICD-10-CM

## 2022-12-02 PROCEDURE — 97802 PR MED NUTR THER, 1ST, INDIV, EA 15 MIN: ICD-10-PCS | Mod: 95,,, | Performed by: DIETITIAN, REGISTERED

## 2022-12-02 PROCEDURE — 97802 MEDICAL NUTRITION INDIV IN: CPT | Mod: 95,,, | Performed by: DIETITIAN, REGISTERED

## 2022-12-05 ENCOUNTER — PATIENT MESSAGE (OUTPATIENT)
Dept: HEMATOLOGY/ONCOLOGY | Facility: CLINIC | Age: 42
End: 2022-12-05
Payer: COMMERCIAL

## 2022-12-05 DIAGNOSIS — K12.30 MUCOSITIS: Primary | ICD-10-CM

## 2022-12-05 RX ORDER — DEXAMETHASONE 0.5 MG/5ML
0.5 ELIXIR ORAL EVERY 6 HOURS
Qty: 237 ML | Refills: 0 | Status: SHIPPED | OUTPATIENT
Start: 2022-12-05 | End: 2022-12-14

## 2022-12-06 NOTE — PROGRESS NOTES
Protocol: [id number]  :  Sheng Calloway M.D.  IRB#: [id number]  Patient# [id number]    Disparities in Results of Immune Checkpoint Inhibitor Treatment (DIRECT): A Prospective Cohort Study of Cancer Survivors Treated with anti-PD-L1 Immunotherapy in a Community Oncology Setting       Patient completed A2 of Immunotherapy (Keytruda) on 30Nov2022      A2 Follow-Up: 30Nov2022  Patient presents to clinic today for 2nd cycle of Keytruda follow-up on above-mentioned study. Patient queried & verbalized her willingness to continue her participation in this study. Patient is AAO x3 with appropriate mood, affect & insight. Ms. WalkerNEWTON progress note 11/30/2022 stated patient feels well but still experiencing some side effects: fatigue, body aches, irritation at the gums, generalized pruritis, and decreased appetite. However, the main complaint is diarrhea, taking Pepto-Bismol. She went from 4-5 watery BMs per day, but now down to 1-2.    Collected blood and saliva today per protocol. Patient completed questionnaires at home via email link. Labs were also collected.  Next study appointment is in 6 months (04/08/2023-06/07/2023 timeframe). Patient instructed to contact me or Dr. Godwin if she has any questions, concerns, or changes in health status. Patient verbalized understanding & denies any additional questions at this time.         Please see patient's shadow chart for all Medical History, Adverse Events, and Concomitant Medications.

## 2022-12-13 ENCOUNTER — PATIENT MESSAGE (OUTPATIENT)
Dept: HEMATOLOGY/ONCOLOGY | Facility: CLINIC | Age: 42
End: 2022-12-13
Payer: COMMERCIAL

## 2022-12-13 ENCOUNTER — OFFICE VISIT (OUTPATIENT)
Dept: URGENT CARE | Facility: CLINIC | Age: 42
End: 2022-12-13
Payer: COMMERCIAL

## 2022-12-13 VITALS
TEMPERATURE: 98 F | OXYGEN SATURATION: 96 % | HEART RATE: 79 BPM | BODY MASS INDEX: 41.14 KG/M2 | RESPIRATION RATE: 16 BRPM | DIASTOLIC BLOOD PRESSURE: 82 MMHG | SYSTOLIC BLOOD PRESSURE: 112 MMHG | HEIGHT: 66 IN | WEIGHT: 256 LBS

## 2022-12-13 DIAGNOSIS — K13.79 OTHER LESIONS OF ORAL MUCOSA: Primary | ICD-10-CM

## 2022-12-13 PROCEDURE — 99214 PR OFFICE/OUTPT VISIT, EST, LEVL IV, 30-39 MIN: ICD-10-PCS | Mod: S$GLB,,, | Performed by: STUDENT IN AN ORGANIZED HEALTH CARE EDUCATION/TRAINING PROGRAM

## 2022-12-13 PROCEDURE — 3074F SYST BP LT 130 MM HG: CPT | Mod: CPTII,S$GLB,, | Performed by: STUDENT IN AN ORGANIZED HEALTH CARE EDUCATION/TRAINING PROGRAM

## 2022-12-13 PROCEDURE — 3079F DIAST BP 80-89 MM HG: CPT | Mod: CPTII,S$GLB,, | Performed by: STUDENT IN AN ORGANIZED HEALTH CARE EDUCATION/TRAINING PROGRAM

## 2022-12-13 PROCEDURE — 1159F MED LIST DOCD IN RCRD: CPT | Mod: CPTII,S$GLB,, | Performed by: STUDENT IN AN ORGANIZED HEALTH CARE EDUCATION/TRAINING PROGRAM

## 2022-12-13 PROCEDURE — 1159F PR MEDICATION LIST DOCUMENTED IN MEDICAL RECORD: ICD-10-PCS | Mod: CPTII,S$GLB,, | Performed by: STUDENT IN AN ORGANIZED HEALTH CARE EDUCATION/TRAINING PROGRAM

## 2022-12-13 PROCEDURE — 1160F PR REVIEW ALL MEDS BY PRESCRIBER/CLIN PHARMACIST DOCUMENTED: ICD-10-PCS | Mod: CPTII,S$GLB,, | Performed by: STUDENT IN AN ORGANIZED HEALTH CARE EDUCATION/TRAINING PROGRAM

## 2022-12-13 PROCEDURE — 3079F PR MOST RECENT DIASTOLIC BLOOD PRESSURE 80-89 MM HG: ICD-10-PCS | Mod: CPTII,S$GLB,, | Performed by: STUDENT IN AN ORGANIZED HEALTH CARE EDUCATION/TRAINING PROGRAM

## 2022-12-13 PROCEDURE — 3008F BODY MASS INDEX DOCD: CPT | Mod: CPTII,S$GLB,, | Performed by: STUDENT IN AN ORGANIZED HEALTH CARE EDUCATION/TRAINING PROGRAM

## 2022-12-13 PROCEDURE — 3008F PR BODY MASS INDEX (BMI) DOCUMENTED: ICD-10-PCS | Mod: CPTII,S$GLB,, | Performed by: STUDENT IN AN ORGANIZED HEALTH CARE EDUCATION/TRAINING PROGRAM

## 2022-12-13 PROCEDURE — 1160F RVW MEDS BY RX/DR IN RCRD: CPT | Mod: CPTII,S$GLB,, | Performed by: STUDENT IN AN ORGANIZED HEALTH CARE EDUCATION/TRAINING PROGRAM

## 2022-12-13 PROCEDURE — 99214 OFFICE O/P EST MOD 30 MIN: CPT | Mod: S$GLB,,, | Performed by: STUDENT IN AN ORGANIZED HEALTH CARE EDUCATION/TRAINING PROGRAM

## 2022-12-13 PROCEDURE — 3074F PR MOST RECENT SYSTOLIC BLOOD PRESSURE < 130 MM HG: ICD-10-PCS | Mod: CPTII,S$GLB,, | Performed by: STUDENT IN AN ORGANIZED HEALTH CARE EDUCATION/TRAINING PROGRAM

## 2022-12-13 PROCEDURE — 3044F HG A1C LEVEL LT 7.0%: CPT | Mod: CPTII,S$GLB,, | Performed by: STUDENT IN AN ORGANIZED HEALTH CARE EDUCATION/TRAINING PROGRAM

## 2022-12-13 PROCEDURE — 3044F PR MOST RECENT HEMOGLOBIN A1C LEVEL <7.0%: ICD-10-PCS | Mod: CPTII,S$GLB,, | Performed by: STUDENT IN AN ORGANIZED HEALTH CARE EDUCATION/TRAINING PROGRAM

## 2022-12-13 RX ORDER — PREDNISOLONE SODIUM PHOSPHATE 15 MG/1
15 TABLET, ORALLY DISINTEGRATING ORAL DAILY
Qty: 10 TABLET | Refills: 0 | Status: SHIPPED | OUTPATIENT
Start: 2022-12-13 | End: 2022-12-14

## 2022-12-13 RX ORDER — DOXYCYCLINE 25 MG/5ML
100 POWDER, FOR SUSPENSION ORAL 2 TIMES DAILY
Qty: 60 ML | Refills: 0 | Status: SHIPPED | OUTPATIENT
Start: 2022-12-13 | End: 2022-12-21 | Stop reason: ALTCHOICE

## 2022-12-13 NOTE — PROGRESS NOTES
"Subjective:       Patient ID: Teagan Griffith is a 42 y.o. female.    Vitals:  height is 5' 6" (1.676 m) and weight is 116.1 kg (256 lb). Her oral temperature is 97.8 °F (36.6 °C). Her blood pressure is 112/82 and her pulse is 79. Her respiration is 16 and oxygen saturation is 96%.     Chief Complaint: Mouth Lesions    Patient is currently on keytruda for renal cell carcinoma. Treatments tried for mouth lesions over the past 6 weeks include the magic mouthwash and a steroid rinse. Neither treatments have provided the patient any relief. Lesions first onset near the gums and have since progressed to the lips and bilateral cheeks. She adds that she is experiencing pain on her tongue but denies noticing any lesions on the tongue.     Mouth Lesions   The current episode started more than 2 weeks ago (about 6 weeks ago). The onset was sudden. The problem has been gradually worsening. The problem is severe. Nothing relieves the symptoms. The symptoms are aggravated by eating, drinking and activity. Associated symptoms include mouth sores. Pertinent negatives include no orthopnea, no fever, no decreased vision, no double vision, no eye itching, no photophobia, no abdominal pain, no constipation, no diarrhea, no nausea, no vomiting, no congestion, no ear discharge, no ear pain, no headaches, no hearing loss, no rhinorrhea, no sore throat, no stridor, no swollen glands, no muscle aches, no neck pain, no neck stiffness, no cough, no URI, no wheezing, no rash, no diaper rash, no eye discharge, no eye pain and no eye redness. Recent Medical Care: PMHx of renal cell carcinoma.     Constitution: Negative for fever.   HENT:  Positive for mouth sores. Negative for ear pain, ear discharge, hearing loss, congestion and sore throat.    Neck: Negative for neck pain.   Eyes:  Negative for eye discharge, eye itching, eye pain, eye redness, photophobia and double vision.   Respiratory:  Negative for cough, stridor and wheezing.  "   Gastrointestinal:  Negative for abdominal pain, nausea, vomiting, constipation and diarrhea.   Skin:  Negative for rash.   Neurological:  Negative for headaches.     Objective:      Physical Exam   Constitutional: She is oriented to person, place, and time. She does not appear ill. No distress.   HENT:   Head: Normocephalic.   Mouth/Throat:      Comments: Erosions and ulcerations of buccal mucosa and on gums. None appreciated on hard or soft palate  Eyes: Conjunctivae are normal.   Pulmonary/Chest: No respiratory distress.   Neurological: She is alert and oriented to person, place, and time. Coordination and gait normal.   Skin: Skin is warm and dry.   Psychiatric: Her behavior is normal. Mood normal.   Nursing note and vitals reviewed.      Assessment:       1. Other lesions of oral mucosa          Plan:         Other lesions of oral mucosa  -     prednisoLONE (ORAPRED ODT) 15 MG disintegrating tablet; Take 1 tablet (15 mg total) by mouth once daily. for 10 days  Dispense: 10 tablet; Refill: 0  -     doxycycline monohydrate 25 mg/5 mL SusR; Take 20 mLs (100 mg total) by mouth 2 (two) times daily. Swish before swallowing  Dispense: 60 mL; Refill: 0    Patient reports symptom onset after starting her chemotherapy medications 6 weeks ago.  She has tried oral dexamethasone as well as magic mouthwash and the symptoms are worsening.  I informed the patient that I am not aware of many other treatments to try outside of changing her oral steroid to or prednisolone  ODT and trialing an oral antibiotic (doxycycline) that I recommended she swish before swallowing.  She will follow-up with her dentist for further evaluation and also keep follow-up with her oncologist.  Also suggested discussing with ENT if needed.  Because she needs to continue the suspected causative agent, this may be an ongoing issue, but the goal is to get some symptom relief with the medications.  The patient was understanding and agreeable with the  plan of care.

## 2022-12-14 ENCOUNTER — LAB VISIT (OUTPATIENT)
Dept: LAB | Facility: HOSPITAL | Age: 42
End: 2022-12-14
Attending: HOSPITALIST
Payer: COMMERCIAL

## 2022-12-14 ENCOUNTER — OFFICE VISIT (OUTPATIENT)
Dept: HEMATOLOGY/ONCOLOGY | Facility: CLINIC | Age: 42
End: 2022-12-14
Payer: COMMERCIAL

## 2022-12-14 VITALS
HEIGHT: 66 IN | BODY MASS INDEX: 41.8 KG/M2 | WEIGHT: 260.13 LBS | OXYGEN SATURATION: 97 % | HEART RATE: 86 BPM | SYSTOLIC BLOOD PRESSURE: 132 MMHG | RESPIRATION RATE: 18 BRPM | TEMPERATURE: 97 F | DIASTOLIC BLOOD PRESSURE: 82 MMHG

## 2022-12-14 DIAGNOSIS — K12.30 MUCOSITIS: Primary | ICD-10-CM

## 2022-12-14 DIAGNOSIS — C64.1 RENAL CELL CARCINOMA OF RIGHT KIDNEY: ICD-10-CM

## 2022-12-14 PROBLEM — K12.1 STOMATITIS AND MUCOSITIS: Status: ACTIVE | Noted: 2022-12-14

## 2022-12-14 LAB
ALBUMIN SERPL BCP-MCNC: 3.9 G/DL (ref 3.5–5.2)
ALP SERPL-CCNC: 78 U/L (ref 55–135)
ALT SERPL W/O P-5'-P-CCNC: 31 U/L (ref 10–44)
ANION GAP SERPL CALC-SCNC: 9 MMOL/L (ref 8–16)
AST SERPL-CCNC: 20 U/L (ref 10–40)
BILIRUB SERPL-MCNC: 0.3 MG/DL (ref 0.1–1)
BUN SERPL-MCNC: 13 MG/DL (ref 6–20)
CALCIUM SERPL-MCNC: 10.2 MG/DL (ref 8.7–10.5)
CHLORIDE SERPL-SCNC: 106 MMOL/L (ref 95–110)
CO2 SERPL-SCNC: 24 MMOL/L (ref 23–29)
CREAT SERPL-MCNC: 1.2 MG/DL (ref 0.5–1.4)
ERYTHROCYTE [DISTWIDTH] IN BLOOD BY AUTOMATED COUNT: 12.7 % (ref 11.5–14.5)
EST. GFR  (NO RACE VARIABLE): 57.6 ML/MIN/1.73 M^2
GLUCOSE SERPL-MCNC: 103 MG/DL (ref 70–110)
HCT VFR BLD AUTO: 37.1 % (ref 37–48.5)
HGB BLD-MCNC: 11.8 G/DL (ref 12–16)
IMM GRANULOCYTES # BLD AUTO: 0.04 K/UL (ref 0–0.04)
MCH RBC QN AUTO: 28 PG (ref 27–31)
MCHC RBC AUTO-ENTMCNC: 31.8 G/DL (ref 32–36)
MCV RBC AUTO: 88 FL (ref 82–98)
NEUTROPHILS # BLD AUTO: 5.4 K/UL (ref 1.8–7.7)
PLATELET # BLD AUTO: 353 K/UL (ref 150–450)
PMV BLD AUTO: 9.9 FL (ref 9.2–12.9)
POTASSIUM SERPL-SCNC: 4.1 MMOL/L (ref 3.5–5.1)
PROT SERPL-MCNC: 7.3 G/DL (ref 6–8.4)
RBC # BLD AUTO: 4.21 M/UL (ref 4–5.4)
SODIUM SERPL-SCNC: 139 MMOL/L (ref 136–145)
WBC # BLD AUTO: 8.25 K/UL (ref 3.9–12.7)

## 2022-12-14 PROCEDURE — 3079F PR MOST RECENT DIASTOLIC BLOOD PRESSURE 80-89 MM HG: ICD-10-PCS | Mod: CPTII,S$GLB,, | Performed by: HOSPITALIST

## 2022-12-14 PROCEDURE — 3075F SYST BP GE 130 - 139MM HG: CPT | Mod: CPTII,S$GLB,, | Performed by: HOSPITALIST

## 2022-12-14 PROCEDURE — 3044F HG A1C LEVEL LT 7.0%: CPT | Mod: CPTII,S$GLB,, | Performed by: HOSPITALIST

## 2022-12-14 PROCEDURE — 99215 OFFICE O/P EST HI 40 MIN: CPT | Mod: S$GLB,,, | Performed by: HOSPITALIST

## 2022-12-14 PROCEDURE — 3008F BODY MASS INDEX DOCD: CPT | Mod: CPTII,S$GLB,, | Performed by: HOSPITALIST

## 2022-12-14 PROCEDURE — 99999 PR PBB SHADOW E&M-EST. PATIENT-LVL IV: CPT | Mod: PBBFAC,,, | Performed by: HOSPITALIST

## 2022-12-14 PROCEDURE — 99215 PR OFFICE/OUTPT VISIT, EST, LEVL V, 40-54 MIN: ICD-10-PCS | Mod: S$GLB,,, | Performed by: HOSPITALIST

## 2022-12-14 PROCEDURE — 36415 COLL VENOUS BLD VENIPUNCTURE: CPT | Performed by: HOSPITALIST

## 2022-12-14 PROCEDURE — 3008F PR BODY MASS INDEX (BMI) DOCUMENTED: ICD-10-PCS | Mod: CPTII,S$GLB,, | Performed by: HOSPITALIST

## 2022-12-14 PROCEDURE — 3044F PR MOST RECENT HEMOGLOBIN A1C LEVEL <7.0%: ICD-10-PCS | Mod: CPTII,S$GLB,, | Performed by: HOSPITALIST

## 2022-12-14 PROCEDURE — 3079F DIAST BP 80-89 MM HG: CPT | Mod: CPTII,S$GLB,, | Performed by: HOSPITALIST

## 2022-12-14 PROCEDURE — 99999 PR PBB SHADOW E&M-EST. PATIENT-LVL IV: ICD-10-PCS | Mod: PBBFAC,,, | Performed by: HOSPITALIST

## 2022-12-14 PROCEDURE — 3075F PR MOST RECENT SYSTOLIC BLOOD PRESS GE 130-139MM HG: ICD-10-PCS | Mod: CPTII,S$GLB,, | Performed by: HOSPITALIST

## 2022-12-14 PROCEDURE — 85027 COMPLETE CBC AUTOMATED: CPT | Performed by: HOSPITALIST

## 2022-12-14 PROCEDURE — 80053 COMPREHEN METABOLIC PANEL: CPT | Performed by: HOSPITALIST

## 2022-12-14 RX ORDER — PREDNISONE 10 MG/1
TABLET ORAL
Qty: 95 TABLET | Refills: 0 | Status: SHIPPED | OUTPATIENT
Start: 2022-12-14 | End: 2023-01-18

## 2022-12-14 RX ORDER — ACETAMINOPHEN 500 MG
1000 TABLET ORAL EVERY 8 HOURS PRN
Qty: 90 TABLET | Refills: 0 | Status: SHIPPED | OUTPATIENT
Start: 2022-12-14

## 2022-12-14 NOTE — PROGRESS NOTES
MEDICAL ONCOLOGY FOLLOW-UP VISIT.     Best Contact Phone Number(s): [phone] (home)      Cancer/Stage/TNM:    Cancer Staging   Renal cell carcinoma  Staging form: Kidney, AJCC 8th Edition  - Pathologic: Stage Unknown (pT2b, pNX, cM0) - Signed by Moshe Jane MD on 10/6/2022  - Pathologic: Stage II (pT2, pN0, cM0) - Signed by Moshe Jane MD on 11/9/2022         Reason for visit: Ms. 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 She presents to medical oncology mid-cycle for evaluation of progressive mouth sores and pain.     Interval History:     Patient last seen in this clinic on 11/30/22. At that time she had noted fatigue, body aches, irritation of the gums, generalized pruritis, diarrhea (up to 4-5 episodes) and low appetite. At the time there was irritation about the gumline, but no visible sores in the mouth. She was managed expectantly with vicous lidocaine and Lomotil and received C2 pembrolizumab as planned.    In the interim, patient has had progressively painful mouth sores despite viscous lidocaine and the addition of topical steroid mouthwash on 12/5/22.    Patient reports her symptoms developed about one week after C1. Started on gumline with distal progression over several days. Later developed ulceration/sores on the soft palate with burning sensation over her cheeks and progressing to the lips.    Steroid mouthwash with significant burning.    No significant pain with swallowing; pain primarily located within the mouth.     Had some associated diarrhea one week after first treatment. Lasted about two weeks and up to 4-5x per day. No signficant abdominal pain. No fevers or chills. Now using lomotil intermittently with much improvement. Having formed stools every few day; occaisional loose stool.    No CP. Mild SOB this morning. Occaisional  "'random' cough. No signficant rash.    Oncology History   Renal cell carcinoma   8/6/2022 Imaging Significant Findings    CTA performed for chest pain incidentally found a large partially visualized enhancing mass in the right kidney measuring at least 12 cm with adjacent fat stranding and prominent vessels highly concerning for renal cell carcinoma.    Subsequent non contrast CT a/p confirms 13.7 x 12.2 x 14.3 (AP x TV x CC) intermediate density solid right renal mass with central necrosis and scattered punctate calcifications are concerning for malignancy.     8/9/2022 Imaging Significant Findings    MRI a/p shows eterogeneously enhancing solid right renal mass highly concerning for RCC. No evidence of filling defect or enhancing tumor thrombus in the right renal artery or vein.  Mildly prominent mesenteric lymph nodes, as above.  Metastatic disease difficult to definitively exclude     9/14/2022 Surgery    Right nephrectomy - pathology with ccRCC nuclear grade 4 up to 13.3 cm. Tumor confined to the kidney. Margins negative Rhabdoid features present with associated necrosis. [id number]     11/1/2022 Imaging Significant Findings    CT torso with several up to 3mm micronodules in the lung and prominent but small mesenteric nodes overall felt generally stable.     11/9/2022 -  Chemotherapy    Treatment Summary   Plan Name: OP PEMBROLIZUMAB 200MG Q3W  Treatment Goal: Curative  Status: Active  Start Date: 11/9/2022  End Date: 10/23/2024 (Planned)  Provider: Moshe Jane MD  Chemotherapy: [No matching medication found in this treatment plan]       11/9/2022 Cancer Staged    Staging form: Kidney, AJCC 8th Edition  - Pathologic: Stage II (pT2, pN0, cM0)              Physical Exam:   /82 (BP Location: Left arm, Patient Position: Sitting, BP Method: Medium (Automatic))   Pulse 86   Temp 97.2 °F (36.2 °C) (Oral)   Resp 18   Ht 5' 6" (1.676 m)   Wt 118 kg (260 lb 2.3 oz)   SpO2 97%   BMI 41.99 kg/m²      ECOG " Performance Status: (foot note - ECOG PS provided by Eastern Cooperative Oncology Group) 0 - Asymptomatic    Physical Exam  Constitutional:       General: She is not in acute distress.  HENT:      Head: Normocephalic.      Mouth/Throat:      Pharynx: Oropharyngeal exudate and posterior oropharyngeal erythema present.      Comments: Prominent gingivitis with coalescing erythematous macules over the bucal mucosa and fco with ulceration and whitish plaque  Eyes:      Conjunctiva/sclera: Conjunctivae normal.      Pupils: Pupils are equal, round, and reactive to light.   Cardiovascular:      Rate and Rhythm: Normal rate.   Pulmonary:      Effort: Pulmonary effort is normal. No respiratory distress.   Abdominal:      General: There is no distension.      Palpations: Abdomen is soft.      Tenderness: There is no abdominal tenderness.   Musculoskeletal:         General: Normal range of motion.      Cervical back: Normal range of motion and neck supple.   Skin:     General: Skin is warm.      Findings: No rash.   Neurological:      General: No focal deficit present.      Mental Status: She is alert and oriented to person, place, and time.      Motor: No weakness.   Psychiatric:         Mood and Affect: Mood normal.         Behavior: Behavior normal.         Thought Content: Thought content normal.         Labs:   Recent Results (from the past 48 hour(s))   Comprehensive Metabolic Panel    Collection Time: 12/14/22  2:56 PM   Result Value Ref Range    Sodium 139 136 - 145 mmol/L    Potassium 4.1 3.5 - 5.1 mmol/L    Chloride 106 95 - 110 mmol/L    CO2 24 23 - 29 mmol/L    Glucose 103 70 - 110 mg/dL    BUN 13 6 - 20 mg/dL    Creatinine 1.2 0.5 - 1.4 mg/dL    Calcium 10.2 8.7 - 10.5 mg/dL    Total Protein 7.3 6.0 - 8.4 g/dL    Albumin 3.9 3.5 - 5.2 g/dL    Total Bilirubin 0.3 0.1 - 1.0 mg/dL    Alkaline Phosphatase 78 55 - 135 U/L    AST 20 10 - 40 U/L    ALT 31 10 - 44 U/L    Anion Gap 9 8 - 16 mmol/L    eGFR 57.6 (A) >60  mL/min/1.73 m^2   CBC Oncology    Collection Time: 12/14/22  2:56 PM   Result Value Ref Range    WBC 8.25 3.90 - 12.70 K/uL    RBC 4.21 4.00 - 5.40 M/uL    Hemoglobin 11.8 (L) 12.0 - 16.0 g/dL    Hematocrit 37.1 37.0 - 48.5 %    MCV 88 82 - 98 fL    MCH 28.0 27.0 - 31.0 pg    MCHC 31.8 (L) 32.0 - 36.0 g/dL    RDW 12.7 11.5 - 14.5 %    Platelets 353 150 - 450 K/uL    MPV 9.9 9.2 - 12.9 fL    Gran # (ANC) 5.4 1.8 - 7.7 K/uL    Immature Grans (Abs) 0.04 0.00 - 0.04 K/uL            Imaging:     CT Chest Abdomen Pelvis With Contrast  Narrative: EXAMINATION:  CT CHEST ABDOMEN PELVIS WITH CONTRAST (XPD)    CLINICAL HISTORY:  Metastatic disease evaluation;Kidney cancer, staging; Malignant neoplasm of right kidney, except renal pelvis    TECHNIQUE:  Low dose axial images, sagittal and coronal reformations were obtained from the thoracic inlet to the pubic symphysis following the IV administration of 100 mL of Omnipaque 350    COMPARISON:  None    FINDINGS:  CT chest:    Mediastinum: Mildly enlarged inferior thyroid gland, partially imaged.  No mediastinal or hilar lymphadenopathy.  Heart size within normal limits.  No pericardial pleural effusion.  Thoracic aorta is normal caliber.    Lungs: Multiple 3 mm subpleural pulmonary micro nodules in the right lung (series 3, images 23, 26, 46, 58).  There is trace amount of dependent peripheral interstitial thickening.  No emphysematous lung changes.  The tracheobronchial tree is clear.  No consolidation.    Abdomen: Low-attenuation in the liver suggesting fatty change.  Gallbladder is unremarkable.  No biliary or pancreatic ductal dilatation.  Mild pancreatic atrophy.  Splenic size within normal limits.  Adrenal glands unremarkable.  Prior right nephrectomy noted.  No left renal mass or hydronephrosis.  Prominent focus of fat attenuation in the pancreatic head, unchanged.    There are an increased number of small lymph nodes in the mesentery, similar to the prior exam.    Pelvis:  Bladder, uterus, and adnexa are unremarkable.  No fluid collections.  No inguinal or pelvic lymphadenopathy.    Bowel: No pericolonic fat stranding/inflammatory changes.  The terminal ileum and appendix are unremarkable.  No dilated loops of bowel.  No evidence of obstruction.    Bones: No marrow replacement process.  Impression: Interval right nephrectomy for renal carcinoma.    Increased number of small mesenteric lymph nodes, similar to the prior exam.    Right lung pulmonary micro nodules (3 mm), as above.    Electronically signed by: Rafiq Desir MD  Date:    11/01/2022  Time:    15:49              Diagnoses:       1. Mucositis    2. Renal cell carcinoma of right kidney            Assessment and Plan:     1. Mucositis  Overview:  Patient with painful mucositis developed shortly after starting pembrolizumab. No response to topical treatment. Appears most consistent with ICI side effect.    Assessment & Plan:  - Hold pembrolizumab treatment next week.  - Start prednisone taper - 60mg daily x7 days then taper weekly --> 60, 40, 20, 10, 5 then off  - Continue magic mouthwash  - APAP for additional symptomatic relief    Orders:  -     predniSONE (DELTASONE) 10 MG tablet; Take 6 tablets (60 mg total) by mouth once daily for 7 days, THEN 4 tablets (40 mg total) once daily for 7 days, THEN 2 tablets (20 mg total) once daily for 7 days, THEN 1 tablet (10 mg total) once daily for 7 days, THEN 1 tablet (10 mg total) every other day for 7 days.  Dispense: 95 tablet; Refill: 0  -     acetaminophen (TYLENOL) 500 MG tablet; Take 2 tablets (1,000 mg total) by mouth every 8 (eight) hours as needed for Pain.  Dispense: 90 tablet; Refill: 0    2. Renal cell carcinoma of right kidney  Overview:  Patient found to have high risk likely stage II ([id number]) with nuclear grade 4 and rhabdoid features on nephrectomy 9/14/22. Started adjuvant pembrolizumab 11/9/22.    Assessment & Plan:  - Will hold scheduled C3 pembrolizumab while  awaiting recover of severe mucositis               Route Chart for Scheduling    Med Onc Chart Routing      Follow up with physician . Appt already scheduled for 12/21   Follow up with RAUL    Infusion scheduling note Cancelling pembro appt 12/21   Injection scheduling note    Labs    Imaging    Pharmacy appointment    Other referrals        Treatment Plan Information   OP PEMBROLIZUMAB 200MG Q3W   Moshe Jane MD   Upcoming Treatment Dates - OP PEMBROLIZUMAB 200MG Q3W    12/21/2022       Chemotherapy       pembrolizumab (KEYTRUDA) 200 mg in sodium chloride 0.9% 108 mL infusion  1/11/2023       Chemotherapy       pembrolizumab (KEYTRUDA) 200 mg in sodium chloride 0.9% 108 mL infusion  2/1/2023       Chemotherapy       pembrolizumab (KEYTRUDA) 200 mg in sodium chloride 0.9% 108 mL infusion  2/22/2023       Chemotherapy       pembrolizumab (KEYTRUDA) 200 mg in sodium chloride 0.9% 108 mL infusion         Moshe Jane MD  Hematology/Oncology  [facility]

## 2022-12-14 NOTE — ASSESSMENT & PLAN NOTE
- Hold pembrolizumab treatment next week.  - Start prednisone taper - 60mg daily x7 days then taper weekly --> 60, 40, 20, 10, 5 then off  - Continue magic mouthwash  - APAP for additional symptomatic relief

## 2022-12-14 NOTE — Clinical Note
Hi scheduling team - can we cancel Ms. Griffith's chemo appt on 12/21? We can keep her Lab and MD appt. Thanks,  Moshe

## 2022-12-14 NOTE — PATIENT INSTRUCTIONS
You have developed signficant sores and mucositis within the mouth most concerning for a side effect of the pembrolizumab.     Will start high dose steroids to reduce inflammation. Take prednisone 60mg daily; then decrease the dose every 7 days - 60, 40, 20, 10, then every other day.    Will hold your next treatment next week until the inflammation has resolved, but we will keep appt next week for evaluation.    Will also repeat lab work today.

## 2022-12-21 ENCOUNTER — OFFICE VISIT (OUTPATIENT)
Dept: HEMATOLOGY/ONCOLOGY | Facility: CLINIC | Age: 42
End: 2022-12-21
Payer: COMMERCIAL

## 2022-12-21 ENCOUNTER — LAB VISIT (OUTPATIENT)
Dept: LAB | Facility: HOSPITAL | Age: 42
End: 2022-12-21
Attending: HOSPITALIST
Payer: COMMERCIAL

## 2022-12-21 VITALS
RESPIRATION RATE: 18 BRPM | BODY MASS INDEX: 41.63 KG/M2 | HEART RATE: 86 BPM | SYSTOLIC BLOOD PRESSURE: 106 MMHG | HEIGHT: 66 IN | WEIGHT: 259.06 LBS | DIASTOLIC BLOOD PRESSURE: 55 MMHG | TEMPERATURE: 99 F | OXYGEN SATURATION: 97 %

## 2022-12-21 DIAGNOSIS — N17.9 AKI (ACUTE KIDNEY INJURY): ICD-10-CM

## 2022-12-21 DIAGNOSIS — C64.1 RENAL CELL CARCINOMA OF RIGHT KIDNEY: ICD-10-CM

## 2022-12-21 DIAGNOSIS — R53.83 FATIGUE, UNSPECIFIED TYPE: ICD-10-CM

## 2022-12-21 DIAGNOSIS — K12.30 MUCOSITIS: Primary | ICD-10-CM

## 2022-12-21 LAB
ALBUMIN SERPL BCP-MCNC: 3.7 G/DL (ref 3.5–5.2)
ALP SERPL-CCNC: 71 U/L (ref 55–135)
ALT SERPL W/O P-5'-P-CCNC: 23 U/L (ref 10–44)
ANION GAP SERPL CALC-SCNC: 11 MMOL/L (ref 8–16)
AST SERPL-CCNC: 12 U/L (ref 10–40)
BILIRUB SERPL-MCNC: 0.3 MG/DL (ref 0.1–1)
BUN SERPL-MCNC: 21 MG/DL (ref 6–20)
CALCIUM SERPL-MCNC: 9.7 MG/DL (ref 8.7–10.5)
CHLORIDE SERPL-SCNC: 102 MMOL/L (ref 95–110)
CO2 SERPL-SCNC: 26 MMOL/L (ref 23–29)
CREAT SERPL-MCNC: 1.6 MG/DL (ref 0.5–1.4)
ERYTHROCYTE [DISTWIDTH] IN BLOOD BY AUTOMATED COUNT: 13.1 % (ref 11.5–14.5)
EST. GFR  (NO RACE VARIABLE): 40.8 ML/MIN/1.73 M^2
GLUCOSE SERPL-MCNC: 145 MG/DL (ref 70–110)
HCG INTACT+B SERPL-ACNC: <2.4 MIU/ML
HCT VFR BLD AUTO: 38.4 % (ref 37–48.5)
HGB BLD-MCNC: 12.5 G/DL (ref 12–16)
IMM GRANULOCYTES # BLD AUTO: 0.29 K/UL (ref 0–0.04)
MCH RBC QN AUTO: 28.3 PG (ref 27–31)
MCHC RBC AUTO-ENTMCNC: 32.6 G/DL (ref 32–36)
MCV RBC AUTO: 87 FL (ref 82–98)
NEUTROPHILS # BLD AUTO: 7.8 K/UL (ref 1.8–7.7)
PLATELET # BLD AUTO: 376 K/UL (ref 150–450)
PMV BLD AUTO: 9.3 FL (ref 9.2–12.9)
POTASSIUM SERPL-SCNC: 3.9 MMOL/L (ref 3.5–5.1)
PROT SERPL-MCNC: 6.6 G/DL (ref 6–8.4)
RBC # BLD AUTO: 4.41 M/UL (ref 4–5.4)
SODIUM SERPL-SCNC: 139 MMOL/L (ref 136–145)
TSH SERPL DL<=0.005 MIU/L-ACNC: 1.31 UIU/ML (ref 0.4–4)
VIT B12 SERPL-MCNC: 312 PG/ML (ref 210–950)
WBC # BLD AUTO: 13.77 K/UL (ref 3.9–12.7)

## 2022-12-21 PROCEDURE — 99999 PR PBB SHADOW E&M-EST. PATIENT-LVL III: CPT | Mod: PBBFAC,,, | Performed by: HOSPITALIST

## 2022-12-21 PROCEDURE — 3074F PR MOST RECENT SYSTOLIC BLOOD PRESSURE < 130 MM HG: ICD-10-PCS | Mod: CPTII,S$GLB,, | Performed by: HOSPITALIST

## 2022-12-21 PROCEDURE — 99999 PR PBB SHADOW E&M-EST. PATIENT-LVL III: ICD-10-PCS | Mod: PBBFAC,,, | Performed by: HOSPITALIST

## 2022-12-21 PROCEDURE — 84443 ASSAY THYROID STIM HORMONE: CPT | Performed by: HOSPITALIST

## 2022-12-21 PROCEDURE — 3008F PR BODY MASS INDEX (BMI) DOCUMENTED: ICD-10-PCS | Mod: CPTII,S$GLB,, | Performed by: HOSPITALIST

## 2022-12-21 PROCEDURE — 84702 CHORIONIC GONADOTROPIN TEST: CPT | Performed by: REGISTERED NURSE

## 2022-12-21 PROCEDURE — 3044F PR MOST RECENT HEMOGLOBIN A1C LEVEL <7.0%: ICD-10-PCS | Mod: CPTII,S$GLB,, | Performed by: HOSPITALIST

## 2022-12-21 PROCEDURE — 85027 COMPLETE CBC AUTOMATED: CPT | Performed by: HOSPITALIST

## 2022-12-21 PROCEDURE — 99215 PR OFFICE/OUTPT VISIT, EST, LEVL V, 40-54 MIN: ICD-10-PCS | Mod: S$GLB,,, | Performed by: HOSPITALIST

## 2022-12-21 PROCEDURE — 3074F SYST BP LT 130 MM HG: CPT | Mod: CPTII,S$GLB,, | Performed by: HOSPITALIST

## 2022-12-21 PROCEDURE — 82607 VITAMIN B-12: CPT | Performed by: REGISTERED NURSE

## 2022-12-21 PROCEDURE — 3078F DIAST BP <80 MM HG: CPT | Mod: CPTII,S$GLB,, | Performed by: HOSPITALIST

## 2022-12-21 PROCEDURE — 36415 COLL VENOUS BLD VENIPUNCTURE: CPT | Performed by: HOSPITALIST

## 2022-12-21 PROCEDURE — 3078F PR MOST RECENT DIASTOLIC BLOOD PRESSURE < 80 MM HG: ICD-10-PCS | Mod: CPTII,S$GLB,, | Performed by: HOSPITALIST

## 2022-12-21 PROCEDURE — 3008F BODY MASS INDEX DOCD: CPT | Mod: CPTII,S$GLB,, | Performed by: HOSPITALIST

## 2022-12-21 PROCEDURE — 80053 COMPREHEN METABOLIC PANEL: CPT | Performed by: HOSPITALIST

## 2022-12-21 PROCEDURE — 3044F HG A1C LEVEL LT 7.0%: CPT | Mod: CPTII,S$GLB,, | Performed by: HOSPITALIST

## 2022-12-21 PROCEDURE — 99215 OFFICE O/P EST HI 40 MIN: CPT | Mod: S$GLB,,, | Performed by: HOSPITALIST

## 2022-12-21 NOTE — PROGRESS NOTES
MEDICAL ONCOLOGY FOLLOW-UP VISIT.     Best Contact Phone Number(s): [phone] (home)      Cancer/Stage/TNM:    Cancer Staging   Renal cell carcinoma  Staging form: Kidney, AJCC 8th Edition  - Pathologic: Stage Unknown (pT2b, pNX, cM0) - Signed by Moshe Jane MD on 10/6/2022  - Pathologic: Stage II (pT2, pN0, cM0) - Signed by Moshe Jane MD on 11/9/2022         Reason for visit: Ms. 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 She presents to medical oncology clinic for routine follow up after starting prednisone taper due to severe mucositis..     Interval History:     Patient last seen in this clinic on 12/14/22 midcycle C2 due to worsening mucositis not responsive to topical steroids and viscous lidocaine.  She started prednisone 60mg po daily with plan to taper every 7 days.     Reports signficant improvement in her mucositits since starting prednisone. Tongue remains sensitive but sores have resolved. Ongoing discomfort with brushing teeth. Tolerating orals well. No dizziness or lightheadness. Had a single episode of headache and feeling 'disoriented' last Friday after starting prednisone.  Diarrhea has resolved. No rashes. Urinating normally. No other concerns.       Oncology History   Renal cell carcinoma   8/6/2022 Imaging Significant Findings    CTA performed for chest pain incidentally found a large partially visualized enhancing mass in the right kidney measuring at least 12 cm with adjacent fat stranding and prominent vessels highly concerning for renal cell carcinoma.    Subsequent non contrast CT a/p confirms 13.7 x 12.2 x 14.3 (AP x TV x CC) intermediate density solid right renal mass with central necrosis and scattered punctate calcifications are concerning for malignancy.     8/9/2022 Imaging Significant Findings    MRI a/p shows  "eterogeneously enhancing solid right renal mass highly concerning for RCC. No evidence of filling defect or enhancing tumor thrombus in the right renal artery or vein.  Mildly prominent mesenteric lymph nodes, as above.  Metastatic disease difficult to definitively exclude     9/14/2022 Surgery    Right nephrectomy - pathology with ccRCC nuclear grade 4 up to 13.3 cm. Tumor confined to the kidney. Margins negative Rhabdoid features present with associated necrosis. [id number]     11/1/2022 Imaging Significant Findings    CT torso with several up to 3mm micronodules in the lung and prominent but small mesenteric nodes overall felt generally stable.     11/9/2022 -  Chemotherapy    Treatment Summary   Plan Name: OP PEMBROLIZUMAB 200MG Q3W  Treatment Goal: Curative  Status: Active  Start Date: 11/9/2022  End Date: 10/23/2024 (Planned)  Provider: Moshe Jane MD  Chemotherapy: [No matching medication found in this treatment plan]       11/9/2022 Cancer Staged    Staging form: Kidney, AJCC 8th Edition  - Pathologic: Stage II (pT2, pN0, cM0)       12/14/2022 Notable Event    Pembrolizumab held in setting of severe mucositis. Start prednisone taper starting at 60mg po daily.            Physical Exam:   BP (!) 106/55 (BP Location: Left arm, Patient Position: Sitting, BP Method: Large (Automatic))   Pulse 86   Temp 98.5 °F (36.9 °C) (Oral)   Resp 18   Ht 5' 6" (1.676 m)   Wt 117.5 kg (259 lb 0.7 oz)   SpO2 97%   BMI 41.81 kg/m²      ECOG Performance Status: (foot note - ECOG PS provided by Eastern Cooperative Oncology Group) 0 - Asymptomatic    Physical Exam  Constitutional:       General: She is not in acute distress.  HENT:      Head: Normocephalic.      Mouth/Throat:      Pharynx: No oropharyngeal exudate or posterior oropharyngeal erythema.      Comments: Significant improvement in prior mucosal ulceration  Eyes:      Conjunctiva/sclera: Conjunctivae normal.      Pupils: Pupils are equal, round, and reactive to " light.   Cardiovascular:      Rate and Rhythm: Normal rate.   Pulmonary:      Effort: Pulmonary effort is normal. No respiratory distress.   Abdominal:      General: There is no distension.      Palpations: Abdomen is soft.      Tenderness: There is no abdominal tenderness.   Musculoskeletal:         General: Normal range of motion.      Cervical back: Normal range of motion and neck supple.   Skin:     General: Skin is warm.      Findings: No rash.   Neurological:      General: No focal deficit present.      Mental Status: She is alert and oriented to person, place, and time.      Motor: No weakness.   Psychiatric:         Mood and Affect: Mood normal.         Behavior: Behavior normal.         Thought Content: Thought content normal.         Labs:   Recent Results (from the past 48 hour(s))   Comprehensive Metabolic Panel    Collection Time: 12/21/22 11:56 AM   Result Value Ref Range    Sodium 139 136 - 145 mmol/L    Potassium 3.9 3.5 - 5.1 mmol/L    Chloride 102 95 - 110 mmol/L    CO2 26 23 - 29 mmol/L    Glucose 145 (H) 70 - 110 mg/dL    BUN 21 (H) 6 - 20 mg/dL    Creatinine 1.6 (H) 0.5 - 1.4 mg/dL    Calcium 9.7 8.7 - 10.5 mg/dL    Total Protein 6.6 6.0 - 8.4 g/dL    Albumin 3.7 3.5 - 5.2 g/dL    Total Bilirubin 0.3 0.1 - 1.0 mg/dL    Alkaline Phosphatase 71 55 - 135 U/L    AST 12 10 - 40 U/L    ALT 23 10 - 44 U/L    Anion Gap 11 8 - 16 mmol/L    eGFR 40.8 (A) >60 mL/min/1.73 m^2   CBC Oncology    Collection Time: 12/21/22 11:56 AM   Result Value Ref Range    WBC 13.77 (H) 3.90 - 12.70 K/uL    RBC 4.41 4.00 - 5.40 M/uL    Hemoglobin 12.5 12.0 - 16.0 g/dL    Hematocrit 38.4 37.0 - 48.5 %    MCV 87 82 - 98 fL    MCH 28.3 27.0 - 31.0 pg    MCHC 32.6 32.0 - 36.0 g/dL    RDW 13.1 11.5 - 14.5 %    Platelets 376 150 - 450 K/uL    MPV 9.3 9.2 - 12.9 fL    Gran # (ANC) 7.8 (H) 1.8 - 7.7 K/uL    Immature Grans (Abs) 0.29 (H) 0.00 - 0.04 K/uL   TSH    Collection Time: 12/21/22 11:56 AM   Result Value Ref Range    TSH  1.310 0.400 - 4.000 uIU/mL   VITAMIN B12    Collection Time: 12/21/22 11:56 AM   Result Value Ref Range    Vitamin B-12 312 210 - 950 pg/mL   HCG, QUANTITATIVE, PREGNANCY    Collection Time: 12/21/22 11:56 AM   Result Value Ref Range    HCG Quant <2.4 See Text mIU/mL            Imaging:     CT Chest Abdomen Pelvis With Contrast  Narrative: EXAMINATION:  CT CHEST ABDOMEN PELVIS WITH CONTRAST (XPD)    CLINICAL HISTORY:  Metastatic disease evaluation;Kidney cancer, staging; Malignant neoplasm of right kidney, except renal pelvis    TECHNIQUE:  Low dose axial images, sagittal and coronal reformations were obtained from the thoracic inlet to the pubic symphysis following the IV administration of 100 mL of Omnipaque 350    COMPARISON:  None    FINDINGS:  CT chest:    Mediastinum: Mildly enlarged inferior thyroid gland, partially imaged.  No mediastinal or hilar lymphadenopathy.  Heart size within normal limits.  No pericardial pleural effusion.  Thoracic aorta is normal caliber.    Lungs: Multiple 3 mm subpleural pulmonary micro nodules in the right lung (series 3, images 23, 26, 46, 58).  There is trace amount of dependent peripheral interstitial thickening.  No emphysematous lung changes.  The tracheobronchial tree is clear.  No consolidation.    Abdomen: Low-attenuation in the liver suggesting fatty change.  Gallbladder is unremarkable.  No biliary or pancreatic ductal dilatation.  Mild pancreatic atrophy.  Splenic size within normal limits.  Adrenal glands unremarkable.  Prior right nephrectomy noted.  No left renal mass or hydronephrosis.  Prominent focus of fat attenuation in the pancreatic head, unchanged.    There are an increased number of small lymph nodes in the mesentery, similar to the prior exam.    Pelvis: Bladder, uterus, and adnexa are unremarkable.  No fluid collections.  No inguinal or pelvic lymphadenopathy.    Bowel: No pericolonic fat stranding/inflammatory changes.  The terminal ileum and appendix  are unremarkable.  No dilated loops of bowel.  No evidence of obstruction.    Bones: No marrow replacement process.  Impression: Interval right nephrectomy for renal carcinoma.    Increased number of small mesenteric lymph nodes, similar to the prior exam.    Right lung pulmonary micro nodules (3 mm), as above.    Electronically signed by: Rafiq Desir MD  Date:    11/01/2022  Time:    15:49              Diagnoses:       1. Mucositis    2. Renal cell carcinoma of right kidney    3. JEN (acute kidney injury)              Assessment and Plan:     1. Mucositis  Overview:  Patient with painful mucositis developed shortly after starting pembrolizumab. No response to topical treatment. Appears most consistent with ICI side effect and improved with starting systemic prednisone.    Assessment & Plan:  - Hold pembrolizumab treatment today  - Con't prednisone taper -  40 mg x7 days and decrease weekly (20mg, 10mg, 10mg every other day then off)  - Continue magic mouthwash  - APAP for additional symptomatic relief      2. Renal cell carcinoma of right kidney  Overview:  Patient found to have high risk likely stage II ([id number]) with nuclear grade 4 and rhabdoid features on nephrectomy 9/14/22. Started adjuvant pembrolizumab 11/9/22.    Assessment & Plan:  - Plan to restart C3 pembrolizumab on 1/11/23 pending ongoing improvement in mucositis and steroid taper      3. JEN (acute kidney injury)  Assessment & Plan:  Cr acutely up to 1.6 today. Likely in setting of poor po intake due to mucositis. Tolerating oral intake much better now. No other clear insult aside from possibly pembrolizumab, and medication list otherwise benign.  - Encourage po intake  - FU in three weeks                 Route Chart for Scheduling    Med Onc Chart Routing      Follow up with physician . Follow up already appropriately scheduled for next 2 treatments   Follow up with RAUL    Infusion scheduling note    Injection scheduling note    Labs    Imaging     Pharmacy appointment    Other referrals        Treatment Plan Information   OP PEMBROLIZUMAB 200MG Q3W   Moshe Jane MD   Upcoming Treatment Dates - OP PEMBROLIZUMAB 200MG Q3W    12/21/2022       Chemotherapy       pembrolizumab (KEYTRUDA) 200 mg in sodium chloride 0.9% 108 mL infusion  1/11/2023       Chemotherapy       pembrolizumab (KEYTRUDA) 200 mg in sodium chloride 0.9% 108 mL infusion  2/1/2023       Chemotherapy       pembrolizumab (KEYTRUDA) 200 mg in sodium chloride 0.9% 108 mL infusion  2/22/2023       Chemotherapy       pembrolizumab (KEYTRUDA) 200 mg in sodium chloride 0.9% 108 mL infusion         Moshe Jane MD  Hematology/Oncology  [facility]

## 2022-12-21 NOTE — ASSESSMENT & PLAN NOTE
- Plan to restart C3 pembrolizumab on 1/11/23 pending ongoing improvement in mucositis and steroid taper

## 2022-12-21 NOTE — ASSESSMENT & PLAN NOTE
- Hold pembrolizumab treatment today  - Con't prednisone taper -  40 mg x7 days and decrease weekly (20mg, 10mg, 10mg every other day then off)  - Continue magic mouthwash  - APAP for additional symptomatic relief

## 2022-12-21 NOTE — ASSESSMENT & PLAN NOTE
Cr acutely up to 1.6 today. Likely in setting of poor po intake due to mucositis. Tolerating oral intake much better now. No other clear insult aside from possibly pembrolizumab, and medication list otherwise benign.  - Encourage po intake  - FU in three weeks

## 2023-01-02 NOTE — PROGRESS NOTES
Audio Only Telehealth Visit     The patient location is: work  The chief complaint leading to consultation is: diarrhea, mucositis, diet changes   Visit type: Virtual visit with audio only (telephone)  Total time spent with patient: 14 minutes     The reason for the audio only service rather than synchronous audio and video virtual visit was related to technical difficulties or patient preference/necessity.     Each patient to whom I provide medical services by telemedicine is:  (1) informed of the relationship between the physician and patient and the respective role of any other health care provider with respect to management of the patient; and (2) notified that they may decline to receive medical services by telemedicine and may withdraw from such care at any time. Patient verbally consented to receive this service via voice-only telephone call.     This service was not originating from a related E/M service provided within the previous 7 days nor will  to an E/M service or procedure within the next 24 hours or my soonest available appointment.  Prevailing standard of care was able to be met in this audio-only visit.       Oncology Nutrition Assessment for Medical Nutrition Therapy  Follow up Visit    Teagan Griffith   1980    Referring Provider:  No ref. provider found      Reason for Visit: Pt in for education and nutrition counseling     PMHx:   Past Medical History:   Diagnosis Date    JEN (acute kidney injury) 12/21/2022    Depression     Renal cell carcinoma 8/6/2022       Nutrition Assessment    This is a 42 y.o.female with ccRCC with rhabdoid features s/p right nephrectomy 9/14/22. Currently on adjuvant Keytruda.   Follow up today for diet recommendations to assist with energy level, weight management, and diarrhea.   Today she reports after our previous visit she had worsening mucositis- only eating mashed potatoes and protein shakes. She is now doing much better since steroids (tapered  "down to 20mg currnetly, then 10mg this Wednesday). Diarrhea resolved after using the Enterade samples I sent.   Now that she is feeling better she is working on improving diet and exercise. She is ordering meal kits and "fresh and ready meals" from [employer] for when she does not feel like/have energy for cooking. She is cooking homemade meals in between using Mediterranean recipes provided after our last visit. She is also working on drinking more water. Finds that she can drink more if she uses Smart Water brand. Also switched to Gatorade Zero to cut down on calories and sugar. Reports that she has lost a couple of pounds per her home scale.   She has also been walking again. Currently doing 20 minutes daily at 2mph pace. Plans to increase time by 5 minutes each week until she can do one hour per day (she used to do 60 minutes at 3mph prior to treatment).   Overall she is feeling a lot better, but not sure it's lifestyle changes vs having a break from Keytruda.      Weight:117.5 kg (259 lb)  Height:5' 6" (1.676 m)  BMI:Body mass index is 41.8 kg/m².   IBW: Ideal body weight: 59.3 kg (130 lb 11.7 oz)  Adjusted ideal body weight: 82.6 kg (182 lb 0.6 oz)    Allergies: Patient has no known allergies.    Current Medications:    Current Outpatient Medications:     acetaminophen (TYLENOL) 500 MG tablet, Take 2 tablets (1,000 mg total) by mouth every 8 (eight) hours as needed for Pain., Disp: 90 tablet, Rfl: 0    ALPRAZolam (XANAX) 2 MG Tab, Take 1 mg by mouth 2 (two) times a day., Disp: , Rfl:     dextroamphetamine-amphetamine 30 mg Tab, Take 30 mg by mouth 2 (two) times a day., Disp: , Rfl:     diphenoxylate-atropine 2.5-0.025 mg (LOMOTIL) 2.5-0.025 mg per tablet, Take 1 tablet by mouth 4 (four) times daily as needed for Diarrhea., Disp: 60 tablet, Rfl: 0    lamoTRIgine (LAMICTAL) 200 MG tablet, Take 200 mg by mouth once daily., Disp: , Rfl:     magic mouthwash diphen/antac/lidoc, Swish and spit 15 mls by mouth every 4 " hours as needed, Disp: 400 mL, Rfl: 1    predniSONE (DELTASONE) 10 MG tablet, Take 6 tablets (60 mg total) by mouth once daily for 7 days, THEN 4 tablets (40 mg total) once daily for 7 days, THEN 2 tablets (20 mg total) once daily for 7 days, THEN 1 tablet (10 mg total) once daily for 7 days, THEN 1 tablet (10 mg total) every other day for 7 days., Disp: 95 tablet, Rfl: 0    risperiDONE (RISPERDAL) 2 MG tablet, Take 4 mg by mouth every evening., Disp: , Rfl:     sertraline (ZOLOFT) 100 MG tablet, Take 50 mg by mouth once daily., Disp: , Rfl:   No current facility-administered medications for this visit.    Facility-Administered Medications Ordered in Other Visits:     diphenhydrAMINE injection 25 mg, 25 mg, Intravenous, Q6H PRN, Elias Doss MD    fentaNYL 50 mcg/mL injection 25 mcg, 25 mcg, Intravenous, Q5 Min PRN, Elias Doss MD    fentaNYL 50 mcg/mL injection  mcg,  mcg, Intravenous, PRN, Matt Ellison, DO, 50 mcg at 09/14/22 0735    HYDROmorphone injection 0.2 mg, 0.2 mg, Intravenous, Q5 Min PRN, Elias Doss MD, 0.2 mg at 09/14/22 1455    midazolam (VERSED) 1 mg/mL injection 2 mg, 2 mg, Intravenous, PRN, Matt Ellison, DO, 2 mg at 09/14/22 0735    prochlorperazine injection Soln 5 mg, 5 mg, Intravenous, Q30 Min PRN, Elias Doss MD    sodium chloride 0.9% flush 10 mL, 10 mL, Intravenous, PRN, Elias Doss MD    Vitamins/Supplements: none     Labs: Reviewed from 12/21- Creatinine 1.6    Nutrition Diagnosis    Problem: nutrition knowledge deficit   Etiology (related to): lack of prior need for nutrition education   Signs/Symptoms (as evidenced by): side effects of cancer treatment, diet questions/concerns     Nutrition Intervention    Nutrition Prescription   4841-4427 Kcals (15-20kcal/kg)  70-93 g protein (0.6-0.8g/kg)   2900 mL fluid (25mL/kg)    Recommendations:  Baking soda/salt water rinses 3-4 times daily starting day of treatment   Try chewing ice chips during infusions    Continue focusing on hydration  -focus on water in between Gatorade (at least 1L plain water daily)  Resume Enterade if diarrhea comes back      Nutrition Monitoring and Evaluation    Monitor: energy intake, diet tolerance , weight, and diet education needs     Goals: slow weight loss (limit to 1-2lb per week while on treatment)   Continue to improve diet quality   Symptom prevention for mucositis     Follow up PRN    Communication to referring provider/care team: note available in chart     Counseling time: 15 Minutes    Kina Nelson, MPH, RD, , LDN, FAND   [phone]

## 2023-01-03 ENCOUNTER — CLINICAL SUPPORT (OUTPATIENT)
Dept: HEMATOLOGY/ONCOLOGY | Facility: CLINIC | Age: 43
End: 2023-01-03
Payer: COMMERCIAL

## 2023-01-03 VITALS — WEIGHT: 259 LBS | BODY MASS INDEX: 41.62 KG/M2 | HEIGHT: 66 IN

## 2023-01-03 DIAGNOSIS — Z71.3 NUTRITIONAL COUNSELING: Primary | ICD-10-CM

## 2023-01-03 DIAGNOSIS — E66.01 CLASS 3 SEVERE OBESITY WITH BODY MASS INDEX (BMI) OF 40.0 TO 44.9 IN ADULT, UNSPECIFIED OBESITY TYPE, UNSPECIFIED WHETHER SERIOUS COMORBIDITY PRESENT: ICD-10-CM

## 2023-01-03 DIAGNOSIS — C64.1 RENAL CELL CARCINOMA OF RIGHT KIDNEY: ICD-10-CM

## 2023-01-03 PROCEDURE — 98967 PH1 ASSMT&MGMT NQHP 11-20: CPT | Mod: 95,,, | Performed by: DIETITIAN, REGISTERED

## 2023-01-03 PROCEDURE — 98967 PR NONPHYSICIAN TELEPHONE ASSESSMENT 11-20 MIN: ICD-10-PCS | Mod: 95,,, | Performed by: DIETITIAN, REGISTERED

## 2023-01-10 ENCOUNTER — TELEPHONE (OUTPATIENT)
Dept: HEMATOLOGY/ONCOLOGY | Facility: CLINIC | Age: 43
End: 2023-01-10
Payer: COMMERCIAL

## 2023-01-11 ENCOUNTER — OFFICE VISIT (OUTPATIENT)
Dept: HEMATOLOGY/ONCOLOGY | Facility: CLINIC | Age: 43
End: 2023-01-11
Payer: COMMERCIAL

## 2023-01-11 ENCOUNTER — INFUSION (OUTPATIENT)
Dept: INFUSION THERAPY | Facility: HOSPITAL | Age: 43
End: 2023-01-11
Payer: COMMERCIAL

## 2023-01-11 ENCOUNTER — LAB VISIT (OUTPATIENT)
Dept: LAB | Facility: HOSPITAL | Age: 43
End: 2023-01-11
Attending: HOSPITALIST
Payer: COMMERCIAL

## 2023-01-11 VITALS
SYSTOLIC BLOOD PRESSURE: 117 MMHG | OXYGEN SATURATION: 97 % | TEMPERATURE: 98 F | WEIGHT: 261.56 LBS | HEART RATE: 97 BPM | DIASTOLIC BLOOD PRESSURE: 70 MMHG | HEIGHT: 66 IN | BODY MASS INDEX: 42.04 KG/M2 | RESPIRATION RATE: 18 BRPM

## 2023-01-11 VITALS — RESPIRATION RATE: 18 BRPM | HEART RATE: 97 BPM | DIASTOLIC BLOOD PRESSURE: 70 MMHG | SYSTOLIC BLOOD PRESSURE: 117 MMHG

## 2023-01-11 DIAGNOSIS — E66.01 CLASS 3 SEVERE OBESITY DUE TO EXCESS CALORIES WITH BODY MASS INDEX (BMI) OF 40.0 TO 44.9 IN ADULT, UNSPECIFIED WHETHER SERIOUS COMORBIDITY PRESENT: ICD-10-CM

## 2023-01-11 DIAGNOSIS — N17.9 AKI (ACUTE KIDNEY INJURY): ICD-10-CM

## 2023-01-11 DIAGNOSIS — K12.30 MUCOSITIS: ICD-10-CM

## 2023-01-11 DIAGNOSIS — Z78.9 ALCOHOL CONSUMPTION OF MORE THAN FOUR DRINKS PER DAY: ICD-10-CM

## 2023-01-11 DIAGNOSIS — C64.1 RENAL CELL CARCINOMA OF RIGHT KIDNEY: ICD-10-CM

## 2023-01-11 DIAGNOSIS — F32.4 MAJOR DEPRESSIVE DISORDER IN PARTIAL REMISSION, UNSPECIFIED WHETHER RECURRENT: ICD-10-CM

## 2023-01-11 DIAGNOSIS — C64.1 RENAL CELL CARCINOMA OF RIGHT KIDNEY: Primary | ICD-10-CM

## 2023-01-11 LAB
ALBUMIN SERPL BCP-MCNC: 4 G/DL (ref 3.5–5.2)
ALP SERPL-CCNC: 71 U/L (ref 55–135)
ALT SERPL W/O P-5'-P-CCNC: 26 U/L (ref 10–44)
ANION GAP SERPL CALC-SCNC: 9 MMOL/L (ref 8–16)
AST SERPL-CCNC: 16 U/L (ref 10–40)
BILIRUB SERPL-MCNC: 0.3 MG/DL (ref 0.1–1)
BUN SERPL-MCNC: 22 MG/DL (ref 6–20)
CALCIUM SERPL-MCNC: 10.6 MG/DL (ref 8.7–10.5)
CHLORIDE SERPL-SCNC: 102 MMOL/L (ref 95–110)
CO2 SERPL-SCNC: 27 MMOL/L (ref 23–29)
CREAT SERPL-MCNC: 1.3 MG/DL (ref 0.5–1.4)
ERYTHROCYTE [DISTWIDTH] IN BLOOD BY AUTOMATED COUNT: 13.1 % (ref 11.5–14.5)
EST. GFR  (NO RACE VARIABLE): 52.3 ML/MIN/1.73 M^2
GLUCOSE SERPL-MCNC: 110 MG/DL (ref 70–110)
HCG INTACT+B SERPL-ACNC: <2.4 MIU/ML
HCT VFR BLD AUTO: 38.4 % (ref 37–48.5)
HGB BLD-MCNC: 12.4 G/DL (ref 12–16)
IMM GRANULOCYTES # BLD AUTO: 0.04 K/UL (ref 0–0.04)
MCH RBC QN AUTO: 28.8 PG (ref 27–31)
MCHC RBC AUTO-ENTMCNC: 32.3 G/DL (ref 32–36)
MCV RBC AUTO: 89 FL (ref 82–98)
NEUTROPHILS # BLD AUTO: 5.6 K/UL (ref 1.8–7.7)
PLATELET # BLD AUTO: 307 K/UL (ref 150–450)
PMV BLD AUTO: 9.8 FL (ref 9.2–12.9)
POTASSIUM SERPL-SCNC: 4.5 MMOL/L (ref 3.5–5.1)
PROT SERPL-MCNC: 7.3 G/DL (ref 6–8.4)
RBC # BLD AUTO: 4.31 M/UL (ref 4–5.4)
SODIUM SERPL-SCNC: 138 MMOL/L (ref 136–145)
TSH SERPL DL<=0.005 MIU/L-ACNC: 1.02 UIU/ML (ref 0.4–4)
WBC # BLD AUTO: 10.97 K/UL (ref 3.9–12.7)

## 2023-01-11 PROCEDURE — 63600175 PHARM REV CODE 636 W HCPCS: Mod: JG | Performed by: REGISTERED NURSE

## 2023-01-11 PROCEDURE — 96413 CHEMO IV INFUSION 1 HR: CPT

## 2023-01-11 PROCEDURE — 3078F PR MOST RECENT DIASTOLIC BLOOD PRESSURE < 80 MM HG: ICD-10-PCS | Mod: CPTII,S$GLB,, | Performed by: REGISTERED NURSE

## 2023-01-11 PROCEDURE — 25000003 PHARM REV CODE 250: Performed by: REGISTERED NURSE

## 2023-01-11 PROCEDURE — 3008F PR BODY MASS INDEX (BMI) DOCUMENTED: ICD-10-PCS | Mod: CPTII,S$GLB,, | Performed by: REGISTERED NURSE

## 2023-01-11 PROCEDURE — 84443 ASSAY THYROID STIM HORMONE: CPT | Performed by: HOSPITALIST

## 2023-01-11 PROCEDURE — 99999 PR PBB SHADOW E&M-EST. PATIENT-LVL V: ICD-10-PCS | Mod: PBBFAC,,, | Performed by: REGISTERED NURSE

## 2023-01-11 PROCEDURE — 80053 COMPREHEN METABOLIC PANEL: CPT | Performed by: HOSPITALIST

## 2023-01-11 PROCEDURE — 99215 PR OFFICE/OUTPT VISIT, EST, LEVL V, 40-54 MIN: ICD-10-PCS | Mod: S$GLB,,, | Performed by: REGISTERED NURSE

## 2023-01-11 PROCEDURE — 85027 COMPLETE CBC AUTOMATED: CPT | Performed by: HOSPITALIST

## 2023-01-11 PROCEDURE — 3078F DIAST BP <80 MM HG: CPT | Mod: CPTII,S$GLB,, | Performed by: REGISTERED NURSE

## 2023-01-11 PROCEDURE — 84702 CHORIONIC GONADOTROPIN TEST: CPT | Performed by: REGISTERED NURSE

## 2023-01-11 PROCEDURE — 36415 COLL VENOUS BLD VENIPUNCTURE: CPT | Performed by: HOSPITALIST

## 2023-01-11 PROCEDURE — 99215 OFFICE O/P EST HI 40 MIN: CPT | Mod: S$GLB,,, | Performed by: REGISTERED NURSE

## 2023-01-11 PROCEDURE — 96361 HYDRATE IV INFUSION ADD-ON: CPT

## 2023-01-11 PROCEDURE — 3008F BODY MASS INDEX DOCD: CPT | Mod: CPTII,S$GLB,, | Performed by: REGISTERED NURSE

## 2023-01-11 PROCEDURE — 99999 PR PBB SHADOW E&M-EST. PATIENT-LVL V: CPT | Mod: PBBFAC,,, | Performed by: REGISTERED NURSE

## 2023-01-11 PROCEDURE — 3074F PR MOST RECENT SYSTOLIC BLOOD PRESSURE < 130 MM HG: ICD-10-PCS | Mod: CPTII,S$GLB,, | Performed by: REGISTERED NURSE

## 2023-01-11 PROCEDURE — 3074F SYST BP LT 130 MM HG: CPT | Mod: CPTII,S$GLB,, | Performed by: REGISTERED NURSE

## 2023-01-11 RX ORDER — DIPHENHYDRAMINE HYDROCHLORIDE 50 MG/ML
50 INJECTION INTRAMUSCULAR; INTRAVENOUS ONCE AS NEEDED
Status: DISCONTINUED | OUTPATIENT
Start: 2023-01-11 | End: 2023-01-11 | Stop reason: HOSPADM

## 2023-01-11 RX ORDER — EPINEPHRINE 0.3 MG/.3ML
0.3 INJECTION SUBCUTANEOUS ONCE AS NEEDED
Status: CANCELLED | OUTPATIENT
Start: 2023-01-11

## 2023-01-11 RX ORDER — DIPHENHYDRAMINE HYDROCHLORIDE 50 MG/ML
50 INJECTION INTRAMUSCULAR; INTRAVENOUS ONCE AS NEEDED
Status: CANCELLED | OUTPATIENT
Start: 2023-01-11

## 2023-01-11 RX ORDER — HEPARIN 100 UNIT/ML
500 SYRINGE INTRAVENOUS
Status: DISCONTINUED | OUTPATIENT
Start: 2023-01-11 | End: 2023-01-11 | Stop reason: HOSPADM

## 2023-01-11 RX ORDER — HEPARIN 100 UNIT/ML
500 SYRINGE INTRAVENOUS
Status: CANCELLED | OUTPATIENT
Start: 2023-01-11

## 2023-01-11 RX ORDER — SODIUM CHLORIDE 0.9 % (FLUSH) 0.9 %
10 SYRINGE (ML) INJECTION
Status: CANCELLED | OUTPATIENT
Start: 2023-01-11

## 2023-01-11 RX ORDER — EPINEPHRINE 0.3 MG/.3ML
0.3 INJECTION SUBCUTANEOUS ONCE AS NEEDED
Status: DISCONTINUED | OUTPATIENT
Start: 2023-01-11 | End: 2023-01-11 | Stop reason: HOSPADM

## 2023-01-11 RX ORDER — SODIUM CHLORIDE 0.9 % (FLUSH) 0.9 %
10 SYRINGE (ML) INJECTION
Status: DISCONTINUED | OUTPATIENT
Start: 2023-01-11 | End: 2023-01-11 | Stop reason: HOSPADM

## 2023-01-11 RX ADMIN — SODIUM CHLORIDE 1000 ML: 9 INJECTION, SOLUTION INTRAVENOUS at 12:01

## 2023-01-11 RX ADMIN — SODIUM CHLORIDE 200 MG: 9 INJECTION, SOLUTION INTRAVENOUS at 01:01

## 2023-01-11 NOTE — PROGRESS NOTES
MEDICAL ONCOLOGY FOLLOW-UP VISIT.     Best Contact Phone Number(s): [phone] (home)      Cancer/Stage/TNM:    Cancer Staging   Renal cell carcinoma  Staging form: Kidney, AJCC 8th Edition  - Pathologic: Stage Unknown (pT2b, pNX, cM0) - Signed by Moshe Jane MD on 10/6/2022  - Pathologic: Stage II (pT2, pN0, cM0) - Signed by Moshe Jane MD on 11/9/2022         Reason for visit: Ms. 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 She presents to medical oncology clinic for routine follow up after starting prednisone taper due to severe mucositis..     Interval History:   Ms. Griffith returns to clinic today for follow up. We have held her last infusion d/t mucositis and prednisone taper. She is feeling much better and currently on 10 mg prednisone taken every other day starting today. Reports waking up earlier and having the energy to be more active. She has been trying to walk on the treadmill regularly. Unable to walk as long as she used to so incrementally increasing her activity levels. Her appetite has vastly increased and she has started a Mediterranean diet per nutrition's recommendation. Not drinking as much beer as she had been.     Continues to have improvement in mucositis but still with sensitivity around her lips, increased sensitivity to spices or toothpaste, and discomfort with brushing sides of tongue. Discomfort improves with cold water to area. No open lesions and no issues/pain reported with oral intake. She is using a medium toothbrush but may starting using a softer one to help with sensitivity. Plans to start with salt water rinses at least every other day as well. No urinary complaints. Denies fever/chills, SOB, CP, palpitations, N/V, C/D, blood in urine/stool, paresthesias.       Oncology History   Renal cell carcinoma   8/6/2022 Imaging  "Significant Findings    CTA performed for chest pain incidentally found a large partially visualized enhancing mass in the right kidney measuring at least 12 cm with adjacent fat stranding and prominent vessels highly concerning for renal cell carcinoma.    Subsequent non contrast CT a/p confirms 13.7 x 12.2 x 14.3 (AP x TV x CC) intermediate density solid right renal mass with central necrosis and scattered punctate calcifications are concerning for malignancy.     8/9/2022 Imaging Significant Findings    MRI a/p shows eterogeneously enhancing solid right renal mass highly concerning for RCC. No evidence of filling defect or enhancing tumor thrombus in the right renal artery or vein.  Mildly prominent mesenteric lymph nodes, as above.  Metastatic disease difficult to definitively exclude     9/14/2022 Surgery    Right nephrectomy - pathology with ccRCC nuclear grade 4 up to 13.3 cm. Tumor confined to the kidney. Margins negative Rhabdoid features present with associated necrosis. [id number]     11/1/2022 Imaging Significant Findings    CT torso with several up to 3mm micronodules in the lung and prominent but small mesenteric nodes overall felt generally stable.     11/9/2022 -  Chemotherapy    Treatment Summary   Plan Name: OP PEMBROLIZUMAB 200MG Q3W  Treatment Goal: Curative  Status: Active  Start Date: 11/9/2022  End Date: 11/13/2024 (Planned)  Provider: Moshe Jane MD  Chemotherapy: [No matching medication found in this treatment plan]       11/9/2022 Cancer Staged    Staging form: Kidney, AJCC 8th Edition  - Pathologic: Stage II (pT2, pN0, cM0)       12/14/2022 Notable Event    Pembrolizumab held in setting of severe mucositis. Start prednisone taper starting at 60mg po daily.            Physical Exam:   /70 (BP Location: Right arm, Patient Position: Sitting, BP Method: Large (Automatic))   Pulse 97   Temp 97.8 °F (36.6 °C) (Oral)   Resp 18   Ht 5' 6" (1.676 m)   Wt 118.6 kg (261 lb 9.2 oz)   " SpO2 97%   BMI 42.22 kg/m²      ECOG Performance Status: (foot note - ECOG PS provided by Eastern Cooperative Oncology Group) 0 - Asymptomatic    Physical Exam  Vitals reviewed.   Constitutional:       General: She is not in acute distress.     Appearance: Normal appearance. She is not ill-appearing, toxic-appearing or diaphoretic.   HENT:      Head: Normocephalic and atraumatic.      Right Ear: External ear normal.      Left Ear: External ear normal.      Mouth/Throat:      Pharynx: No oropharyngeal exudate or posterior oropharyngeal erythema.      Comments: Significant improvement in prior mucosal ulceration. No open sores noted. Area remains sensitive.   Eyes:      General: No scleral icterus.     Conjunctiva/sclera: Conjunctivae normal.      Pupils: Pupils are equal, round, and reactive to light.   Cardiovascular:      Rate and Rhythm: Normal rate.   Pulmonary:      Effort: Pulmonary effort is normal. No respiratory distress.      Breath sounds: No wheezing.   Abdominal:      General: There is no distension.      Palpations: Abdomen is soft.      Tenderness: There is no abdominal tenderness.   Musculoskeletal:         General: Normal range of motion.      Cervical back: Normal range of motion and neck supple.   Skin:     General: Skin is warm.      Coloration: Skin is not jaundiced or pale.      Findings: No bruising or rash.   Neurological:      General: No focal deficit present.      Mental Status: She is alert and oriented to person, place, and time. Mental status is at baseline.      Cranial Nerves: No cranial nerve deficit.      Sensory: No sensory deficit.      Motor: No weakness.      Gait: Gait normal.   Psychiatric:         Mood and Affect: Mood normal.         Behavior: Behavior normal.         Thought Content: Thought content normal.       Labs:   Recent Results (from the past 48 hour(s))   Comprehensive Metabolic Panel    Collection Time: 01/11/23 10:45 AM   Result Value Ref Range    Sodium 138 136  - 145 mmol/L    Potassium 4.5 3.5 - 5.1 mmol/L    Chloride 102 95 - 110 mmol/L    CO2 27 23 - 29 mmol/L    Glucose 110 70 - 110 mg/dL    BUN 22 (H) 6 - 20 mg/dL    Creatinine 1.3 0.5 - 1.4 mg/dL    Calcium 10.6 (H) 8.7 - 10.5 mg/dL    Total Protein 7.3 6.0 - 8.4 g/dL    Albumin 4.0 3.5 - 5.2 g/dL    Total Bilirubin 0.3 0.1 - 1.0 mg/dL    Alkaline Phosphatase 71 55 - 135 U/L    AST 16 10 - 40 U/L    ALT 26 10 - 44 U/L    Anion Gap 9 8 - 16 mmol/L    eGFR 52.3 (A) >60 mL/min/1.73 m^2   CBC Oncology    Collection Time: 01/11/23 10:45 AM   Result Value Ref Range    WBC 10.97 3.90 - 12.70 K/uL    RBC 4.31 4.00 - 5.40 M/uL    Hemoglobin 12.4 12.0 - 16.0 g/dL    Hematocrit 38.4 37.0 - 48.5 %    MCV 89 82 - 98 fL    MCH 28.8 27.0 - 31.0 pg    MCHC 32.3 32.0 - 36.0 g/dL    RDW 13.1 11.5 - 14.5 %    Platelets 307 150 - 450 K/uL    MPV 9.8 9.2 - 12.9 fL    Gran # (ANC) 5.6 1.8 - 7.7 K/uL    Immature Grans (Abs) 0.04 0.00 - 0.04 K/uL   TSH    Collection Time: 01/11/23 10:45 AM   Result Value Ref Range    TSH 1.017 0.400 - 4.000 uIU/mL   HCG, QUANTITATIVE, PREGNANCY    Collection Time: 01/11/23 10:45 AM   Result Value Ref Range    HCG Quant <2.4 See Text mIU/mL     Vitals reviewed with patient in clinic.     Imaging:     CT Chest Abdomen Pelvis With Contrast  Narrative: EXAMINATION:  CT CHEST ABDOMEN PELVIS WITH CONTRAST (XPD)    CLINICAL HISTORY:  Metastatic disease evaluation;Kidney cancer, staging; Malignant neoplasm of right kidney, except renal pelvis    TECHNIQUE:  Low dose axial images, sagittal and coronal reformations were obtained from the thoracic inlet to the pubic symphysis following the IV administration of 100 mL of Omnipaque 350    COMPARISON:  None    FINDINGS:  CT chest:    Mediastinum: Mildly enlarged inferior thyroid gland, partially imaged.  No mediastinal or hilar lymphadenopathy.  Heart size within normal limits.  No pericardial pleural effusion.  Thoracic aorta is normal caliber.    Lungs: Multiple 3 mm  subpleural pulmonary micro nodules in the right lung (series 3, images 23, 26, 46, 58).  There is trace amount of dependent peripheral interstitial thickening.  No emphysematous lung changes.  The tracheobronchial tree is clear.  No consolidation.    Abdomen: Low-attenuation in the liver suggesting fatty change.  Gallbladder is unremarkable.  No biliary or pancreatic ductal dilatation.  Mild pancreatic atrophy.  Splenic size within normal limits.  Adrenal glands unremarkable.  Prior right nephrectomy noted.  No left renal mass or hydronephrosis.  Prominent focus of fat attenuation in the pancreatic head, unchanged.    There are an increased number of small lymph nodes in the mesentery, similar to the prior exam.    Pelvis: Bladder, uterus, and adnexa are unremarkable.  No fluid collections.  No inguinal or pelvic lymphadenopathy.    Bowel: No pericolonic fat stranding/inflammatory changes.  The terminal ileum and appendix are unremarkable.  No dilated loops of bowel.  No evidence of obstruction.    Bones: No marrow replacement process.  Impression: Interval right nephrectomy for renal carcinoma.    Increased number of small mesenteric lymph nodes, similar to the prior exam.    Right lung pulmonary micro nodules (3 mm), as above.    Electronically signed by: Rafiq Desir MD  Date:    11/01/2022  Time:    15:49     Diagnoses:       1. Renal cell carcinoma of right kidney    2. Mucositis    3. JEN (acute kidney injury)    4. Class 3 severe obesity due to excess calories with body mass index (BMI) of 40.0 to 44.9 in adult, unspecified whether serious comorbidity present    5. Major depressive disorder in partial remission, unspecified whether recurrent    6. Alcohol consumption of more than four drinks per day      Assessment and Plan:     1. Renal cell carcinoma of right kidney  Overview:  Patient found to have high risk likely stage II ([id number]) with nuclear grade 4 and rhabdoid features on nephrectomy 9/14/22.  Started adjuvant pembrolizumab 11/9/22.    Assessment & Plan:  - Patient wishes to restart pembrolizumab today given continued improvement in mucositis.   - States if mucositis occurs again, she will likely opt to discontinue treatment and monitor.     Orders:  -     Cancel: sodium chloride 0.9% bolus 1,000 mL 1,000 mL  -     Cancel: pembrolizumab (KEYTRUDA) 200 mg in sodium chloride 0.9% SolP 108 mL infusion  -     Cancel: EPINEPHrine (EPIPEN) 0.3 mg/0.3 mL pen injection 0.3 mg  -     Cancel: diphenhydrAMINE injection 50 mg  -     Cancel: hydrocortisone sodium succinate injection 100 mg  -     Cancel: sodium chloride 0.9% 100 mL flush bag  -     Cancel: sodium chloride 0.9% flush 10 mL  -     Cancel: heparin, porcine (PF) 100 unit/mL injection flush 500 Units  -     Cancel: alteplase injection 2 mg    2. Mucositis  Overview:  Patient with painful mucositis developed shortly after starting pembrolizumab. No response to topical treatment. Appears most consistent with ICI side effect and improved with starting systemic prednisone.    Assessment & Plan:  - Significant improvement noted in symptoms. No open lesions but still mildly sensitive.   - Patient wishes to restart pembrolizumab today given improvement in symptoms.   - Continue prednisone taper. Currently on 10 mg every other day.   - Magic mouthwash as needed.   - APAP for additional symptomatic relief.   - Plans to start salt water rinses and use soft bristled toothbrush to help with discomfort.       3. JEN (acute kidney injury)  Assessment & Plan:  - Cr improved to 1.3 today. Likely remains mildly elevated d/t less PO intake with mucositis. Notes oral intake tolerance has improved.   - Encouraged increased PO intake and extra fluids.   - Reassess in 3 weeks       4. Class 3 severe obesity due to excess calories with body mass index (BMI) of 40.0 to 44.9 in adult, unspecified whether serious comorbidity present  Assessment & Plan:  Notes recent weight gain  after losing 75 lbs.   Has restarted physical activity on treadmill and following Mediterranean diet.   Encouraged healthy diet choices, exercising at least 150 minutes per week, increased water intake, and avoiding sedentary behavior.       5. Major depressive disorder in partial remission, unspecified whether recurrent  Overview:  Home medications include sertraline, risperidone, and lamotrigine.    Assessment & Plan:  - Continue home medications.   - Encouraged to continue weekly therapist visits and regular psychiatrist visits.   - Doing well overall.       6. Alcohol consumption of more than four drinks per day  Assessment & Plan:  - Previously advised to decrease alcohol intake as to not interfere with ongoing immunotherapy treatment.   - Continue to follow with therapist and psychiatrist. Last psychiatrist visit x 1 week ago.   - Reports continued decrease in amount and frequency of alcohol intake.       Patient is in agreement with the proposed treatment plan. All questions were answered to the patient's satisfaction. Pt knows to call clinic if anything is needed before the next clinic visit.      Sharon Walker, MSN, APRN, [facility]-  Hematology and Medical Oncology  Clinical Nurse Specialist to Dr. Reich, Dr. Sandoval & Dr. Fields Chart for Scheduling    Med Onc Chart Routing      Follow up with physician 3 weeks. RTC in 3 weeks with labs (CBC,CMP,TSH,B HCG) to see Dr. Jane for infusion   Follow up with RAUL 6 weeks. RTC in 6 weeks with labs (CBC,CMP,TSH,B HCG) to see Sharon for infusion   Infusion scheduling note keytruda given every 3 weeks   Injection scheduling note    Labs CBC, CMP, TSH and B HCG   Lab interval: every 3 weeks     Imaging    Pharmacy appointment    Other referrals        Treatment Plan Information   OP PEMBROLIZUMAB 200MG Q3W   Moshe Jane MD   Upcoming Treatment Dates - OP PEMBROLIZUMAB 200MG Q3W    2/1/2023       Chemotherapy       pembrolizumab (KEYTRUDA) 200 mg in  sodium chloride 0.9% SolP 108 mL infusion  2/22/2023       Chemotherapy       pembrolizumab (KEYTRUDA) 200 mg in sodium chloride 0.9% SolP 108 mL infusion  3/15/2023       Chemotherapy       pembrolizumab (KEYTRUDA) 200 mg in sodium chloride 0.9% SolP 108 mL infusion  4/5/2023       Chemotherapy       pembrolizumab (KEYTRUDA) 200 mg in sodium chloride 0.9% SolP 108 mL infusion

## 2023-01-11 NOTE — PLAN OF CARE
1300 pt arrived to chair with mom.  No complaints at this time.  Did state she had mouth sores and had skipped a visit but didn't have any at this time.  Pt without any questions at this time and began treatment.

## 2023-01-11 NOTE — PLAN OF CARE
1434 pt tolerated treatment without any concerns or questions at this time.  Iv access dc'd and pt ambulated with mom  home.

## 2023-01-12 NOTE — ASSESSMENT & PLAN NOTE
- Significant improvement noted in symptoms. No open lesions but still mildly sensitive.   - Patient wishes to restart pembrolizumab today given improvement in symptoms.   - Continue prednisone taper. Currently on 10 mg every other day.   - Magic mouthwash as needed.   - APAP for additional symptomatic relief.   - Plans to start salt water rinses and use soft bristled toothbrush to help with discomfort.

## 2023-01-12 NOTE — ASSESSMENT & PLAN NOTE
- Cr improved to 1.3 today. Likely remains mildly elevated d/t less PO intake with mucositis. Notes oral intake tolerance has improved.   - Encouraged increased PO intake and extra fluids.   - Reassess in 3 weeks

## 2023-01-12 NOTE — ASSESSMENT & PLAN NOTE
- Continue home medications.   - Encouraged to continue weekly therapist visits and regular psychiatrist visits.   - Doing well overall.

## 2023-01-12 NOTE — ASSESSMENT & PLAN NOTE
Notes recent weight gain after losing 75 lbs.   Has restarted physical activity on treadmill and following Mediterranean diet.   Encouraged healthy diet choices, exercising at least 150 minutes per week, increased water intake, and avoiding sedentary behavior.

## 2023-01-12 NOTE — ASSESSMENT & PLAN NOTE
- Patient wishes to restart pembrolizumab today given continued improvement in mucositis.   - States if mucositis occurs again, she will likely opt to discontinue treatment and monitor.

## 2023-01-17 ENCOUNTER — PATIENT MESSAGE (OUTPATIENT)
Dept: HEMATOLOGY/ONCOLOGY | Facility: CLINIC | Age: 43
End: 2023-01-17
Payer: COMMERCIAL

## 2023-01-23 ENCOUNTER — PATIENT MESSAGE (OUTPATIENT)
Dept: HEMATOLOGY/ONCOLOGY | Facility: CLINIC | Age: 43
End: 2023-01-23
Payer: COMMERCIAL

## 2023-01-23 DIAGNOSIS — R21 RASH: Primary | ICD-10-CM

## 2023-01-24 ENCOUNTER — PATIENT MESSAGE (OUTPATIENT)
Dept: HEMATOLOGY/ONCOLOGY | Facility: CLINIC | Age: 43
End: 2023-01-24
Payer: COMMERCIAL

## 2023-01-24 DIAGNOSIS — C64.1 RENAL CELL CARCINOMA OF RIGHT KIDNEY: Primary | ICD-10-CM

## 2023-01-24 RX ORDER — TRIAMCINOLONE ACETONIDE 5 MG/G
CREAM TOPICAL 2 TIMES DAILY
Qty: 454 G | Refills: 0 | Status: SHIPPED | OUTPATIENT
Start: 2023-01-24 | End: 2023-06-21 | Stop reason: ALTCHOICE

## 2023-01-24 NOTE — TELEPHONE ENCOUNTER
Called her in some steroid cream. I think we may have to stop adjuvant treatment and start surveillance.     Please schedule CT scan prior to her appt with me on 2/1

## 2023-01-29 ENCOUNTER — PATIENT MESSAGE (OUTPATIENT)
Dept: HEMATOLOGY/ONCOLOGY | Facility: CLINIC | Age: 43
End: 2023-01-29
Payer: COMMERCIAL

## 2023-01-29 DIAGNOSIS — K12.30 MUCOSITIS: Primary | ICD-10-CM

## 2023-01-30 RX ORDER — PREDNISONE 10 MG/1
TABLET ORAL
Qty: 151 TABLET | Refills: 0 | Status: SHIPPED | OUTPATIENT
Start: 2023-01-30 | End: 2023-03-20

## 2023-01-30 NOTE — TELEPHONE ENCOUNTER
Patient with repeat immune checkpoint reaction including stomatits and rash. Previously responded to steroid taper.    Restart prednisone 60mg daily tapered by 10mg every 7 days. Patient to follow up on Wed in clinic.

## 2023-01-31 ENCOUNTER — HOSPITAL ENCOUNTER (OUTPATIENT)
Dept: RADIOLOGY | Facility: HOSPITAL | Age: 43
Discharge: HOME | End: 2023-01-31
Attending: HOSPITALIST
Payer: COMMERCIAL

## 2023-01-31 DIAGNOSIS — C64.1 RENAL CELL CARCINOMA OF RIGHT KIDNEY: ICD-10-CM

## 2023-01-31 PROCEDURE — 71270 CT THORAX DX C-/C+: CPT | Mod: TC

## 2023-01-31 PROCEDURE — 74178 CT CHEST ABDOMEN PELVIS W W/O CONTRAST (XPD): ICD-10-PCS | Mod: 26,,, | Performed by: INTERNAL MEDICINE

## 2023-01-31 PROCEDURE — 74177 CT ABD & PELVIS W/CONTRAST: CPT | Mod: TC

## 2023-01-31 PROCEDURE — 74178 CT ABD&PLV WO CNTR FLWD CNTR: CPT | Mod: 26,,, | Performed by: INTERNAL MEDICINE

## 2023-01-31 PROCEDURE — 71270 CT THORAX DX C-/C+: CPT | Mod: 26,,, | Performed by: INTERNAL MEDICINE

## 2023-01-31 PROCEDURE — 25500020 PHARM REV CODE 255: Performed by: HOSPITALIST

## 2023-01-31 PROCEDURE — 71260 CT THORAX DX C+: CPT | Mod: TC

## 2023-01-31 PROCEDURE — 71270 CT CHEST ABDOMEN PELVIS W W/O CONTRAST (XPD): ICD-10-PCS | Mod: 26,,, | Performed by: INTERNAL MEDICINE

## 2023-01-31 RX ADMIN — IOHEXOL 100 ML: 350 INJECTION, SOLUTION INTRAVENOUS at 04:01

## 2023-01-31 NOTE — PROGRESS NOTES
MEDICAL ONCOLOGY FOLLOW-UP VISIT.     Best Contact Phone Number(s): [phone] (home)      Cancer/Stage/TNM:    Cancer Staging   Renal cell carcinoma  Staging form: Kidney, AJCC 8th Edition  - Pathologic: Stage Unknown (pT2b, pNX, cM0) - Signed by Moshe Jane MD on 10/6/2022  - Pathologic: Stage II (pT2, pN0, cM0) - Signed by Moshe Jane MD on 11/9/2022         Reason for visit: Ms. 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 She presents to medical oncology clinic after having restarted adjuvant pembrolizumab 1/11/2023 and subsequent recurrence of stomatitsi and rash.    Interval History:     Underwent C3D1 pembrolizumab 1/11/23 after delay due to prior stomatits. Approximately 1 week after C3D1 devloped recurrence of the mouth sores affecting ability to eat and drink due to discomfort along with new rash over the palms and soles of her feet spreading proximally up her amrs with mild purritis. Also notes several non healing 'bumps' over her legs bilaterally. Restarted prednisone 60mg daily on 1/30/23. Has moderate improvement in mouth pain. Scattered papules over legs persist.     No fevers or chills. Notes some dizziness upon standing. Rarely can occur without standing. Notes some new memory issues and occaisional word finding difficulties. Also has occaisional headaches. NO focal weakness. Has shortness of breath with exertion. No cough. No N/V/D. No abdominal pain. No urinary complaints and normal BM.       Oncology History   Renal cell carcinoma   8/6/2022 Imaging Significant Findings    CTA performed for chest pain incidentally found a large partially visualized enhancing mass in the right kidney measuring at least 12 cm with adjacent fat stranding and prominent vessels highly concerning for  renal cell carcinoma.    Subsequent non contrast CT a/p confirms 13.7 x 12.2 x 14.3 (AP x TV x CC) intermediate density solid right renal mass with central necrosis and scattered punctate calcifications are concerning for malignancy.     8/9/2022 Imaging Significant Findings    MRI a/p shows eterogeneously enhancing solid right renal mass highly concerning for RCC. No evidence of filling defect or enhancing tumor thrombus in the right renal artery or vein.  Mildly prominent mesenteric lymph nodes, as above.  Metastatic disease difficult to definitively exclude     9/14/2022 Surgery    Right nephrectomy - pathology with ccRCC nuclear grade 4 up to 13.3 cm. Tumor confined to the kidney. Margins negative Rhabdoid features present with associated necrosis. [id number]     11/1/2022 Imaging Significant Findings    CT torso with several up to 3mm micronodules in the lung and prominent but small mesenteric nodes overall felt generally stable.     11/9/2022 -  Chemotherapy    Treatment Summary   Plan Name: OP PEMBROLIZUMAB 200MG Q3W  Treatment Goal: Curative  Status: Active  Start Date: 11/9/2022  End Date: 11/13/2024 (Planned)  Provider: Moshe Jane MD  Chemotherapy: [No matching medication found in this treatment plan]       11/9/2022 Cancer Staged    Staging form: Kidney, AJCC 8th Edition  - Pathologic: Stage II (pT2, pN0, cM0)       12/14/2022 Notable Event    Pembrolizumab held in setting of severe mucositis. Start prednisone taper starting at 60mg po daily.     1/11/2023 -  Immunotherapy    Restart immunotherapy with pembrolizumab 200mg IV q3 weeks     1/30/2023 Notable Event    Stop pembrolizumab for second time due to recurrent stomatitis/mucositis with associated rash. Restart steroid taper at 60mg prednisone.     1/31/2023 Imaging Significant Findings    CT torso with no evidence of recurrent disease. Stable mesenteric nodes up to 1.0cm. Also 0.9cm thyroid nodule.            Physical Exam:   /61 (BP  "Location: Left arm, Patient Position: Sitting, BP Method: Large (Automatic))   Pulse 93   Temp 97.4 °F (36.3 °C) (Oral)   Resp 18   Ht 5' 6" (1.676 m)   Wt 121.4 kg (267 lb 10.2 oz)   SpO2 97%   BMI 43.20 kg/m²      ECOG Performance Status: (foot note - ECOG PS provided by [employer]cology [employer]) 1 - Symptomatic but completely ambulatory    Physical Exam  Constitutional:       General: She is not in acute distress.  HENT:      Head: Normocephalic.      Comments: Thrush over tongue. Small ulcers over buccal mucosa and soft palate     Mouth/Throat:      Pharynx: Oropharyngeal exudate present. No posterior oropharyngeal erythema.      Comments: Significant improvement in prior mucosal ulceration  Eyes:      Conjunctiva/sclera: Conjunctivae normal.      Pupils: Pupils are equal, round, and reactive to light.   Cardiovascular:      Rate and Rhythm: Normal rate.   Pulmonary:      Effort: Pulmonary effort is normal. No respiratory distress.   Abdominal:      General: There is no distension.      Palpations: Abdomen is soft.      Tenderness: There is no abdominal tenderness.   Musculoskeletal:         General: Normal range of motion.      Cervical back: Normal range of motion and neck supple.   Skin:     General: Skin is warm.      Findings: Rash (scattered papules over legs. Dominant 0.5-1cm nonhealing papule with overlying scale anterior left shin) present.   Neurological:      General: No focal deficit present.      Mental Status: She is alert and oriented to person, place, and time.      Motor: No weakness.   Psychiatric:         Mood and Affect: Mood normal.         Behavior: Behavior normal.         Thought Content: Thought content normal.         Labs:   Recent Results (from the past 48 hour(s))   Comprehensive Metabolic Panel    Collection Time: 02/01/23 11:55 AM   Result Value Ref Range    Sodium 138 136 - 145 mmol/L    Potassium 4.0 3.5 - 5.1 mmol/L    Chloride 107 95 - 110 mmol/L    CO2 22 (L) " 23 - 29 mmol/L    Glucose 132 (H) 70 - 110 mg/dL    BUN 17 6 - 20 mg/dL    Creatinine 1.0 0.5 - 1.4 mg/dL    Calcium 9.4 8.7 - 10.5 mg/dL    Total Protein 6.9 6.0 - 8.4 g/dL    Albumin 3.9 3.5 - 5.2 g/dL    Total Bilirubin 0.2 0.1 - 1.0 mg/dL    Alkaline Phosphatase 64 55 - 135 U/L    AST 12 10 - 40 U/L    ALT 29 10 - 44 U/L    Anion Gap 9 8 - 16 mmol/L    eGFR >60.0 >60 mL/min/1.73 m^2   CBC Oncology    Collection Time: 02/01/23 11:55 AM   Result Value Ref Range    WBC 14.62 (H) 3.90 - 12.70 K/uL    RBC 3.96 (L) 4.00 - 5.40 M/uL    Hemoglobin 11.1 (L) 12.0 - 16.0 g/dL    Hematocrit 35.7 (L) 37.0 - 48.5 %    MCV 90 82 - 98 fL    MCH 28.0 27.0 - 31.0 pg    MCHC 31.1 (L) 32.0 - 36.0 g/dL    RDW 13.5 11.5 - 14.5 %    Platelets 338 150 - 450 K/uL    MPV 10.3 9.2 - 12.9 fL    Gran # (ANC) 11.8 (H) 1.8 - 7.7 K/uL    Immature Grans (Abs) 0.10 (H) 0.00 - 0.04 K/uL   TSH    Collection Time: 02/01/23 11:55 AM   Result Value Ref Range    TSH 0.656 0.400 - 4.000 uIU/mL   HCG, QUANTITATIVE, PREGNANCY    Collection Time: 02/01/23 11:55 AM   Result Value Ref Range    HCG Quant <2.4 See Text mIU/mL            Imaging:     CT Chest Abdomen Pelvis W W/O Contrast (XPD)  Narrative: EXAMINATION:  CT CHEST ABDOMEN PELVIS W W/O CONTRAST (XPD)    CLINICAL HISTORY:  Kidney cancer, staging; Malignant neoplasm of right kidney, except renal pelvis    TECHNIQUE:  Axial images of the abdomen and pelvis were obtained prior to the administration of intravenous contrast.  Axial images of the chest, abdomen, and pelvis were acquired  after the use of 100 cc [id number] IV contrast.  Delayed phase imaging of the abdomen and pelvis was then obtained.  Coronal and sagittal reconstructions were also obtained    COMPARISON:  CT 11/01/2022    FINDINGS:  Thoracic soft tissues: 0.9 cm left thyroid lobe nodule.  No axillary lymphadenopathy.    Aorta: Thoracic aorta is normal in caliber and contour with no significant calcific atherosclerosis.    Heart: Normal in  size. No pericardial effusion. No significant calcific coronary atherosclerosis.    Mare/Mediastinum: No new or enlarging lymph nodes.    Lungs: Trachea and bronchi are patent.  Lung symmetrically expanded without consolidation.  Small pleural based nodule measuring 0.2 cm (series 3, image 64), which is unchanged from prior.  No evidence of new or enlarging pulmonary nodules.  No pleural fluid or pneumothorax.    Esophagus: Unremarkable.    Stomach and duodenum: Unremarkable.    Liver: Hepatomegaly with liver measuring 19 cm in craniocaudal dimension.  Hepatic steatosis.  No focal hepatic lesion.    Gallbladder: No calcified gallstones.    Bile Ducts: No evidence of dilated ducts.    Pancreas: Prominent fatty focus in the pancreatic head, unchanged from prior.    Spleen: Unremarkable.    Adrenals: Unremarkable.    Kidneys/Ureters: Left kidney is normal in size and location.  Normal parenchymal enhancement and excretion.  No hydronephrosis or nephrolithiasis.  No ureteral dilation.    Right kidney is surgically absent.  There is no definite evidence of nodularity or recurrence at the surgical site.  Vascular stump appears unremarkable.    Bladder: No evidence of wall thickening.    Reproductive organs: Unremarkable.    Bowel/Mesentery: Small bowel is normal in caliber with no evidence of obstruction.  No evidence of inflammation or wall thickening.  Colon demonstrates no focal wall thickening.  The appendix appears unremarkable.    Peritoneum: No intraperitoneal free air or fluid.    Lymph nodes: Few stable prominent mesenteric nodes, measuring up to 1.0 cm (series 3, image 154).  Multiple shotty pericaval and periaortic lymph nodes.  No evidence of new or enlarging lymph nodes    Abdominal wall:  Postoperative change of prior laparotomy.    Vasculature: No aneurysm. No significant calcific atherosclerosis.    Bones: No acute fracture. No suspicious osseous lesions.  Impression: Postoperative change of right  nephrectomy for renal cell carcinoma, without evidence of nodularity or local recurrence at the surgical site.    No lesions in the chest, abdomen, or pelvis to suggest new or worsening metastatic disease.    Few prominent right-sided mesenteric lymph nodes measuring up to 1.0 cm, unchanged from prior.  Recommend continued attention on follow-up.    0.9 cm left thyroid lobe nodule, which may be better evaluated with thyroid ultrasound.    Hepatomegaly and steatosis.    Electronically signed by resident: Etta Gore  Date:    02/01/2023  Time:    09:22    Electronically signed by: Abbey Waller MD  Date:    02/01/2023  Time:    11:54              Diagnoses:       1. Renal cell carcinoma of right kidney    2. Rash    3. Thyroid nodule    4. Long term systemic steroid user    5. Thrush    6. Nonintractable headache, unspecified chronicity pattern, unspecified headache type    7. Major depressive disorder in partial remission, unspecified whether recurrent    8. Mucositis              Assessment and Plan:     1. Renal cell carcinoma of right kidney  Overview:  Patient found to have high risk likely stage II ([id number]) with nuclear grade 4 and rhabdoid features on nephrectomy 9/14/22. Started adjuvant pembrolizumab 11/9/22. Stopped 1/31/23 due to recurrent ICI side effects.    Assessment & Plan:  No plans for further adjuvant treatment.    Surveillance per NCCN guidelines for stage III renal cell carcinoma includes clinical exam, CMP, and cross sectional imaging every 3-6 months for 3 years then annually through year 5.     - CT scan yesterday without recurrence  - Repeat CT torso in 12 weeks  - FU in 4 weeks with MR head given headaches      Orders:  -     Ambulatory referral/consult to Dermatology; Future; Expected date: 02/08/2023  -     CT Chest Abdomen Pelvis With Contrast; Future; Expected date: 02/01/2023  -     MRI Brain With Contrast; Future; Expected date: 02/01/2023    2. Rash  Assessment & Plan:  -  Referral to dermatology  - Steroid taper as above    Orders:  -     Ambulatory referral/consult to Dermatology; Future; Expected date: 02/08/2023    3. Thyroid nodule  -     Testosterone; Future; Expected date: 02/01/2023  -     T4, Free; Future; Expected date: 02/01/2023  -     T3; Future; Expected date: 02/01/2023  -     US Thyroid; Future; Expected date: 02/01/2023    4. Long term systemic steroid user  -     famotidine (PEPCID) 20 MG tablet; Take 1 tablet (20 mg total) by mouth 2 (two) times daily.  Dispense: 60 tablet; Refill: 11  -     sulfamethoxazole-trimethoprim 400-80mg (BACTRIM) 400-80 mg per tablet; Take 1 tablet by mouth once daily.  Dispense: 30 tablet; Refill: 1    5. Thrush  -     nystatin (MYCOSTATIN) 100,000 unit/mL suspension; Take 6 mLs (600,000 Units total) by mouth 4 (four) times daily. for 10 days  Dispense: 473 mL; Refill: 0    6. Nonintractable headache, unspecified chronicity pattern, unspecified headache type  -     MRI Brain With Contrast; Future; Expected date: 02/01/2023    7. Major depressive disorder in partial remission, unspecified whether recurrent  Overview:  Home medications include sertraline, risperidone, and lamotrigine.    Assessment & Plan:  - Continue home medications  - Follows with her therapist      8. Mucositis  Overview:  Patient with painful mucositis/stomatitis developed shortly after starting pembrolizumab. Thought consistent with ICI side effect and improved with starting systemic prednisone. Recurred on pembrolizumab rechallenge.    Assessment & Plan:  - Continue prednisone taper  - Nystatin for thrush                 Route Chart for Scheduling    Med Onc Chart Routing      Follow up with physician 4 weeks.   Follow up with RAUL    Infusion scheduling note    Injection scheduling note    Labs CBC, CMP and TSH   Lab interval:     Imaging MRI      Pharmacy appointment    Other referrals        Treatment Plan Information   OP PEMBROLIZUMAB 200MG Q3W   Moshe ARCE  MD Buck   Upcoming Treatment Dates - OP PEMBROLIZUMAB 200MG Q3W    2/1/2023       Chemotherapy       pembrolizumab (KEYTRUDA) 200 mg in sodium chloride 0.9% SolP 108 mL infusion  2/22/2023       Chemotherapy       pembrolizumab (KEYTRUDA) 200 mg in sodium chloride 0.9% SolP 108 mL infusion  3/15/2023       Chemotherapy       pembrolizumab (KEYTRUDA) 200 mg in sodium chloride 0.9% SolP 108 mL infusion  4/5/2023       Chemotherapy       pembrolizumab (KEYTRUDA) 200 mg in sodium chloride 0.9% SolP 108 mL infusion         Moshe Jane MD  Hematology/Oncology  [facility]

## 2023-01-31 NOTE — ASSESSMENT & PLAN NOTE
No plans for further adjuvant treatment.    Surveillance per NCCN guidelines for stage III renal cell carcinoma includes clinical exam, CMP, and cross sectional imaging every 3-6 months for 3 years then annually through year 5.     - CT scan yesterday without recurrence  - Repeat CT torso in 12 weeks  - FU in 4 weeks with MR head given headaches

## 2023-02-01 ENCOUNTER — TELEPHONE (OUTPATIENT)
Dept: DERMATOLOGY | Facility: CLINIC | Age: 43
End: 2023-02-01
Payer: COMMERCIAL

## 2023-02-01 ENCOUNTER — OFFICE VISIT (OUTPATIENT)
Dept: HEMATOLOGY/ONCOLOGY | Facility: CLINIC | Age: 43
End: 2023-02-01
Payer: COMMERCIAL

## 2023-02-01 ENCOUNTER — LAB VISIT (OUTPATIENT)
Dept: LAB | Facility: HOSPITAL | Age: 43
End: 2023-02-01
Attending: HOSPITALIST
Payer: COMMERCIAL

## 2023-02-01 VITALS
OXYGEN SATURATION: 97 % | SYSTOLIC BLOOD PRESSURE: 122 MMHG | RESPIRATION RATE: 18 BRPM | DIASTOLIC BLOOD PRESSURE: 61 MMHG | HEIGHT: 66 IN | WEIGHT: 267.63 LBS | TEMPERATURE: 97 F | HEART RATE: 93 BPM | BODY MASS INDEX: 43.01 KG/M2

## 2023-02-01 DIAGNOSIS — K12.30 MUCOSITIS: ICD-10-CM

## 2023-02-01 DIAGNOSIS — C64.1 RENAL CELL CARCINOMA OF RIGHT KIDNEY: Primary | ICD-10-CM

## 2023-02-01 DIAGNOSIS — R51.9 NONINTRACTABLE HEADACHE, UNSPECIFIED CHRONICITY PATTERN, UNSPECIFIED HEADACHE TYPE: ICD-10-CM

## 2023-02-01 DIAGNOSIS — B37.0 THRUSH: ICD-10-CM

## 2023-02-01 DIAGNOSIS — C64.1 RENAL CELL CARCINOMA OF RIGHT KIDNEY: ICD-10-CM

## 2023-02-01 DIAGNOSIS — Z79.52 LONG TERM SYSTEMIC STEROID USER: ICD-10-CM

## 2023-02-01 DIAGNOSIS — F32.4 MAJOR DEPRESSIVE DISORDER IN PARTIAL REMISSION, UNSPECIFIED WHETHER RECURRENT: ICD-10-CM

## 2023-02-01 DIAGNOSIS — R21 RASH: ICD-10-CM

## 2023-02-01 DIAGNOSIS — E04.1 THYROID NODULE: ICD-10-CM

## 2023-02-01 PROBLEM — N17.9 AKI (ACUTE KIDNEY INJURY): Status: RESOLVED | Noted: 2022-12-21 | Resolved: 2023-02-01

## 2023-02-01 LAB
ALBUMIN SERPL BCP-MCNC: 3.9 G/DL (ref 3.5–5.2)
ALP SERPL-CCNC: 64 U/L (ref 55–135)
ALT SERPL W/O P-5'-P-CCNC: 29 U/L (ref 10–44)
ANION GAP SERPL CALC-SCNC: 9 MMOL/L (ref 8–16)
AST SERPL-CCNC: 12 U/L (ref 10–40)
BILIRUB SERPL-MCNC: 0.2 MG/DL (ref 0.1–1)
BUN SERPL-MCNC: 17 MG/DL (ref 6–20)
CALCIUM SERPL-MCNC: 9.4 MG/DL (ref 8.7–10.5)
CHLORIDE SERPL-SCNC: 107 MMOL/L (ref 95–110)
CO2 SERPL-SCNC: 22 MMOL/L (ref 23–29)
CREAT SERPL-MCNC: 1 MG/DL (ref 0.5–1.4)
ERYTHROCYTE [DISTWIDTH] IN BLOOD BY AUTOMATED COUNT: 13.5 % (ref 11.5–14.5)
EST. GFR  (NO RACE VARIABLE): >60 ML/MIN/1.73 M^2
GLUCOSE SERPL-MCNC: 132 MG/DL (ref 70–110)
HCG INTACT+B SERPL-ACNC: <2.4 MIU/ML
HCT VFR BLD AUTO: 35.7 % (ref 37–48.5)
HGB BLD-MCNC: 11.1 G/DL (ref 12–16)
IMM GRANULOCYTES # BLD AUTO: 0.1 K/UL (ref 0–0.04)
MCH RBC QN AUTO: 28 PG (ref 27–31)
MCHC RBC AUTO-ENTMCNC: 31.1 G/DL (ref 32–36)
MCV RBC AUTO: 90 FL (ref 82–98)
NEUTROPHILS # BLD AUTO: 11.8 K/UL (ref 1.8–7.7)
PLATELET # BLD AUTO: 338 K/UL (ref 150–450)
PMV BLD AUTO: 10.3 FL (ref 9.2–12.9)
POTASSIUM SERPL-SCNC: 4 MMOL/L (ref 3.5–5.1)
PROT SERPL-MCNC: 6.9 G/DL (ref 6–8.4)
RBC # BLD AUTO: 3.96 M/UL (ref 4–5.4)
SODIUM SERPL-SCNC: 138 MMOL/L (ref 136–145)
TSH SERPL DL<=0.005 MIU/L-ACNC: 0.66 UIU/ML (ref 0.4–4)
WBC # BLD AUTO: 14.62 K/UL (ref 3.9–12.7)

## 2023-02-01 PROCEDURE — 84443 ASSAY THYROID STIM HORMONE: CPT | Performed by: HOSPITALIST

## 2023-02-01 PROCEDURE — 80053 COMPREHEN METABOLIC PANEL: CPT | Performed by: HOSPITALIST

## 2023-02-01 PROCEDURE — 3078F PR MOST RECENT DIASTOLIC BLOOD PRESSURE < 80 MM HG: ICD-10-PCS | Mod: CPTII,S$GLB,, | Performed by: HOSPITALIST

## 2023-02-01 PROCEDURE — 84702 CHORIONIC GONADOTROPIN TEST: CPT | Performed by: REGISTERED NURSE

## 2023-02-01 PROCEDURE — 3078F DIAST BP <80 MM HG: CPT | Mod: CPTII,S$GLB,, | Performed by: HOSPITALIST

## 2023-02-01 PROCEDURE — 3008F PR BODY MASS INDEX (BMI) DOCUMENTED: ICD-10-PCS | Mod: CPTII,S$GLB,, | Performed by: HOSPITALIST

## 2023-02-01 PROCEDURE — 3074F PR MOST RECENT SYSTOLIC BLOOD PRESSURE < 130 MM HG: ICD-10-PCS | Mod: CPTII,S$GLB,, | Performed by: HOSPITALIST

## 2023-02-01 PROCEDURE — 99999 PR PBB SHADOW E&M-EST. PATIENT-LVL IV: ICD-10-PCS | Mod: PBBFAC,,, | Performed by: HOSPITALIST

## 2023-02-01 PROCEDURE — 3008F BODY MASS INDEX DOCD: CPT | Mod: CPTII,S$GLB,, | Performed by: HOSPITALIST

## 2023-02-01 PROCEDURE — 99215 OFFICE O/P EST HI 40 MIN: CPT | Mod: S$GLB,,, | Performed by: HOSPITALIST

## 2023-02-01 PROCEDURE — 3074F SYST BP LT 130 MM HG: CPT | Mod: CPTII,S$GLB,, | Performed by: HOSPITALIST

## 2023-02-01 PROCEDURE — 99999 PR PBB SHADOW E&M-EST. PATIENT-LVL IV: CPT | Mod: PBBFAC,,, | Performed by: HOSPITALIST

## 2023-02-01 PROCEDURE — 85027 COMPLETE CBC AUTOMATED: CPT | Performed by: HOSPITALIST

## 2023-02-01 PROCEDURE — 99215 PR OFFICE/OUTPT VISIT, EST, LEVL V, 40-54 MIN: ICD-10-PCS | Mod: S$GLB,,, | Performed by: HOSPITALIST

## 2023-02-01 RX ORDER — FAMOTIDINE 20 MG/1
20 TABLET, FILM COATED ORAL 2 TIMES DAILY
Qty: 60 TABLET | Refills: 11 | Status: SHIPPED | OUTPATIENT
Start: 2023-02-01 | End: 2024-03-28

## 2023-02-01 RX ORDER — NYSTATIN 100000 [USP'U]/ML
6 SUSPENSION ORAL 4 TIMES DAILY
Qty: 473 ML | Refills: 0 | Status: SHIPPED | OUTPATIENT
Start: 2023-02-01 | End: 2023-02-22 | Stop reason: SDUPTHER

## 2023-02-01 RX ORDER — SULFAMETHOXAZOLE AND TRIMETHOPRIM 400; 80 MG/1; MG/1
1 TABLET ORAL DAILY
Qty: 30 TABLET | Refills: 1 | Status: SHIPPED | OUTPATIENT
Start: 2023-02-01 | End: 2023-03-03

## 2023-02-01 NOTE — TELEPHONE ENCOUNTER
----- Message from Dia Jesus MD sent at 2/1/2023  3:45 PM CST -----  Can we please put her in acute derm sometime soon? Cancer pt with immunotherapy rash.  ----- Message -----  From: Moshe Jane MD  Sent: 2/1/2023   2:14 PM CST  To: MD Medardo Faulkner - Ms. Griffith has been on pembrolizumab for adjuvant tx of stage III ccRCC. Unfortunately developed severe mucositis requiring steriod taper. Rechallenged with recurrent symptoms and now rash which looks a bit atypical. Involves her hands and soles. Has some somewhat atypical appearing papules she describes non-healing 'bites' over her legs.    I restarted steroids due to the severe stomattis but the rash is persistent. Can you take a look at her? Thanks!    Moshe

## 2023-02-01 NOTE — PATIENT INSTRUCTIONS
Because you had recurrence of the severe mouth sores along with rash associated with the pembrolizumab treatment we will forgo further adjuvant treatment for your renal cell carcinoma. Please continue with steroid taper with 60mg of prednisone tapered by 10mg every week.     - Continue steroid taper as described  - Start famotidine and Bactrim to help prevent side effect of long term steroids  - Start Nystatin swish and spit 4 times daily x 10 days for thrush  - Referral to dermatology for further evaluation of skin rash  - MR of head given memory lapses and headaches  - FU with Dr. Jane in 4 weeks  - Next imaging for surveillance in ~12 weeks

## 2023-02-01 NOTE — Clinical Note
Medardo Alvares - Ms. Griffith has been on pembrolizumab for adjuvant tx of stage III ccRCC. Unfortunately developed severe mucositis requiring steriod taper. Rechallenged with recurrent symptoms and now rash which looks a bit atypical. Involves her hands and soles. Has some somewhat atypical appearing papules she describes non-healing 'bites' over her legs.  I restarted steroids due to the severe stomattis but the rash is persistent. Can you take a look at her? Thanks!  Moshe

## 2023-02-02 ENCOUNTER — TELEPHONE (OUTPATIENT)
Dept: DERMATOLOGY | Facility: CLINIC | Age: 43
End: 2023-02-02
Payer: COMMERCIAL

## 2023-02-02 ENCOUNTER — PATIENT MESSAGE (OUTPATIENT)
Dept: DERMATOLOGY | Facility: CLINIC | Age: 43
End: 2023-02-02
Payer: COMMERCIAL

## 2023-02-07 ENCOUNTER — OFFICE VISIT (OUTPATIENT)
Dept: DERMATOLOGY | Facility: CLINIC | Age: 43
End: 2023-02-07
Payer: COMMERCIAL

## 2023-02-07 DIAGNOSIS — B37.0 ORAL THRUSH: ICD-10-CM

## 2023-02-07 DIAGNOSIS — L30.9 DERMATITIS: Primary | ICD-10-CM

## 2023-02-07 PROCEDURE — 11102 TANGNTL BX SKIN SINGLE LES: CPT | Mod: S$GLB,,, | Performed by: DERMATOLOGY

## 2023-02-07 PROCEDURE — 99204 OFFICE O/P NEW MOD 45 MIN: CPT | Mod: 25,S$GLB,, | Performed by: DERMATOLOGY

## 2023-02-07 PROCEDURE — 99999 PR PBB SHADOW E&M-EST. PATIENT-LVL II: CPT | Mod: PBBFAC,,,

## 2023-02-07 PROCEDURE — 88305 TISSUE EXAM BY PATHOLOGIST: CPT | Mod: 26,,, | Performed by: PATHOLOGY

## 2023-02-07 PROCEDURE — 99204 PR OFFICE/OUTPT VISIT, NEW, LEVL IV, 45-59 MIN: ICD-10-PCS | Mod: 25,S$GLB,, | Performed by: DERMATOLOGY

## 2023-02-07 PROCEDURE — 88305 TISSUE EXAM BY PATHOLOGIST: CPT | Performed by: PATHOLOGY

## 2023-02-07 PROCEDURE — 11102 PR TANGENTIAL BIOPSY, SKIN, SINGLE LESION: ICD-10-PCS | Mod: S$GLB,,, | Performed by: DERMATOLOGY

## 2023-02-07 PROCEDURE — 99999 PR PBB SHADOW E&M-EST. PATIENT-LVL II: ICD-10-PCS | Mod: PBBFAC,,,

## 2023-02-07 PROCEDURE — 88305 TISSUE EXAM BY PATHOLOGIST: ICD-10-PCS | Mod: 26,,, | Performed by: PATHOLOGY

## 2023-02-07 RX ORDER — CLOBETASOL PROPIONATE 0.5 MG/G
CREAM TOPICAL 2 TIMES DAILY
Qty: 60 G | Refills: 3 | Status: SHIPPED | OUTPATIENT
Start: 2023-02-07 | End: 2023-04-20 | Stop reason: SDUPTHER

## 2023-02-07 RX ORDER — FLUCONAZOLE 150 MG/1
150 TABLET ORAL
Qty: 2 TABLET | Refills: 0 | Status: SHIPPED | OUTPATIENT
Start: 2023-02-07 | End: 2023-02-22 | Stop reason: ALTCHOICE

## 2023-02-07 NOTE — PROGRESS NOTES
Subjective:       Patient ID:  Teagan Griffith is a 42 y.o. female who presents for   Chief Complaint   Patient presents with    Rash     43 yo F with a history of renal cell carcinoma (treated with pembrolizumab) presents to acute dermatology clinic for evaluation of a rash. Patient reports she developed sores in her mouth 6 weeks ago in conjunction with a few bumps on her left leg. Her oncologist, Dr. Jane, stopped the pembrolizumab and started patient on prednisone for a diagnosis of mucositis. Then patient restarted the pembrolizumab and subsequently developed a rash on her feet, ankles, hands, and wrists, in conjunction with mouth sores again. Pembrolizumab was again stopped and patient started on a prednisone taper. Reports the rash has improved with the prednisone. Denies any associated itching or pain. Of note, patient reports a similar rash occurred 5 years ago on her wrists and ankles and caused her nails to become ragged and brittle. The rash resolved on its own at that time but her brittle nails persisted.      Review of Systems   HENT:  Positive for mouth sores.    Skin:  Positive for rash. Negative for itching.      Objective:    Physical Exam   Constitutional: She appears well-developed and well-nourished.   HENT:   Mouth/Throat:       Neurological: She is alert and oriented to person, place, and time.   Skin:   Areas Examined (abnormalities noted in diagram):   Head / Face Inspection Performed  Neck Inspection Performed  Abdomen Inspection Performed  Back Inspection Performed  RUE Inspected  LUE Inspection Performed  RLE Inspected  LLE Inspection Performed  Nails and Digits Inspection Performed            Diagram Legend     Erythematous scaling macule/papule c/w actinic keratosis       Vascular papule c/w angioma      Pigmented verrucoid papule/plaque c/w seborrheic keratosis      Yellow umbilicated papule c/w sebaceous hyperplasia      Irregularly shaped tan macule c/w lentigo     1-2 mm  smooth white papules consistent with Milia      Movable subcutaneous cyst with punctum c/w epidermal inclusion cyst      Subcutaneous movable cyst c/w pilar cyst      Firm pink to brown papule c/w dermatofibroma      Pedunculated fleshy papule(s) c/w skin tag(s)      Evenly pigmented macule c/w junctional nevus     Mildly variegated pigmented, slightly irregular-bordered macule c/w mildly atypical nevus      Flesh colored to evenly pigmented papule c/w intradermal nevus       Pink pearly papule/plaque c/w basal cell carcinoma      Erythematous hyperkeratotic cursted plaque c/w SCC      Surgical scar with no sign of skin cancer recurrence      Open and closed comedones      Inflammatory papules and pustules      Verrucoid papule consistent consistent with wart     Erythematous eczematous patches and plaques     Dystrophic onycholytic nail with subungual debris c/w onychomycosis     Umbilicated papule    Erythematous-base heme-crusted tan verrucoid plaque consistent with inflamed seborrheic keratosis     Erythematous Silvery Scaling Plaque c/w Psoriasis     See annotation                            Assessment / Plan:      Pathology Orders:       Normal Orders This Visit    Specimen to Pathology, Dermatology     Questions:    Procedure Type: Dermatology and skin neoplasms    Number of Specimens: 1    ------------------------: -------------------------    Spec 1 Procedure: Biopsy    Spec 1 Clinical Impression: LP vs guttate psoriasis vs other    Spec 1 Source: right foot    Clinical Information: 43 yo F on pembrolizumab for RCC presents with 1 week history of erythematous to violaceous papules on bilateral feet, ankles, hands, and wrists in conjunction with oral erosions and eligio striae    Release to patient: Immediate          Dermatitis  -     clobetasoL (TEMOVATE) 0.05 % cream; Apply topically 2 (two) times daily.  Dispense: 60 g; Refill: 3  -     Specimen to Pathology, Dermatology    Oral thrush  -      fluconazole (DIFLUCAN) 150 MG Tab; Take 1 tablet (150 mg total) by mouth every 7 days. for 2 doses  Dispense: 2 tablet; Refill: 0    Patient with RCC on pembrolizumab, recently discontinued due to concerns for recurrent mucositis and rash. Also with oral thrush 2/2 steroids. Rash most consistent with lichen planus given morphology, oral erosions/sores, eligio striae-like lesions of buccal mucosa, and dystrophic nail changes. Ddx includes LP vs guttate psoriasis. Lichen planus and other inflammatory disorders are commonly seen with immune checkpoint inhibitors like pembrolizumab. Patient also describes a history of a similar appearing rash in the past, so may already have predisposition for exacerbation. Often these disorders can be treated with medications and the immunotherapy does not necessarily need to be discontinued.   - Shave biopsy of right foot performed today, as below  - Continue po prednisone taper per Dr. Jane  - Start clobetasol 0.05% cream BID to affected areas. Counseled patient on side effects of topical steroids and instructed not to use on face, armpits, or groin  - Start diflucan 150 mg once a week for 2 weeks for oral thrush  - Will follow up results    Mildred Ochoa MD  Dermatology, PGY-3

## 2023-02-09 ENCOUNTER — PATIENT MESSAGE (OUTPATIENT)
Dept: HEMATOLOGY/ONCOLOGY | Facility: CLINIC | Age: 43
End: 2023-02-09
Payer: COMMERCIAL

## 2023-02-10 ENCOUNTER — PATIENT MESSAGE (OUTPATIENT)
Dept: HEMATOLOGY/ONCOLOGY | Facility: CLINIC | Age: 43
End: 2023-02-10
Payer: COMMERCIAL

## 2023-02-13 ENCOUNTER — PATIENT MESSAGE (OUTPATIENT)
Dept: HEMATOLOGY/ONCOLOGY | Facility: CLINIC | Age: 43
End: 2023-02-13
Payer: COMMERCIAL

## 2023-02-14 LAB
FINAL PATHOLOGIC DIAGNOSIS: NORMAL
GROSS: NORMAL
Lab: NORMAL
MICROSCOPIC EXAM: NORMAL

## 2023-02-22 ENCOUNTER — LAB VISIT (OUTPATIENT)
Dept: LAB | Facility: HOSPITAL | Age: 43
End: 2023-02-22
Attending: HOSPITALIST
Payer: COMMERCIAL

## 2023-02-22 ENCOUNTER — OFFICE VISIT (OUTPATIENT)
Dept: HEMATOLOGY/ONCOLOGY | Facility: CLINIC | Age: 43
End: 2023-02-22
Payer: COMMERCIAL

## 2023-02-22 VITALS
DIASTOLIC BLOOD PRESSURE: 70 MMHG | TEMPERATURE: 99 F | OXYGEN SATURATION: 95 % | HEART RATE: 70 BPM | HEIGHT: 66 IN | RESPIRATION RATE: 18 BRPM | BODY MASS INDEX: 43.35 KG/M2 | WEIGHT: 269.75 LBS | SYSTOLIC BLOOD PRESSURE: 126 MMHG

## 2023-02-22 DIAGNOSIS — K92.1 HEMATOCHEZIA: ICD-10-CM

## 2023-02-22 DIAGNOSIS — K12.30 MUCOSITIS: ICD-10-CM

## 2023-02-22 DIAGNOSIS — E66.01 CLASS 3 SEVERE OBESITY DUE TO EXCESS CALORIES WITH BODY MASS INDEX (BMI) OF 40.0 TO 44.9 IN ADULT, UNSPECIFIED WHETHER SERIOUS COMORBIDITY PRESENT: ICD-10-CM

## 2023-02-22 DIAGNOSIS — R21 RASH: ICD-10-CM

## 2023-02-22 DIAGNOSIS — C64.1 RENAL CELL CARCINOMA OF RIGHT KIDNEY: Primary | ICD-10-CM

## 2023-02-22 DIAGNOSIS — C64.1 RENAL CELL CARCINOMA OF RIGHT KIDNEY: ICD-10-CM

## 2023-02-22 DIAGNOSIS — B37.0 THRUSH: ICD-10-CM

## 2023-02-22 LAB
ALBUMIN SERPL BCP-MCNC: 4.1 G/DL (ref 3.5–5.2)
ALP SERPL-CCNC: 58 U/L (ref 55–135)
ALT SERPL W/O P-5'-P-CCNC: 42 U/L (ref 10–44)
ANION GAP SERPL CALC-SCNC: 13 MMOL/L (ref 8–16)
AST SERPL-CCNC: 17 U/L (ref 10–40)
BILIRUB SERPL-MCNC: 0.3 MG/DL (ref 0.1–1)
BUN SERPL-MCNC: 23 MG/DL (ref 6–20)
CALCIUM SERPL-MCNC: 10.5 MG/DL (ref 8.7–10.5)
CHLORIDE SERPL-SCNC: 101 MMOL/L (ref 95–110)
CO2 SERPL-SCNC: 24 MMOL/L (ref 23–29)
CREAT SERPL-MCNC: 1.5 MG/DL (ref 0.5–1.4)
ERYTHROCYTE [DISTWIDTH] IN BLOOD BY AUTOMATED COUNT: 14 % (ref 11.5–14.5)
EST. GFR  (NO RACE VARIABLE): 44.1 ML/MIN/1.73 M^2
GLUCOSE SERPL-MCNC: 182 MG/DL (ref 70–110)
HCG INTACT+B SERPL-ACNC: <2.4 MIU/ML
HCT VFR BLD AUTO: 39.1 % (ref 37–48.5)
HGB BLD-MCNC: 12.4 G/DL (ref 12–16)
IMM GRANULOCYTES # BLD AUTO: 0.13 K/UL (ref 0–0.04)
MCH RBC QN AUTO: 28.8 PG (ref 27–31)
MCHC RBC AUTO-ENTMCNC: 31.7 G/DL (ref 32–36)
MCV RBC AUTO: 91 FL (ref 82–98)
NEUTROPHILS # BLD AUTO: 12.8 K/UL (ref 1.8–7.7)
PLATELET # BLD AUTO: 309 K/UL (ref 150–450)
PMV BLD AUTO: 9.3 FL (ref 9.2–12.9)
POTASSIUM SERPL-SCNC: 4.9 MMOL/L (ref 3.5–5.1)
PROT SERPL-MCNC: 7.3 G/DL (ref 6–8.4)
RBC # BLD AUTO: 4.3 M/UL (ref 4–5.4)
SODIUM SERPL-SCNC: 138 MMOL/L (ref 136–145)
TSH SERPL DL<=0.005 MIU/L-ACNC: 0.41 UIU/ML (ref 0.4–4)
WBC # BLD AUTO: 15.11 K/UL (ref 3.9–12.7)

## 2023-02-22 PROCEDURE — 3074F SYST BP LT 130 MM HG: CPT | Mod: CPTII,S$GLB,, | Performed by: HOSPITALIST

## 2023-02-22 PROCEDURE — 3008F PR BODY MASS INDEX (BMI) DOCUMENTED: ICD-10-PCS | Mod: CPTII,S$GLB,, | Performed by: HOSPITALIST

## 2023-02-22 PROCEDURE — 80053 COMPREHEN METABOLIC PANEL: CPT | Performed by: HOSPITALIST

## 2023-02-22 PROCEDURE — 99999 PR PBB SHADOW E&M-EST. PATIENT-LVL V: CPT | Mod: PBBFAC,,, | Performed by: HOSPITALIST

## 2023-02-22 PROCEDURE — 85027 COMPLETE CBC AUTOMATED: CPT | Performed by: HOSPITALIST

## 2023-02-22 PROCEDURE — 99999 PR PBB SHADOW E&M-EST. PATIENT-LVL V: ICD-10-PCS | Mod: PBBFAC,,, | Performed by: HOSPITALIST

## 2023-02-22 PROCEDURE — 99215 OFFICE O/P EST HI 40 MIN: CPT | Mod: S$GLB,,, | Performed by: HOSPITALIST

## 2023-02-22 PROCEDURE — 3078F DIAST BP <80 MM HG: CPT | Mod: CPTII,S$GLB,, | Performed by: HOSPITALIST

## 2023-02-22 PROCEDURE — 36415 COLL VENOUS BLD VENIPUNCTURE: CPT | Performed by: REGISTERED NURSE

## 2023-02-22 PROCEDURE — 1159F MED LIST DOCD IN RCRD: CPT | Mod: CPTII,S$GLB,, | Performed by: HOSPITALIST

## 2023-02-22 PROCEDURE — 3008F BODY MASS INDEX DOCD: CPT | Mod: CPTII,S$GLB,, | Performed by: HOSPITALIST

## 2023-02-22 PROCEDURE — 3074F PR MOST RECENT SYSTOLIC BLOOD PRESSURE < 130 MM HG: ICD-10-PCS | Mod: CPTII,S$GLB,, | Performed by: HOSPITALIST

## 2023-02-22 PROCEDURE — 84702 CHORIONIC GONADOTROPIN TEST: CPT | Performed by: REGISTERED NURSE

## 2023-02-22 PROCEDURE — 1159F PR MEDICATION LIST DOCUMENTED IN MEDICAL RECORD: ICD-10-PCS | Mod: CPTII,S$GLB,, | Performed by: HOSPITALIST

## 2023-02-22 PROCEDURE — 3078F PR MOST RECENT DIASTOLIC BLOOD PRESSURE < 80 MM HG: ICD-10-PCS | Mod: CPTII,S$GLB,, | Performed by: HOSPITALIST

## 2023-02-22 PROCEDURE — 84443 ASSAY THYROID STIM HORMONE: CPT | Performed by: HOSPITALIST

## 2023-02-22 PROCEDURE — 99215 PR OFFICE/OUTPT VISIT, EST, LEVL V, 40-54 MIN: ICD-10-PCS | Mod: S$GLB,,, | Performed by: HOSPITALIST

## 2023-02-22 RX ORDER — NYSTATIN 100000 [USP'U]/ML
6 SUSPENSION ORAL 4 TIMES DAILY
Qty: 473 ML | Refills: 0 | Status: SHIPPED | OUTPATIENT
Start: 2023-02-22 | End: 2023-03-04

## 2023-02-22 NOTE — PATIENT INSTRUCTIONS
You continue to recover from your side effects from the pembrolizumab.    - Continue steroid taper as you are doing  - After completing 10mg daily, decrease to 10mg every other day then stop  - Continue nystatin, pepcid, and bactrim while on steroids  - Plan to reschedule MRI brain  - Referral for colonoscopy given blood in stool  - FU in April with repeat CT scan

## 2023-02-22 NOTE — PROGRESS NOTES
MEDICAL ONCOLOGY FOLLOW-UP VISIT.     Best Contact Phone Number(s): [phone] (home)      Cancer/Stage/TNM:    Cancer Staging   Renal cell carcinoma  Staging form: Kidney, AJCC 8th Edition  - Pathologic: Stage Unknown (pT2b, pNX, cM0) - Signed by Moshe Jane MD on 10/6/2022  - Pathologic: Stage II (pT2, pN0, cM0) - Signed by Moshe Jane MD on 11/9/2022         Reason for visit: Ms. 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 She presents to medical oncology clinic after having restarted adjuvant pembrolizumab 1/11/2023 and subsequent recurrence of stomatitsi and rash.    Interval History:     Continues on prednisone 30mg po daily. Tapers every Monday. Mouth sores have resolved. Now with some persistent mouth sensitivy. Rash has also almost entirely resolved. Few scattered spots remain are asymptomatic. Feels like she has been retaining fluid and gaining weight on steroids. Reports lots of anxiety regarding medical issues.       Oncology History   Renal cell carcinoma   8/6/2022 Imaging Significant Findings    CTA performed for chest pain incidentally found a large partially visualized enhancing mass in the right kidney measuring at least 12 cm with adjacent fat stranding and prominent vessels highly concerning for renal cell carcinoma.    Subsequent non contrast CT a/p confirms 13.7 x 12.2 x 14.3 (AP x TV x CC) intermediate density solid right renal mass with central necrosis and scattered punctate calcifications are concerning for malignancy.     8/9/2022 Imaging Significant Findings    MRI a/p shows eterogeneously enhancing solid right renal mass highly concerning for RCC. No evidence of filling defect or enhancing tumor thrombus in the right renal artery or vein.  Mildly prominent mesenteric  What Is The Reason For Today's Visit?: Full Body Skin Examination with No Concerns "lymph nodes, as above.  Metastatic disease difficult to definitively exclude     9/14/2022 Surgery    Right nephrectomy - pathology with ccRCC nuclear grade 4 up to 13.3 cm. Tumor confined to the kidney. Margins negative Rhabdoid features present with associated necrosis. [id number]     11/1/2022 Imaging Significant Findings    CT torso with several up to 3mm micronodules in the lung and prominent but small mesenteric nodes overall felt generally stable.     11/9/2022 - 1/11/2023 Chemotherapy    Treatment Summary   Plan Name: OP PEMBROLIZUMAB 200MG Q3W  Treatment Goal: Curative  Status: Inactive  Start Date: 11/9/2022  End Date: 1/11/2023  Provider: Moshe Jane MD  Chemotherapy: [No matching medication found in this treatment plan]       11/9/2022 Cancer Staged    Staging form: Kidney, AJCC 8th Edition  - Pathologic: Stage II (pT2, pN0, cM0)       12/14/2022 Notable Event    Pembrolizumab held in setting of severe mucositis. Start prednisone taper starting at 60mg po daily.     1/11/2023 -  Immunotherapy    Restart immunotherapy with pembrolizumab 200mg IV q3 weeks     1/30/2023 Notable Event    Stop pembrolizumab for second time due to recurrent stomatitis/mucositis with associated rash. Restart steroid taper at 60mg prednisone.     1/31/2023 Imaging Significant Findings    CT torso with no evidence of recurrent disease. Stable mesenteric nodes up to 1.0cm. Also 0.9cm thyroid nodule.            Physical Exam:   /70 (BP Location: Left arm, Patient Position: Sitting, BP Method: Medium (Automatic))   Pulse 70   Temp 98.7 °F (37.1 °C) (Oral)   Resp 18   Ht 5' 6" (1.676 m)   Wt 122.3 kg (269 lb 11.7 oz)   SpO2 95%   BMI 43.54 kg/m²      ECOG Performance Status: (foot note - ECOG PS provided by Eastern Co[employer] Oncology Group) 1 - Symptomatic but completely ambulatory    Physical Exam  Constitutional:       General: She is not in acute distress.  HENT:      Head: Normocephalic.      Mouth/Throat:      " What Is The Reason For Today's Visit? (Being Monitored For X): concerning skin lesions on an annual basis Pharynx: No oropharyngeal exudate or posterior oropharyngeal erythema.   Eyes:      Conjunctiva/sclera: Conjunctivae normal.      Pupils: Pupils are equal, round, and reactive to light.   Cardiovascular:      Rate and Rhythm: Normal rate.   Pulmonary:      Effort: Pulmonary effort is normal. No respiratory distress.   Abdominal:      General: There is no distension.      Palpations: Abdomen is soft.      Tenderness: There is no abdominal tenderness.   Musculoskeletal:         General: Normal range of motion.      Cervical back: Normal range of motion and neck supple.   Skin:     General: Skin is warm.      Findings: No rash.   Neurological:      General: No focal deficit present.      Mental Status: She is alert and oriented to person, place, and time.      Motor: No weakness.   Psychiatric:         Mood and Affect: Mood normal.         Behavior: Behavior normal.         Thought Content: Thought content normal.         Labs:   Recent Results (from the past 48 hour(s))   Comprehensive Metabolic Panel    Collection Time: 02/22/23 12:50 PM   Result Value Ref Range    Sodium 138 136 - 145 mmol/L    Potassium 4.9 3.5 - 5.1 mmol/L    Chloride 101 95 - 110 mmol/L    CO2 24 23 - 29 mmol/L    Glucose 182 (H) 70 - 110 mg/dL    BUN 23 (H) 6 - 20 mg/dL    Creatinine 1.5 (H) 0.5 - 1.4 mg/dL    Calcium 10.5 8.7 - 10.5 mg/dL    Total Protein 7.3 6.0 - 8.4 g/dL    Albumin 4.1 3.5 - 5.2 g/dL    Total Bilirubin 0.3 0.1 - 1.0 mg/dL    Alkaline Phosphatase 58 55 - 135 U/L    AST 17 10 - 40 U/L    ALT 42 10 - 44 U/L    Anion Gap 13 8 - 16 mmol/L    eGFR 44.1 (A) >60 mL/min/1.73 m^2   CBC Oncology    Collection Time: 02/22/23 12:50 PM   Result Value Ref Range    WBC 15.11 (H) 3.90 - 12.70 K/uL    RBC 4.30 4.00 - 5.40 M/uL    Hemoglobin 12.4 12.0 - 16.0 g/dL    Hematocrit 39.1 37.0 - 48.5 %    MCV 91 82 - 98 fL    MCH 28.8 27.0 - 31.0 pg    MCHC 31.7 (L) 32.0 - 36.0 g/dL    RDW 14.0 11.5 - 14.5 %    Platelets 309 150 - 450 K/uL    MPV 9.3  9.2 - 12.9 fL    Gran # (ANC) 12.8 (H) 1.8 - 7.7 K/uL    Immature Grans (Abs) 0.13 (H) 0.00 - 0.04 K/uL   TSH    Collection Time: 02/22/23 12:50 PM   Result Value Ref Range    TSH 0.405 0.400 - 4.000 uIU/mL   HCG, QUANTITATIVE, PREGNANCY    Collection Time: 02/22/23 12:50 PM   Result Value Ref Range    HCG Quant <2.4 See Text mIU/mL              Imaging:     CT Chest Abdomen Pelvis W W/O Contrast (XPD)  Narrative: EXAMINATION:  CT CHEST ABDOMEN PELVIS W W/O CONTRAST (XPD)    CLINICAL HISTORY:  Kidney cancer, staging; Malignant neoplasm of right kidney, except renal pelvis    TECHNIQUE:  Axial images of the abdomen and pelvis were obtained prior to the administration of intravenous contrast.  Axial images of the chest, abdomen, and pelvis were acquired  after the use of 100 cc Myju346 IV contrast.  Delayed phase imaging of the abdomen and pelvis was then obtained.  Coronal and sagittal reconstructions were also obtained    COMPARISON:  CT 11/01/2022    FINDINGS:  Thoracic soft tissues: 0.9 cm left thyroid lobe nodule.  No axillary lymphadenopathy.    Aorta: Thoracic aorta is normal in caliber and contour with no significant calcific atherosclerosis.    Heart: Normal in size. No pericardial effusion. No significant calcific coronary atherosclerosis.    Mare/Mediastinum: No new or enlarging lymph nodes.    Lungs: Trachea and bronchi are patent.  Lung symmetrically expanded without consolidation.  Small pleural based nodule measuring 0.2 cm (series 3, image 64), which is unchanged from prior.  No evidence of new or enlarging pulmonary nodules.  No pleural fluid or pneumothorax.    Esophagus: Unremarkable.    Stomach and duodenum: Unremarkable.    Liver: Hepatomegaly with liver measuring 19 cm in craniocaudal dimension.  Hepatic steatosis.  No focal hepatic lesion.    Gallbladder: No calcified gallstones.    Bile Ducts: No evidence of dilated ducts.    Pancreas: Prominent fatty focus in the pancreatic head, unchanged  from prior.    Spleen: Unremarkable.    Adrenals: Unremarkable.    Kidneys/Ureters: Left kidney is normal in size and location.  Normal parenchymal enhancement and excretion.  No hydronephrosis or nephrolithiasis.  No ureteral dilation.    Right kidney is surgically absent.  There is no definite evidence of nodularity or recurrence at the surgical site.  Vascular stump appears unremarkable.    Bladder: No evidence of wall thickening.    Reproductive organs: Unremarkable.    Bowel/Mesentery: Small bowel is normal in caliber with no evidence of obstruction.  No evidence of inflammation or wall thickening.  Colon demonstrates no focal wall thickening.  The appendix appears unremarkable.    Peritoneum: No intraperitoneal free air or fluid.    Lymph nodes: Few stable prominent mesenteric nodes, measuring up to 1.0 cm (series 3, image 154).  Multiple shotty pericaval and periaortic lymph nodes.  No evidence of new or enlarging lymph nodes    Abdominal wall:  Postoperative change of prior laparotomy.    Vasculature: No aneurysm. No significant calcific atherosclerosis.    Bones: No acute fracture. No suspicious osseous lesions.  Impression: Postoperative change of right nephrectomy for renal cell carcinoma, without evidence of nodularity or local recurrence at the surgical site.    No lesions in the chest, abdomen, or pelvis to suggest new or worsening metastatic disease.    Few prominent right-sided mesenteric lymph nodes measuring up to 1.0 cm, unchanged from prior.  Recommend continued attention on follow-up.    0.9 cm left thyroid lobe nodule, which may be better evaluated with thyroid ultrasound.    Hepatomegaly and steatosis.    Electronically signed by resident: Etta Gore  Date:    02/01/2023  Time:    09:22    Electronically signed by: Abbey Waller MD  Date:    02/01/2023  Time:    11:54              Diagnoses:       1. Renal cell carcinoma of right kidney    2. Mucositis    3. Thrush    4. Hematochezia     5. Class 3 severe obesity due to excess calories with body mass index (BMI) of 40.0 to 44.9 in adult, unspecified whether serious comorbidity present    6. Rash                Assessment and Plan:     1. Renal cell carcinoma of right kidney  Overview:  Patient found to have high risk likely stage II ([id number]) with nuclear grade 4 and rhabdoid features on nephrectomy 9/14/22. Started adjuvant pembrolizumab 11/9/22. Stopped 1/31/23 due to recurrent ICI side effects.    Assessment & Plan:  No further adjvuant treatment.    Con't surveillance per NCCN guidelines for stage III disease with exam, labs, and imaging every 3-6 months x3 years then annually through year 5.    - FU in 2 months for repeat imaging with CT torso  - Head MRI for ongoing headaches      2. Mucositis  Overview:  Patient with painful mucositis/stomatitis developed shortly after starting pembrolizumab. Thought consistent with ICI side effect and improved with starting systemic prednisone. Recurred on pembrolizumab rechallenge.    Assessment & Plan:  - Continue prednisone taper  - Nystatin while on steroids      3. Thrush  -     nystatin (MYCOSTATIN) 100,000 unit/mL suspension; Take 6 mLs (600,000 Units total) by mouth 4 (four) times daily. for 10 days  Dispense: 473 mL; Refill: 0    4. Hematochezia  -     Ambulatory referral/consult to Endo Procedure ; Future; Expected date: 02/23/2023    5. Class 3 severe obesity due to excess calories with body mass index (BMI) of 40.0 to 44.9 in adult, unspecified whether serious comorbidity present  Assessment & Plan:  - worsened in setting of high dose steroids  - Continue steroid taper      6. Rash  Assessment & Plan:  - Continue steroid taper by 10mg every Monday; stop after 10mg every other day  - Per dermatology, if additional ICI needed in the future, could consider MTX as a steroid sparing agent                   Route Chart for Scheduling    Med Onc Chart Routing      Follow up with physician 2  months.   Follow up with RAUL . Cancel 3/15/23 Sharon appt   Infusion scheduling note Cancel 3/15/23 treatment appt   Injection scheduling note    Labs    Imaging CT chest abdomen pelvis and MRI      Pharmacy appointment    Other referrals                  Moshe Jane MD  Hematology/Oncology  [facility]

## 2023-02-23 NOTE — ASSESSMENT & PLAN NOTE
No further adjvuant treatment.    Con't surveillance per NCCN guidelines for stage III disease with exam, labs, and imaging every 3-6 months x3 years then annually through year 5.    - FU in 2 months for repeat imaging with CT torso  - Head MRI for ongoing headaches

## 2023-02-23 NOTE — ASSESSMENT & PLAN NOTE
- Continue steroid taper by 10mg every Monday; stop after 10mg every other day  - Per dermatology, if additional ICI needed in the future, could consider MTX as a steroid sparing agent

## 2023-03-09 ENCOUNTER — RESEARCH ENCOUNTER (OUTPATIENT)
Dept: RESEARCH | Facility: HOSPITAL | Age: 43
End: 2023-03-09
Payer: COMMERCIAL

## 2023-03-09 NOTE — PROGRESS NOTES
March 9, 2023    Protocol: [id number], Disparities in Results of Immune Checkpoint Inhibitor Treatment (DIRECT): A Prospective Cohort Study of Cancer Survivors Treated with anti-PD-L1 Immunotherapy in a Community Oncology Setting  IRB# 2022.126  Version Date: 1/11/2022  Study # [id number]    Per Dr. Jane's Epic message 2/23/2023- he is discontinuing patient S, G on Pembrolizumab (Keytruda) due to side effects. Will continue to follow via surveillance.

## 2023-03-17 ENCOUNTER — HOSPITAL ENCOUNTER (OUTPATIENT)
Dept: RADIOLOGY | Facility: HOSPITAL | Age: 43
Discharge: HOME | End: 2023-03-17
Attending: HOSPITALIST
Payer: COMMERCIAL

## 2023-03-17 DIAGNOSIS — R51.9 NONINTRACTABLE HEADACHE, UNSPECIFIED CHRONICITY PATTERN, UNSPECIFIED HEADACHE TYPE: ICD-10-CM

## 2023-03-17 DIAGNOSIS — C64.1 RENAL CELL CARCINOMA OF RIGHT KIDNEY: ICD-10-CM

## 2023-03-17 PROCEDURE — 70553 MRI BRAIN STEM W/O & W/DYE: CPT | Mod: TC

## 2023-03-17 PROCEDURE — 71260 CT THORAX DX C+: CPT | Mod: 26,,, | Performed by: RADIOLOGY

## 2023-03-17 PROCEDURE — A9698 NON-RAD CONTRAST MATERIALNOC: HCPCS | Performed by: HOSPITALIST

## 2023-03-17 PROCEDURE — 74177 CT ABD & PELVIS W/CONTRAST: CPT | Mod: TC

## 2023-03-17 PROCEDURE — 25500020 PHARM REV CODE 255: Performed by: HOSPITALIST

## 2023-03-17 PROCEDURE — 74177 CT CHEST ABDOMEN PELVIS WITH CONTRAST (XPD): ICD-10-PCS | Mod: 26,,, | Performed by: RADIOLOGY

## 2023-03-17 PROCEDURE — 70553 MRI BRAIN W WO CONTRAST: ICD-10-PCS | Mod: 26,,, | Performed by: RADIOLOGY

## 2023-03-17 PROCEDURE — A9585 GADOBUTROL INJECTION: HCPCS | Performed by: HOSPITALIST

## 2023-03-17 PROCEDURE — 71260 CT THORAX DX C+: CPT | Mod: TC

## 2023-03-17 PROCEDURE — 71260 CT CHEST ABDOMEN PELVIS WITH CONTRAST (XPD): ICD-10-PCS | Mod: 26,,, | Performed by: RADIOLOGY

## 2023-03-17 PROCEDURE — 74177 CT ABD & PELVIS W/CONTRAST: CPT | Mod: 26,,, | Performed by: RADIOLOGY

## 2023-03-17 PROCEDURE — 70553 MRI BRAIN STEM W/O & W/DYE: CPT | Mod: 26,,, | Performed by: RADIOLOGY

## 2023-03-17 RX ORDER — GADOBUTROL 604.72 MG/ML
10 INJECTION INTRAVENOUS
Status: COMPLETED | OUTPATIENT
Start: 2023-03-17 | End: 2023-03-17

## 2023-03-17 RX ADMIN — Medication 450 ML: at 12:03

## 2023-03-17 RX ADMIN — IOHEXOL 100 ML: 350 INJECTION, SOLUTION INTRAVENOUS at 12:03

## 2023-03-17 RX ADMIN — GADOBUTROL 10 ML: 604.72 INJECTION INTRAVENOUS at 11:03

## 2023-03-23 ENCOUNTER — PATIENT MESSAGE (OUTPATIENT)
Dept: HEMATOLOGY/ONCOLOGY | Facility: CLINIC | Age: 43
End: 2023-03-23
Payer: COMMERCIAL

## 2023-03-24 ENCOUNTER — PATIENT MESSAGE (OUTPATIENT)
Dept: HEMATOLOGY/ONCOLOGY | Facility: CLINIC | Age: 43
End: 2023-03-24
Payer: COMMERCIAL

## 2023-03-24 DIAGNOSIS — R21 RASH: Primary | ICD-10-CM

## 2023-03-24 RX ORDER — HYDROXYZINE HYDROCHLORIDE 25 MG/1
25 TABLET, FILM COATED ORAL 3 TIMES DAILY PRN
Qty: 90 TABLET | Refills: 0 | Status: SHIPPED | OUTPATIENT
Start: 2023-03-24 | End: 2023-05-01

## 2023-03-28 ENCOUNTER — LAB VISIT (OUTPATIENT)
Dept: LAB | Facility: HOSPITAL | Age: 43
End: 2023-03-28
Attending: HOSPITALIST
Payer: COMMERCIAL

## 2023-03-28 ENCOUNTER — PATIENT MESSAGE (OUTPATIENT)
Dept: HEMATOLOGY/ONCOLOGY | Facility: CLINIC | Age: 43
End: 2023-03-28
Payer: COMMERCIAL

## 2023-03-28 DIAGNOSIS — C64.1 RENAL CELL CARCINOMA OF RIGHT KIDNEY: ICD-10-CM

## 2023-03-28 LAB
ALBUMIN SERPL BCP-MCNC: 3.9 G/DL (ref 3.5–5.2)
ALP SERPL-CCNC: 75 U/L (ref 55–135)
ALT SERPL W/O P-5'-P-CCNC: 59 U/L (ref 10–44)
ANION GAP SERPL CALC-SCNC: 8 MMOL/L (ref 8–16)
AST SERPL-CCNC: 32 U/L (ref 10–40)
BILIRUB SERPL-MCNC: 0.3 MG/DL (ref 0.1–1)
BUN SERPL-MCNC: 13 MG/DL (ref 6–20)
CALCIUM SERPL-MCNC: 9.9 MG/DL (ref 8.7–10.5)
CHLORIDE SERPL-SCNC: 106 MMOL/L (ref 95–110)
CO2 SERPL-SCNC: 25 MMOL/L (ref 23–29)
CREAT SERPL-MCNC: 1.2 MG/DL (ref 0.5–1.4)
EST. GFR  (NO RACE VARIABLE): 57.6 ML/MIN/1.73 M^2
GLUCOSE SERPL-MCNC: 128 MG/DL (ref 70–110)
POTASSIUM SERPL-SCNC: 4.8 MMOL/L (ref 3.5–5.1)
PROT SERPL-MCNC: 7.1 G/DL (ref 6–8.4)
SODIUM SERPL-SCNC: 139 MMOL/L (ref 136–145)

## 2023-03-28 PROCEDURE — 80053 COMPREHEN METABOLIC PANEL: CPT | Performed by: HOSPITALIST

## 2023-03-28 PROCEDURE — 36415 COLL VENOUS BLD VENIPUNCTURE: CPT | Performed by: HOSPITALIST

## 2023-04-19 ENCOUNTER — PATIENT MESSAGE (OUTPATIENT)
Dept: RESEARCH | Facility: HOSPITAL | Age: 43
End: 2023-04-19
Payer: COMMERCIAL

## 2023-04-19 DIAGNOSIS — C64.1 RENAL CELL CARCINOMA OF RIGHT KIDNEY: Primary | ICD-10-CM

## 2023-04-19 DIAGNOSIS — Z00.6 EXAMINATION OF PARTICIPANT IN CLINICAL TRIAL: ICD-10-CM

## 2023-04-20 ENCOUNTER — OFFICE VISIT (OUTPATIENT)
Dept: HEMATOLOGY/ONCOLOGY | Facility: CLINIC | Age: 43
End: 2023-04-20
Payer: COMMERCIAL

## 2023-04-20 ENCOUNTER — LAB VISIT (OUTPATIENT)
Dept: LAB | Facility: HOSPITAL | Age: 43
End: 2023-04-20
Attending: HOSPITALIST
Payer: COMMERCIAL

## 2023-04-20 ENCOUNTER — TELEPHONE (OUTPATIENT)
Dept: OBSTETRICS AND GYNECOLOGY | Facility: CLINIC | Age: 43
End: 2023-04-20
Payer: COMMERCIAL

## 2023-04-20 ENCOUNTER — PATIENT MESSAGE (OUTPATIENT)
Dept: HEMATOLOGY/ONCOLOGY | Facility: CLINIC | Age: 43
End: 2023-04-20

## 2023-04-20 ENCOUNTER — TELEPHONE (OUTPATIENT)
Dept: DERMATOLOGY | Facility: CLINIC | Age: 43
End: 2023-04-20
Payer: COMMERCIAL

## 2023-04-20 VITALS
SYSTOLIC BLOOD PRESSURE: 160 MMHG | HEIGHT: 66 IN | BODY MASS INDEX: 42.31 KG/M2 | DIASTOLIC BLOOD PRESSURE: 92 MMHG | OXYGEN SATURATION: 99 % | RESPIRATION RATE: 18 BRPM | HEART RATE: 85 BPM | WEIGHT: 263.25 LBS

## 2023-04-20 DIAGNOSIS — R21 VULVAR RASH: ICD-10-CM

## 2023-04-20 DIAGNOSIS — L43.2 LICHENOID DRUG REACTION: ICD-10-CM

## 2023-04-20 DIAGNOSIS — K92.1 HEMATOCHEZIA: ICD-10-CM

## 2023-04-20 DIAGNOSIS — L30.9 DERMATITIS: ICD-10-CM

## 2023-04-20 DIAGNOSIS — Z00.6 EXAMINATION OF PARTICIPANT IN CLINICAL TRIAL: ICD-10-CM

## 2023-04-20 DIAGNOSIS — C64.1 RENAL CELL CARCINOMA OF RIGHT KIDNEY: ICD-10-CM

## 2023-04-20 DIAGNOSIS — F32.4 MAJOR DEPRESSIVE DISORDER IN PARTIAL REMISSION, UNSPECIFIED WHETHER RECURRENT: ICD-10-CM

## 2023-04-20 DIAGNOSIS — K12.30 MUCOSITIS: Primary | ICD-10-CM

## 2023-04-20 DIAGNOSIS — E66.01 CLASS 3 SEVERE OBESITY DUE TO EXCESS CALORIES WITH BODY MASS INDEX (BMI) OF 40.0 TO 44.9 IN ADULT, UNSPECIFIED WHETHER SERIOUS COMORBIDITY PRESENT: ICD-10-CM

## 2023-04-20 LAB
ALBUMIN SERPL BCP-MCNC: 3.8 G/DL (ref 3.5–5.2)
ALP SERPL-CCNC: 72 U/L (ref 55–135)
ALT SERPL W/O P-5'-P-CCNC: 137 U/L (ref 10–44)
ANION GAP SERPL CALC-SCNC: 10 MMOL/L (ref 8–16)
AST SERPL-CCNC: 78 U/L (ref 10–40)
BILIRUB SERPL-MCNC: 0.3 MG/DL (ref 0.1–1)
BUN SERPL-MCNC: 13 MG/DL (ref 6–20)
CALCIUM SERPL-MCNC: 9.9 MG/DL (ref 8.7–10.5)
CHLORIDE SERPL-SCNC: 107 MMOL/L (ref 95–110)
CO2 SERPL-SCNC: 23 MMOL/L (ref 23–29)
CREAT SERPL-MCNC: 1.1 MG/DL (ref 0.5–1.4)
DRUG STUDY SPECIMEN TYPE: NORMAL
DRUG STUDY TEST NAME: NORMAL
DRUG STUDY TEST RESULT: NORMAL
ERYTHROCYTE [DISTWIDTH] IN BLOOD BY AUTOMATED COUNT: 13.3 % (ref 11.5–14.5)
EST. GFR  (NO RACE VARIABLE): >60 ML/MIN/1.73 M^2
GLUCOSE SERPL-MCNC: 138 MG/DL (ref 70–110)
HCG INTACT+B SERPL-ACNC: <2.4 MIU/ML
HCT VFR BLD AUTO: 38.1 % (ref 37–48.5)
HGB BLD-MCNC: 12.1 G/DL (ref 12–16)
IMM GRANULOCYTES # BLD AUTO: 0.04 K/UL (ref 0–0.04)
MCH RBC QN AUTO: 28.9 PG (ref 27–31)
MCHC RBC AUTO-ENTMCNC: 31.8 G/DL (ref 32–36)
MCV RBC AUTO: 91 FL (ref 82–98)
NEUTROPHILS # BLD AUTO: 3.9 K/UL (ref 1.8–7.7)
PLATELET # BLD AUTO: 393 K/UL (ref 150–450)
PMV BLD AUTO: 10 FL (ref 9.2–12.9)
POTASSIUM SERPL-SCNC: 4.7 MMOL/L (ref 3.5–5.1)
PROT SERPL-MCNC: 6.8 G/DL (ref 6–8.4)
RBC # BLD AUTO: 4.19 M/UL (ref 4–5.4)
SODIUM SERPL-SCNC: 140 MMOL/L (ref 136–145)
TSH SERPL DL<=0.005 MIU/L-ACNC: 0.93 UIU/ML (ref 0.4–4)
WBC # BLD AUTO: 7.39 K/UL (ref 3.9–12.7)

## 2023-04-20 PROCEDURE — 3008F BODY MASS INDEX DOCD: CPT | Mod: CPTII,S$GLB,, | Performed by: HOSPITALIST

## 2023-04-20 PROCEDURE — 99999 PR PBB SHADOW E&M-EST. PATIENT-LVL V: ICD-10-PCS | Mod: PBBFAC,,, | Performed by: HOSPITALIST

## 2023-04-20 PROCEDURE — 3080F DIAST BP >= 90 MM HG: CPT | Mod: CPTII,S$GLB,, | Performed by: HOSPITALIST

## 2023-04-20 PROCEDURE — 1160F RVW MEDS BY RX/DR IN RCRD: CPT | Mod: CPTII,S$GLB,, | Performed by: HOSPITALIST

## 2023-04-20 PROCEDURE — 99000 SPECIMEN HANDLING OFFICE-LAB: CPT | Performed by: HOSPITALIST

## 2023-04-20 PROCEDURE — 80053 COMPREHEN METABOLIC PANEL: CPT | Performed by: HOSPITALIST

## 2023-04-20 PROCEDURE — 1159F PR MEDICATION LIST DOCUMENTED IN MEDICAL RECORD: ICD-10-PCS | Mod: CPTII,S$GLB,, | Performed by: HOSPITALIST

## 2023-04-20 PROCEDURE — 84702 CHORIONIC GONADOTROPIN TEST: CPT | Performed by: REGISTERED NURSE

## 2023-04-20 PROCEDURE — 3008F PR BODY MASS INDEX (BMI) DOCUMENTED: ICD-10-PCS | Mod: CPTII,S$GLB,, | Performed by: HOSPITALIST

## 2023-04-20 PROCEDURE — 99215 OFFICE O/P EST HI 40 MIN: CPT | Mod: S$GLB,,, | Performed by: HOSPITALIST

## 2023-04-20 PROCEDURE — 3077F PR MOST RECENT SYSTOLIC BLOOD PRESSURE >= 140 MM HG: ICD-10-PCS | Mod: CPTII,S$GLB,, | Performed by: HOSPITALIST

## 2023-04-20 PROCEDURE — 36415 COLL VENOUS BLD VENIPUNCTURE: CPT | Performed by: HOSPITALIST

## 2023-04-20 PROCEDURE — 3077F SYST BP >= 140 MM HG: CPT | Mod: CPTII,S$GLB,, | Performed by: HOSPITALIST

## 2023-04-20 PROCEDURE — 84443 ASSAY THYROID STIM HORMONE: CPT | Performed by: HOSPITALIST

## 2023-04-20 PROCEDURE — 99999 PR PBB SHADOW E&M-EST. PATIENT-LVL V: CPT | Mod: PBBFAC,,, | Performed by: HOSPITALIST

## 2023-04-20 PROCEDURE — 1160F PR REVIEW ALL MEDS BY PRESCRIBER/CLIN PHARMACIST DOCUMENTED: ICD-10-PCS | Mod: CPTII,S$GLB,, | Performed by: HOSPITALIST

## 2023-04-20 PROCEDURE — 99215 PR OFFICE/OUTPT VISIT, EST, LEVL V, 40-54 MIN: ICD-10-PCS | Mod: S$GLB,,, | Performed by: HOSPITALIST

## 2023-04-20 PROCEDURE — 1159F MED LIST DOCD IN RCRD: CPT | Mod: CPTII,S$GLB,, | Performed by: HOSPITALIST

## 2023-04-20 PROCEDURE — 3080F PR MOST RECENT DIASTOLIC BLOOD PRESSURE >= 90 MM HG: ICD-10-PCS | Mod: CPTII,S$GLB,, | Performed by: HOSPITALIST

## 2023-04-20 PROCEDURE — 85027 COMPLETE CBC AUTOMATED: CPT | Performed by: HOSPITALIST

## 2023-04-20 RX ORDER — NYSTATIN 100000 [USP'U]/ML
6 SUSPENSION ORAL 4 TIMES DAILY
Qty: 240 ML | Refills: 0 | Status: SHIPPED | OUTPATIENT
Start: 2023-04-20 | End: 2023-06-21 | Stop reason: ALTCHOICE

## 2023-04-20 RX ORDER — CLOBETASOL PROPIONATE 0.5 MG/G
CREAM TOPICAL 2 TIMES DAILY
Qty: 60 G | Refills: 3 | Status: SHIPPED | OUTPATIENT
Start: 2023-04-20 | End: 2023-05-01

## 2023-04-20 RX ORDER — DEXAMETHASONE 0.5 MG/5ML
0.5 ELIXIR ORAL EVERY 6 HOURS
Qty: 200 ML | Refills: 0 | Status: SHIPPED | OUTPATIENT
Start: 2023-04-20 | End: 2023-06-21 | Stop reason: ALTCHOICE

## 2023-04-20 NOTE — TELEPHONE ENCOUNTER
Attempted to call pt in regards to her getting appt moved to a sooner date no answer LVM     ----- Message from Sylvia Moreno MD sent at 4/20/2023 12:31 PM CDT -----  Regarding: New Patient Visit  Hi Dr. Jeansonne - I saw Ms. Griffith today with Dr. Jane for follow up of immunotherapy toxicity from Keytruda last received 1/2023. She is establishing care with you, but reports severe vaginal pain and bleeding (unsure if this is rectal or vaginal).     If you could expedite her clinic visit currently scheduled 6/9/23 that would be great.    Thanks,  Sylvia

## 2023-04-20 NOTE — ASSESSMENT & PLAN NOTE
Today patient reports passive suicidal ideation with no plan. She reports wishing she wishes she was dead daily, but has no plans to act on this and is in frequent communication with her psychiatrist and therapist regarding these feelings. She is aware of resources including the National Suicide Hotline if she were to develop a plan or symptoms worsened.  Matt present for support as well.    Patient is on a stable dose of Risperidone, Lamotrigine, Sertraline. Aware of other resources available including psychology oncology and social work. Symptoms also attributable to current immunotherapy toxicity with hopes that alleviating symptoms will improve mood symptoms.

## 2023-04-20 NOTE — ASSESSMENT & PLAN NOTE
Pain with bowel movements and blood in stool, consistent with stricture vs hemorrhoids. Colonoscopy scheduled 7/2027. Also has gynecology visit for evaluation of bleeding.

## 2023-04-20 NOTE — PROGRESS NOTES
MEDICAL ONCOLOGY FOLLOW-UP VISIT.     Best Contact Phone Number(s): [phone] (home)      Cancer/Stage/TNM:    Cancer Staging   Renal cell carcinoma  Staging form: Kidney, AJCC 8th Edition  - Pathologic: Stage Unknown (pT2b, pNX, cM0) - Signed by Moshe Jane MD on 10/6/2022  - Pathologic: Stage II (pT2, pN0, cM0) - Signed by Moshe Jane MD on 11/9/2022         Reason for visit: Ms. 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    Interval History:     Prednisone taper    Mouth sensitivity  Rash    Weight gain and anxiety      Oncology History   Renal cell carcinoma   8/6/2022 Imaging Significant Findings    CTA performed for chest pain incidentally found a large partially visualized enhancing mass in the right kidney measuring at least 12 cm with adjacent fat stranding and prominent vessels highly concerning for renal cell carcinoma.    Subsequent non contrast CT a/p confirms 13.7 x 12.2 x 14.3 (AP x TV x CC) intermediate density solid right renal mass with central necrosis and scattered punctate calcifications are concerning for malignancy.     8/9/2022 Imaging Significant Findings    MRI a/p shows eterogeneously enhancing solid right renal mass highly concerning for RCC. No evidence of filling defect or enhancing tumor thrombus in the right renal artery or vein.  Mildly prominent mesenteric lymph nodes, as above.  Metastatic disease difficult to definitively exclude     9/14/2022 Surgery    Right nephrectomy - pathology with ccRCC nuclear grade 4 up to 13.3 cm. Tumor confined to the kidney. Margins negative Rhabdoid features present with associated necrosis. [id number]     11/1/2022 Imaging Significant Findings    CT torso with several up to 3mm  micronodules in the lung and prominent but small mesenteric nodes overall felt generally stable.     11/9/2022 - 1/11/2023 Chemotherapy    Treatment Summary   Plan Name: OP PEMBROLIZUMAB 200MG Q3W  Treatment Goal: Curative  Status: Inactive  Start Date: 11/9/2022  End Date: 1/11/2023  Provider: Moshe Jane MD  Chemotherapy: [No matching medication found in this treatment plan]       11/9/2022 Cancer Staged    Staging form: Kidney, AJCC 8th Edition  - Pathologic: Stage II (pT2, pN0, cM0)       12/14/2022 Notable Event    Pembrolizumab held in setting of severe mucositis. Start prednisone taper starting at 60mg po daily.     1/11/2023 -  Immunotherapy    Restart immunotherapy with pembrolizumab 200mg IV q3 weeks     1/30/2023 Notable Event    Stop pembrolizumab for second time due to recurrent stomatitis/mucositis with associated rash. Restart steroid taper at 60mg prednisone.     1/31/2023 Imaging Significant Findings    CT torso with no evidence of recurrent disease. Stable mesenteric nodes up to 1.0cm. Also 0.9cm thyroid nodule.            Physical Exam:   There were no vitals taken for this visit.     ECOG Performance Status: (foot note - ECOG PS provided by Eastern Cooperative Oncology Group) 1 - Symptomatic but completely ambulatory    Physical Exam  Constitutional:       General: She is not in acute distress.  HENT:      Head: Normocephalic.      Mouth/Throat:      Pharynx: No oropharyngeal exudate or posterior oropharyngeal erythema.   Eyes:      Conjunctiva/sclera: Conjunctivae normal.      Pupils: Pupils are equal, round, and reactive to light.   Cardiovascular:      Rate and Rhythm: Normal rate.   Pulmonary:      Effort: Pulmonary effort is normal. No respiratory distress.   Abdominal:      General: There is no distension.      Palpations: Abdomen is soft.      Tenderness: There is no abdominal tenderness.   Musculoskeletal:         General: Normal range of motion.      Cervical back: Normal range of  motion and neck supple.   Skin:     General: Skin is warm.      Findings: No rash.   Neurological:      General: No focal deficit present.      Mental Status: She is alert and oriented to person, place, and time.      Motor: No weakness.   Psychiatric:         Mood and Affect: Mood normal.         Behavior: Behavior normal.         Thought Content: Thought content normal.         Labs:   No results found for this or any previous visit (from the past 48 hour(s)).             Imaging:     CT Chest Abdomen Pelvis With Contrast  Narrative: EXAMINATION:  CT CHEST ABDOMEN PELVIS WITH CONTRAST (XPD)    CLINICAL HISTORY:  Kidney cancer, monitor; Malignant neoplasm of right kidney, except renal pelvis    TECHNIQUE:  Axial images of the chest, abdomen, and pelvis were acquired after the use of 100 cc Opbd602 IV contrast. 450 mL of oral contrast was given.  Coronal and sagittal reconstructions were also obtained    COMPARISON:  CT chest abdomen pelvis: 01/31/2023.  CT chest abdomen pelvis: 11/01/2022.    FINDINGS:  Thoracic soft tissues: Stable subcentimeter hypodensity in the inferior pole of the left thyroid lobe.    Aorta: Left-sided aortic arch with 3 branch vessels. Thoracic aorta is normal in caliber and contour with no significant calcific atherosclerosis.    Heart: Normal in size with no evidence of pericardial effusion.  No significant atherosclerotic disease of the coronary arteries.    Pulmonary vasculature: Normal distribution of pulmonary arteries. There are 4 branches of the pulmonary veins.    Mare/Mediastinum: No significant mediastinal, hilar, or axillary lymphadenopathy    Lungs/airway: Few stable subcentimeter right fissural nodules (axial series 6, image 104, 165, and 185).  Stable punctate solid nodule in the right upper lobe (axial series 6, image 112).  Stable 0.6 cm nodule in the medial basal segment of the left lower lobe (axial series 6, image 254).  No new lesions are identified.  No consolidative  change or pleural effusion.  No pneumothorax.    Liver: Enlarged in size, measures 21.1 cm in craniocaudal dimension.  Geographic parenchymal enhancement in the left hepatic lobe adjacent to the gallbladder, likely representing focal fatty sparing or transient hepatic attenuation differences.  No focal hepatic lesion.    Gallbladder: Noncalcified stone within the gallbladder with no surrounding acute inflammatory process.    Bile Ducts: No evidence of dilated ducts.    Pancreas: No mass or peripancreatic fat stranding.    Spleen: Unremarkable.    Mesentery/GI: The stomach is unremarkable.  Small/large bowel are normal in caliber with no evidence of obstruction.  No evidence of inflammation or wall thickening.  The appendix is unremarkable.    Adrenals: Unremarkable.    Kidneys/ Ureters: Postsurgical change of right nephrectomy in this patient with reported clear cell RCC.  Few prominent lymph nodes in the right renal fossa and para-aortic region, similar to the prior.  No findings to suggest local neoplasm recurrence.  The left kidney is unremarkable.  The left ureter is normal in course and caliber with no evidence of ureteral stones or dilatation.    Bladder: No evidence of wall thickening.    Reproductive organs: Unremarkable.    Lymph nodes: No lymphadenapathy.    Abdominal wall:  Normal healing of anterior midline incision.    Vasculature: No significant atherosclerotic disease of the abdominal aorta.   No evidence of aneurysmal dilatation.    Bones: No evidence of acute fractures or lytic lesions.  Impression: Postsurgical change of right nephrectomy in this patient with reported clear cell RCC.  Few prominent lymph nodes in the right renal fossa and para-aortic region, similar to the prior. No findings to suggest local neoplasm recurrence.  No evidence of distant metastatic disease.    Few stable bilateral subcentimeter pulmonary nodules, as above.    Hepatomegaly with fatty infiltration; no focal  lesion.    Noncalcified cholelithiasis with no surrounding acute inflammatory process.    Additional findings, as above.    Electronically signed by resident: Vicente Brand  Date:    03/17/2023  Time:    14:13    Electronically signed by: Sheng Jackson MD  Date:    03/17/2023  Time:    15:20  MRI Brain W WO Contrast  Narrative: EXAMINATION:  MRI BRAIN W WO CONTRAST    CLINICAL HISTORY:  Headache, chronic, new features or increased frequency;Kidney cancer, staging;.  Malignant neoplasm of right kidney, except renal pelvis    TECHNIQUE:  Multiplanar multisequence MR imaging of the brain was performed before and after 10 mL of Gadavist IV contrast.    COMPARISON:  No priors.    FINDINGS:  Brain parenchyma appears unremarkable.  No abnormal enhancement. No diffusion restriction to suggest acute infarct. No areas of gradient susceptibility to suggest intraparenchymal hemorrhage.  No mass lesion or edema.    Ventricles and sulci are normal in size and midline for age without evidence of hydrocephalus.    No extra-axial blood or fluid collections.    Normal T2 skull base vascular flow voids are preserved.    Bone marrow signal intensity is within normal limits.  Impression: Normal MRI of the brain with no evidence of metastasis.    Electronically signed by resident: Jey Fuentes  Date:    03/17/2023  Time:    11:35    Electronically signed by: Chance oGnzales MD  Date:    03/17/2023  Time:    12:17              Diagnoses:       No diagnosis found.              Assessment and Plan:     {There are no diagnoses linked to this encounter. (Refresh or delete this SmartLink)}               Route Chart for Scheduling  Med Onc Route Chart for Scheduling              Moshe Jane MD  Hematology/Oncology  [facility]

## 2023-04-20 NOTE — ASSESSMENT & PLAN NOTE
Significant reaction to immunotherapy (Keytruda last received 1/2023) manifested as lichenoid skin and oral reaction. Refractory to systemic steroids with poor tolerance to steroids, completed taper 2-3 weeks previously.    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 Symptoms are new since immunotherapy initiation. Message sent to Dr. Jesus regarding clobetasol for oral lesions, candidacy for acitretin, and potentially expediting follow up dermatology visit.     Patient also has upcoming visit with gynecology for painful vaginal area.

## 2023-04-20 NOTE — PATIENT INSTRUCTIONS
Recent imaging in March shows no evidence of recurrent cancer. Will plan to repeat imaging June to maintain close surveillance.    Given ongoing mouth sores along with anorectal and vaginal discomfort will try to expedite appointments with gyn and with dermatology. In the meantime can use the clobetasol prescribed by dermatology for the mouth sores.     - Try baking soda and water; clobetsol called into local pharmacy  - Follow up with dentist  - Follow up with GI for colonoscopy as scheduled  - We will try to expedite GYN and dermatology appts    Follow up with Dr. Jane in June with repeat imaging

## 2023-04-20 NOTE — ASSESSMENT & PLAN NOTE
CT Scan 3/17/23 shows DWAYNE.     Plan surveillance:   Eval q3 months x3 years (2026); then q6 through 5 (2028)  CT q3-6 month x3 years; then annually through 5

## 2023-04-20 NOTE — ASSESSMENT & PLAN NOTE
Mucositis/stomatitis has progressed over past week, refractory to Nystatin mouthwash, magic mouthwash, saltwater. Recommend following up with dentist, trial of baking soda + water and Clobetasol topical to oropharyngeal lesions per dermatology recommendations.     Message sent to Dr. Jesus to attempt to expedite dermatology visit. Unfortunately patient is a poor candidate for systemic steroids with comorbid psychiatric symptoms and poor tolerance to steroids.

## 2023-04-20 NOTE — ASSESSMENT & PLAN NOTE
Refractory to oral antifungal treatment. Upcoming visit with gynecology 6/2023, message sent to potentially expedite visit.

## 2023-04-20 NOTE — PROGRESS NOTES
MEDICAL ONCOLOGY FOLLOW-UP VISIT.     Best Contact Phone Number(s): [phone] (home)      Cancer/Stage/TNM:    Cancer Staging   Renal cell carcinoma  Staging form: Kidney, AJCC 8th Edition  - Pathologic: Stage Unknown (pT2b, pNX, cM0) - Signed by Moshe Jane MD on 10/6/2022  - Pathologic: Stage II (pT2, pN0, cM0) - Signed by Moshe Jane MD on 11/9/2022      Reason for visit: Ms. 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    Interval History: Today reports passive wishes of being dead but not suicidal with active plan. Follows closely with psychiatry and therapist.     Patient has completed prednisone taper and last took steroids ~3-4 weeks ago. She still has severe stomatitis refractory to Nystatin, saltwater and magic mouthwash.  She is unable to brush her teeth despite soft bristles. Primarily with liquid diet, reports symptoms have worsened over past couple of weeks. Weight 269 -> 263 lbs unintentionally. Last Keytruda dose 1/2023. She also endorses itching all over her body, skin lesions on L anterior calf have been ongoing consistent with excoriations. She has also developed rash on palms and sides of feet. Derm biopsy shows lichen planus. These symptoms are all new post-immunotherapy treatment.        Oncology History   Renal cell carcinoma   8/6/2022 Imaging Significant Findings    CTA performed for chest pain incidentally found a large partially visualized enhancing mass in the right kidney measuring at least 12 cm with adjacent fat stranding and prominent vessels highly concerning for renal cell carcinoma.    Subsequent non contrast CT a/p confirms 13.7 x 12.2 x 14.3 (AP x TV x CC) intermediate density solid right renal mass with central  necrosis and scattered punctate calcifications are concerning for malignancy.     8/9/2022 Imaging Significant Findings    MRI a/p shows eterogeneously enhancing solid right renal mass highly concerning for RCC. No evidence of filling defect or enhancing tumor thrombus in the right renal artery or vein.  Mildly prominent mesenteric lymph nodes, as above.  Metastatic disease difficult to definitively exclude     9/14/2022 Surgery    Right nephrectomy - pathology with ccRCC nuclear grade 4 up to 13.3 cm. Tumor confined to the kidney. Margins negative Rhabdoid features present with associated necrosis. [id number]     11/1/2022 Imaging Significant Findings    CT torso with several up to 3mm micronodules in the lung and prominent but small mesenteric nodes overall felt generally stable.     11/9/2022 - 1/11/2023 Chemotherapy    Treatment Summary   Plan Name: OP PEMBROLIZUMAB 200MG Q3W  Treatment Goal: Curative  Status: Inactive  Start Date: 11/9/2022  End Date: 1/11/2023  Provider: Moshe Jane MD  Chemotherapy: [No matching medication found in this treatment plan]       11/9/2022 Cancer Staged    Staging form: Kidney, AJCC 8th Edition  - Pathologic: Stage II (pT2, pN0, cM0)       12/14/2022 Notable Event    Pembrolizumab held in setting of severe mucositis. Start prednisone taper starting at 60mg po daily.     1/11/2023 -  Immunotherapy    Restart immunotherapy with pembrolizumab 200mg IV q3 weeks     1/30/2023 Notable Event    Stop pembrolizumab for second time due to recurrent stomatitis/mucositis with associated rash. Restart steroid taper at 60mg prednisone.     1/31/2023 Imaging Significant Findings    CT torso with no evidence of recurrent disease. Stable mesenteric nodes up to 1.0cm. Also 0.9cm thyroid nodule.     3/17/2023 Imaging Significant Findings    CT torso  - Postsurgical change of right nephrectomy in this patient with reported clear cell RCC.  Few prominent lymph nodes in the right renal fossa and  "para-aortic region, similar to the prior. No findings to suggest local neoplasm recurrence.  No evidence of distant metastatic disease.  - Few stable bilateral subcentimeter pulmonary nodules, as above.  - Hepatomegaly with fatty infiltration; no focal lesion.  - Noncalcified cholelithiasis with no surrounding acute inflammatory process.    MR Brain:  Normal MRI of the brain with no evidence of metastasis.            Physical Exam:   BP (!) 160/92 (BP Location: Right arm, Patient Position: Sitting, BP Method: Large (Automatic))   Pulse 85   Resp 18   Ht 5' 6" (1.676 m)   Wt 119.4 kg (263 lb 3.7 oz)   SpO2 99%   BMI 42.49 kg/m²      ECOG Performance Status: (foot note - ECOG PS provided by Eastern Cooperative Oncology Group) 1 - Symptomatic but completely ambulatory    Physical Exam  Constitutional:       General: She is not in acute distress.  HENT:      Head: Normocephalic.      Mouth/Throat:      Mouth: Mucous membranes are moist.      Dentition: Abnormal dentition. No dental tenderness.      Pharynx: Posterior oropharyngeal erythema present. No oropharyngeal exudate.   Eyes:      Conjunctiva/sclera: Conjunctivae normal.      Pupils: Pupils are equal, round, and reactive to light.   Cardiovascular:      Rate and Rhythm: Normal rate.   Pulmonary:      Effort: Pulmonary effort is normal. No respiratory distress.   Abdominal:      General: There is no distension.      Palpations: Abdomen is soft.      Tenderness: There is no abdominal tenderness.   Musculoskeletal:         General: Normal range of motion.      Cervical back: Normal range of motion and neck supple.   Skin:     General: Skin is warm.      Findings: No rash.      Comments: Excoriations on lower extremities   Neurological:      General: No focal deficit present.      Mental Status: She is alert and oriented to person, place, and time.      Motor: No weakness.   Psychiatric:         Mood and Affect: Mood normal.         Behavior: Behavior normal.    "      Thought Content: Thought content normal.         Labs:   Recent Results (from the past 48 hour(s))   Comprehensive Metabolic Panel    Collection Time: 04/20/23  9:04 AM   Result Value Ref Range    Sodium 140 136 - 145 mmol/L    Potassium 4.7 3.5 - 5.1 mmol/L    Chloride 107 95 - 110 mmol/L    CO2 23 23 - 29 mmol/L    Glucose 138 (H) 70 - 110 mg/dL    BUN 13 6 - 20 mg/dL    Creatinine 1.1 0.5 - 1.4 mg/dL    Calcium 9.9 8.7 - 10.5 mg/dL    Total Protein 6.8 6.0 - 8.4 g/dL    Albumin 3.8 3.5 - 5.2 g/dL    Total Bilirubin 0.3 0.1 - 1.0 mg/dL    Alkaline Phosphatase 72 55 - 135 U/L    AST 78 (H) 10 - 40 U/L     (H) 10 - 44 U/L    Anion Gap 10 8 - 16 mmol/L    eGFR >60.0 >60 mL/min/1.73 m^2   CBC Oncology    Collection Time: 04/20/23  9:04 AM   Result Value Ref Range    WBC 7.39 3.90 - 12.70 K/uL    RBC 4.19 4.00 - 5.40 M/uL    Hemoglobin 12.1 12.0 - 16.0 g/dL    Hematocrit 38.1 37.0 - 48.5 %    MCV 91 82 - 98 fL    MCH 28.9 27.0 - 31.0 pg    MCHC 31.8 (L) 32.0 - 36.0 g/dL    RDW 13.3 11.5 - 14.5 %    Platelets 393 150 - 450 K/uL    MPV 10.0 9.2 - 12.9 fL    Gran # (ANC) 3.9 1.8 - 7.7 K/uL    Immature Grans (Abs) 0.04 0.00 - 0.04 K/uL   TSH    Collection Time: 04/20/23  9:04 AM   Result Value Ref Range    TSH 0.931 0.400 - 4.000 uIU/mL   HCG, QUANTITATIVE, PREGNANCY    Collection Time: 04/20/23  9:04 AM   Result Value Ref Range    HCG Quant <2.4 See Text mIU/mL   DRUG STUDY SENDOUT, MISC.    Collection Time: 04/20/23  9:04 AM   Result Value Ref Range    Drug Study Test Name Drug Study     Drug Study Specimen Type blood     Drug Study Test Result Result sent directly to physician                 Imaging:     CT Chest Abdomen Pelvis With Contrast  Narrative: EXAMINATION:  CT CHEST ABDOMEN PELVIS WITH CONTRAST (XPD)    CLINICAL HISTORY:  Kidney cancer, monitor; Malignant neoplasm of right kidney, except renal pelvis    TECHNIQUE:  Axial images of the chest, abdomen, and pelvis were acquired after the use of 100  cc [id number] IV contrast. 450 mL of oral contrast was given.  Coronal and sagittal reconstructions were also obtained    COMPARISON:  CT chest abdomen pelvis: 01/31/2023.  CT chest abdomen pelvis: 11/01/2022.    FINDINGS:  Thoracic soft tissues: Stable subcentimeter hypodensity in the inferior pole of the left thyroid lobe.    Aorta: Left-sided aortic arch with 3 branch vessels. Thoracic aorta is normal in caliber and contour with no significant calcific atherosclerosis.    Heart: Normal in size with no evidence of pericardial effusion.  No significant atherosclerotic disease of the coronary arteries.    Pulmonary vasculature: Normal distribution of pulmonary arteries. There are 4 branches of the pulmonary veins.    Mare/Mediastinum: No significant mediastinal, hilar, or axillary lymphadenopathy    Lungs/airway: Few stable subcentimeter right fissural nodules (axial series 6, image 104, 165, and 185).  Stable punctate solid nodule in the right upper lobe (axial series 6, image 112).  Stable 0.6 cm nodule in the medial basal segment of the left lower lobe (axial series 6, image 254).  No new lesions are identified.  No consolidative change or pleural effusion.  No pneumothorax.    Liver: Enlarged in size, measures 21.1 cm in craniocaudal dimension.  Geographic parenchymal enhancement in the left hepatic lobe adjacent to the gallbladder, likely representing focal fatty sparing or transient hepatic attenuation differences.  No focal hepatic lesion.    Gallbladder: Noncalcified stone within the gallbladder with no surrounding acute inflammatory process.    Bile Ducts: No evidence of dilated ducts.    Pancreas: No mass or peripancreatic fat stranding.    Spleen: Unremarkable.    Mesentery/GI: The stomach is unremarkable.  Small/large bowel are normal in caliber with no evidence of obstruction.  No evidence of inflammation or wall thickening.  The appendix is unremarkable.    Adrenals: Unremarkable.    Kidneys/ Ureters:  Postsurgical change of right nephrectomy in this patient with reported clear cell RCC.  Few prominent lymph nodes in the right renal fossa and para-aortic region, similar to the prior.  No findings to suggest local neoplasm recurrence.  The left kidney is unremarkable.  The left ureter is normal in course and caliber with no evidence of ureteral stones or dilatation.    Bladder: No evidence of wall thickening.    Reproductive organs: Unremarkable.    Lymph nodes: No lymphadenapathy.    Abdominal wall:  Normal healing of anterior midline incision.    Vasculature: No significant atherosclerotic disease of the abdominal aorta.   No evidence of aneurysmal dilatation.    Bones: No evidence of acute fractures or lytic lesions.  Impression: Postsurgical change of right nephrectomy in this patient with reported clear cell RCC.  Few prominent lymph nodes in the right renal fossa and para-aortic region, similar to the prior. No findings to suggest local neoplasm recurrence.  No evidence of distant metastatic disease.    Few stable bilateral subcentimeter pulmonary nodules, as above.    Hepatomegaly with fatty infiltration; no focal lesion.    Noncalcified cholelithiasis with no surrounding acute inflammatory process.    Additional findings, as above.    Electronically signed by resident: Vicente Brand  Date:    03/17/2023  Time:    14:13    Electronically signed by: Sheng Jackson MD  Date:    03/17/2023  Time:    15:20  MRI Brain W WO Contrast  Narrative: EXAMINATION:  MRI BRAIN W WO CONTRAST    CLINICAL HISTORY:  Headache, chronic, new features or increased frequency;Kidney cancer, staging;.  Malignant neoplasm of right kidney, except renal pelvis    TECHNIQUE:  Multiplanar multisequence MR imaging of the brain was performed before and after 10 mL of Gadavist IV contrast.    COMPARISON:  No priors.    FINDINGS:  Brain parenchyma appears unremarkable.  No abnormal enhancement. No diffusion restriction to suggest acute  infarct. No areas of gradient susceptibility to suggest intraparenchymal hemorrhage.  No mass lesion or edema.    Ventricles and sulci are normal in size and midline for age without evidence of hydrocephalus.    No extra-axial blood or fluid collections.    Normal T2 skull base vascular flow voids are preserved.    Bone marrow signal intensity is within normal limits.  Impression: Normal MRI of the brain with no evidence of metastasis.    Electronically signed by resident: Jey Fuentes  Date:    03/17/2023  Time:    11:35    Electronically signed by: Chance Gonzales MD  Date:    03/17/2023  Time:    12:17              Diagnoses:       1. Mucositis    2. Renal cell carcinoma of right kidney    3. Major depressive disorder in partial remission, unspecified whether recurrent    4. Class 3 severe obesity due to excess calories with body mass index (BMI) of 40.0 to 44.9 in adult, unspecified whether serious comorbidity present    5. Dermatitis    6. Lichenoid drug reaction    7. Hematochezia    8. Vulvar rash                  Assessment and Plan:     1. Mucositis  Overview:  Patient with painful mucositis/stomatitis developed shortly after starting pembrolizumab. Thought consistent with ICI side effect and improved with starting systemic prednisone. Recurred on pembrolizumab rechallenge.    Assessment & Plan:  Mucositis/stomatitis has progressed over past week, refractory to Nystatin mouthwash, magic mouthwash, saltwater. Recommend following up with dentist, trial of baking soda + water and Clobetasol topical to oropharyngeal lesions per dermatology recommendations.     Message sent to Dr. Jesus to attempt to expedite dermatology visit. Unfortunately patient is a poor candidate for systemic steroids with comorbid psychiatric symptoms and poor tolerance to steroids.       2. Renal cell carcinoma of right kidney  Overview:  Patient found to have high risk likely stage II (lH3aSeT4) with nuclear grade 4 and rhabdoid  features on nephrectomy 9/14/22. Started adjuvant pembrolizumab 11/9/22. Stopped 1/31/23 due to recurrent ICI side effects.    Assessment & Plan:  CT Scan 3/17/23 shows DWAYNE.     Plan surveillance:   Eval q3 months x3 years (2026); then q6 through 5 (2028)  CT q3-6 month x3 years; then annually through 5    Orders:  -     CT Chest Abdomen Pelvis With Contrast (xpd); Future; Expected date: 04/20/2023  -     TSH; Future; Expected date: 04/20/2023  -     HCG, QUANTITATIVE, PREGNANCY; Future; Expected date: 04/20/2023    3. Major depressive disorder in partial remission, unspecified whether recurrent  Overview:  Home medications include sertraline, risperidone, and lamotrigine.    Assessment & Plan:  Today patient reports passive suicidal ideation with no plan. She reports wishing she wishes she was dead daily, but has no plans to act on this and is in frequent communication with her psychiatrist and therapist regarding these feelings. She is aware of resources including the National Suicide Hotline if she were to develop a plan or symptoms worsened.  Matt present for support as well.    Patient is on a stable dose of Risperidone, Lamotrigine, Sertraline. Aware of other resources available including psychology oncology and social work. Symptoms also attributable to current immunotherapy toxicity with hopes that alleviating symptoms will improve mood symptoms.       4. Class 3 severe obesity due to excess calories with body mass index (BMI) of 40.0 to 44.9 in adult, unspecified whether serious comorbidity present    5. Dermatitis  -     clobetasoL (TEMOVATE) 0.05 % cream; Apply topically 2 (two) times daily. Apply to mouth twice daily.  Dispense: 60 g; Refill: 3    6. Lichenoid drug reaction  Assessment & Plan:  Significant reaction to immunotherapy (Keytruda last received 1/2023) manifested as lichenoid skin and oral reaction. Refractory to systemic steroids with poor tolerance to steroids, completed taper 2-3  weeks previously.    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 Symptoms are new since immunotherapy initiation. Message sent to Dr. Jesus regarding clobetasol for oral lesions, candidacy for acitretin, and potentially expediting follow up dermatology visit.     Patient also has upcoming visit with gynecology for painful vaginal area.       7. Hematochezia  Assessment & Plan:  Pain with bowel movements and blood in stool, consistent with stricture vs hemorrhoids. Colonoscopy scheduled 7/2027. Also has gynecology visit for evaluation of bleeding.       8. Vulvar rash  Assessment & Plan:  Refractory to oral antifungal treatment. Upcoming visit with gynecology 6/2023, message sent to potentially expedite visit.           Route Chart for Scheduling    Med Onc Chart Routing      Follow up with physician 2 months. Dr. Jane   Follow up with [facility]    Infusion scheduling note    Injection scheduling note    Labs CBC, CMP, TSH and other   Scheduling:  Preferred lab:  Lab interval:  cbc, cmp, tsh, hcg prior to clinic visit   Imaging CT chest abdomen pelvis   CT CAP within 1w prior to Dr. Jane visit 6/2023   Pharmacy appointment    Other referrals               Patient discussed with attending physician, Dr. Buck Moreno, PGY-VI  Hematology/Oncology Fellow

## 2023-04-23 ENCOUNTER — PATIENT MESSAGE (OUTPATIENT)
Dept: HEMATOLOGY/ONCOLOGY | Facility: CLINIC | Age: 43
End: 2023-04-23
Payer: COMMERCIAL

## 2023-04-24 ENCOUNTER — PATIENT MESSAGE (OUTPATIENT)
Dept: HEMATOLOGY/ONCOLOGY | Facility: CLINIC | Age: 43
End: 2023-04-24

## 2023-04-24 ENCOUNTER — OFFICE VISIT (OUTPATIENT)
Dept: HEMATOLOGY/ONCOLOGY | Facility: CLINIC | Age: 43
End: 2023-04-24
Payer: COMMERCIAL

## 2023-04-24 VITALS
OXYGEN SATURATION: 96 % | RESPIRATION RATE: 18 BRPM | HEART RATE: 94 BPM | WEIGHT: 255.5 LBS | TEMPERATURE: 98 F | BODY MASS INDEX: 42.57 KG/M2 | DIASTOLIC BLOOD PRESSURE: 67 MMHG | HEIGHT: 65 IN | SYSTOLIC BLOOD PRESSURE: 127 MMHG

## 2023-04-24 DIAGNOSIS — F32.4 MAJOR DEPRESSIVE DISORDER IN PARTIAL REMISSION, UNSPECIFIED WHETHER RECURRENT: ICD-10-CM

## 2023-04-24 DIAGNOSIS — K12.30 MUCOSITIS: ICD-10-CM

## 2023-04-24 DIAGNOSIS — K12.30 MUCOSITIS: Primary | ICD-10-CM

## 2023-04-24 DIAGNOSIS — C64.1 RENAL CELL CARCINOMA OF RIGHT KIDNEY: ICD-10-CM

## 2023-04-24 DIAGNOSIS — L43.2 LICHENOID DRUG REACTION: Primary | ICD-10-CM

## 2023-04-24 PROCEDURE — 99214 OFFICE O/P EST MOD 30 MIN: CPT | Mod: S$GLB,,, | Performed by: HOSPITALIST

## 2023-04-24 PROCEDURE — 3074F PR MOST RECENT SYSTOLIC BLOOD PRESSURE < 130 MM HG: ICD-10-PCS | Mod: CPTII,S$GLB,, | Performed by: HOSPITALIST

## 2023-04-24 PROCEDURE — 3074F SYST BP LT 130 MM HG: CPT | Mod: CPTII,S$GLB,, | Performed by: HOSPITALIST

## 2023-04-24 PROCEDURE — 3078F DIAST BP <80 MM HG: CPT | Mod: CPTII,S$GLB,, | Performed by: HOSPITALIST

## 2023-04-24 PROCEDURE — 3008F PR BODY MASS INDEX (BMI) DOCUMENTED: ICD-10-PCS | Mod: CPTII,S$GLB,, | Performed by: HOSPITALIST

## 2023-04-24 PROCEDURE — 3008F BODY MASS INDEX DOCD: CPT | Mod: CPTII,S$GLB,, | Performed by: HOSPITALIST

## 2023-04-24 PROCEDURE — 3078F PR MOST RECENT DIASTOLIC BLOOD PRESSURE < 80 MM HG: ICD-10-PCS | Mod: CPTII,S$GLB,, | Performed by: HOSPITALIST

## 2023-04-24 PROCEDURE — 99214 PR OFFICE/OUTPT VISIT, EST, LEVL IV, 30-39 MIN: ICD-10-PCS | Mod: S$GLB,,, | Performed by: HOSPITALIST

## 2023-04-24 PROCEDURE — 99999 PR PBB SHADOW E&M-EST. PATIENT-LVL III: CPT | Mod: PBBFAC,,, | Performed by: HOSPITALIST

## 2023-04-24 PROCEDURE — 99999 PR PBB SHADOW E&M-EST. PATIENT-LVL III: ICD-10-PCS | Mod: PBBFAC,,, | Performed by: HOSPITALIST

## 2023-04-24 RX ORDER — PREDNISONE 20 MG/1
60 TABLET ORAL DAILY
Qty: 30 TABLET | Refills: 0 | Status: SHIPPED | OUTPATIENT
Start: 2023-04-24 | End: 2023-05-04

## 2023-04-24 NOTE — PATIENT INSTRUCTIONS
Presumably we are still managed a dermatologic skin reaction most consistent with 'lichen planus'. Management will be primarily through the dermatology office. Until then, please restart prednisone 60mg daily. Can stop the dexamethasone mouthwash. Please continue the Nystatin to avoid thrush.    - Prednisone 60mg daily until seen by dermatology  - Referral to oncology primary care   - Please given the gyn team a call back to help expedite GYN follow up.    Follow up with Dr. Jane in June with repeat imaging

## 2023-04-24 NOTE — ASSESSMENT & PLAN NOTE
Symptoms now worsening since coming off steroids. Patient has expedited dermatology visit next week. Also trying to expedite establishing gyn and primary care  - Restart prednisone 60mg daily until seen by dermatology  - Continues nytstatin  Mouthwash empirically  - Referral to oncology primary care

## 2023-04-24 NOTE — ASSESSMENT & PLAN NOTE
Acutely worsened. Patient able to discuss prior hospitlization and passive suicidal thoughts. No active thoughts and able to contract for safety. She has follow up with her therapist and psychologist.

## 2023-04-24 NOTE — PROGRESS NOTES
MEDICAL ONCOLOGY FOLLOW-UP VISIT.     Best Contact Phone Number(s): [phone] (home)      Cancer/Stage/TNM:    Cancer Staging   Renal cell carcinoma  Staging form: Kidney, AJCC 8th Edition  - Pathologic: Stage Unknown (pT2b, pNX, cM0) - Signed by Moshe Jane MD on 10/6/2022  - Pathologic: Stage II (pT2, pN0, cM0) - Signed by Moshe Jane MD on 11/9/2022      Reason for visit: Ms. 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    Interval History:   Saw patient last week for routine follow up. Noted to have worsening mouth sores and anogenital lesiosn siince stopping steroids a few weeks ago. Now continues to worsen over the last week despite oral dexamethasone. Patient with severe depression attributed to her symptoms.       Oncology History   Renal cell carcinoma   8/6/2022 Imaging Significant Findings    CTA performed for chest pain incidentally found a large partially visualized enhancing mass in the right kidney measuring at least 12 cm with adjacent fat stranding and prominent vessels highly concerning for renal cell carcinoma.    Subsequent non contrast CT a/p confirms 13.7 x 12.2 x 14.3 (AP x TV x CC) intermediate density solid right renal mass with central necrosis and scattered punctate calcifications are concerning for malignancy.     8/9/2022 Imaging Significant Findings    MRI a/p shows eterogeneously enhancing solid right renal mass highly concerning for RCC. No evidence of filling defect or enhancing tumor thrombus in the right renal artery or vein.  Mildly prominent mesenteric lymph nodes, as above.  Metastatic disease difficult to definitively exclude     9/14/2022 Surgery    Right nephrectomy - pathology  with ccRCC nuclear grade 4 up to 13.3 cm. Tumor confined to the kidney. Margins negative Rhabdoid features present with associated necrosis. [id number]     11/1/2022 Imaging Significant Findings    CT torso with several up to 3mm micronodules in the lung and prominent but small mesenteric nodes overall felt generally stable.     11/9/2022 - 1/11/2023 Chemotherapy    Treatment Summary   Plan Name: OP PEMBROLIZUMAB 200MG Q3W  Treatment Goal: Curative  Status: Inactive  Start Date: 11/9/2022  End Date: 1/11/2023  Provider: Moshe Jane MD  Chemotherapy: [No matching medication found in this treatment plan]       11/9/2022 Cancer Staged    Staging form: Kidney, AJCC 8th Edition  - Pathologic: Stage II (pT2, pN0, cM0)       12/14/2022 Notable Event    Pembrolizumab held in setting of severe mucositis. Start prednisone taper starting at 60mg po daily.     1/11/2023 -  Immunotherapy    Restart immunotherapy with pembrolizumab 200mg IV q3 weeks     1/30/2023 Notable Event    Stop pembrolizumab for second time due to recurrent stomatitis/mucositis with associated rash. Restart steroid taper at 60mg prednisone.     1/31/2023 Imaging Significant Findings    CT torso with no evidence of recurrent disease. Stable mesenteric nodes up to 1.0cm. Also 0.9cm thyroid nodule.     3/17/2023 Imaging Significant Findings    CT torso  - Postsurgical change of right nephrectomy in this patient with reported clear cell RCC.  Few prominent lymph nodes in the right renal fossa and para-aortic region, similar to the prior. No findings to suggest local neoplasm recurrence.  No evidence of distant metastatic disease.  - Few stable bilateral subcentimeter pulmonary nodules, as above.  - Hepatomegaly with fatty infiltration; no focal lesion.  - Noncalcified cholelithiasis with no surrounding acute inflammatory process.    MR Brain:  Normal MRI of the brain with no evidence of metastasis.            Physical Exam:   /67 (BP Location: Right  "arm, Patient Position: Sitting, BP Method: Large (Automatic))   Pulse 94   Temp 97.8 °F (36.6 °C) (Oral)   Resp 18   Ht 5' 5" (1.651 m)   Wt 115.9 kg (255 lb 8.2 oz)   SpO2 96%   BMI 42.52 kg/m²      ECOG Performance Status: (foot note - ECOG PS provided by [employer]cology [employer]) 1 - Symptomatic but completely ambulatory    Physical Exam  Constitutional:       General: She is not in acute distress.  HENT:      Head: Normocephalic.      Mouth/Throat:      Mouth: Mucous membranes are moist.      Dentition: Abnormal dentition. No dental tenderness.      Pharynx: Posterior oropharyngeal erythema (with lacy white plaques over buccal mucosa) present. No oropharyngeal exudate.   Eyes:      Conjunctiva/sclera: Conjunctivae normal.      Pupils: Pupils are equal, round, and reactive to light.   Cardiovascular:      Rate and Rhythm: Normal rate.   Pulmonary:      Effort: Pulmonary effort is normal. No respiratory distress.   Abdominal:      General: There is no distension.      Palpations: Abdomen is soft.      Tenderness: There is no abdominal tenderness.   Musculoskeletal:         General: Normal range of motion.      Cervical back: Normal range of motion and neck supple.   Skin:     General: Skin is warm.      Findings: No rash.      Comments: Excoriations on lower extremities   Neurological:      General: No focal deficit present.      Mental Status: She is alert and oriented to person, place, and time.      Motor: No weakness.   Psychiatric:         Mood and Affect: Mood normal.         Behavior: Behavior normal.         Thought Content: Thought content normal.         Labs:   No results found for this or any previous visit (from the past 48 hour(s)).               Imaging:     CT Chest Abdomen Pelvis With Contrast  Narrative: EXAMINATION:  CT CHEST ABDOMEN PELVIS WITH CONTRAST (XPD)    CLINICAL HISTORY:  Kidney cancer, monitor; Malignant neoplasm of right kidney, except renal " pelvis    TECHNIQUE:  Axial images of the chest, abdomen, and pelvis were acquired after the use of 100 cc [id number] IV contrast. 450 mL of oral contrast was given.  Coronal and sagittal reconstructions were also obtained    COMPARISON:  CT chest abdomen pelvis: 01/31/2023.  CT chest abdomen pelvis: 11/01/2022.    FINDINGS:  Thoracic soft tissues: Stable subcentimeter hypodensity in the inferior pole of the left thyroid lobe.    Aorta: Left-sided aortic arch with 3 branch vessels. Thoracic aorta is normal in caliber and contour with no significant calcific atherosclerosis.    Heart: Normal in size with no evidence of pericardial effusion.  No significant atherosclerotic disease of the coronary arteries.    Pulmonary vasculature: Normal distribution of pulmonary arteries. There are 4 branches of the pulmonary veins.    Mare/Mediastinum: No significant mediastinal, hilar, or axillary lymphadenopathy    Lungs/airway: Few stable subcentimeter right fissural nodules (axial series 6, image 104, 165, and 185).  Stable punctate solid nodule in the right upper lobe (axial series 6, image 112).  Stable 0.6 cm nodule in the medial basal segment of the left lower lobe (axial series 6, image 254).  No new lesions are identified.  No consolidative change or pleural effusion.  No pneumothorax.    Liver: Enlarged in size, measures 21.1 cm in craniocaudal dimension.  Geographic parenchymal enhancement in the left hepatic lobe adjacent to the gallbladder, likely representing focal fatty sparing or transient hepatic attenuation differences.  No focal hepatic lesion.    Gallbladder: Noncalcified stone within the gallbladder with no surrounding acute inflammatory process.    Bile Ducts: No evidence of dilated ducts.    Pancreas: No mass or peripancreatic fat stranding.    Spleen: Unremarkable.    Mesentery/GI: The stomach is unremarkable.  Small/large bowel are normal in caliber with no evidence of obstruction.  No evidence of  inflammation or wall thickening.  The appendix is unremarkable.    Adrenals: Unremarkable.    Kidneys/ Ureters: Postsurgical change of right nephrectomy in this patient with reported clear cell RCC.  Few prominent lymph nodes in the right renal fossa and para-aortic region, similar to the prior.  No findings to suggest local neoplasm recurrence.  The left kidney is unremarkable.  The left ureter is normal in course and caliber with no evidence of ureteral stones or dilatation.    Bladder: No evidence of wall thickening.    Reproductive organs: Unremarkable.    Lymph nodes: No lymphadenapathy.    Abdominal wall:  Normal healing of anterior midline incision.    Vasculature: No significant atherosclerotic disease of the abdominal aorta.   No evidence of aneurysmal dilatation.    Bones: No evidence of acute fractures or lytic lesions.  Impression: Postsurgical change of right nephrectomy in this patient with reported clear cell RCC.  Few prominent lymph nodes in the right renal fossa and para-aortic region, similar to the prior. No findings to suggest local neoplasm recurrence.  No evidence of distant metastatic disease.    Few stable bilateral subcentimeter pulmonary nodules, as above.    Hepatomegaly with fatty infiltration; no focal lesion.    Noncalcified cholelithiasis with no surrounding acute inflammatory process.    Additional findings, as above.    Electronically signed by resident: Vicente Brand  Date:    03/17/2023  Time:    14:13    Electronically signed by: Sheng Jackson MD  Date:    03/17/2023  Time:    15:20  MRI Brain W WO Contrast  Narrative: EXAMINATION:  MRI BRAIN W WO CONTRAST    CLINICAL HISTORY:  Headache, chronic, new features or increased frequency;Kidney cancer, staging;.  Malignant neoplasm of right kidney, except renal pelvis    TECHNIQUE:  Multiplanar multisequence MR imaging of the brain was performed before and after 10 mL of Gadavist IV contrast.    COMPARISON:  No  priors.    FINDINGS:  Brain parenchyma appears unremarkable.  No abnormal enhancement. No diffusion restriction to suggest acute infarct. No areas of gradient susceptibility to suggest intraparenchymal hemorrhage.  No mass lesion or edema.    Ventricles and sulci are normal in size and midline for age without evidence of hydrocephalus.    No extra-axial blood or fluid collections.    Normal T2 skull base vascular flow voids are preserved.    Bone marrow signal intensity is within normal limits.  Impression: Normal MRI of the brain with no evidence of metastasis.    Electronically signed by resident: Jey Fuentes  Date:    03/17/2023  Time:    11:35    Electronically signed by: Chance Gonzales MD  Date:    03/17/2023  Time:    12:17              Diagnoses:       1. Lichenoid drug reaction    2. Mucositis    3. Major depressive disorder in partial remission, unspecified whether recurrent    4. Renal cell carcinoma of right kidney                  Assessment and Plan:     1. Lichenoid drug reaction  Overview:  Developed following intiation of pembrolizumab. Primarily affecting the mouth and the anogenital region.    Assessment & Plan:  Symptoms now worsening since coming off steroids. Patient has expedited dermatology visit next week. Also trying to expedite establishing gyn and primary care  - Restart prednisone 60mg daily until seen by dermatology  - Continues nytstatin  Mouthwash empirically  - Referral to oncology primary care      2. Mucositis  Overview:  Patient with painful mucositis/stomatitis developed shortly after starting pembrolizumab. 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 Recurred on pembrolizumab rechallenge.    Assessment & Plan:  - Restarting oral prednisone  - Continue nystatin      3. Major depressive disorder in partial remission, unspecified whether recurrent  Overview:  Home medications include sertraline, risperidone, and  lamotrigine.    Assessment & Plan:  Acutely worsened. Patient able to discuss prior hospitlization and passive suicidal thoughts. No active thoughts and able to contract for safety. She has follow up with her therapist and psychologist.      4. Renal cell carcinoma of right kidney  Overview:  Patient found to have high risk likely stage II ([id number]) with nuclear grade 4 and rhabdoid features on nephrectomy 9/14/22. Started adjuvant pembrolizumab 11/9/22. Stopped 1/31/23 due to recurrent ICI side effects.    Assessment & Plan:  - FU in June with repeat CT imaging          Route Chart for Scheduling    Med Onc Chart Routing      Follow up with physician . FU in June as previously scheduled   Follow up with [facility]    Infusion scheduling note    Injection scheduling note    Labs    Imaging    Pharmacy appointment    Other referrals Refer to Oncology Primary Care - Severe depression, obesity, and presumably lichen planus from her prior pembro therapy              Moshe Jane MD

## 2023-04-24 NOTE — TELEPHONE ENCOUNTER
Honestly I can't really do anything else. This is presumably oral lichen planus, and she has derm follow up next week. All I can offer are steroids

## 2023-04-26 ENCOUNTER — PATIENT MESSAGE (OUTPATIENT)
Dept: RESEARCH | Facility: HOSPITAL | Age: 43
End: 2023-04-26
Payer: COMMERCIAL

## 2023-05-01 ENCOUNTER — OFFICE VISIT (OUTPATIENT)
Dept: DERMATOLOGY | Facility: CLINIC | Age: 43
End: 2023-05-01
Payer: COMMERCIAL

## 2023-05-01 DIAGNOSIS — L43.8 EROSIVE ORAL LICHEN PLANUS: ICD-10-CM

## 2023-05-01 DIAGNOSIS — L43.9 LICHEN PLANUS: Primary | ICD-10-CM

## 2023-05-01 PROBLEM — K12.30 MUCOSITIS: Status: RESOLVED | Noted: 2022-12-14 | Resolved: 2023-05-01

## 2023-05-01 PROBLEM — R21 RASH: Status: RESOLVED | Noted: 2023-02-01 | Resolved: 2023-05-01

## 2023-05-01 PROBLEM — R21 VULVAR RASH: Status: RESOLVED | Noted: 2023-04-20 | Resolved: 2023-05-01

## 2023-05-01 PROCEDURE — 1159F PR MEDICATION LIST DOCUMENTED IN MEDICAL RECORD: ICD-10-PCS | Mod: CPTII,S$GLB,, | Performed by: DERMATOLOGY

## 2023-05-01 PROCEDURE — 99214 PR OFFICE/OUTPT VISIT, EST, LEVL IV, 30-39 MIN: ICD-10-PCS | Mod: S$GLB,,, | Performed by: DERMATOLOGY

## 2023-05-01 PROCEDURE — 1160F RVW MEDS BY RX/DR IN RCRD: CPT | Mod: CPTII,S$GLB,, | Performed by: DERMATOLOGY

## 2023-05-01 PROCEDURE — 1159F MED LIST DOCD IN RCRD: CPT | Mod: CPTII,S$GLB,, | Performed by: DERMATOLOGY

## 2023-05-01 PROCEDURE — 99214 OFFICE O/P EST MOD 30 MIN: CPT | Mod: S$GLB,,, | Performed by: DERMATOLOGY

## 2023-05-01 PROCEDURE — 1160F PR REVIEW ALL MEDS BY PRESCRIBER/CLIN PHARMACIST DOCUMENTED: ICD-10-PCS | Mod: CPTII,S$GLB,, | Performed by: DERMATOLOGY

## 2023-05-01 PROCEDURE — 99999 PR PBB SHADOW E&M-EST. PATIENT-LVL III: CPT | Mod: PBBFAC,,,

## 2023-05-01 PROCEDURE — 99999 PR PBB SHADOW E&M-EST. PATIENT-LVL III: ICD-10-PCS | Mod: PBBFAC,,,

## 2023-05-01 RX ORDER — LIDOCAINE HYDROCHLORIDE 20 MG/ML
SOLUTION OROPHARYNGEAL
Qty: 100 ML | Refills: 1 | Status: SHIPPED | OUTPATIENT
Start: 2023-05-01 | End: 2024-03-28

## 2023-05-01 RX ORDER — HYDROXYCHLOROQUINE SULFATE 200 MG/1
200 TABLET, FILM COATED ORAL 2 TIMES DAILY
Qty: 60 EACH | Refills: 2 | Status: SHIPPED | OUTPATIENT
Start: 2023-05-01 | End: 2023-05-15 | Stop reason: SDUPTHER

## 2023-05-01 RX ORDER — HYDROXYCHLOROQUINE SULFATE 200 MG/1
200 TABLET, FILM COATED ORAL DAILY
Qty: 14 EACH | Refills: 0 | Status: SHIPPED | OUTPATIENT
Start: 2023-05-01 | End: 2024-03-28

## 2023-05-01 RX ORDER — CLOBETASOL PROPIONATE 0.5 MG/G
OINTMENT TOPICAL
Qty: 60 G | Refills: 1 | Status: SHIPPED | OUTPATIENT
Start: 2023-05-01 | End: 2023-09-27 | Stop reason: SDUPTHER

## 2023-05-01 RX ORDER — PREDNISONE 20 MG/1
TABLET ORAL
Qty: 25 TABLET | Refills: 0 | Status: SHIPPED | OUTPATIENT
Start: 2023-05-01 | End: 2023-05-15 | Stop reason: SDUPTHER

## 2023-05-01 NOTE — PROGRESS NOTES
Subjective:      Patient ID:  Teagan Griffith is a 43 y.o. female who presents for   Chief Complaint   Patient presents with    Rash     43F w h/o renal cell carcinoma (treated with pembrolizumab) presents to acute dermatology clinic for LP follow-up.  LOV 2/2023, at which time patient was evaluated for pruritic rash to ankles, feet, wrists, hands, and inside of mouth.  Biopsy was performed and c/w lichen planus.  She was rx'd topical Clobetasol ointment.  At that time, patient was on PO steroids per H/O, and Pembrolizumab was stopped.  Course of PO steroids resulted in resolution of rash.  However, as soon as she tapered off, rash re-developed.  The rash is not particularly bothersome to her but the oral erosions are very painful.  With this recurrence, she also noticed genital erosions.  As a result, she was given a short course of PO steroids a little over a week ago by H/O with the intention of getting her to this appointment. Since starting the CS, the rash and genital erosions have resolved.  Her mouth is also much improved, though she states it is not back to normal.      Rash      Review of Systems   Constitutional:  Negative for fever, chills, weight loss, fatigue and malaise.   HENT:  Positive for mouth sores.    Genitourinary:  Positive for genital sores.   Skin:  Positive for rash.     Objective:   Physical Exam   Constitutional: She appears well-developed and well-nourished. No distress.   HENT:   Mouth/Throat:       Neurological: She is alert and oriented to person, place, and time.   Psychiatric: She has a normal mood and affect.   Skin:            Diagram Legend     Erythematous scaling macule/papule c/w actinic keratosis       Vascular papule c/w angioma      Pigmented verrucoid papule/plaque c/w seborrheic keratosis      Yellow umbilicated papule c/w sebaceous hyperplasia      Irregularly shaped tan macule c/w lentigo     1-2 mm smooth white papules consistent with Milia      Movable  subcutaneous cyst with punctum c/w epidermal inclusion cyst      Subcutaneous movable cyst c/w pilar cyst      Firm pink to brown papule c/w dermatofibroma      Pedunculated fleshy papule(s) c/w skin tag(s)      Evenly pigmented macule c/w junctional nevus     Mildly variegated pigmented, slightly irregular-bordered macule c/w mildly atypical nevus      Flesh colored to evenly pigmented papule c/w intradermal nevus       Pink pearly papule/plaque c/w basal cell carcinoma      Erythematous hyperkeratotic cursted plaque c/w SCC      Surgical scar with no sign of skin cancer recurrence      Open and closed comedones      Inflammatory papules and pustules      Verrucoid papule consistent consistent with wart     Erythematous eczematous patches and plaques     Dystrophic onycholytic nail with subungual debris c/w onychomycosis     Umbilicated papule    Erythematous-base heme-crusted tan verrucoid plaque consistent with inflamed seborrheic keratosis     Erythematous Silvery Scaling Plaque c/w Psoriasis     See annotation      Assessment / Plan:        Lichen planus  Patient with RCC previously on Pembrolizumab (last dose 1/2023), which was discontinued due to development of rash with mucosal involvement.  Morphology and distribution of lichenoid dermatitis, as well as associated mucosal erosions with Kellie striae-like changes and nail changes, led to LP as leading differential.  Biopsy in 2/2023 confirmed LP, likely secondary to immune checkpoint inhibitor therapy.  LP improved with cessation of Pembrolizumab and prolonged PO steroid taper; however, quickly recurred with completion of steroid course.  Has again improved with PO Prednisone over the last week.  Given recalcitrant nature of LP necessitating prolonged oral steroid therapy, feel that patient would benefit from alternative systemic therapy at this time.   - Rx'd steroid taper: PO Prednisone 40 mg x 7d, followed by PO Prednisone 20 mg x 7d, followed by PO  Prednisone 10 mg x 7d.  - Rx'd Plaquenil.  Starting at 200 mg daily for 14d, followed by increase to 200 mg BID thereafter.    - Extensively discussed potential side effects and drug interactions.   - Patient follows with ophthalmology and gets annual eye exams.  Most recently on 4/29/2023.   - Rx'd viscous lidocaine swish and spit.  Discussed appropriate use, including importance of not swallowing the medication.  - Rx'd Clobetasol 0.05% ointment to be applied to pledget for use on lesions in mouth.        Follow up in about 2 months (around 7/1/2023).

## 2023-05-04 ENCOUNTER — PATIENT MESSAGE (OUTPATIENT)
Dept: RESEARCH | Facility: HOSPITAL | Age: 43
End: 2023-05-04
Payer: COMMERCIAL

## 2023-05-15 DIAGNOSIS — L43.9 LICHEN PLANUS: ICD-10-CM

## 2023-05-16 ENCOUNTER — PATIENT MESSAGE (OUTPATIENT)
Dept: DERMATOLOGY | Facility: CLINIC | Age: 43
End: 2023-05-16
Payer: COMMERCIAL

## 2023-05-16 RX ORDER — HYDROXYCHLOROQUINE SULFATE 200 MG/1
200 TABLET, FILM COATED ORAL 2 TIMES DAILY
Qty: 60 TABLET | Refills: 2 | Status: SHIPPED | OUTPATIENT
Start: 2023-05-16 | End: 2023-08-28 | Stop reason: SDUPTHER

## 2023-05-16 RX ORDER — PREDNISONE 20 MG/1
TABLET ORAL
Qty: 25 TABLET | Refills: 0 | Status: SHIPPED | OUTPATIENT
Start: 2023-05-16 | End: 2023-06-21 | Stop reason: ALTCHOICE

## 2023-05-23 ENCOUNTER — PATIENT MESSAGE (OUTPATIENT)
Dept: RESEARCH | Facility: HOSPITAL | Age: 43
End: 2023-05-23
Payer: COMMERCIAL

## 2023-06-06 ENCOUNTER — OFFICE VISIT (OUTPATIENT)
Dept: DERMATOLOGY | Facility: CLINIC | Age: 43
End: 2023-06-06
Payer: COMMERCIAL

## 2023-06-06 DIAGNOSIS — C64.1 RENAL CELL CARCINOMA OF RIGHT KIDNEY: ICD-10-CM

## 2023-06-06 DIAGNOSIS — R21 RASH: ICD-10-CM

## 2023-06-06 DIAGNOSIS — L43.8 EROSIVE ORAL LICHEN PLANUS: ICD-10-CM

## 2023-06-06 DIAGNOSIS — L43.9 LICHEN PLANUS: Primary | ICD-10-CM

## 2023-06-06 PROCEDURE — 1160F PR REVIEW ALL MEDS BY PRESCRIBER/CLIN PHARMACIST DOCUMENTED: ICD-10-PCS | Mod: CPTII,S$GLB,, | Performed by: DERMATOLOGY

## 2023-06-06 PROCEDURE — 1159F PR MEDICATION LIST DOCUMENTED IN MEDICAL RECORD: ICD-10-PCS | Mod: CPTII,S$GLB,, | Performed by: DERMATOLOGY

## 2023-06-06 PROCEDURE — 99999 PR PBB SHADOW E&M-EST. PATIENT-LVL III: ICD-10-PCS | Mod: PBBFAC,,, | Performed by: DERMATOLOGY

## 2023-06-06 PROCEDURE — 1159F MED LIST DOCD IN RCRD: CPT | Mod: CPTII,S$GLB,, | Performed by: DERMATOLOGY

## 2023-06-06 PROCEDURE — 99213 PR OFFICE/OUTPT VISIT, EST, LEVL III, 20-29 MIN: ICD-10-PCS | Mod: S$GLB,,, | Performed by: DERMATOLOGY

## 2023-06-06 PROCEDURE — 1160F RVW MEDS BY RX/DR IN RCRD: CPT | Mod: CPTII,S$GLB,, | Performed by: DERMATOLOGY

## 2023-06-06 PROCEDURE — 99999 PR PBB SHADOW E&M-EST. PATIENT-LVL III: CPT | Mod: PBBFAC,,, | Performed by: DERMATOLOGY

## 2023-06-06 PROCEDURE — 99213 OFFICE O/P EST LOW 20 MIN: CPT | Mod: S$GLB,,, | Performed by: DERMATOLOGY

## 2023-06-06 NOTE — PROGRESS NOTES
Subjective:      Patient ID:  Teagan Griffith is a 43 y.o. female who presents for   Chief Complaint   Patient presents with    Follow-up     LP     Pt presents for follow up of LP  Pt last seen on 05/01/2023  Pt noticed improvement - states only painful while eating food. Has not used Clobetasol ointment, taking plaquenil, and tapering on prednisone   Last seen 1 mo ago at which time continued on prednisone taper - she has about ten days left of 10 mg prednisone to take.  On plaquenil 200 mg po bid tolerating well  Remains off immunotherapy without plans to restart (monitoring with imaging)  Notes eating is better, still some sensitivity in mouth.  Unable to tolerate spice.  Tongue still active. Vaginal area resolved. No new rashes.    Review of Systems   Constitutional:  Negative for fever, chills, weight loss, fatigue and malaise.   HENT:  Positive for mouth sores.    Skin:  Positive for rash.     Objective:   Physical Exam   Constitutional: She appears well-developed and well-nourished. No distress.   HENT:   Mouth/Throat:       Neurological: She is alert and oriented to person, place, and time.   Psychiatric: She has a normal mood and affect.   Skin:   Areas Examined (abnormalities noted in diagram):   Head / Face Inspection Performed  Neck Inspection Performed         Diagram Legend     Erythematous scaling macule/papule c/w actinic keratosis       Vascular papule c/w angioma      Pigmented verrucoid papule/plaque c/w seborrheic keratosis      Yellow umbilicated papule c/w sebaceous hyperplasia      Irregularly shaped tan macule c/w lentigo     1-2 mm smooth white papules consistent with Milia      Movable subcutaneous cyst with punctum c/w epidermal inclusion cyst      Subcutaneous movable cyst c/w pilar cyst      Firm pink to brown papule c/w dermatofibroma      Pedunculated fleshy papule(s) c/w skin tag(s)      Evenly pigmented macule c/w junctional nevus     Mildly variegated pigmented, slightly  irregular-bordered macule c/w mildly atypical nevus      Flesh colored to evenly pigmented papule c/w intradermal nevus       Pink pearly papule/plaque c/w basal cell carcinoma      Erythematous hyperkeratotic cursted plaque c/w SCC      Surgical scar with no sign of skin cancer recurrence      Open and closed comedones      Inflammatory papules and pustules      Verrucoid papule consistent consistent with wart     Erythematous eczematous patches and plaques     Dystrophic onycholytic nail with subungual debris c/w onychomycosis     Umbilicated papule    Erythematous-base heme-crusted tan verrucoid plaque consistent with inflamed seborrheic keratosis     Erythematous Silvery Scaling Plaque c/w Psoriasis     See annotation      Assessment / Plan:        Lichen planus    Renal cell carcinoma of right kidney  -     Ambulatory referral/consult to Dermatology    Rash  -     Ambulatory referral/consult to Dermatology    Erosive oral lichen planus    -continue Plaquenil 200 mg bid as steroid sparing agent  -given improvement ok to taper off prednisone upon completion of next 10 days at 10 mg daily  -pt will msg me if flares  -in meantime clobetasol ointment to tongue BID  -viscous lidocaine prn pain           No follow-ups on file.  2 mo f/u

## 2023-06-16 ENCOUNTER — PATIENT MESSAGE (OUTPATIENT)
Dept: DERMATOLOGY | Facility: CLINIC | Age: 43
End: 2023-06-16
Payer: COMMERCIAL

## 2023-06-19 ENCOUNTER — LAB VISIT (OUTPATIENT)
Dept: LAB | Facility: HOSPITAL | Age: 43
End: 2023-06-19
Attending: HOSPITALIST
Payer: COMMERCIAL

## 2023-06-19 ENCOUNTER — HOSPITAL ENCOUNTER (OUTPATIENT)
Dept: RADIOLOGY | Facility: HOSPITAL | Age: 43
Discharge: HOME | End: 2023-06-19
Attending: STUDENT IN AN ORGANIZED HEALTH CARE EDUCATION/TRAINING PROGRAM
Payer: COMMERCIAL

## 2023-06-19 DIAGNOSIS — C64.1 RENAL CELL CARCINOMA OF RIGHT KIDNEY: ICD-10-CM

## 2023-06-19 DIAGNOSIS — E04.1 THYROID NODULE: ICD-10-CM

## 2023-06-19 LAB
ALBUMIN SERPL BCP-MCNC: 4.1 G/DL (ref 3.5–5.2)
ALP SERPL-CCNC: 57 U/L (ref 55–135)
ALT SERPL W/O P-5'-P-CCNC: 39 U/L (ref 10–44)
ANION GAP SERPL CALC-SCNC: 10 MMOL/L (ref 8–16)
AST SERPL-CCNC: 27 U/L (ref 10–40)
BILIRUB SERPL-MCNC: 0.4 MG/DL (ref 0.1–1)
BUN SERPL-MCNC: 8 MG/DL (ref 6–20)
CALCIUM SERPL-MCNC: 10.1 MG/DL (ref 8.7–10.5)
CHLORIDE SERPL-SCNC: 108 MMOL/L (ref 95–110)
CO2 SERPL-SCNC: 23 MMOL/L (ref 23–29)
CREAT SERPL-MCNC: 1 MG/DL (ref 0.5–1.4)
CREAT SERPL-MCNC: 1 MG/DL (ref 0.5–1.4)
ERYTHROCYTE [DISTWIDTH] IN BLOOD BY AUTOMATED COUNT: 12.4 % (ref 11.5–14.5)
EST. GFR  (NO RACE VARIABLE): >60 ML/MIN/1.73 M^2
GLUCOSE SERPL-MCNC: 111 MG/DL (ref 70–110)
HCG INTACT+B SERPL-ACNC: <2.4 MIU/ML
HCT VFR BLD AUTO: 39.5 % (ref 37–48.5)
HGB BLD-MCNC: 12.9 G/DL (ref 12–16)
IMM GRANULOCYTES # BLD AUTO: 0.04 K/UL (ref 0–0.04)
MCH RBC QN AUTO: 29.9 PG (ref 27–31)
MCHC RBC AUTO-ENTMCNC: 32.7 G/DL (ref 32–36)
MCV RBC AUTO: 92 FL (ref 82–98)
NEUTROPHILS # BLD AUTO: 4.2 K/UL (ref 1.8–7.7)
PLATELET # BLD AUTO: 364 K/UL (ref 150–450)
PMV BLD AUTO: 10.6 FL (ref 9.2–12.9)
POTASSIUM SERPL-SCNC: 4.7 MMOL/L (ref 3.5–5.1)
PROT SERPL-MCNC: 7.3 G/DL (ref 6–8.4)
RBC # BLD AUTO: 4.31 M/UL (ref 4–5.4)
SAMPLE: NORMAL
SODIUM SERPL-SCNC: 141 MMOL/L (ref 136–145)
T3 SERPL-MCNC: 105 NG/DL (ref 60–180)
T4 FREE SERPL-MCNC: 0.84 NG/DL (ref 0.71–1.51)
TESTOST SERPL-MCNC: 26 NG/DL (ref 5–73)
TSH SERPL DL<=0.005 MIU/L-ACNC: 0.53 UIU/ML (ref 0.4–4)
WBC # BLD AUTO: 7.96 K/UL (ref 3.9–12.7)

## 2023-06-19 PROCEDURE — 84403 ASSAY OF TOTAL TESTOSTERONE: CPT | Performed by: HOSPITALIST

## 2023-06-19 PROCEDURE — 84480 ASSAY TRIIODOTHYRONINE (T3): CPT | Performed by: HOSPITALIST

## 2023-06-19 PROCEDURE — 71260 CT THORAX DX C+: CPT | Mod: TC

## 2023-06-19 PROCEDURE — 84439 ASSAY OF FREE THYROXINE: CPT | Performed by: HOSPITALIST

## 2023-06-19 PROCEDURE — 71260 CT THORAX DX C+: CPT | Mod: 26,,, | Performed by: INTERNAL MEDICINE

## 2023-06-19 PROCEDURE — A9698 NON-RAD CONTRAST MATERIALNOC: HCPCS | Performed by: STUDENT IN AN ORGANIZED HEALTH CARE EDUCATION/TRAINING PROGRAM

## 2023-06-19 PROCEDURE — 36415 COLL VENOUS BLD VENIPUNCTURE: CPT | Performed by: HOSPITALIST

## 2023-06-19 PROCEDURE — 74177 CT ABD & PELVIS W/CONTRAST: CPT | Mod: TC

## 2023-06-19 PROCEDURE — 80053 COMPREHEN METABOLIC PANEL: CPT | Performed by: HOSPITALIST

## 2023-06-19 PROCEDURE — 71260 CT CHEST ABDOMEN PELVIS WITH CONTRAST (XPD): ICD-10-PCS | Mod: 26,,, | Performed by: INTERNAL MEDICINE

## 2023-06-19 PROCEDURE — 85027 COMPLETE CBC AUTOMATED: CPT | Performed by: HOSPITALIST

## 2023-06-19 PROCEDURE — 84702 CHORIONIC GONADOTROPIN TEST: CPT | Performed by: STUDENT IN AN ORGANIZED HEALTH CARE EDUCATION/TRAINING PROGRAM

## 2023-06-19 PROCEDURE — 25500020 PHARM REV CODE 255: Performed by: STUDENT IN AN ORGANIZED HEALTH CARE EDUCATION/TRAINING PROGRAM

## 2023-06-19 PROCEDURE — 84443 ASSAY THYROID STIM HORMONE: CPT | Performed by: STUDENT IN AN ORGANIZED HEALTH CARE EDUCATION/TRAINING PROGRAM

## 2023-06-19 PROCEDURE — 74177 CT CHEST ABDOMEN PELVIS WITH CONTRAST (XPD): ICD-10-PCS | Mod: 26,,, | Performed by: INTERNAL MEDICINE

## 2023-06-19 PROCEDURE — 74177 CT ABD & PELVIS W/CONTRAST: CPT | Mod: 26,,, | Performed by: INTERNAL MEDICINE

## 2023-06-19 RX ADMIN — IOHEXOL 100 ML: 350 INJECTION, SOLUTION INTRAVENOUS at 11:06

## 2023-06-19 RX ADMIN — Medication 450 ML: at 11:06

## 2023-06-20 ENCOUNTER — TELEPHONE (OUTPATIENT)
Dept: HEMATOLOGY/ONCOLOGY | Facility: CLINIC | Age: 43
End: 2023-06-20
Payer: COMMERCIAL

## 2023-06-21 ENCOUNTER — OFFICE VISIT (OUTPATIENT)
Dept: HEMATOLOGY/ONCOLOGY | Facility: CLINIC | Age: 43
End: 2023-06-21
Payer: COMMERCIAL

## 2023-06-21 VITALS
HEIGHT: 65 IN | RESPIRATION RATE: 18 BRPM | SYSTOLIC BLOOD PRESSURE: 140 MMHG | HEART RATE: 94 BPM | WEIGHT: 266.31 LBS | BODY MASS INDEX: 44.37 KG/M2 | OXYGEN SATURATION: 98 % | DIASTOLIC BLOOD PRESSURE: 74 MMHG

## 2023-06-21 DIAGNOSIS — C64.1 RENAL CELL CARCINOMA OF RIGHT KIDNEY: Primary | ICD-10-CM

## 2023-06-21 DIAGNOSIS — F32.4 MAJOR DEPRESSIVE DISORDER IN PARTIAL REMISSION, UNSPECIFIED WHETHER RECURRENT: ICD-10-CM

## 2023-06-21 DIAGNOSIS — L43.2 LICHENOID DRUG REACTION: ICD-10-CM

## 2023-06-21 DIAGNOSIS — F98.8 ATTENTION DEFICIT DISORDER, UNSPECIFIED HYPERACTIVITY PRESENCE: ICD-10-CM

## 2023-06-21 PROCEDURE — 3008F PR BODY MASS INDEX (BMI) DOCUMENTED: ICD-10-PCS | Mod: CPTII,S$GLB,, | Performed by: HOSPITALIST

## 2023-06-21 PROCEDURE — 3078F DIAST BP <80 MM HG: CPT | Mod: CPTII,S$GLB,, | Performed by: HOSPITALIST

## 2023-06-21 PROCEDURE — 3008F BODY MASS INDEX DOCD: CPT | Mod: CPTII,S$GLB,, | Performed by: HOSPITALIST

## 2023-06-21 PROCEDURE — 99214 OFFICE O/P EST MOD 30 MIN: CPT | Mod: S$GLB,,, | Performed by: HOSPITALIST

## 2023-06-21 PROCEDURE — 99999 PR PBB SHADOW E&M-EST. PATIENT-LVL IV: ICD-10-PCS | Mod: PBBFAC,,, | Performed by: HOSPITALIST

## 2023-06-21 PROCEDURE — 99214 PR OFFICE/OUTPT VISIT, EST, LEVL IV, 30-39 MIN: ICD-10-PCS | Mod: S$GLB,,, | Performed by: HOSPITALIST

## 2023-06-21 PROCEDURE — 3077F PR MOST RECENT SYSTOLIC BLOOD PRESSURE >= 140 MM HG: ICD-10-PCS | Mod: CPTII,S$GLB,, | Performed by: HOSPITALIST

## 2023-06-21 PROCEDURE — 3078F PR MOST RECENT DIASTOLIC BLOOD PRESSURE < 80 MM HG: ICD-10-PCS | Mod: CPTII,S$GLB,, | Performed by: HOSPITALIST

## 2023-06-21 PROCEDURE — 99999 PR PBB SHADOW E&M-EST. PATIENT-LVL IV: CPT | Mod: PBBFAC,,, | Performed by: HOSPITALIST

## 2023-06-21 PROCEDURE — 3077F SYST BP >= 140 MM HG: CPT | Mod: CPTII,S$GLB,, | Performed by: HOSPITALIST

## 2023-06-21 NOTE — PROGRESS NOTES
MEDICAL ONCOLOGY FOLLOW-UP VISIT.     Best Contact Phone Number(s): [phone] (home)      Cancer/Stage/TNM:    Cancer Staging   Renal cell carcinoma  Staging form: Kidney, AJCC 8th Edition  - Pathologic: Stage Unknown (pT2b, pNX, cM0) - Signed by Moshe Jane MD on 10/6/2022  - Pathologic: Stage II (pT2, pN0, cM0) - Signed by Moshe Jane MD on 11/9/2022      Reason for visit: Ms. 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    Interval History:     Following with dermatology regarding lichenoid drug reaction to pembrolizumab. Recently tapered off prednisone and is on hydroxychloroquine. Has some ongoing pain in her mouth and generalized pruritis, but overall much better. She has follow up with dermatology in the next few weeks.    Otherwise feels generally well. No FC. No CP, SOB or cough. No N/V/D. Anogenital rash/itching is signficant improved and generally resolved.    Oncology History   Renal cell carcinoma   8/6/2022 Imaging Significant Findings    CTA performed for chest pain incidentally found a large partially visualized enhancing mass in the right kidney measuring at least 12 cm with adjacent fat stranding and prominent vessels highly concerning for renal cell carcinoma.    Subsequent non contrast CT a/p confirms 13.7 x 12.2 x 14.3 (AP x TV x CC) intermediate density solid right renal mass with central necrosis and scattered punctate calcifications are concerning for malignancy.     8/9/2022 Imaging Significant Findings    MRI a/p shows eterogeneously enhancing solid right renal mass highly concerning for RCC. No evidence of filling defect or enhancing tumor thrombus in the right renal artery or vein.  Mildly  prominent mesenteric lymph nodes, as above.  Metastatic disease difficult to definitively exclude     9/14/2022 Surgery    Right nephrectomy - pathology with ccRCC nuclear grade 4 up to 13.3 cm. Tumor confined to the kidney. Margins negative Rhabdoid features present with associated necrosis. [id number]     11/1/2022 Imaging Significant Findings    CT torso with several up to 3mm micronodules in the lung and prominent but small mesenteric nodes overall felt generally stable.     11/9/2022 - 1/11/2023 Chemotherapy    Treatment Summary   Plan Name: OP PEMBROLIZUMAB 200MG Q3W  Treatment Goal: Curative  Status: Inactive  Start Date: 11/9/2022  End Date: 1/11/2023  Provider: Moshe Jane MD  Chemotherapy: [No matching medication found in this treatment plan]     11/9/2022 Cancer Staged    Staging form: Kidney, AJCC 8th Edition  - Pathologic: Stage II (pT2, pN0, cM0)     12/14/2022 Notable Event    Pembrolizumab held in setting of severe mucositis. Start prednisone taper starting at 60mg po daily.     1/11/2023 -  Immunotherapy    Restart immunotherapy with pembrolizumab 200mg IV q3 weeks     1/30/2023 Notable Event    Stop pembrolizumab for second time due to recurrent stomatitis/mucositis with associated rash. Restart steroid taper at 60mg prednisone.     1/31/2023 Imaging Significant Findings    CT torso with no evidence of recurrent disease. Stable mesenteric nodes up to 1.0cm. Also 0.9cm thyroid nodule.     3/17/2023 Imaging Significant Findings    CT torso  - Postsurgical change of right nephrectomy in this patient with reported clear cell RCC.  Few prominent lymph nodes in the right renal fossa and para-aortic region, similar to the prior. No findings to suggest local neoplasm recurrence.  No evidence of distant metastatic disease.  - Few stable bilateral subcentimeter pulmonary nodules, as above.  - Hepatomegaly with fatty infiltration; no focal lesion.  - Noncalcified cholelithiasis with no surrounding acute  "inflammatory process.    MR Brain:  Normal MRI of the brain with no evidence of metastasis.     6/19/2023 Imaging Significant Findings    6/19/23 CT Torso:  - Surgical change of right nephrectomy in this patient with a history of renal cell carcinoma.  There are a few stable lymph nodes in the right renal fossa.  Small stable periaortic lymph nodes are present, not pathologically enlarged.  - Mild hepatomegaly and hepatic steatosis  - Few stable small pulmonary nodules.            Physical Exam:   BP (!) 140/74 (BP Location: Left arm, Patient Position: Sitting, BP Method: Medium (Automatic))   Pulse 94   Resp 18   Ht 5' 5" (1.651 m)   Wt 120.8 kg (266 lb 5.1 oz)   SpO2 98%   BMI 44.32 kg/m²      ECOG Performance Status: (foot note - ECOG PS provided by Eastern Cooperative Oncology Group) 1 - Symptomatic but completely ambulatory    Physical Exam  Constitutional:       General: She is not in acute distress.  HENT:      Head: Normocephalic.      Mouth/Throat:      Mouth: Mucous membranes are moist.      Pharynx: Posterior oropharyngeal erythema: with lacy white plaques over buccal mucosa.      Comments: Lacy white appearance persists over tongue  Eyes:      Conjunctiva/sclera: Conjunctivae normal.      Pupils: Pupils are equal, round, and reactive to light.   Cardiovascular:      Rate and Rhythm: Normal rate.   Pulmonary:      Effort: Pulmonary effort is normal. No respiratory distress.   Abdominal:      General: There is no distension.      Palpations: Abdomen is soft.      Tenderness: There is no abdominal tenderness.   Musculoskeletal:         General: Normal range of motion.      Cervical back: Normal range of motion and neck supple.   Skin:     General: Skin is warm.   Neurological:      General: No focal deficit present.      Mental Status: She is alert and oriented to person, place, and time.      Motor: No weakness.   Psychiatric:         Mood and Affect: Mood normal.         Behavior: Behavior normal.   "       Thought Content: Thought content normal.         Labs:   Recent Results (from the past 48 hour(s))   ISTAT CREATININE    Collection Time: 06/19/23 11:26 AM   Result Value Ref Range    POC Creatinine 1.0 0.5 - 1.4 mg/dL    Sample VENOUS                   Imaging:     CT Chest Abdomen Pelvis With Contrast (xpd)  Narrative: EXAMINATION:  CT CHEST ABDOMEN PELVIS WITH CONTRAST (XPD)    CLINICAL HISTORY:  Kidney cancer, staging; Malignant neoplasm of right kidney, except renal pelvis    TECHNIQUE:  Low dose axial images, sagittal and coronal reformations were obtained from the thoracic inlet to the pubic symphysis following the IV administration of 100 mL of Omnipaque 350 and oral contrast    COMPARISON:  March 2023    FINDINGS:  There are a few tiny pulmonary nodules which appear stable.    There is a stable subcentimeter hypodensity within the thyroid, better visualized on prior.  There is no significant lymphadenopathy within the chest.  No significant pericardial or pleural effusion is present.  Heart and great vessels are unremarkable.    There is mild hepatomegaly and the liver density is suggestive of steatosis.  The spleen is not enlarged.    The stomach, pancreas and adrenal glands are within normal limits.  The gallbladder is unremarkable.  There is no biliary ductal dilatation.    Surgical change of right nephrectomy noted.  There are a few small lymph nodes in the right renal fossa, unchanged compared to previous imaging.  The left kidney concentrates contrast satisfactorily and demonstrates no abnormality.    The aorta tapers normally without pathologic periaortic lymphadenopathy.  A few small stable periaortic lymph nodes are present.  In the left ovary is a 1.6 cm cyst, not requiring further imaging.  Uterus is present.  Bladder is not well distended, grossly unremarkable.    The bowel demonstrates no distension or adjacent inflammatory change.    The osseous structures show degenerative  changes.  Impression: Surgical change of right nephrectomy in this patient with a history of renal cell carcinoma.  There are a few stable lymph nodes in the right renal fossa.  Small stable periaortic lymph nodes are present, not pathologically enlarged.    Mild hepatomegaly and hepatic steatosis    Few stable small pulmonary nodules.    Electronically signed by: Abbey Waller MD  Date:    06/19/2023  Time:    13:25              Diagnoses:       1. Renal cell carcinoma of right kidney    2. Lichenoid drug reaction    3. Major depressive disorder in partial remission, unspecified whether recurrent    4. Attention deficit disorder, unspecified hyperactivity presence                    Assessment and Plan:     1. Renal cell carcinoma of right kidney  Overview:  Patient found to have high risk likely stage II ([id number]) with nuclear grade 4 and rhabdoid features on nephrectomy 9/14/22. Started adjuvant pembrolizumab 11/9/22. Stopped 1/31/23 due to recurrent ICI side effects.    Assessment & Plan:  No evidence of recurrence on most recent imaging. Will continue closer surveillance given G4 sarcomatoid disease. Plan for q3 month imaging through year 1; then spacing to q6 month imaging for years 2-3 and annual imaging years 4 and 5.    Patient inquiring about need for increased screening for her son. Will refer to high risk cancer genetics clinic given RCC in young patient.    - FU in 3 months with repeat CT torso to complete first year of surveillance  - Referral to genetics    Orders:  -     Ambulatory referral/consult to Genetics; Future; Expected date: 06/28/2023  -     CT Chest Abdomen Pelvis With Contrast; Future; Expected date: 06/21/2023    2. Lichenoid drug reaction  Overview:  Developed following intiation of pembrolizumab. Primarily affecting the mouth and the anogenital region. Currently on hydroxychloroquine    Assessment & Plan:  - Follows closely with Dr. Jesus      3. Major depressive disorder in partial  remission, unspecified whether recurrent  Overview:  Home medications include sertraline, risperidone, and lamotrigine.      4. Attention deficit disorder, unspecified hyperactivity presence  Overview:  Home medications include Adderal            Route Chart for Scheduling    Med Onc Chart Routing      Follow up with physician 3 months.   Follow up with [facility]    Infusion scheduling note    Injection scheduling note    Labs CBC and CMP   Scheduling:  Preferred lab:  Lab interval:     Imaging CT chest abdomen pelvis      Pharmacy appointment    Other referrals refer to Oncology Primary Care -               Moshe Jane MD

## 2023-06-21 NOTE — PATIENT INSTRUCTIONS
Most recently imaging shows no evidence of cancer recurrence. Will continue close monitoring with repeat scan and clinic visit in 3 months.    Continue to follow with dermatology regarding the lichenoid drug reaction which has signficantly improved.    Will refer to cancer genetics to discuss if any genetic testing is warranted or additional screening recommendations for your family.    Will also refer to oncology primary care

## 2023-06-21 NOTE — ASSESSMENT & PLAN NOTE
No evidence of recurrence on most recent imaging. Will continue closer surveillance given G4 sarcomatoid disease. Plan for q3 month imaging through year 1; then spacing to q6 month imaging for years 2-3 and annual imaging years 4 and 5.    Patient inquiring about need for increased screening for her son. Will refer to high risk cancer genetics clinic given RCC in young patient.    - FU in 3 months with repeat CT torso to complete first year of surveillance  - Referral to genetics

## 2023-06-26 ENCOUNTER — PATIENT MESSAGE (OUTPATIENT)
Dept: HEMATOLOGY/ONCOLOGY | Facility: CLINIC | Age: 43
End: 2023-06-26
Payer: COMMERCIAL

## 2023-07-14 ENCOUNTER — CLINICAL SUPPORT (OUTPATIENT)
Dept: ENDOSCOPY | Facility: HOSPITAL | Age: 43
End: 2023-07-14
Attending: HOSPITALIST
Payer: COMMERCIAL

## 2023-07-14 ENCOUNTER — TELEPHONE (OUTPATIENT)
Dept: ENDOSCOPY | Facility: HOSPITAL | Age: 43
End: 2023-07-14

## 2023-07-14 DIAGNOSIS — K92.1 HEMATOCHEZIA: ICD-10-CM

## 2023-07-14 DIAGNOSIS — Z12.11 SPECIAL SCREENING FOR MALIGNANT NEOPLASMS, COLON: Primary | ICD-10-CM

## 2023-07-14 RX ORDER — POLYETHYLENE GLYCOL 3350, SODIUM SULFATE ANHYDROUS, SODIUM BICARBONATE, SODIUM CHLORIDE, POTASSIUM CHLORIDE 236; 22.74; 6.74; 5.86; 2.97 G/4L; G/4L; G/4L; G/4L; G/4L
4 POWDER, FOR SOLUTION ORAL ONCE
Qty: 4000 ML | Refills: 0 | Status: SHIPPED | OUTPATIENT
Start: 2023-07-14 | End: 2023-07-14

## 2023-07-14 NOTE — TELEPHONE ENCOUNTER
Spoke to patient to schedule procedure(s) Colonoscopy       Physician to perform procedure(s) Dr. GEETA Knutson  Date of Procedure (s) 9/25/23  Arrival Time 11:30 AM  Time of Procedure(s) 12:30 PM   Location of Procedure(s) [facility]  Type of Rx Prep sent to patient: PEG  Instructions provided to patient via [facility]    Patient was informed on the following information and verbalized understanding. Screening questionnaire reviewed with patient and complete. If procedure requires anesthesia, a responsible adult needs to be present to accompany the patient home, patient cannot drive after receiving anesthesia. Appointment details are tentative, especially check-in time. Patient will receive a prep-op call 4 days prior to confirm check-in time for procedure. If applicable the patient should contact their pharmacy to verify Rx for procedure prep is ready for pick-up. Patient was advised to call the scheduling department at [phone] if pharmacy states no Rx is available. Patient was advised to call the endoscopy scheduling department if any questions or concerns arise.      [facility] Scheduling Department

## 2023-07-20 NOTE — ASSESSMENT & PLAN NOTE
- Previously advised to decrease alcohol intake as to not interfere with ongoing immunotherapy treatment.   - Continue to follow with therapist and psychiatrist. Last psychiatrist visit x 1 week ago.   - Reports continued decrease in amount and frequency of alcohol intake.    MRI femur right wo IV contrast placed.     Called and spoke with patient's wife Courtney. Unable to add central scheduling to the call. Left voice message with Courtney to call central scheduling at her convenience to schedule patient (per his request in clinic).     Letter mailed to call central scheduling to schedule MRI. Patient is scheduled for MRI, 8/15/23.

## 2023-08-28 DIAGNOSIS — L43.9 LICHEN PLANUS: ICD-10-CM

## 2023-08-29 RX ORDER — HYDROXYCHLOROQUINE SULFATE 200 MG/1
200 TABLET, FILM COATED ORAL 2 TIMES DAILY
Qty: 60 TABLET | Refills: 2 | Status: SHIPPED | OUTPATIENT
Start: 2023-08-29 | End: 2024-03-28

## 2023-09-05 ENCOUNTER — PATIENT MESSAGE (OUTPATIENT)
Dept: DERMATOLOGY | Facility: CLINIC | Age: 43
End: 2023-09-05
Payer: COMMERCIAL

## 2023-09-05 ENCOUNTER — TELEPHONE (OUTPATIENT)
Dept: DERMATOLOGY | Facility: CLINIC | Age: 43
End: 2023-09-05
Payer: COMMERCIAL

## 2023-09-14 ENCOUNTER — HOSPITAL ENCOUNTER (OUTPATIENT)
Dept: RADIOLOGY | Facility: HOSPITAL | Age: 43
Discharge: HOME | End: 2023-09-14
Attending: HOSPITALIST
Payer: COMMERCIAL

## 2023-09-14 DIAGNOSIS — C64.1 RENAL CELL CARCINOMA OF RIGHT KIDNEY: ICD-10-CM

## 2023-09-14 LAB
CREAT SERPL-MCNC: 1.1 MG/DL (ref 0.5–1.4)
SAMPLE: NORMAL

## 2023-09-14 PROCEDURE — 71260 CT THORAX DX C+: CPT | Mod: 26,,, | Performed by: RADIOLOGY

## 2023-09-14 PROCEDURE — 71260 CT THORAX DX C+: CPT | Mod: TC

## 2023-09-14 PROCEDURE — 25500020 PHARM REV CODE 255: Performed by: HOSPITALIST

## 2023-09-14 PROCEDURE — 71260 CT CHEST ABDOMEN PELVIS WITH CONTRAST (XPD): ICD-10-PCS | Mod: 26,,, | Performed by: RADIOLOGY

## 2023-09-14 PROCEDURE — 74177 CT ABD & PELVIS W/CONTRAST: CPT | Mod: 26,,, | Performed by: RADIOLOGY

## 2023-09-14 PROCEDURE — 74177 CT CHEST ABDOMEN PELVIS WITH CONTRAST (XPD): ICD-10-PCS | Mod: 26,,, | Performed by: RADIOLOGY

## 2023-09-14 PROCEDURE — 74177 CT ABD & PELVIS W/CONTRAST: CPT | Mod: TC

## 2023-09-14 PROCEDURE — A9698 NON-RAD CONTRAST MATERIALNOC: HCPCS | Performed by: HOSPITALIST

## 2023-09-14 RX ADMIN — IOHEXOL 100 ML: 350 INJECTION, SOLUTION INTRAVENOUS at 09:09

## 2023-09-14 RX ADMIN — Medication 450 ML: at 09:09

## 2023-09-18 ENCOUNTER — TELEPHONE (OUTPATIENT)
Dept: HEMATOLOGY/ONCOLOGY | Facility: CLINIC | Age: 43
End: 2023-09-18
Payer: COMMERCIAL

## 2023-09-21 ENCOUNTER — OFFICE VISIT (OUTPATIENT)
Dept: HEMATOLOGY/ONCOLOGY | Facility: CLINIC | Age: 43
End: 2023-09-21
Payer: COMMERCIAL

## 2023-09-21 ENCOUNTER — LAB VISIT (OUTPATIENT)
Dept: LAB | Facility: HOSPITAL | Age: 43
End: 2023-09-21
Attending: HOSPITALIST
Payer: COMMERCIAL

## 2023-09-21 VITALS
BODY MASS INDEX: 42.33 KG/M2 | SYSTOLIC BLOOD PRESSURE: 128 MMHG | RESPIRATION RATE: 18 BRPM | HEIGHT: 65 IN | WEIGHT: 254.06 LBS | OXYGEN SATURATION: 95 % | DIASTOLIC BLOOD PRESSURE: 61 MMHG | HEART RATE: 83 BPM

## 2023-09-21 DIAGNOSIS — K92.1 HEMATOCHEZIA: ICD-10-CM

## 2023-09-21 DIAGNOSIS — C64.1 RENAL CELL CARCINOMA OF RIGHT KIDNEY: ICD-10-CM

## 2023-09-21 DIAGNOSIS — L43.2 LICHENOID DRUG REACTION: ICD-10-CM

## 2023-09-21 DIAGNOSIS — C64.1 RENAL CELL CARCINOMA OF RIGHT KIDNEY: Primary | ICD-10-CM

## 2023-09-21 LAB
ALBUMIN SERPL BCP-MCNC: 3.8 G/DL (ref 3.5–5.2)
ALP SERPL-CCNC: 67 U/L (ref 55–135)
ALT SERPL W/O P-5'-P-CCNC: 21 U/L (ref 10–44)
ANION GAP SERPL CALC-SCNC: 5 MMOL/L (ref 8–16)
AST SERPL-CCNC: 17 U/L (ref 10–40)
BILIRUB SERPL-MCNC: 0.3 MG/DL (ref 0.1–1)
BUN SERPL-MCNC: 21 MG/DL (ref 6–20)
CALCIUM SERPL-MCNC: 9.7 MG/DL (ref 8.7–10.5)
CHLORIDE SERPL-SCNC: 106 MMOL/L (ref 95–110)
CO2 SERPL-SCNC: 27 MMOL/L (ref 23–29)
CREAT SERPL-MCNC: 1.2 MG/DL (ref 0.5–1.4)
ERYTHROCYTE [DISTWIDTH] IN BLOOD BY AUTOMATED COUNT: 12.4 % (ref 11.5–14.5)
EST. GFR  (NO RACE VARIABLE): 57.2 ML/MIN/1.73 M^2
GLUCOSE SERPL-MCNC: 111 MG/DL (ref 70–110)
HCT VFR BLD AUTO: 36.3 % (ref 37–48.5)
HGB BLD-MCNC: 12.2 G/DL (ref 12–16)
IMM GRANULOCYTES # BLD AUTO: 0.04 K/UL (ref 0–0.04)
MCH RBC QN AUTO: 28.6 PG (ref 27–31)
MCHC RBC AUTO-ENTMCNC: 33.6 G/DL (ref 32–36)
MCV RBC AUTO: 85 FL (ref 82–98)
NEUTROPHILS # BLD AUTO: 8.4 K/UL (ref 1.8–7.7)
PLATELET # BLD AUTO: 335 K/UL (ref 150–450)
PMV BLD AUTO: 10.3 FL (ref 9.2–12.9)
POTASSIUM SERPL-SCNC: 4.5 MMOL/L (ref 3.5–5.1)
PROT SERPL-MCNC: 6.9 G/DL (ref 6–8.4)
RBC # BLD AUTO: 4.27 M/UL (ref 4–5.4)
SODIUM SERPL-SCNC: 138 MMOL/L (ref 136–145)
TSH SERPL DL<=0.005 MIU/L-ACNC: 1.58 UIU/ML (ref 0.4–4)
WBC # BLD AUTO: 11.59 K/UL (ref 3.9–12.7)

## 2023-09-21 PROCEDURE — 3074F SYST BP LT 130 MM HG: CPT | Mod: CPTII,S$GLB,, | Performed by: HOSPITALIST

## 2023-09-21 PROCEDURE — 3008F PR BODY MASS INDEX (BMI) DOCUMENTED: ICD-10-PCS | Mod: CPTII,S$GLB,, | Performed by: HOSPITALIST

## 2023-09-21 PROCEDURE — 3078F DIAST BP <80 MM HG: CPT | Mod: CPTII,S$GLB,, | Performed by: HOSPITALIST

## 2023-09-21 PROCEDURE — 3008F BODY MASS INDEX DOCD: CPT | Mod: CPTII,S$GLB,, | Performed by: HOSPITALIST

## 2023-09-21 PROCEDURE — 99215 OFFICE O/P EST HI 40 MIN: CPT | Mod: S$GLB,,, | Performed by: HOSPITALIST

## 2023-09-21 PROCEDURE — 99215 PR OFFICE/OUTPT VISIT, EST, LEVL V, 40-54 MIN: ICD-10-PCS | Mod: S$GLB,,, | Performed by: HOSPITALIST

## 2023-09-21 PROCEDURE — 36415 COLL VENOUS BLD VENIPUNCTURE: CPT | Performed by: HOSPITALIST

## 2023-09-21 PROCEDURE — 85027 COMPLETE CBC AUTOMATED: CPT | Performed by: HOSPITALIST

## 2023-09-21 PROCEDURE — 80053 COMPREHEN METABOLIC PANEL: CPT | Performed by: HOSPITALIST

## 2023-09-21 PROCEDURE — 3074F PR MOST RECENT SYSTOLIC BLOOD PRESSURE < 130 MM HG: ICD-10-PCS | Mod: CPTII,S$GLB,, | Performed by: HOSPITALIST

## 2023-09-21 PROCEDURE — 99999 PR PBB SHADOW E&M-EST. PATIENT-LVL III: ICD-10-PCS | Mod: PBBFAC,,, | Performed by: HOSPITALIST

## 2023-09-21 PROCEDURE — 84443 ASSAY THYROID STIM HORMONE: CPT | Performed by: HOSPITALIST

## 2023-09-21 PROCEDURE — 3078F PR MOST RECENT DIASTOLIC BLOOD PRESSURE < 80 MM HG: ICD-10-PCS | Mod: CPTII,S$GLB,, | Performed by: HOSPITALIST

## 2023-09-21 PROCEDURE — 99999 PR PBB SHADOW E&M-EST. PATIENT-LVL III: CPT | Mod: PBBFAC,,, | Performed by: HOSPITALIST

## 2023-09-21 NOTE — Clinical Note
Charmaine - can we get her set up for oncology primary care - she probably can't schedule anyting until November, but really needs it.

## 2023-09-21 NOTE — PROGRESS NOTES
MEDICAL ONCOLOGY FOLLOW-UP VISIT.     Best Contact Phone Number(s): [phone] (home)      Cancer/Stage/TNM:    Cancer Staging   Renal cell carcinoma  Staging form: Kidney, AJCC 8th Edition  - Pathologic: Stage Unknown (pT2b, pNX, cM0) - Signed by Moshe Jane MD on 10/6/2022  - Pathologic: Stage II (pT2, pN0, cM0) - Signed by Moshe Jane MD on 11/9/2022      Reason for visit: Ms. 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    Interval History:     Has ongoing itchining over her legs and discomfort within her mouth. Continues to follow with dermatology. She is on hydroxchloroquine. Ongoing rectal discomfort with bowel movements. No further blood in stool. Reports normal bowel movements a few times a day. Occasional use of laxatives for constipation. No abdominal pain or cramping. No CP or SOB. Has mild sore throat and cough last few days. Overall feels quite a bit better. Ongoing stress at work. Reports she drinks a few beers most nights to help relieve stress. She recognizes this is hampering her efforts to lose weight.     Oncology History   Renal cell carcinoma   8/6/2022 Imaging Significant Findings    CTA performed for chest pain incidentally found a large partially visualized enhancing mass in the right kidney measuring at least 12 cm with adjacent fat stranding and prominent vessels highly concerning for renal cell carcinoma.    Subsequent non contrast CT a/p confirms 13.7 x 12.2 x 14.3 (AP x TV x CC) intermediate density solid right renal mass with central necrosis and scattered punctate calcifications are concerning for malignancy.     8/9/2022 Imaging Significant Findings    MRI a/p shows eterogeneously  enhancing solid right renal mass highly concerning for RCC. No evidence of filling defect or enhancing tumor thrombus in the right renal artery or vein.  Mildly prominent mesenteric lymph nodes, as above.  Metastatic disease difficult to definitively exclude     9/14/2022 Surgery    Right nephrectomy - pathology with ccRCC nuclear grade 4 up to 13.3 cm. Tumor confined to the kidney. Margins negative Rhabdoid features present with associated necrosis. [id number]     11/1/2022 Imaging Significant Findings    CT torso with several up to 3mm micronodules in the lung and prominent but small mesenteric nodes overall felt generally stable.     11/9/2022 - 1/11/2023 Chemotherapy    Treatment Summary   Plan Name: OP PEMBROLIZUMAB 200MG Q3W  Treatment Goal: Curative  Status: Inactive  Start Date: 11/9/2022  End Date: 1/11/2023  Provider: Moshe Jane MD  Chemotherapy: [No matching medication found in this treatment plan]     11/9/2022 Cancer Staged    Staging form: Kidney, AJCC 8th Edition  - Pathologic: Stage II (pT2, pN0, cM0)     12/14/2022 Notable Event    Pembrolizumab held in setting of severe mucositis. Start prednisone taper starting at 60mg po daily.     1/11/2023 -  Immunotherapy    Restart immunotherapy with pembrolizumab 200mg IV q3 weeks     1/30/2023 Notable Event    Stop pembrolizumab for second time due to recurrent stomatitis/mucositis with associated rash. Restart steroid taper at 60mg prednisone.     1/31/2023 Imaging Significant Findings    CT torso with no evidence of recurrent disease. Stable mesenteric nodes up to 1.0cm. Also 0.9cm thyroid nodule.     3/17/2023 Imaging Significant Findings    CT torso  - Postsurgical change of right nephrectomy in this patient with reported clear cell RCC.  Few prominent lymph nodes in the right renal fossa and para-aortic region, similar to the prior. No findings to suggest local neoplasm recurrence.  No evidence of distant metastatic disease.  - Few stable bilateral  "subcentimeter pulmonary nodules, as above.  - Hepatomegaly with fatty infiltration; no focal lesion.  - Noncalcified cholelithiasis with no surrounding acute inflammatory process.    MR Brain:  Normal MRI of the brain with no evidence of metastasis.     6/19/2023 Imaging Significant Findings    6/19/23 CT Torso:  - Surgical change of right nephrectomy in this patient with a history of renal cell carcinoma.  There are a few stable lymph nodes in the right renal fossa.  Small stable periaortic lymph nodes are present, not pathologically enlarged.  - Mild hepatomegaly and hepatic steatosis  - Few stable small pulmonary nodules.     9/14/2023 Imaging Significant Findings    CT torso  Impression:  - Prior right nephrectomy.  Few stable appearing lymph nodes at the right renal fossa and periaortic area.  Otherwise, no evidence for local recurrent or metastatic disease.  - Few stable small pulmonary nodules.  - Hepatic steatosis and additional findings as above.            Physical Exam:   /61 (BP Location: Left arm, Patient Position: Sitting, BP Method: Medium (Automatic))   Pulse 83   Resp 18   Ht 5' 5" (1.651 m)   Wt 115.2 kg (254 lb 1.3 oz)   SpO2 95%   BMI 42.28 kg/m²      ECOG Performance Status: (foot note - ECOG PS provided by Eastern Co[employer]cology Group) 1 - Symptomatic but completely ambulatory    Physical Exam  Constitutional:       General: She is not in acute distress.  HENT:      Head: Normocephalic.      Mouth/Throat:      Mouth: Mucous membranes are moist.      Pharynx: Posterior oropharyngeal erythema: with lacy white plaques over buccal mucosa.      Comments: Lacy white appearance persists over tongue  Eyes:      Conjunctiva/sclera: Conjunctivae normal.      Pupils: Pupils are equal, round, and reactive to light.   Cardiovascular:      Rate and Rhythm: Normal rate.   Pulmonary:      Effort: Pulmonary effort is normal. No respiratory distress.   Abdominal:      General: There is no " distension.      Palpations: Abdomen is soft.      Tenderness: There is no abdominal tenderness.   Musculoskeletal:         General: Normal range of motion.      Cervical back: Normal range of motion and neck supple.   Skin:     General: Skin is warm.   Neurological:      General: No focal deficit present.      Mental Status: She is alert and oriented to person, place, and time.      Motor: No weakness.   Psychiatric:         Mood and Affect: Mood normal.         Behavior: Behavior normal.         Thought Content: Thought content normal.           Labs:   Recent Results (from the past 48 hour(s))   Comprehensive Metabolic Panel    Collection Time: 09/21/23  9:18 AM   Result Value Ref Range    Sodium 138 136 - 145 mmol/L    Potassium 4.5 3.5 - 5.1 mmol/L    Chloride 106 95 - 110 mmol/L    CO2 27 23 - 29 mmol/L    Glucose 111 (H) 70 - 110 mg/dL    BUN 21 (H) 6 - 20 mg/dL    Creatinine 1.2 0.5 - 1.4 mg/dL    Calcium 9.7 8.7 - 10.5 mg/dL    Total Protein 6.9 6.0 - 8.4 g/dL    Albumin 3.8 3.5 - 5.2 g/dL    Total Bilirubin 0.3 0.1 - 1.0 mg/dL    Alkaline Phosphatase 67 55 - 135 U/L    AST 17 10 - 40 U/L    ALT 21 10 - 44 U/L    eGFR 57.2 (A) >60 mL/min/1.73 m^2    Anion Gap 5 (L) 8 - 16 mmol/L   CBC Oncology    Collection Time: 09/21/23  9:18 AM   Result Value Ref Range    WBC 11.59 3.90 - 12.70 K/uL    RBC 4.27 4.00 - 5.40 M/uL    Hemoglobin 12.2 12.0 - 16.0 g/dL    Hematocrit 36.3 (L) 37.0 - 48.5 %    MCV 85 82 - 98 fL    MCH 28.6 27.0 - 31.0 pg    MCHC 33.6 32.0 - 36.0 g/dL    RDW 12.4 11.5 - 14.5 %    Platelets 335 150 - 450 K/uL    MPV 10.3 9.2 - 12.9 fL    Gran # (ANC) 8.4 (H) 1.8 - 7.7 K/uL    Immature Grans (Abs) 0.04 0.00 - 0.04 K/uL   TSH    Collection Time: 09/21/23  9:18 AM   Result Value Ref Range    TSH 1.576 0.400 - 4.000 uIU/mL                    Imaging:     CT Chest Abdomen Pelvis With Contrast  Narrative: EXAMINATION:  CT CHEST ABDOMEN PELVIS WITH CONTRAST (XPD)    CLINICAL HISTORY:  Kidney cancer,  staging;  Malignant neoplasm of right kidney, except renal pelvis    TECHNIQUE:  Low dose axial images, sagittal and coronal reformations were obtained from the thoracic inlet to the pubic symphysis following the IV administration of 100 mL of Omnipaque 350 intravenous contrast. 450 mL barium sulfate suspension oral contrast was administered    COMPARISON:  CT chest abdomen pelvis 06/19/2023    FINDINGS:  CHEST:    Neck and thoracic soft tissues: 4 mm hypodense thyroid nodule.    Vasculature: Left sided aortic arch. Thoracic aorta is nonaneurysmal. No significant atherosclerosis of the thoracic aorta .    Heart: No pericardial effusion.    Airways: Central airways are clear.    Lungs/Pleura: Lungs are symmetrically expanded. Few stable subcentimeter nodules.  For reference stable right perifissural lung nodule measuring 4 mm (series 6, image 200) and stable left lower lobe 4 mm nodule (series 6, image 183).  No suspicious new nodule or mass.  No pleural effusion.    Hilum/Mediastinum: No hilar or mediastinal lymph node enlargement.    Esophagus: No significant abnormality.    ABDOMEN/PELVIS:    Liver: No discrete lesion.  Relative decreased liver attenuation suggesting steatosis.    Gallbladder: No radiopaque gallstones.  No pericholecystic inflammatory changes.    Bile ducts: No intrahepatic or extrahepatic biliary ductal dilatation.    Spleen: Normal size. Unremarkable    Pancreas: No mass. No peripancreatic fat stranding.    Adrenals: No significant abnormality    Renal/Ureters: Right nephrectomy.  Normal appearance of left kidney without hydronephrosis.  The urinary bladder is unremarkable.    Reproductive: Uterus is present.  Few adnexal hypodensities difficult to characterize by CT though suggestive of cysts, largest on the right measuring 2.0 cm.    Stomach/Bowel: Stomach is unremarkable.  Small bowel is normal caliber without obstruction or inflammation. Appendix is without obstruction or inflammation.   Large bowel is normal caliber without obstruction or inflammation.    Peritoneum: No free fluid. No intraperitoneal free air.    Lymph Nodes: Few stable appearing lymph nodes at the right renal fossa and periaortic region, not pathologically enlarged.    Vasculature: Abdominal aorta tapers normally.  Minimal atherosclerosis of the abdominal aorta and its branches.  Portal vasculature, SMV, and splenic vein are patent.    Bones: No aggressive osseous lesion.    Soft Tissues: No significant abnormality.  Impression: Prior right nephrectomy.  Few stable appearing lymph nodes at the right renal fossa and periaortic area.  Otherwise, no evidence for local recurrent or metastatic disease.    Few stable small pulmonary nodules.    Hepatic steatosis and additional findings as above.    Electronically signed by resident: Derian Yepez  Date:    09/14/2023  Time:    09:16    Electronically signed by: Lazaro Benedict  Date:    09/14/2023  Time:    11:40              Diagnoses:       1. Renal cell carcinoma of right kidney    2. Lichenoid drug reaction    3. Hematochezia                      Assessment and Plan:     1. Renal cell carcinoma of right kidney  Overview:  Patient found to have high risk likely stage II ([id number]) with nuclear grade 4 and rhabdoid features on nephrectomy 9/14/22. Started adjuvant pembrolizumab 11/9/22. Stopped 1/31/23 due to recurrent ICI side effects.    Assessment & Plan:  No evidence of recurrence on most recent imaging. Will continue closer surveillance given G4 sarcomatoid disease. Plan for q3 month imaging through year 1; then spacing to q6 month imaging for years 2-3 and annual imaging years 4 and 5.     Now over 1 year from surgery.    - FU in 6 months with repeat CT torso     Orders:  -     CT Chest Abdomen Pelvis With Contrast; Future; Expected date: 09/21/2023    2. Lichenoid drug reaction  Overview:  Developed following intiation of pembrolizumab. Primarily affecting the mouth and  the anogenital region. Currently on hydroxychloroquine    Assessment & Plan:  - Follows with dermatology      3. Hematochezia  Assessment & Plan:  Has resolved.    - Patient requesting rescheduling her colonscopy to November due to financial restraints              Route Chart for Scheduling    Med Onc Chart Routing      Follow up with physician 6 months.   Follow up with RAUL    Infusion scheduling note    Injection scheduling note    Labs CBC and CMP   Scheduling:  Preferred lab:  Lab interval:     Imaging CT chest abdomen pelvis      Pharmacy appointment    Other referrals refer to Oncology Primary Care -                     Moshe Jane MD

## 2023-09-21 NOTE — ASSESSMENT & PLAN NOTE
No evidence of recurrence on most recent imaging. Will continue closer surveillance given G4 sarcomatoid disease. Plan for q3 month imaging through year 1; then spacing to q6 month imaging for years 2-3 and annual imaging years 4 and 5.     Now over 1 year from surgery.    - FU in 6 months with repeat CT torso

## 2023-09-21 NOTE — PATIENT INSTRUCTIONS
No evidence of recurrent kidney cancer on most recent scans. Will plan to change to every 6 month surveillence.    - Continue to follow with Dr. Jesus for the lichen planus rash  - Referral to primary care  - OK to reschedule coloscopy to November  - Try to reduce drinking at night for stress relief

## 2023-09-21 NOTE — ASSESSMENT & PLAN NOTE
Has resolved.    - Patient requesting rescheduling her colonscopy to November due to financial restraints

## 2023-09-25 ENCOUNTER — TELEPHONE (OUTPATIENT)
Dept: ENDOSCOPY | Facility: HOSPITAL | Age: 43
End: 2023-09-25
Payer: COMMERCIAL

## 2023-09-25 NOTE — TELEPHONE ENCOUNTER
Contacted the patient to reschedule an endoscopy procedure(s) Colonoscopy. The patient did not answer the call and left a voice message requesting a call back.

## 2023-09-25 NOTE — TELEPHONE ENCOUNTER
----- Message from Yodit Baird sent at 9/25/2023  8:11 AM CDT -----  Regarding: FW: reschedule request  Contact: pt [phone]    ----- Message -----  From: Mireya Bedoya  Sent: 9/22/2023   1:03 PM CDT  To: [facility] Endo Schedulers  Subject: reschedule request                               PATIENT CALL    Pt calling again to reschedule her colonoscopy from 09/25 to November. Monday appts preferred, please call pt with availability

## 2023-09-27 DIAGNOSIS — L43.9 LICHEN PLANUS: ICD-10-CM

## 2023-09-27 RX ORDER — CLOBETASOL PROPIONATE 0.5 MG/G
OINTMENT TOPICAL
Qty: 60 G | Refills: 1 | Status: SHIPPED | OUTPATIENT
Start: 2023-09-27 | End: 2024-03-28

## 2023-10-25 NOTE — PROGRESS NOTES
Primary Care Oncology Visit    Subjective:   Patient ID: Teagan Griffith is a 43 y.o. female.    Chief Complaint: Renal cell carcinoma of right kidney    Teagan Griffith comes in today for a primary care/oncology visit for uncontrolled  weight, to get established with a pcp .    She does not have a primary care provider currently or has not seen her primary care provider in the last 6 months.     Cancer Staging   Renal cell carcinoma  Staging form: Kidney, AJCC 8th Edition  - Pathologic: Stage Unknown (pT2b, pNX, cM0) - Signed by Moshe Jane MD on 10/6/2022  - Pathologic: Stage II (pT2, pN0, cM0) - Signed by Moshe Jane MD on 11/9/2022    Ms. 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  Now getting every 6 months scans for surveillance    BP Readings from Last 5 Encounters:   10/26/23 (!) 116/57   09/21/23 128/61   06/21/23 (!) 140/74   04/24/23 127/67   04/20/23 (!) 160/92     Lab Results   Component Value Date    WBC 11.59 09/21/2023    HGB 12.2 09/21/2023    HCT 36.3 (L) 09/21/2023    MCV 85 09/21/2023     09/21/2023    ALT 21 09/21/2023    AST 17 09/21/2023    TSH 1.576 09/21/2023    HGBA1C 5.3 08/29/2022      Dexa results: No results found for this or any previous visit.    HPI  Generally feeling well today.  No new problems or concerns today.      Obesity:  Trying to lose weight.  Trying to cut back on alcohol use.  Had lost almost 20 lbs.  Walks occasionally on the treadmill.  Meal planning.  Has met with a nutritionist.  Mediterranean diet.      ADD/depression/anxiety: Has a psychiatrist who manages her add and depression. Sees someone at [facility], but would like to see someone at  [facility].  Does feel that the medications she is taking are working.  Adderall, zoloft, lamictal, adderrall, not taking risperidone.  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  Seeing dermatology for this    Bowel movements are now normal, occasional constipation    Review of Systems   Constitutional:  Negative for chills, diaphoresis and fever.   HENT:  Negative for congestion, ear pain and hearing loss.         Lichen planus on tongue   Eyes:  Negative for pain, redness and visual disturbance.   Respiratory:  Negative for cough and shortness of breath.    Cardiovascular:  Negative for chest pain and leg swelling.   Gastrointestinal:  Negative for abdominal distention, abdominal pain, constipation and diarrhea.   Genitourinary:  Negative for difficulty urinating and dysuria.   Musculoskeletal:  Negative for gait problem and joint swelling.   Skin:  Negative for rash.   Neurological:  Negative for dizziness and light-headedness.   Psychiatric/Behavioral:  Negative for confusion. The patient is not nervous/anxious.         Depression anxiety, insomnia       Review of patient's allergies indicates:  No Known Allergies    Current Outpatient Medications   Medication Instructions    acetaminophen (TYLENOL) 1,000 mg, Oral, Every 8 hours PRN    ALPRAZolam (XANAX) 1 mg, Oral, 2 times daily    clobetasol 0.05% (TEMOVATE) 0.05 % Oint Apply to tissue and then hold on sores in mouth for 10-15 minutes twice a day.    dextroamphetamine-amphetamine 30 mg Tab 30 mg, Oral, 2 times daily    famotidine (PEPCID) 20 mg, Oral, 2 times daily    hydroxychloroquine (PLAQUENIL) 200 mg, Oral, Daily, Do this for 14 days.    hydroxychloroquine (PLAQUENIL) 200 mg, Oral, 2 times daily, Start this after taking 200 mg daily for 14 days.    lamoTRIgine (LAMICTAL) 200 mg, Oral, Daily    LIDOcaine HCl 2% (LIDOCAINE VISCOUS) 2 % Soln  "Swish around in mouth then spit out.  Do not swallow.  Can do this up to 3 times a day.    risperiDONE (RISPERDAL) 4 mg, Oral, Nightly    sertraline (ZOLOFT) 50 mg, Oral, Daily       Patient Active Problem List    Diagnosis Date Noted    Lichenoid drug reaction 2023    Hematochezia 2023    ADD (attention deficit disorder) 2022    Snoring 2022    Class 3 severe obesity with body mass index (BMI) of 40.0 to 44.9 in adult 2022    Alcohol consumption of more than four drinks per day 2022    Depression 2022    Hepatic steatosis 2022    Renal cell carcinoma 2022       Past Medical History:   Diagnosis Date    JEN (acute kidney injury) 2022    Depression     Renal cell carcinoma 2022        Past Surgical History:   Procedure Laterality Date     SECTION      LAPAROSCOPIC ROBOT-ASSISTED SURGICAL REMOVAL OF KIDNEY USING DA CAESAR XI Right 2022    Procedure: XI ROBOTIC NEPHRECTOMY;  Surgeon: Justin Riley MD;  Location: [address];  Service: Urology;  Laterality: Right;  4 hours        Objective:     Vitals:    10/26/23 0723   BP: (!) 116/57   Pulse: 70   Resp: 17   Temp: 97 °F (36.1 °C)   TempSrc: Oral   SpO2: 98%   Weight: 117 kg (257 lb 15 oz)   Height: 5' 5" (1.651 m)   PainSc: 0-No pain       Body mass index is 42.92 kg/m².    Physical Exam  Vitals reviewed.   Constitutional:       General: She is not in acute distress.     Appearance: She is well-developed. She is not diaphoretic.   HENT:      Head: Normocephalic and atraumatic.   Eyes:      General: No scleral icterus.     Conjunctiva/sclera: Conjunctivae normal.   Cardiovascular:      Rate and Rhythm: Normal rate and regular rhythm.      Pulses: Normal pulses.      Heart sounds: Normal heart sounds.   Pulmonary:      Effort: Pulmonary effort is normal. No respiratory distress.      Breath sounds: Normal breath sounds.   Abdominal:      General: There is no distension. "   Musculoskeletal:         General: Normal range of motion.      Cervical back: Normal range of motion and neck supple.   Skin:     General: Skin is warm and dry.   Neurological:      Mental Status: She is alert and oriented to person, place, and time.   Psychiatric:         Behavior: Behavior normal.         Assessment:     1. Renal cell carcinoma of right kidney    2. Lichenoid drug reaction    3. Major depressive disorder in partial remission, unspecified whether recurrent    4. Attention deficit disorder, unspecified hyperactivity presence    5. Morbid obesity with BMI of 40.0-44.9, adult    6. Encounter for screening mammogram for malignant neoplasm of breast    7. Well woman exam        Plan:   Intervention: education, labs ordered, and additional referrals placed  Follow up: follow up with primary care oncology     Teagan was seen today for renal cell carcinoma of right kidney.  Diagnoses and all orders for this visit:    1. Renal cell carcinoma of right kidney (Primary)  Followed by Dr. Jane  On every 6 months scans  Did not tolerate keytruda: side effects    -     Lipid Panel; Future; Expected date: 10/26/2023  -     Hemoglobin A1C; Future; Expected date: 10/26/2023    2. Lichenoid drug reaction  Improving, but still present  Cont hydroxychloroquine  Follow up with derm    3. Major depressive disorder in partial remission, unspecified whether recurrent  Will refer to psych at [facility] per pt's request  Continue current medications  Keep follow up with outside psych until can get established with someone at [facility]    -     Ambulatory referral/consult to Psychiatry; Future; Expected date: 11/02/2023    4. Attention deficit disorder, unspecified hyperactivity presence  See number 3  -     Ambulatory referral/consult to Psychiatry; Future; Expected date: 11/02/2023    5. Morbid obesity with BMI of 40.0-44.9, adult  Has met with nutrition.  Agree with mediterranean diet  Continue to cut back on alcohol  consumption  Will get labs  Increase exercise: 30 minutes, 5 days per week  May refer to bariatric medicine in the future    -     Lipid Panel; Future; Expected date: 10/26/2023  -     Vitamin D; Future; Expected date: 10/26/2023    6. Encounter for screening mammogram for malignant neoplasm of breast  -     Mammo Digital Screening Bilat w/ Bryan; Future; Expected date: 10/26/2023    7. Well woman exam  -     Ambulatory referral/consult to Gynecology; Future; Expected date: 11/02/2023     Total time of this visit, including time spent face to face with patient and/or via video/audio, and also in preparing for today's visit for MDM and documentation. (Medical Decision Making, including consideration of possible diagnoses, management options, complex medical record review, review of diagnostic tests and information, consideration and discussion of significant complications based on comorbidities, and discussion with providers involved with the care of the patient) is 30 minutes. Greater than 50% was spent face to face with the patient counseling and coordinating care.      Med Onc Chart Routing      Follow up with physician    Follow up with RAUL . For prim/onc visit on a monday afternoon (virtual) one of the first weeks in december   Infusion scheduling note    Injection scheduling note    Labs   Scheduling:  Preferred lab:  Lab interval:  Lipid panel, vitamin d, hemoglobin A1C prior to follow up visit in December (fasting)   Imaging    Pharmacy appointment    Other referrals                Charmaine Adrian NP  There are no Patient Instructions on file for this visit.

## 2023-10-26 ENCOUNTER — OFFICE VISIT (OUTPATIENT)
Dept: HEMATOLOGY/ONCOLOGY | Facility: CLINIC | Age: 43
End: 2023-10-26
Payer: COMMERCIAL

## 2023-10-26 VITALS
BODY MASS INDEX: 42.98 KG/M2 | TEMPERATURE: 97 F | HEART RATE: 70 BPM | WEIGHT: 257.94 LBS | RESPIRATION RATE: 17 BRPM | OXYGEN SATURATION: 98 % | SYSTOLIC BLOOD PRESSURE: 116 MMHG | DIASTOLIC BLOOD PRESSURE: 57 MMHG | HEIGHT: 65 IN

## 2023-10-26 DIAGNOSIS — C64.1 RENAL CELL CARCINOMA OF RIGHT KIDNEY: Primary | ICD-10-CM

## 2023-10-26 DIAGNOSIS — L43.2 LICHENOID DRUG REACTION: ICD-10-CM

## 2023-10-26 DIAGNOSIS — Z01.419 WELL WOMAN EXAM: ICD-10-CM

## 2023-10-26 DIAGNOSIS — E66.01 MORBID OBESITY WITH BMI OF 40.0-44.9, ADULT: ICD-10-CM

## 2023-10-26 DIAGNOSIS — Z12.31 ENCOUNTER FOR SCREENING MAMMOGRAM FOR MALIGNANT NEOPLASM OF BREAST: ICD-10-CM

## 2023-10-26 DIAGNOSIS — F32.4 MAJOR DEPRESSIVE DISORDER IN PARTIAL REMISSION, UNSPECIFIED WHETHER RECURRENT: ICD-10-CM

## 2023-10-26 DIAGNOSIS — F98.8 ATTENTION DEFICIT DISORDER, UNSPECIFIED HYPERACTIVITY PRESENCE: ICD-10-CM

## 2023-10-26 PROCEDURE — 99214 PR OFFICE/OUTPT VISIT, EST, LEVL IV, 30-39 MIN: ICD-10-PCS | Mod: S$GLB,,, | Performed by: NURSE PRACTITIONER

## 2023-10-26 PROCEDURE — 3078F DIAST BP <80 MM HG: CPT | Mod: CPTII,S$GLB,, | Performed by: NURSE PRACTITIONER

## 2023-10-26 PROCEDURE — 3078F PR MOST RECENT DIASTOLIC BLOOD PRESSURE < 80 MM HG: ICD-10-PCS | Mod: CPTII,S$GLB,, | Performed by: NURSE PRACTITIONER

## 2023-10-26 PROCEDURE — 99999 PR PBB SHADOW E&M-EST. PATIENT-LVL V: CPT | Mod: PBBFAC,,, | Performed by: NURSE PRACTITIONER

## 2023-10-26 PROCEDURE — 3008F PR BODY MASS INDEX (BMI) DOCUMENTED: ICD-10-PCS | Mod: CPTII,S$GLB,, | Performed by: NURSE PRACTITIONER

## 2023-10-26 PROCEDURE — 1159F PR MEDICATION LIST DOCUMENTED IN MEDICAL RECORD: ICD-10-PCS | Mod: CPTII,S$GLB,, | Performed by: NURSE PRACTITIONER

## 2023-10-26 PROCEDURE — 1159F MED LIST DOCD IN RCRD: CPT | Mod: CPTII,S$GLB,, | Performed by: NURSE PRACTITIONER

## 2023-10-26 PROCEDURE — 3008F BODY MASS INDEX DOCD: CPT | Mod: CPTII,S$GLB,, | Performed by: NURSE PRACTITIONER

## 2023-10-26 PROCEDURE — 3074F SYST BP LT 130 MM HG: CPT | Mod: CPTII,S$GLB,, | Performed by: NURSE PRACTITIONER

## 2023-10-26 PROCEDURE — 99999 PR PBB SHADOW E&M-EST. PATIENT-LVL V: ICD-10-PCS | Mod: PBBFAC,,, | Performed by: NURSE PRACTITIONER

## 2023-10-26 PROCEDURE — 3074F PR MOST RECENT SYSTOLIC BLOOD PRESSURE < 130 MM HG: ICD-10-PCS | Mod: CPTII,S$GLB,, | Performed by: NURSE PRACTITIONER

## 2023-10-26 PROCEDURE — 99214 OFFICE O/P EST MOD 30 MIN: CPT | Mod: S$GLB,,, | Performed by: NURSE PRACTITIONER

## 2023-10-27 ENCOUNTER — PATIENT MESSAGE (OUTPATIENT)
Dept: HEMATOLOGY/ONCOLOGY | Facility: CLINIC | Age: 43
End: 2023-10-27
Payer: COMMERCIAL

## 2023-11-08 DIAGNOSIS — Z00.6 EXAMINATION OF PARTICIPANT IN CLINICAL TRIAL: ICD-10-CM

## 2023-11-08 DIAGNOSIS — C64.1 RENAL CELL CARCINOMA OF RIGHT KIDNEY: Primary | ICD-10-CM

## 2023-11-16 ENCOUNTER — PATIENT MESSAGE (OUTPATIENT)
Dept: RESEARCH | Facility: HOSPITAL | Age: 43
End: 2023-11-16
Payer: COMMERCIAL

## 2023-12-04 ENCOUNTER — TELEPHONE (OUTPATIENT)
Dept: NEUROSURGERY | Facility: CLINIC | Age: 43
End: 2023-12-04
Payer: COMMERCIAL

## 2023-12-04 NOTE — TELEPHONE ENCOUNTER
Called pt several times in reference to appointment being scheduled incorrectly no response lvm stating that appointment will be canceled.

## 2024-03-06 PROCEDURE — 99285 EMERGENCY DEPT VISIT HI MDM: CPT | Mod: 25

## 2024-03-07 ENCOUNTER — HOSPITAL ENCOUNTER (EMERGENCY)
Facility: HOSPITAL | Age: 44
Discharge: HOME | End: 2024-03-07
Attending: STUDENT IN AN ORGANIZED HEALTH CARE EDUCATION/TRAINING PROGRAM
Payer: COMMERCIAL

## 2024-03-07 VITALS
SYSTOLIC BLOOD PRESSURE: 104 MMHG | DIASTOLIC BLOOD PRESSURE: 70 MMHG | WEIGHT: 260 LBS | TEMPERATURE: 98 F | RESPIRATION RATE: 18 BRPM | BODY MASS INDEX: 43.27 KG/M2 | HEART RATE: 80 BPM | OXYGEN SATURATION: 98 %

## 2024-03-07 DIAGNOSIS — Z90.5 S/P NEPHRECTOMY: ICD-10-CM

## 2024-03-07 DIAGNOSIS — N12 PYELONEPHRITIS: Primary | ICD-10-CM

## 2024-03-07 DIAGNOSIS — R10.9 ABDOMINAL PAIN, UNSPECIFIED ABDOMINAL LOCATION: ICD-10-CM

## 2024-03-07 DIAGNOSIS — C64.1 RENAL CELL CARCINOMA OF RIGHT KIDNEY: ICD-10-CM

## 2024-03-07 LAB
ALBUMIN SERPL BCP-MCNC: 4 G/DL (ref 3.5–5.2)
ALP SERPL-CCNC: 67 U/L (ref 55–135)
ALT SERPL W/O P-5'-P-CCNC: 22 U/L (ref 10–44)
ANION GAP SERPL CALC-SCNC: 12 MMOL/L (ref 8–16)
AST SERPL-CCNC: 16 U/L (ref 10–40)
B-HCG UR QL: NEGATIVE
BACTERIA #/AREA URNS AUTO: ABNORMAL /HPF
BASOPHILS # BLD AUTO: 0.05 K/UL (ref 0–0.2)
BASOPHILS NFR BLD: 0.4 % (ref 0–1.9)
BILIRUB SERPL-MCNC: 0.5 MG/DL (ref 0.1–1)
BILIRUB UR QL STRIP: NEGATIVE
BUN SERPL-MCNC: 10 MG/DL (ref 6–30)
BUN SERPL-MCNC: 12 MG/DL (ref 6–20)
CALCIUM SERPL-MCNC: 10.3 MG/DL (ref 8.7–10.5)
CHLORIDE SERPL-SCNC: 101 MMOL/L (ref 95–110)
CHLORIDE SERPL-SCNC: 103 MMOL/L (ref 95–110)
CLARITY UR REFRACT.AUTO: ABNORMAL
CO2 SERPL-SCNC: 24 MMOL/L (ref 23–29)
COLOR UR AUTO: YELLOW
CREAT SERPL-MCNC: 0.9 MG/DL (ref 0.5–1.4)
CREAT SERPL-MCNC: 0.9 MG/DL (ref 0.5–1.4)
CTP QC/QA: YES
DIFFERENTIAL METHOD BLD: ABNORMAL
EOSINOPHIL # BLD AUTO: 0.3 K/UL (ref 0–0.5)
EOSINOPHIL NFR BLD: 2.4 % (ref 0–8)
ERYTHROCYTE [DISTWIDTH] IN BLOOD BY AUTOMATED COUNT: 12.5 % (ref 11.5–14.5)
EST. GFR  (NO RACE VARIABLE): >60 ML/MIN/1.73 M^2
GLUCOSE SERPL-MCNC: 117 MG/DL (ref 70–110)
GLUCOSE SERPL-MCNC: 122 MG/DL (ref 70–110)
GLUCOSE UR QL STRIP: NEGATIVE
HCT VFR BLD AUTO: 37.3 % (ref 37–48.5)
HCT VFR BLD CALC: 36 %PCV (ref 36–54)
HCV AB SERPL QL IA: NORMAL
HGB BLD-MCNC: 12.1 G/DL (ref 12–16)
HGB UR QL STRIP: NEGATIVE
HIV 1+2 AB+HIV1 P24 AG SERPL QL IA: NORMAL
IMM GRANULOCYTES # BLD AUTO: 0.04 K/UL (ref 0–0.04)
IMM GRANULOCYTES NFR BLD AUTO: 0.3 % (ref 0–0.5)
KETONES UR QL STRIP: ABNORMAL
LEUKOCYTE ESTERASE UR QL STRIP: ABNORMAL
LIPASE SERPL-CCNC: 13 U/L (ref 4–60)
LYMPHOCYTES # BLD AUTO: 3.1 K/UL (ref 1–4.8)
LYMPHOCYTES NFR BLD: 22 % (ref 18–48)
MAGNESIUM SERPL-MCNC: 2 MG/DL (ref 1.6–2.6)
MCH RBC QN AUTO: 28.6 PG (ref 27–31)
MCHC RBC AUTO-ENTMCNC: 32.4 G/DL (ref 32–36)
MCV RBC AUTO: 88 FL (ref 82–98)
MICROSCOPIC COMMENT: ABNORMAL
MONOCYTES # BLD AUTO: 0.8 K/UL (ref 0.3–1)
MONOCYTES NFR BLD: 6 % (ref 4–15)
NEUTROPHILS # BLD AUTO: 9.6 K/UL (ref 1.8–7.7)
NEUTROPHILS NFR BLD: 68.9 % (ref 38–73)
NITRITE UR QL STRIP: POSITIVE
NRBC BLD-RTO: 0 /100 WBC
PH UR STRIP: 6 [PH] (ref 5–8)
PLATELET # BLD AUTO: 351 K/UL (ref 150–450)
PMV BLD AUTO: 10.2 FL (ref 9.2–12.9)
POC IONIZED CALCIUM: 1.27 MMOL/L (ref 1.06–1.42)
POC TCO2 (MEASURED): 25 MMOL/L (ref 23–29)
POTASSIUM BLD-SCNC: 3.8 MMOL/L (ref 3.5–5.1)
POTASSIUM SERPL-SCNC: 3.8 MMOL/L (ref 3.5–5.1)
PROT SERPL-MCNC: 7.3 G/DL (ref 6–8.4)
PROT UR QL STRIP: ABNORMAL
RBC # BLD AUTO: 4.23 M/UL (ref 4–5.4)
RBC #/AREA URNS AUTO: 6 /HPF (ref 0–4)
SAMPLE: ABNORMAL
SODIUM BLD-SCNC: 138 MMOL/L (ref 136–145)
SODIUM SERPL-SCNC: 139 MMOL/L (ref 136–145)
SP GR UR STRIP: 1.02 (ref 1–1.03)
SQUAMOUS #/AREA URNS AUTO: 4 /HPF
URN SPEC COLLECT METH UR: ABNORMAL
WBC # BLD AUTO: 13.89 K/UL (ref 3.9–12.7)
WBC #/AREA URNS AUTO: 82 /HPF (ref 0–5)

## 2024-03-07 PROCEDURE — 87086 URINE CULTURE/COLONY COUNT: CPT | Performed by: STUDENT IN AN ORGANIZED HEALTH CARE EDUCATION/TRAINING PROGRAM

## 2024-03-07 PROCEDURE — 96376 TX/PRO/DX INJ SAME DRUG ADON: CPT

## 2024-03-07 PROCEDURE — 85025 COMPLETE CBC W/AUTO DIFF WBC: CPT | Performed by: STUDENT IN AN ORGANIZED HEALTH CARE EDUCATION/TRAINING PROGRAM

## 2024-03-07 PROCEDURE — 87088 URINE BACTERIA CULTURE: CPT | Performed by: STUDENT IN AN ORGANIZED HEALTH CARE EDUCATION/TRAINING PROGRAM

## 2024-03-07 PROCEDURE — 80048 BASIC METABOLIC PNL TOTAL CA: CPT | Mod: XB

## 2024-03-07 PROCEDURE — 81025 URINE PREGNANCY TEST: CPT | Performed by: STUDENT IN AN ORGANIZED HEALTH CARE EDUCATION/TRAINING PROGRAM

## 2024-03-07 PROCEDURE — 87186 SC STD MICRODIL/AGAR DIL: CPT | Performed by: STUDENT IN AN ORGANIZED HEALTH CARE EDUCATION/TRAINING PROGRAM

## 2024-03-07 PROCEDURE — 87389 HIV-1 AG W/HIV-1&-2 AB AG IA: CPT | Performed by: PHYSICIAN ASSISTANT

## 2024-03-07 PROCEDURE — 81001 URINALYSIS AUTO W/SCOPE: CPT | Performed by: STUDENT IN AN ORGANIZED HEALTH CARE EDUCATION/TRAINING PROGRAM

## 2024-03-07 PROCEDURE — 96365 THER/PROPH/DIAG IV INF INIT: CPT | Mod: 59

## 2024-03-07 PROCEDURE — 25000003 PHARM REV CODE 250: Performed by: STUDENT IN AN ORGANIZED HEALTH CARE EDUCATION/TRAINING PROGRAM

## 2024-03-07 PROCEDURE — 96361 HYDRATE IV INFUSION ADD-ON: CPT

## 2024-03-07 PROCEDURE — 87077 CULTURE AEROBIC IDENTIFY: CPT | Performed by: STUDENT IN AN ORGANIZED HEALTH CARE EDUCATION/TRAINING PROGRAM

## 2024-03-07 PROCEDURE — 63600175 PHARM REV CODE 636 W HCPCS: Performed by: STUDENT IN AN ORGANIZED HEALTH CARE EDUCATION/TRAINING PROGRAM

## 2024-03-07 PROCEDURE — 96375 TX/PRO/DX INJ NEW DRUG ADDON: CPT

## 2024-03-07 PROCEDURE — 80053 COMPREHEN METABOLIC PANEL: CPT | Performed by: STUDENT IN AN ORGANIZED HEALTH CARE EDUCATION/TRAINING PROGRAM

## 2024-03-07 PROCEDURE — 25500020 PHARM REV CODE 255: Performed by: STUDENT IN AN ORGANIZED HEALTH CARE EDUCATION/TRAINING PROGRAM

## 2024-03-07 PROCEDURE — 83690 ASSAY OF LIPASE: CPT | Performed by: STUDENT IN AN ORGANIZED HEALTH CARE EDUCATION/TRAINING PROGRAM

## 2024-03-07 PROCEDURE — 83735 ASSAY OF MAGNESIUM: CPT | Performed by: STUDENT IN AN ORGANIZED HEALTH CARE EDUCATION/TRAINING PROGRAM

## 2024-03-07 PROCEDURE — 86803 HEPATITIS C AB TEST: CPT | Performed by: PHYSICIAN ASSISTANT

## 2024-03-07 RX ORDER — MORPHINE SULFATE 4 MG/ML
4 INJECTION, SOLUTION INTRAMUSCULAR; INTRAVENOUS
Status: COMPLETED | OUTPATIENT
Start: 2024-03-07 | End: 2024-03-07

## 2024-03-07 RX ORDER — ACETAMINOPHEN 325 MG/1
650 TABLET ORAL
Status: COMPLETED | OUTPATIENT
Start: 2024-03-07 | End: 2024-03-07

## 2024-03-07 RX ORDER — SULFAMETHOXAZOLE AND TRIMETHOPRIM 800; 160 MG/1; MG/1
1 TABLET ORAL 2 TIMES DAILY
Qty: 20 TABLET | Refills: 0 | Status: SHIPPED | OUTPATIENT
Start: 2024-03-07 | End: 2024-03-17

## 2024-03-07 RX ADMIN — MORPHINE SULFATE 4 MG: 4 INJECTION INTRAVENOUS at 04:03

## 2024-03-07 RX ADMIN — MORPHINE SULFATE 4 MG: 4 INJECTION INTRAVENOUS at 02:03

## 2024-03-07 RX ADMIN — ACETAMINOPHEN 650 MG: 325 TABLET ORAL at 06:03

## 2024-03-07 RX ADMIN — SODIUM CHLORIDE, POTASSIUM CHLORIDE, SODIUM LACTATE AND CALCIUM CHLORIDE 1000 ML: 600; 310; 30; 20 INJECTION, SOLUTION INTRAVENOUS at 04:03

## 2024-03-07 RX ADMIN — IOHEXOL 100 ML: 350 INJECTION, SOLUTION INTRAVENOUS at 03:03

## 2024-03-07 RX ADMIN — CEFTRIAXONE 1 G: 1 INJECTION, POWDER, FOR SOLUTION INTRAMUSCULAR; INTRAVENOUS at 04:03

## 2024-03-07 NOTE — ED NOTES
Patient identifiers for Teagan Griffith 43 y.o. female checked and correct.  Chief Complaint   Patient presents with    Abdominal Pain     Lower abdominal pain that began tonight, reports hx of nephrectomy for kidney cancer     Past Medical History:   Diagnosis Date    JEN (acute kidney injury) 12/21/2022    Depression     Renal cell carcinoma 8/6/2022     Allergies reported: Review of patient's allergies indicates:  No Known Allergies      HEENT: Denies vision changes. Denies ear drainage or hearing loss. No c/o nasal drainage. Denies dysphagia or voice changes.   Appearance: Pt awake, alert & oriented to person, place & time. Pt in no acute distress at present time. Pt is clean and well groomed with clothes appropriately fastened.   Skin: Skin warm, dry & intact. Color consistent with ethnicity. Mucous membranes moist. No breakdown or brusing noted.   Musculoskeletal: Patient moving all extremities well, no obvious swelling or deformities noted.   Respiratory: Respirations spontaneous, even, and non-labored. Visible chest rise noted. Airway is open and patent. No accessory muscle use noted.   Neurologic: Sensation is intact. Speech is clear and appropriate. Eyes open spontaneously, behavior appropriate to situation, follows commands, facial expression symmetrical, bilateral hand grasp equal and even, purposeful motor response noted.  Cardiac: All peripheral pulses present. No Bilateral lower extremity edema. Cap refill is <3 seconds.  Abdomen: Abdomen soft, non distended. + lower abd pain   : Pt voids independently, denies dysuria, hematuria, frequency.

## 2024-03-07 NOTE — DISCHARGE INSTRUCTIONS
Schedule an appointment with the PCP using the phone number below for follow up for your kidney infection.  Return to the ER if you have nausea, vomiting, chills, or feel worse or are no longer making urine. Take all antibiotics as prescribed

## 2024-03-07 NOTE — Clinical Note
"Teagan"Teaganpaco Griffith was seen and treated in our emergency department on 3/6/2024.  She may return to work on 03/08/2024.       If you have any questions or concerns, please don't hesitate to call.      Marysol Vargas MD"

## 2024-03-07 NOTE — PROVIDER PROGRESS NOTES - EMERGENCY DEPT.
Encounter Date: 3/6/2024    ED Physician Progress Notes        ED Resident HAND-OFF NOTE:  Teagan Griffith is a 43 y.o. female who presented to the ED on 3/7/2024, patient C/O abdominal pain. I assumed care of patient from off-going ED physician team patient pending CT abdomen and pelvis.    On my evaluation, Teagan Griffith appears well, hemodynamically stable and in NAD. Thus far, Teagan Griffith has received:  Medications   morphine injection 4 mg (4 mg Intravenous Given 3/7/24 0216)   iohexoL (OMNIPAQUE 350) injection 100 mL (100 mLs Intravenous Given 3/7/24 0345)   cefTRIAXone (Rocephin) 1 g in dextrose 5 % in water (D5W) 100 mL IVPB (MB+) (0 g Intravenous Stopped 3/7/24 0445)   lactated ringers bolus 1,000 mL (0 mLs Intravenous Stopped 3/7/24 0550)   morphine injection 4 mg (4 mg Intravenous Given 3/7/24 0445)   acetaminophen tablet 650 mg (650 mg Oral Given 3/7/24 0604)       /70   Pulse 80   Temp 97.8 °F (36.6 °C) (Oral)   Resp 18   Wt 117.9 kg (260 lb)   SpO2 98%   BMI 43.27 kg/m²         Disposition:  Pending night teams re-evaluation.  ______________________  Mukul Shahid MD   Emergency Medicine Resident      UPDATE:  Patient was re-evaluated by night team and subsequently discharged.        :  None

## 2024-03-07 NOTE — ED PROVIDER NOTES
Encounter Date: 3/6/2024       History     Chief Complaint   Patient presents with    Abdominal Pain     Lower abdominal pain that began tonight, reports hx of nephrectomy for kidney cancer     HPI    43-year-old female significant medical history of depression, renal cell carcinoma status post nephrectomy (right, ), JEN, ADD presenting for abdominal pain.        Patient reports 36 hours of suprapubic abdominal pain.  Pain is 10/10, worse with movement, referred to her left flank.  She thought she might have some gas so she took 3 doses of Dulcolax without significant improvement of her symptoms.  She denies fever, chills, nausea, vomiting, polyuria, hematuria, dysuria, diarrhea, constipation.  She last had a bowel movement yesterday, this was normal in consistency and did not have blood in it and  was not dark/ tarry.  She last had her period approximately a month ago, she thinks she is starting to entering menopause, endorses increased amount of vaginal discharge, however notes it is clear, without odor, and denies vaginal burning.  She also denies cough, shortness of breath, rhinitis, sore throat, headache.      She denies being on chemotherapy at this time.    Review of patient's allergies indicates:  No Known Allergies  Past Medical History:   Diagnosis Date    JEN (acute kidney injury) 2022    Depression     Renal cell carcinoma 2022     Past Surgical History:   Procedure Laterality Date     SECTION      LAPAROSCOPIC ROBOT-ASSISTED SURGICAL REMOVAL OF KIDNEY USING DA CAESAR XI Right 2022    Procedure: XI ROBOTIC NEPHRECTOMY;  Surgeon: Justin Riley MD;  Location: [facility] OR [address] [place] [address];  Service: Urology;  Laterality: Right;  4 hours     Family History   Problem Relation Age of Onset    Drug abuse Mother     Prostate cancer Father     Lymphoma Paternal Grandfather     Kidney cancer Neg Hx      Social History     Tobacco Use    Smoking status: Former     Current packs/day: 0.00      Types: Cigarettes     Start date: 1998     Quit date: 2018     Years since quittin.1    Smokeless tobacco: Never   Substance Use Topics    Alcohol use: Yes     Alcohol/week: 42.0 standard drinks of alcohol     Types: 42 Cans of beer per week     Review of Systems  See HPI for pertinent ROS   Physical Exam     Initial Vitals [24 2229]   BP Pulse Resp Temp SpO2   (!) 137/92 103 15 98.2 °F (36.8 °C) 96 %      MAP       --         Physical Exam    Constitutional: She appears well-developed and well-nourished.   HENT:   Head: Normocephalic.   Eyes: No scleral icterus.   Neck: No JVD present.   Cardiovascular:  Normal rate and regular rhythm.     Exam reveals no friction rub.       No murmur heard.  Pulmonary/Chest: Breath sounds normal. She has no wheezes. She has no rhonchi. She has no rales.   Abdominal: Abdomen is soft. There is abdominal tenderness (4 cm inferior to umbilicus TTP, no rigidity or guarding).   L CVAT mild TTP There is no rebound and no guarding.     Neurological: She is alert.   Skin: Skin is warm. Capillary refill takes less than 2 seconds.   Psychiatric: She has a normal mood and affect.         ED Course   Procedures  Labs Reviewed   CBC W/ AUTO DIFFERENTIAL - Abnormal; Notable for the following components:       Result Value    WBC 13.89 (*)     Gran # (ANC) 9.6 (*)     All other components within normal limits   COMPREHENSIVE METABOLIC PANEL - Abnormal; Notable for the following components:    Glucose 117 (*)     All other components within normal limits   URINALYSIS, REFLEX TO URINE CULTURE - Abnormal; Notable for the following components:    Appearance, UA Hazy (*)     Protein, UA Trace (*)     Ketones, UA Trace (*)     Nitrite, UA Positive (*)     Leukocytes, UA 3+ (*)     All other components within normal limits    Narrative:     Specimen Source->Urine   URINALYSIS MICROSCOPIC - Abnormal; Notable for the following components:    RBC, UA 6 (*)     WBC, UA 82 (*)     Bacteria  Moderate (*)     All other components within normal limits    Narrative:     Specimen Source->Urine   ISTAT PROCEDURE - Abnormal; Notable for the following components:    POC Glucose 122 (*)     All other components within normal limits   CULTURE, URINE   HIV 1 / 2 ANTIBODY    Narrative:     Release to patient->Immediate   HEPATITIS C ANTIBODY    Narrative:     Release to patient->Immediate   LIPASE   MAGNESIUM   POCT URINE PREGNANCY   ISTAT CHEM8          Imaging Results              CT Abdomen Pelvis With IV Contrast NO Oral Contrast (Final result)  Result time 03/07/24 06:22:54      Final result by Aurelio Crabtree MD (03/07/24 06:22:54)                   Impression:      Abdomen CT and pelvis CT:    1. No evidence of obstructive uropathy.  2. Right nephrectomy for RCC.  No abnormal soft tissue nodularity or abnormal enhancement to suggest local disease recurrence.  Normal adrenals.  3. Additional findings as detailed in the body of the report.    Electronically signed by resident: Myrna Rey  Date:    03/07/2024  Time:    05:55    Electronically signed by: Aurelio Crabtree  Date:    03/07/2024  Time:    06:22               Narrative:    EXAMINATION:  CT ABDOMEN PELVIS WITH IV CONTRAST    CLINICAL HISTORY:  Flank pain, kidney stone suspected;    TECHNIQUE:  Low dose axial images were obtained from the lung bases to the pubic symphysis following the intravenous administration of 100 mL of Omnipaque 350.    COMPARISON:  CT chest abdomen pelvis 09/14/2023, 06/19/2023, 03/17/2023    Chest CTA 08/06/2022    FINDINGS:  Abdomen CT and pelvis CT:    Heart: Visualized heart appears unremarkable.    Lung bases: Symmetrically expanded.  No consolidation or pleural effusion.    Right lower lobe lateral subpleural pulmonary micronodule (series 2 image 14), stable since at least 08/06/2022.    Liver: Liver is enlarged measuring 21 cm in craniocaudal dimension.  No focal hepatic abnormality.    Gallbladder: Normal in  appearance without evidence for cholecystitis.    Bile Ducts: No intra or extrahepatic biliary ductal dilation.    Pancreas: No pancreatic mass lesion or peripancreatic inflammatory change.    Spleen: Unremarkable.    Adrenals: Unremarkable.    Kidneys/ Ureters: Postoperative changes of nephrectomy for RCC.  No abnormal soft tissue nodularity or enhancement in the right renal fossa.-operative bed.    No soft tissue abnormality in the operative bed.  The left kidney is normal in size and enhances appropriately.  Evaluation for nephrolithiasis limited due to intravenous contrast.  No  hydroureteronephrosis.    Bladder: Smooth contours without bladder wall thickening.    Reproductive organs: Bladder is unremarkable.    GI Tract/Mesentery: The stomach is unremarkable.  Visualized loops of small and large bowel are normal in caliber without evidence for obstruction or inflammation.   Appendix appears unremarkable.    No abdominopelvic ascites, intraperitoneal free air, or significant adenopathy.    Abdominal wall/extraperitoneal soft tissues: Unremarkable.    Vasculature: Abdominal aorta is normal in caliber, contour, and course without significant calcific atherosclerosis.    Bones: No acute displaced fracture.                                       Medications   morphine injection 4 mg (4 mg Intravenous Given 3/7/24 0216)   iohexoL (OMNIPAQUE 350) injection 100 mL (100 mLs Intravenous Given 3/7/24 0345)   cefTRIAXone (Rocephin) 1 g in dextrose 5 % in water (D5W) 100 mL IVPB (MB+) (0 g Intravenous Stopped 3/7/24 0445)   lactated ringers bolus 1,000 mL (0 mLs Intravenous Stopped 3/7/24 0550)   morphine injection 4 mg (4 mg Intravenous Given 3/7/24 0445)   acetaminophen tablet 650 mg (650 mg Oral Given 3/7/24 0604)     Medical Decision Making  Amount and/or Complexity of Data Reviewed  Labs: ordered. Decision-making details documented in ED Course.  Radiology: ordered.    Risk  OTC drugs.  Prescription drug  management.               ED Course as of 03/07/24 0744   Thu Mar 07, 2024   0559 Comp. Metabolic Panel(!)  CMP unremarkable without significant electrolyte derangement, impaired renal function, or elevated LFTs   [BD]   0559 CBC W/ AUTO DIFFERENTIAL(!)  Leukocytosis of 14 [BD]   0559 Lipase: 13  Inconsistent with pancreatitis [BD]   0559 Urinalysis, Reflex to Urine Culture Urine, Clean Catch(!)  Consistent with UTI. Treating wit Rocephin [BD]      ED Course User Index  [BD] Tino Martinez MD                       43-year-old female with history of right nephrectomy status post renal cell carcinoma not on chemotherapy presenting for suprapubic abdominal pain times 36 hours.  On presentation, patient is afebrile, overall appears well.  Physical exam concerning for suprapubic abdominal tenderness to palpation and mild left CVAT tenderness to palpation.    She was given IV analgesics, abdominal pain workup ordered.  She required repeat dosing of morphine and an additional dose of tylenol.     UA consistent with acute infection with positive nitrites, 3+ leuk esterase and moderate bacteria with 82 white count.  She was given IV Rocephin and IV fluids for concern for pyelonephritis.  CT abdomen pelvis with IV contrast overall reassuring, no evidence of acute intra-abdominal abscess, no acute hydronephrosis or obstructing nephrolithiasis.  Held patient centered care decision making process at bedside with patient, patient requested discharge when given the option of admission for observation given her right nephrectomy and continued pain requirements, however patient requested discharge.  She was given 10 days of p.o. Bactrim with plan for follow-up with her PCP for further evaluation of her likely pyelonephritis.    Differential diagnosis includes UTI, pyelonephritis, nephrolithiasis, TOA, endometritis, ovarian torsion, ovarian cyst, diverticulitis, costochondritis, paraspinal muscle spasm         Clinical  Impression:  Final diagnoses:  [N12] Pyelonephritis (Primary)  [R10.9] Abdominal pain, unspecified abdominal location  [C64.1] Renal cell carcinoma of right kidney  [Z90.5] S/p nephrectomy          ED Disposition Condition    Discharge           ED Prescriptions       Medication Sig Dispense Start Date End Date Auth. Provider    sulfamethoxazole-trimethoprim 800-160mg (BACTRIM DS) 800-160 mg Tab Take 1 tablet by mouth 2 (two) times daily. for 10 days 20 tablet 3/7/2024 3/17/2024 Marysol Vargas MD          Follow-up Information       Follow up With Specialties Details Why Contact Info Additional Information    [facility]   [address]  [facility] [address] [phone] [facility] Please park in surface lot and check in at central registration desk             Marysol Vargas MD  Resident  03/07/24 [id number]

## 2024-03-08 LAB — BACTERIA UR CULT: ABNORMAL

## 2024-03-26 NOTE — PROGRESS NOTES
Primary Care Oncology Visit    Subjective:   Patient ID: Teagan Griffith is a 43 y.o. female.    Chief Complaint: Renal cell carcinoma of right kidney    Teagan Griffith comes in today for a primary care/oncology visit for uncontrolled  weight, to get established with a pcp .    She does not have a primary care provider currently or has not seen her primary care provider in the last 6 months.     Cancer Staging   Renal cell carcinoma  Staging form: Kidney, AJCC 8th Edition  - Pathologic: Stage Unknown (pT2b, pNX, cM0) - Signed by Moshe Jane MD on 10/6/2022  - Pathologic: Stage II (pT2, pN0, cM0) - Signed by Moshe Jane MD on 11/9/2022    Ms. 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  Now getting every 6 months scans for surveillance     BP Readings from Last 5 Encounters:   03/27/24 (!) 142/78   03/07/24 104/70   10/26/23 (!) 116/57   09/21/23 128/61   06/21/23 (!) 140/74     Lab Results   Component Value Date    WBC 9.52 03/27/2024    HGB 12.2 03/27/2024    HCT 37.8 03/27/2024    MCV 89 03/27/2024     03/27/2024    CHOL 277 (H) 03/27/2024    TRIG 229 (H) 03/27/2024    HDL 64 03/27/2024    ALT 39 03/27/2024    AST 26 03/27/2024    TSH 0.734 03/27/2024    HGBA1C 5.7 (H) 03/27/2024      Dexa results: No results found for this or any previous visit.    HPI  Being seen today for an ER follow up.  Went to ER on 3/7/24 for lower abdominal pain and left flank pain.  Diagnosed with pyelonephritis.  CT abdomen/pelvis, labs, cultures, and er notes reviewed.  She was given rocephin and sent home with 10 days of bactrim.  She completed antibiotics and symptoms have resolved.      Since our last visit, she is still  in need of lab work, mammogram and colonoscopy.  She says recently canceled her appt with Dr. Jane and for her CT CAP bc she just had the CT in the ER and bc her depression has been worse, causing lack of motivation to take care of her health.  Depression worse recently, tried to self wean off of psych meds, but this did not work well.  Tried to wean off of lamictal, zoloft and risperidone.  Taking adderrall and xanax, has had increased stress at work recently.  She is scheduled to see a new psychiatrist tomorrow.  Has had some suicidal ideation, but has no suicidal plan or plan for self-harm.  Has good support from her .   Depressed about not losing weight.  Drinking more alcohol recently.  About a six pack per day.     Obesity:  wt today is 269 lbs, up about 12 lbs from visit in October. Feels her psych meds are causing some of this weight gain.  Also, drinking more alcohol recently      ADD/depression/anxiety: Has a psychiatrist who manages her add and depression. Was seeing someone at [facility], has appt tomorrow with psychiatry at [facility].      Works in accounts payable, stressful job    No chest pain, sob, no major headaches.    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  Seeing dermatology for this    Bowel movements are now normal, occasional constipation    Review of Systems   Constitutional:  Negative for chills, diaphoresis and fever.   HENT:  Negative for congestion, ear pain and hearing loss.         Lichen planus on tongue   Eyes:  Negative for pain, redness and visual disturbance.   Respiratory:  Negative for cough and shortness of breath.    Cardiovascular:  Negative for chest pain and leg swelling.   Gastrointestinal:  Negative for abdominal distention, abdominal pain, constipation and diarrhea.   Genitourinary:  Negative for difficulty urinating and dysuria.   Musculoskeletal:  Negative for gait problem and joint swelling.   Skin:  Negative for rash.   Neurological:  Negative for  dizziness and light-headedness.   Psychiatric/Behavioral:  Negative for confusion. The patient is not nervous/anxious.         Depression anxiety, insomnia       Review of patient's allergies indicates:  No Known Allergies    Current Outpatient Medications   Medication Instructions    acetaminophen (TYLENOL) 1,000 mg, Oral, Every 8 hours PRN    ALPRAZolam (XANAX) 1 mg, Oral, 2 times daily    clobetasol 0.05% (TEMOVATE) 0.05 % Oint Apply to tissue and then hold on sores in mouth for 10-15 minutes twice a day.    dextroamphetamine-amphetamine 30 mg Tab 30 mg, Oral, 2 times daily    famotidine (PEPCID) 20 mg, Oral, 2 times daily    hydroxychloroquine (PLAQUENIL) 200 mg, Oral, Daily, Do this for 14 days.    hydroxychloroquine (PLAQUENIL) 200 mg, Oral, 2 times daily, Start this after taking 200 mg daily for 14 days.    lamoTRIgine (LAMICTAL) 200 mg, Oral, Daily    LIDOcaine HCl 2% (LIDOCAINE VISCOUS) 2 % Soln Swish around in mouth then spit out.  Do not swallow.  Can do this up to 3 times a day.    risperiDONE (RISPERDAL) 4 mg, Oral, Nightly    sertraline (ZOLOFT) 50 mg, Oral, Daily       Patient Active Problem List    Diagnosis Date Noted    Lichenoid drug reaction 2023    Hematochezia 2023    ADD (attention deficit disorder) 2022    Snoring 2022    Class 3 severe obesity with body mass index (BMI) of 40.0 to 44.9 in adult 2022    Alcohol consumption of more than four drinks per day 2022    Depression 2022    Hepatic steatosis 2022    Renal cell carcinoma 2022       Past Medical History:   Diagnosis Date    JEN (acute kidney injury) 2022    Depression     Renal cell carcinoma 2022        Past Surgical History:   Procedure Laterality Date     SECTION      LAPAROSCOPIC ROBOT-ASSISTED SURGICAL REMOVAL OF KIDNEY USING DA CAESAR XI Right 2022    Procedure: XI ROBOTIC NEPHRECTOMY;  Surgeon: Justin Riley MD;  Location: [facility] OR [address] [place] [address];  Service:  "Urology;  Laterality: Right;  4 hours        Objective:     Vitals:    03/27/24 0720   BP: (!) 142/78   Pulse: 81   Resp: 17   Temp: 97 °F (36.1 °C)   TempSrc: Oral   SpO2: 96%   Weight: 122.2 kg (269 lb 6.4 oz)   Height: 5' 5" (1.651 m)   PainSc: 0-No pain         Body mass index is 44.83 kg/m².    Physical Exam  Vitals reviewed.   Constitutional:       General: She is not in acute distress.     Appearance: She is well-developed. She is not diaphoretic.   HENT:      Head: Normocephalic and atraumatic.   Eyes:      General: No scleral icterus.     Conjunctiva/sclera: Conjunctivae normal.   Cardiovascular:      Rate and Rhythm: Normal rate and regular rhythm.      Pulses: Normal pulses.      Heart sounds: Normal heart sounds.   Pulmonary:      Effort: Pulmonary effort is normal. No respiratory distress.      Breath sounds: Normal breath sounds.   Abdominal:      General: There is no distension.   Musculoskeletal:         General: Normal range of motion.      Cervical back: Normal range of motion and neck supple.   Skin:     General: Skin is warm and dry.   Neurological:      Mental Status: She is alert and oriented to person, place, and time.   Psychiatric:         Behavior: Behavior normal.       Assessment:     1. Renal cell carcinoma of right kidney          Plan:   Intervention: education, labs ordered, and additional referrals placed  Follow up: follow up with primary care oncology     Teagan was seen today for renal cell carcinoma of right kidney.  Diagnoses and all orders for this visit:    1. Renal cell carcinoma of right kidney (Primary)  Followed by Dr. Jane  On every 6 months scans  Did not tolerate keytruda: side effects  Due for 6 month follow up, will get her scheduled for this  Recent CT abdomen/pelvis reviewed.  Will each out to Dr. Jane to see if he wants CT chest prior to his visit    -     Lipid Panel; Future; Expected date: 10/26/2023  -     Hemoglobin A1C; Future; Expected date: " 10/26/2023      2. Major depressive disorder in partial remission, unspecified whether recurrent  Worsening  Has appointment tomorrow with [facility] psychiatry  Encouraged pt to keep this appointment    -     Ambulatory referral/consult to Psychiatry; Future; Expected date: 11/02/2023    4. Attention deficit disorder, unspecified hyperactivity presence  See number 3  -     Ambulatory referral/consult to Psychiatry; Future; Expected date: 11/02/2023    5. Morbid obesity with BMI of 40.0-44.9, adult  Has met with nutrition.  Agree with mediterranean diet  Continue to cut back on alcohol consumption  Will get labs today  Increase exercise: 30 minutes, 5 days per week  Will refer to [facility] for weight loss    -     Lipid Panel; Future; Expected date: 10/26/2023  -     Vitamin D; Future; Expected date: 10/26/2023    6. Pyelonephritis  Symptoms resolved  Bactrim completed    Screening for colon cancer  -     Ambulatory referral/consult to Endo Procedure ; Future    Mammogram scheduled  Due for colonoscopy (referral placed one year ago for blood in stool), will place referral again given pt will be 44  Due for gyn visit    Total time of this visit, including time spent face to face with patient and/or via video/audio, and also in preparing for today's visit for MDM and documentation. (Medical Decision Making, including consideration of possible diagnoses, management options, complex medical record review, review of diagnostic tests and information, consideration and discussion of significant complications based on comorbidities, and discussion with providers involved with the care of the patient) is 30 minutes. Greater than 50% was spent face to face with the patient counseling and coordinating care.      Med Onc Chart Routing      Follow up with physician 4 weeks. with Dr. Jane   Follow up with RAUL 4 weeks. in prim/onc, can do on the same day as follow up with Dr. Jane if possible   Infusion scheduling  note    Injection scheduling note    Labs    Imaging Mammogram   scheduled   Pharmacy appointment    Other referrals       Additional referrals needed  nees gyn visit as well           Charmaine Adrian NP    Total time of this visit, including time spent face to face with patient and/or via video/audio, and also in preparing for today's visit for MDM and documentation. (Medical Decision Making, including consideration of possible diagnoses, management options, complex medical record review, review of diagnostic tests and information, consideration and discussion of significant complications based on comorbidities, and discussion with providers involved with the care of the patient) is 30 minutes. Greater than 50% was spent face to face with the patient counseling and coordinating care.    There are no Patient Instructions on file for this visit.

## 2024-03-27 ENCOUNTER — OFFICE VISIT (OUTPATIENT)
Dept: HEMATOLOGY/ONCOLOGY | Facility: CLINIC | Age: 44
End: 2024-03-27
Payer: COMMERCIAL

## 2024-03-27 ENCOUNTER — TELEPHONE (OUTPATIENT)
Dept: OBSTETRICS AND GYNECOLOGY | Facility: CLINIC | Age: 44
End: 2024-03-27
Payer: COMMERCIAL

## 2024-03-27 ENCOUNTER — LAB VISIT (OUTPATIENT)
Dept: LAB | Facility: HOSPITAL | Age: 44
End: 2024-03-27
Attending: NURSE PRACTITIONER
Payer: COMMERCIAL

## 2024-03-27 VITALS
WEIGHT: 269.38 LBS | DIASTOLIC BLOOD PRESSURE: 78 MMHG | RESPIRATION RATE: 17 BRPM | BODY MASS INDEX: 44.88 KG/M2 | TEMPERATURE: 97 F | HEIGHT: 65 IN | SYSTOLIC BLOOD PRESSURE: 142 MMHG | HEART RATE: 81 BPM | OXYGEN SATURATION: 96 %

## 2024-03-27 DIAGNOSIS — Z12.11 SCREENING FOR COLON CANCER: ICD-10-CM

## 2024-03-27 DIAGNOSIS — E66.01 MORBID OBESITY WITH BMI OF 40.0-44.9, ADULT: ICD-10-CM

## 2024-03-27 DIAGNOSIS — C64.1 RENAL CELL CARCINOMA OF RIGHT KIDNEY: ICD-10-CM

## 2024-03-27 DIAGNOSIS — F98.8 ATTENTION DEFICIT DISORDER, UNSPECIFIED HYPERACTIVITY PRESENCE: ICD-10-CM

## 2024-03-27 DIAGNOSIS — N12 PYELONEPHRITIS: ICD-10-CM

## 2024-03-27 DIAGNOSIS — C64.1 RENAL CELL CARCINOMA OF RIGHT KIDNEY: Primary | ICD-10-CM

## 2024-03-27 DIAGNOSIS — F32.4 MAJOR DEPRESSIVE DISORDER IN PARTIAL REMISSION, UNSPECIFIED WHETHER RECURRENT: ICD-10-CM

## 2024-03-27 LAB
25(OH)D3+25(OH)D2 SERPL-MCNC: 22 NG/ML (ref 30–96)
ALBUMIN SERPL BCP-MCNC: 4.1 G/DL (ref 3.5–5.2)
ALP SERPL-CCNC: 66 U/L (ref 55–135)
ALT SERPL W/O P-5'-P-CCNC: 39 U/L (ref 10–44)
ANION GAP SERPL CALC-SCNC: 8 MMOL/L (ref 8–16)
AST SERPL-CCNC: 26 U/L (ref 10–40)
BILIRUB SERPL-MCNC: 0.4 MG/DL (ref 0.1–1)
BUN SERPL-MCNC: 14 MG/DL (ref 6–20)
CALCIUM SERPL-MCNC: 9.7 MG/DL (ref 8.7–10.5)
CHLORIDE SERPL-SCNC: 107 MMOL/L (ref 95–110)
CHOLEST SERPL-MCNC: 277 MG/DL (ref 120–199)
CHOLEST/HDLC SERPL: 4.3 {RATIO} (ref 2–5)
CO2 SERPL-SCNC: 23 MMOL/L (ref 23–29)
CREAT SERPL-MCNC: 0.9 MG/DL (ref 0.5–1.4)
ERYTHROCYTE [DISTWIDTH] IN BLOOD BY AUTOMATED COUNT: 12.8 % (ref 11.5–14.5)
EST. GFR  (NO RACE VARIABLE): >60 ML/MIN/1.73 M^2
ESTIMATED AVG GLUCOSE: 117 MG/DL (ref 68–131)
GLUCOSE SERPL-MCNC: 98 MG/DL (ref 70–110)
HBA1C MFR BLD: 5.7 % (ref 4–5.6)
HCT VFR BLD AUTO: 37.8 % (ref 37–48.5)
HDLC SERPL-MCNC: 64 MG/DL (ref 40–75)
HDLC SERPL: 23.1 % (ref 20–50)
HGB BLD-MCNC: 12.2 G/DL (ref 12–16)
IMM GRANULOCYTES # BLD AUTO: 0.04 K/UL (ref 0–0.04)
LDLC SERPL CALC-MCNC: 167.2 MG/DL (ref 63–159)
MCH RBC QN AUTO: 28.6 PG (ref 27–31)
MCHC RBC AUTO-ENTMCNC: 32.3 G/DL (ref 32–36)
MCV RBC AUTO: 89 FL (ref 82–98)
NEUTROPHILS # BLD AUTO: 5.6 K/UL (ref 1.8–7.7)
NONHDLC SERPL-MCNC: 213 MG/DL
PLATELET # BLD AUTO: 406 K/UL (ref 150–450)
PMV BLD AUTO: 10 FL (ref 9.2–12.9)
POTASSIUM SERPL-SCNC: 4.9 MMOL/L (ref 3.5–5.1)
PROT SERPL-MCNC: 7.1 G/DL (ref 6–8.4)
RBC # BLD AUTO: 4.27 M/UL (ref 4–5.4)
SODIUM SERPL-SCNC: 138 MMOL/L (ref 136–145)
TRIGL SERPL-MCNC: 229 MG/DL (ref 30–150)
TSH SERPL DL<=0.005 MIU/L-ACNC: 0.73 UIU/ML (ref 0.4–4)
WBC # BLD AUTO: 9.52 K/UL (ref 3.9–12.7)

## 2024-03-27 PROCEDURE — 1159F MED LIST DOCD IN RCRD: CPT | Mod: CPTII,S$GLB,, | Performed by: NURSE PRACTITIONER

## 2024-03-27 PROCEDURE — 3008F BODY MASS INDEX DOCD: CPT | Mod: CPTII,S$GLB,, | Performed by: NURSE PRACTITIONER

## 2024-03-27 PROCEDURE — 85027 COMPLETE CBC AUTOMATED: CPT | Performed by: NURSE PRACTITIONER

## 2024-03-27 PROCEDURE — 80061 LIPID PANEL: CPT | Performed by: NURSE PRACTITIONER

## 2024-03-27 PROCEDURE — 99999 PR PBB SHADOW E&M-EST. PATIENT-LVL V: CPT | Mod: PBBFAC,,, | Performed by: NURSE PRACTITIONER

## 2024-03-27 PROCEDURE — 3077F SYST BP >= 140 MM HG: CPT | Mod: CPTII,S$GLB,, | Performed by: NURSE PRACTITIONER

## 2024-03-27 PROCEDURE — 36415 COLL VENOUS BLD VENIPUNCTURE: CPT | Performed by: NURSE PRACTITIONER

## 2024-03-27 PROCEDURE — 82306 VITAMIN D 25 HYDROXY: CPT | Performed by: NURSE PRACTITIONER

## 2024-03-27 PROCEDURE — 83036 HEMOGLOBIN GLYCOSYLATED A1C: CPT | Performed by: NURSE PRACTITIONER

## 2024-03-27 PROCEDURE — 99214 OFFICE O/P EST MOD 30 MIN: CPT | Mod: S$GLB,,, | Performed by: NURSE PRACTITIONER

## 2024-03-27 PROCEDURE — 3078F DIAST BP <80 MM HG: CPT | Mod: CPTII,S$GLB,, | Performed by: NURSE PRACTITIONER

## 2024-03-27 PROCEDURE — 84443 ASSAY THYROID STIM HORMONE: CPT | Performed by: NURSE PRACTITIONER

## 2024-03-27 PROCEDURE — 80053 COMPREHEN METABOLIC PANEL: CPT | Performed by: NURSE PRACTITIONER

## 2024-03-27 PROCEDURE — 3044F HG A1C LEVEL LT 7.0%: CPT | Mod: CPTII,S$GLB,, | Performed by: NURSE PRACTITIONER

## 2024-03-27 NOTE — Clinical Note
Good afternoon.  I saw in prim/onc for an ER follow up today.  She canceled her recent visit with you. Says her depression has been very bad. Seeing psych tomorrow.  See note.  She had a CT abd/pelvis in the ER recently.  Do you want a CT chest prior to her visit with your?  Thanks.  Charmaine

## 2024-03-27 NOTE — Clinical Note
Can we schedule this patient with [facility] for weight loss.  She also needs a gyn visit as well.  Can we maybe schedule her for two slots with [facility] and she can do both?  Or a virtual visit for the weight loss visit may work well for this patient.  She has some anxiety about driving to different places.  Thanks.  Charmaine

## 2024-03-27 NOTE — TELEPHONE ENCOUNTER
----- Message from Charmaine Adrian NP sent at 3/27/2024  2:58 PM CDT -----  Can we schedule this patient with [facility] for weight loss.  She also needs a gyn visit as well.  Can we maybe schedule her for two slots with [facility] and she can do both?  Or a virtual visit for the weight loss visit may work well for this patient.  She has some anxiety about driving to different places.  Thanks.    Charmaine

## 2024-03-28 ENCOUNTER — TELEPHONE (OUTPATIENT)
Dept: HEMATOLOGY/ONCOLOGY | Facility: CLINIC | Age: 44
End: 2024-03-28
Payer: COMMERCIAL

## 2024-03-28 ENCOUNTER — OFFICE VISIT (OUTPATIENT)
Dept: PSYCHIATRY | Facility: CLINIC | Age: 44
End: 2024-03-28
Payer: COMMERCIAL

## 2024-03-28 VITALS
SYSTOLIC BLOOD PRESSURE: 127 MMHG | DIASTOLIC BLOOD PRESSURE: 63 MMHG | HEIGHT: 65 IN | HEART RATE: 70 BPM | BODY MASS INDEX: 43.32 KG/M2 | WEIGHT: 260 LBS

## 2024-03-28 DIAGNOSIS — F32.4 MAJOR DEPRESSIVE DISORDER IN PARTIAL REMISSION, UNSPECIFIED WHETHER RECURRENT: ICD-10-CM

## 2024-03-28 DIAGNOSIS — F42.9 OBSESSIVE-COMPULSIVE DISORDER, UNSPECIFIED TYPE: ICD-10-CM

## 2024-03-28 DIAGNOSIS — C64.1 RENAL CELL CARCINOMA OF RIGHT KIDNEY: Primary | ICD-10-CM

## 2024-03-28 DIAGNOSIS — F98.8 ATTENTION DEFICIT DISORDER, UNSPECIFIED HYPERACTIVITY PRESENCE: ICD-10-CM

## 2024-03-28 DIAGNOSIS — F43.10 PTSD (POST-TRAUMATIC STRESS DISORDER): ICD-10-CM

## 2024-03-28 DIAGNOSIS — F39 UNSPECIFIED MOOD (AFFECTIVE) DISORDER: Primary | ICD-10-CM

## 2024-03-28 PROCEDURE — 99205 OFFICE O/P NEW HI 60 MIN: CPT | Mod: S$GLB,,, | Performed by: STUDENT IN AN ORGANIZED HEALTH CARE EDUCATION/TRAINING PROGRAM

## 2024-03-28 PROCEDURE — 3074F SYST BP LT 130 MM HG: CPT | Mod: CPTII,S$GLB,, | Performed by: STUDENT IN AN ORGANIZED HEALTH CARE EDUCATION/TRAINING PROGRAM

## 2024-03-28 PROCEDURE — 99999 PR PBB SHADOW E&M-EST. PATIENT-LVL III: CPT | Mod: PBBFAC,,, | Performed by: STUDENT IN AN ORGANIZED HEALTH CARE EDUCATION/TRAINING PROGRAM

## 2024-03-28 PROCEDURE — 3044F HG A1C LEVEL LT 7.0%: CPT | Mod: CPTII,S$GLB,, | Performed by: STUDENT IN AN ORGANIZED HEALTH CARE EDUCATION/TRAINING PROGRAM

## 2024-03-28 PROCEDURE — 3078F DIAST BP <80 MM HG: CPT | Mod: CPTII,S$GLB,, | Performed by: STUDENT IN AN ORGANIZED HEALTH CARE EDUCATION/TRAINING PROGRAM

## 2024-03-28 PROCEDURE — 3008F BODY MASS INDEX DOCD: CPT | Mod: CPTII,S$GLB,, | Performed by: STUDENT IN AN ORGANIZED HEALTH CARE EDUCATION/TRAINING PROGRAM

## 2024-03-28 RX ORDER — LAMOTRIGINE 150 MG/1
150 TABLET ORAL DAILY
Qty: 30 TABLET | Refills: 1 | Status: SHIPPED | OUTPATIENT
Start: 2024-03-28 | End: 2024-04-01 | Stop reason: SDUPTHER

## 2024-03-28 RX ORDER — CARIPRAZINE 1.5 MG/1
1.5 CAPSULE, GELATIN COATED ORAL DAILY
Qty: 30 CAPSULE | Refills: 1 | Status: SHIPPED | OUTPATIENT
Start: 2024-03-28 | End: 2024-04-01 | Stop reason: SDUPTHER

## 2024-03-28 RX ORDER — SERTRALINE HYDROCHLORIDE 100 MG/1
100 TABLET, FILM COATED ORAL DAILY
Qty: 30 TABLET | Refills: 1 | Status: SHIPPED | OUTPATIENT
Start: 2024-03-28 | End: 2024-04-01 | Stop reason: SDUPTHER
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
## 2024-03-29 ENCOUNTER — PATIENT MESSAGE (OUTPATIENT)
Dept: PSYCHIATRY | Facility: CLINIC | Age: 44
End: 2024-03-29
Payer: COMMERCIAL

## 2024-04-01 DIAGNOSIS — F39 UNSPECIFIED MOOD (AFFECTIVE) DISORDER: ICD-10-CM

## 2024-04-01 DIAGNOSIS — F32.4 MAJOR DEPRESSIVE DISORDER IN PARTIAL REMISSION, UNSPECIFIED WHETHER RECURRENT: ICD-10-CM

## 2024-04-01 RX ORDER — SERTRALINE HYDROCHLORIDE 100 MG/1
100 TABLET, FILM COATED ORAL DAILY
Qty: 30 TABLET | Refills: 1 | Status: SHIPPED | OUTPATIENT
Start: 2024-04-01 | End: 2024-04-19 | Stop reason: SDUPTHER

## 2024-04-01 RX ORDER — CARIPRAZINE 1.5 MG/1
1.5 CAPSULE, GELATIN COATED ORAL DAILY
Qty: 30 CAPSULE | Refills: 1 | Status: SHIPPED | OUTPATIENT
Start: 2024-04-01 | End: 2024-04-19

## 2024-04-01 RX ORDER — LAMOTRIGINE 150 MG/1
150 TABLET ORAL DAILY
Qty: 30 TABLET | Refills: 1 | Status: SHIPPED | OUTPATIENT
Start: 2024-04-01 | End: 2024-04-19 | Stop reason: SDUPTHER

## 2024-04-02 ENCOUNTER — LAB VISIT (OUTPATIENT)
Dept: LAB | Facility: HOSPITAL | Age: 44
End: 2024-04-02
Payer: COMMERCIAL

## 2024-04-02 DIAGNOSIS — R39.89 BLADDER PAIN: ICD-10-CM

## 2024-04-02 DIAGNOSIS — C64.1 RENAL CELL CARCINOMA OF RIGHT KIDNEY: ICD-10-CM

## 2024-04-02 DIAGNOSIS — R35.0 URINARY FREQUENCY: ICD-10-CM

## 2024-04-02 DIAGNOSIS — C64.1 RENAL CELL CARCINOMA OF RIGHT KIDNEY: Primary | ICD-10-CM

## 2024-04-02 LAB
BACTERIA #/AREA URNS AUTO: ABNORMAL /HPF
BILIRUB UR QL STRIP: NEGATIVE
CLARITY UR REFRACT.AUTO: ABNORMAL
COLOR UR AUTO: YELLOW
GLUCOSE UR QL STRIP: NEGATIVE
HGB UR QL STRIP: ABNORMAL
KETONES UR QL STRIP: NEGATIVE
LEUKOCYTE ESTERASE UR QL STRIP: ABNORMAL
MICROSCOPIC COMMENT: ABNORMAL
NITRITE UR QL STRIP: NEGATIVE
PH UR STRIP: 5 [PH] (ref 5–8)
PROT UR QL STRIP: NEGATIVE
RBC #/AREA URNS AUTO: 3 /HPF (ref 0–4)
SP GR UR STRIP: 1.01 (ref 1–1.03)
SQUAMOUS #/AREA URNS AUTO: 4 /HPF
URN SPEC COLLECT METH UR: ABNORMAL
WBC #/AREA URNS AUTO: >100 /HPF (ref 0–5)
WBC CLUMPS UR QL AUTO: ABNORMAL

## 2024-04-02 PROCEDURE — 87086 URINE CULTURE/COLONY COUNT: CPT | Performed by: NURSE PRACTITIONER

## 2024-04-02 PROCEDURE — 81001 URINALYSIS AUTO W/SCOPE: CPT | Performed by: NURSE PRACTITIONER

## 2024-04-03 ENCOUNTER — TELEPHONE (OUTPATIENT)
Dept: HEMATOLOGY/ONCOLOGY | Facility: CLINIC | Age: 44
End: 2024-04-03
Payer: COMMERCIAL

## 2024-04-03 DIAGNOSIS — E78.5 HYPERLIPIDEMIA, UNSPECIFIED HYPERLIPIDEMIA TYPE: ICD-10-CM

## 2024-04-03 DIAGNOSIS — R35.0 URINARY FREQUENCY: ICD-10-CM

## 2024-04-03 DIAGNOSIS — N30.00 ACUTE CYSTITIS WITHOUT HEMATURIA: ICD-10-CM

## 2024-04-03 DIAGNOSIS — R39.89 BLADDER PAIN: Primary | ICD-10-CM

## 2024-04-03 LAB
BACTERIA UR CULT: NORMAL
BACTERIA UR CULT: NORMAL

## 2024-04-03 RX ORDER — SIMVASTATIN 10 MG/1
10 TABLET, FILM COATED ORAL NIGHTLY
Qty: 30 TABLET | Refills: 11 | Status: SHIPPED | OUTPATIENT
Start: 2024-04-03 | End: 2024-05-05 | Stop reason: SDUPTHER

## 2024-04-03 RX ORDER — NITROFURANTOIN 25; 75 MG/1; MG/1
100 CAPSULE ORAL 2 TIMES DAILY
Qty: 10 CAPSULE | Refills: 0 | Status: SHIPPED | OUTPATIENT
Start: 2024-04-03 | End: 2024-05-17

## 2024-04-04 ENCOUNTER — TELEPHONE (OUTPATIENT)
Dept: HEMATOLOGY/ONCOLOGY | Facility: CLINIC | Age: 44
End: 2024-04-04
Payer: COMMERCIAL

## 2024-04-04 DIAGNOSIS — E78.5 HYPERLIPIDEMIA, UNSPECIFIED HYPERLIPIDEMIA TYPE: Primary | ICD-10-CM

## 2024-04-04 NOTE — Clinical Note
Please schedule patient for a cmp the same day as her appointment with me and Dr. Jane.  Thank you.  Charmaine Adrian NP

## 2024-04-04 NOTE — TELEPHONE ENCOUNTER
Pt started on simvastatin for elevated cholesterol.  Will check liver enzymes at follow up visit in 3 weeks.    Charmaine Adrian NP

## 2024-04-10 ENCOUNTER — PATIENT MESSAGE (OUTPATIENT)
Dept: HEMATOLOGY/ONCOLOGY | Facility: CLINIC | Age: 44
End: 2024-04-10
Payer: COMMERCIAL

## 2024-04-11 ENCOUNTER — PATIENT MESSAGE (OUTPATIENT)
Dept: PSYCHIATRY | Facility: CLINIC | Age: 44
End: 2024-04-11
Payer: COMMERCIAL

## 2024-04-11 ENCOUNTER — TELEPHONE (OUTPATIENT)
Dept: HEMATOLOGY/ONCOLOGY | Facility: CLINIC | Age: 44
End: 2024-04-11
Payer: COMMERCIAL

## 2024-04-11 DIAGNOSIS — R10.30 LOWER ABDOMINAL PAIN: ICD-10-CM

## 2024-04-11 DIAGNOSIS — C64.1 RENAL CELL CARCINOMA OF RIGHT KIDNEY: ICD-10-CM

## 2024-04-11 DIAGNOSIS — R39.89 BLADDER PAIN: Primary | ICD-10-CM

## 2024-04-11 DIAGNOSIS — R10.2 PELVIC PAIN: ICD-10-CM

## 2024-04-11 DIAGNOSIS — R35.0 URINARY FREQUENCY: ICD-10-CM

## 2024-04-11 NOTE — TELEPHONE ENCOUNTER
Patient with continuous pelvic pain and frequency.  Recent urine cx with possible contamination.  Completed macrobid with no improvement.  Will repeat ua and urine cx.  Will also get repeat CT chest abdomen pelvis given hx of renal cell cell carcinoma.  She has a follow up scheduled with Dr. Jane.  Will also refer to gyn for pelvic exam and possibly urology.    Charmaine Adrian NP

## 2024-04-11 NOTE — Clinical Note
Please schedule patient for a ua and urine culture at nearest labs.  Needs early in the morning.  Please also change her chest CT to a CT chest abdomen pelvis and try to schedule for as soon as possible.  Thank you.  Charmaine

## 2024-04-12 DIAGNOSIS — R39.89 BLADDER PAIN: Primary | ICD-10-CM

## 2024-04-15 ENCOUNTER — PATIENT MESSAGE (OUTPATIENT)
Dept: HEMATOLOGY/ONCOLOGY | Facility: CLINIC | Age: 44
End: 2024-04-15
Payer: COMMERCIAL

## 2024-04-15 ENCOUNTER — TELEPHONE (OUTPATIENT)
Dept: OBSTETRICS AND GYNECOLOGY | Facility: CLINIC | Age: 44
End: 2024-04-15
Payer: COMMERCIAL

## 2024-04-15 DIAGNOSIS — R39.89 BLADDER PAIN: Primary | ICD-10-CM

## 2024-04-15 DIAGNOSIS — R35.0 URINARY FREQUENCY: ICD-10-CM

## 2024-04-16 ENCOUNTER — PATIENT MESSAGE (OUTPATIENT)
Dept: HEMATOLOGY/ONCOLOGY | Facility: CLINIC | Age: 44
End: 2024-04-16
Payer: COMMERCIAL

## 2024-04-19 ENCOUNTER — OFFICE VISIT (OUTPATIENT)
Dept: PSYCHIATRY | Facility: CLINIC | Age: 44
End: 2024-04-19
Payer: COMMERCIAL

## 2024-04-19 VITALS
WEIGHT: 259.81 LBS | BODY MASS INDEX: 43.23 KG/M2 | SYSTOLIC BLOOD PRESSURE: 130 MMHG | DIASTOLIC BLOOD PRESSURE: 76 MMHG | HEART RATE: 81 BPM

## 2024-04-19 DIAGNOSIS — F39 UNSPECIFIED MOOD (AFFECTIVE) DISORDER: ICD-10-CM

## 2024-04-19 DIAGNOSIS — F32.4 MAJOR DEPRESSIVE DISORDER IN PARTIAL REMISSION, UNSPECIFIED WHETHER RECURRENT: ICD-10-CM

## 2024-04-19 PROCEDURE — 3044F HG A1C LEVEL LT 7.0%: CPT | Mod: CPTII,S$GLB,, | Performed by: STUDENT IN AN ORGANIZED HEALTH CARE EDUCATION/TRAINING PROGRAM

## 2024-04-19 PROCEDURE — 3078F DIAST BP <80 MM HG: CPT | Mod: CPTII,S$GLB,, | Performed by: STUDENT IN AN ORGANIZED HEALTH CARE EDUCATION/TRAINING PROGRAM

## 2024-04-19 PROCEDURE — 99215 OFFICE O/P EST HI 40 MIN: CPT | Mod: S$GLB,,, | Performed by: STUDENT IN AN ORGANIZED HEALTH CARE EDUCATION/TRAINING PROGRAM

## 2024-04-19 PROCEDURE — 99999 PR PBB SHADOW E&M-EST. PATIENT-LVL III: CPT | Mod: PBBFAC,,, | Performed by: STUDENT IN AN ORGANIZED HEALTH CARE EDUCATION/TRAINING PROGRAM

## 2024-04-19 PROCEDURE — 3008F BODY MASS INDEX DOCD: CPT | Mod: CPTII,S$GLB,, | Performed by: STUDENT IN AN ORGANIZED HEALTH CARE EDUCATION/TRAINING PROGRAM

## 2024-04-19 PROCEDURE — 3075F SYST BP GE 130 - 139MM HG: CPT | Mod: CPTII,S$GLB,, | Performed by: STUDENT IN AN ORGANIZED HEALTH CARE EDUCATION/TRAINING PROGRAM

## 2024-04-19 RX ORDER — SERTRALINE HYDROCHLORIDE 50 MG/1
50 TABLET, FILM COATED ORAL DAILY
Qty: 30 TABLET | Refills: 1 | Status: SHIPPED | OUTPATIENT
Start: 2024-04-19 | End: 2024-05-03 | Stop reason: SDUPTHER

## 2024-04-19 RX ORDER — LAMOTRIGINE 150 MG/1
150 TABLET ORAL DAILY
Qty: 30 TABLET | Refills: 1 | Status: SHIPPED | OUTPATIENT
Start: 2024-04-19 | End: 2024-05-03 | Stop reason: SDUPTHER

## 2024-04-19 RX ORDER — SERTRALINE HYDROCHLORIDE 100 MG/1
100 TABLET, FILM COATED ORAL DAILY
Qty: 30 TABLET | Refills: 1 | Status: SHIPPED | OUTPATIENT
Start: 2024-04-19 | End: 2024-05-03 | Stop reason: SDUPTHER

## 2024-04-19 RX ORDER — DEXTROAMPHETAMINE SACCHARATE, AMPHETAMINE ASPARTATE, DEXTROAMPHETAMINE SULFATE AND AMPHETAMINE SULFATE 7.5; 7.5; 7.5; 7.5 MG/1; MG/1; MG/1; MG/1
30 TABLET ORAL 2 TIMES DAILY
Qty: 60 TABLET | Refills: 0 | Status: SHIPPED | OUTPATIENT
Start: 2024-04-19 | End: 2024-05-29 | Stop reason: SDUPTHER

## 2024-04-19 RX ORDER — CARIPRAZINE 3 MG/1
3 CAPSULE, GELATIN COATED ORAL DAILY
Qty: 30 CAPSULE | Refills: 1 | Status: SHIPPED | OUTPATIENT
Start: 2024-04-19 | End: 2024-05-03
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
## 2024-04-24 ENCOUNTER — OFFICE VISIT (OUTPATIENT)
Dept: HEMATOLOGY/ONCOLOGY | Facility: CLINIC | Age: 44
End: 2024-04-24
Payer: COMMERCIAL

## 2024-04-24 ENCOUNTER — LAB VISIT (OUTPATIENT)
Dept: LAB | Facility: HOSPITAL | Age: 44
End: 2024-04-24
Payer: COMMERCIAL

## 2024-04-24 ENCOUNTER — RESEARCH ENCOUNTER (OUTPATIENT)
Dept: RESEARCH | Facility: HOSPITAL | Age: 44
End: 2024-04-24
Payer: COMMERCIAL

## 2024-04-24 VITALS
TEMPERATURE: 97 F | WEIGHT: 262.81 LBS | DIASTOLIC BLOOD PRESSURE: 60 MMHG | BODY MASS INDEX: 43.79 KG/M2 | OXYGEN SATURATION: 98 % | HEIGHT: 65 IN | BODY MASS INDEX: 43.79 KG/M2 | SYSTOLIC BLOOD PRESSURE: 106 MMHG | HEART RATE: 76 BPM | OXYGEN SATURATION: 98 % | HEIGHT: 65 IN | RESPIRATION RATE: 18 BRPM | WEIGHT: 262.81 LBS | DIASTOLIC BLOOD PRESSURE: 60 MMHG | TEMPERATURE: 98 F | HEART RATE: 76 BPM | SYSTOLIC BLOOD PRESSURE: 106 MMHG

## 2024-04-24 DIAGNOSIS — N12 PYELONEPHRITIS: ICD-10-CM

## 2024-04-24 DIAGNOSIS — L43.2 LICHENOID DRUG REACTION: ICD-10-CM

## 2024-04-24 DIAGNOSIS — R39.89 BLADDER PAIN: ICD-10-CM

## 2024-04-24 DIAGNOSIS — E78.5 HYPERLIPIDEMIA, UNSPECIFIED HYPERLIPIDEMIA TYPE: ICD-10-CM

## 2024-04-24 DIAGNOSIS — Z00.6 EXAMINATION OF PARTICIPANT IN CLINICAL TRIAL: ICD-10-CM

## 2024-04-24 DIAGNOSIS — C64.1 RENAL CELL CARCINOMA OF RIGHT KIDNEY: Primary | ICD-10-CM

## 2024-04-24 DIAGNOSIS — C64.1 RENAL CELL CARCINOMA OF RIGHT KIDNEY: ICD-10-CM

## 2024-04-24 DIAGNOSIS — E66.01 MORBID OBESITY WITH BMI OF 40.0-44.9, ADULT: ICD-10-CM

## 2024-04-24 DIAGNOSIS — F32.4 MAJOR DEPRESSIVE DISORDER IN PARTIAL REMISSION, UNSPECIFIED WHETHER RECURRENT: ICD-10-CM

## 2024-04-24 DIAGNOSIS — F98.8 ATTENTION DEFICIT DISORDER, UNSPECIFIED HYPERACTIVITY PRESENCE: ICD-10-CM

## 2024-04-24 DIAGNOSIS — R30.0 DYSURIA: ICD-10-CM

## 2024-04-24 LAB
ALBUMIN SERPL BCP-MCNC: 3.7 G/DL (ref 3.5–5.2)
ALP SERPL-CCNC: 71 U/L (ref 55–135)
ALT SERPL W/O P-5'-P-CCNC: 29 U/L (ref 10–44)
ANION GAP SERPL CALC-SCNC: 8 MMOL/L (ref 8–16)
AST SERPL-CCNC: 20 U/L (ref 10–40)
BILIRUB SERPL-MCNC: 0.2 MG/DL (ref 0.1–1)
BUN SERPL-MCNC: 15 MG/DL (ref 6–20)
CALCIUM SERPL-MCNC: 9.4 MG/DL (ref 8.7–10.5)
CHLORIDE SERPL-SCNC: 105 MMOL/L (ref 95–110)
CO2 SERPL-SCNC: 25 MMOL/L (ref 23–29)
CREAT SERPL-MCNC: 1.1 MG/DL (ref 0.5–1.4)
DRUG STUDY SPECIMEN TYPE: NORMAL
DRUG STUDY TEST NAME: NORMAL
DRUG STUDY TEST RESULT: NORMAL
EST. GFR  (NO RACE VARIABLE): >60 ML/MIN/1.73 M^2
GLUCOSE SERPL-MCNC: 124 MG/DL (ref 70–110)
POTASSIUM SERPL-SCNC: 4.4 MMOL/L (ref 3.5–5.1)
PROT SERPL-MCNC: 6.9 G/DL (ref 6–8.4)
SODIUM SERPL-SCNC: 138 MMOL/L (ref 136–145)

## 2024-04-24 PROCEDURE — 3008F BODY MASS INDEX DOCD: CPT | Mod: CPTII,S$GLB,, | Performed by: HOSPITALIST

## 2024-04-24 PROCEDURE — 3078F DIAST BP <80 MM HG: CPT | Mod: CPTII,S$GLB,, | Performed by: HOSPITALIST

## 2024-04-24 PROCEDURE — 3008F BODY MASS INDEX DOCD: CPT | Mod: CPTII,S$GLB,, | Performed by: NURSE PRACTITIONER

## 2024-04-24 PROCEDURE — 99999 PR PBB SHADOW E&M-EST. PATIENT-LVL III: CPT | Mod: PBBFAC,,, | Performed by: HOSPITALIST

## 2024-04-24 PROCEDURE — 99214 OFFICE O/P EST MOD 30 MIN: CPT | Mod: S$GLB,,, | Performed by: NURSE PRACTITIONER

## 2024-04-24 PROCEDURE — 3074F SYST BP LT 130 MM HG: CPT | Mod: CPTII,S$GLB,, | Performed by: HOSPITALIST

## 2024-04-24 PROCEDURE — 3044F HG A1C LEVEL LT 7.0%: CPT | Mod: CPTII,S$GLB,, | Performed by: HOSPITALIST

## 2024-04-24 PROCEDURE — 3078F DIAST BP <80 MM HG: CPT | Mod: CPTII,S$GLB,, | Performed by: NURSE PRACTITIONER

## 2024-04-24 PROCEDURE — 1159F MED LIST DOCD IN RCRD: CPT | Mod: CPTII,S$GLB,, | Performed by: HOSPITALIST

## 2024-04-24 PROCEDURE — 99999 PR PBB SHADOW E&M-EST. PATIENT-LVL III: CPT | Mod: PBBFAC,,, | Performed by: NURSE PRACTITIONER

## 2024-04-24 PROCEDURE — 1159F MED LIST DOCD IN RCRD: CPT | Mod: CPTII,S$GLB,, | Performed by: NURSE PRACTITIONER

## 2024-04-24 PROCEDURE — 99215 OFFICE O/P EST HI 40 MIN: CPT | Mod: S$GLB,,, | Performed by: HOSPITALIST

## 2024-04-24 PROCEDURE — 3044F HG A1C LEVEL LT 7.0%: CPT | Mod: CPTII,S$GLB,, | Performed by: NURSE PRACTITIONER

## 2024-04-24 PROCEDURE — 80053 COMPREHEN METABOLIC PANEL: CPT | Performed by: NURSE PRACTITIONER

## 2024-04-24 PROCEDURE — 99000 SPECIMEN HANDLING OFFICE-LAB: CPT | Performed by: HOSPITALIST

## 2024-04-24 PROCEDURE — G2211 COMPLEX E/M VISIT ADD ON: HCPCS | Mod: S$GLB,,, | Performed by: HOSPITALIST

## 2024-04-24 PROCEDURE — 36415 COLL VENOUS BLD VENIPUNCTURE: CPT | Performed by: NURSE PRACTITIONER

## 2024-04-24 PROCEDURE — 3074F SYST BP LT 130 MM HG: CPT | Mod: CPTII,S$GLB,, | Performed by: NURSE PRACTITIONER

## 2024-04-24 NOTE — ASSESSMENT & PLAN NOTE
Patient self discontinued her HCQ and clobetasol. She was inquiring if she should restart given ongoing symptoms. Will reach out to dermatology.

## 2024-04-24 NOTE — PROGRESS NOTES
Primary Care Oncology Visit    Subjective:   Patient ID: Teagan Griffith is a 43 y.o. female.    Chief Complaint: Renal cell carcinoma of right kidney    Teagan Griffith comes in today for a primary care/oncology visit for uncontrolled  weight, to get established with a pcp .    She does not have a primary care provider currently or has not seen her primary care provider in the last 6 months.     Cancer Staging   Renal cell carcinoma  Staging form: Kidney, AJCC 8th Edition  - Pathologic: Stage Unknown (pT2b, pNX, cM0) - Signed by Moshe Jane MD on 10/6/2022  - Pathologic: Stage II (pT2, pN0, cM0) - Signed by Moshe Jane MD on 11/9/2022    Ms. 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  Now getting every 6 months scans for surveillance     BP Readings from Last 5 Encounters:   04/24/24 106/60   04/24/24 106/60   03/27/24 (!) 142/78   03/07/24 104/70   10/26/23 (!) 116/57     Lab Results   Component Value Date    WBC 9.52 03/27/2024    HGB 12.2 03/27/2024    HCT 37.8 03/27/2024    MCV 89 03/27/2024     03/27/2024    CHOL 277 (H) 03/27/2024    TRIG 229 (H) 03/27/2024    HDL 64 03/27/2024    ALT 29 04/24/2024    AST 20 04/24/2024    TSH 0.734 03/27/2024    HGBA1C 5.7 (H) 03/27/2024      Dexa results: No results found for this or any previous visit.    HPI  Being seen today for a prim/onc visit.  Went to ER on 3/7/24 for lower abdominal pain and left flank pain.  Diagnosed with pyelonephritis.  CT abdomen/pelvis, labs, cultures, and er notes reviewed.  She was given rocephin and sent home with 10 days of bactrim.  She completed antibiotics and symptoms had improved.  Feels she has been having a return of  symptoms with bladder/pelvic pain.  Repeat ua and urine culture showed no one organism in large amounts, possible contaminant.  Treated with a round of macrobid without improvement.  Thinks it may be the vraylar causing this symptoms. Reports it feels like lower gas pain.  No back pain at this time.     Since our last visit, she is still in need of mammogram and colonoscopy.  She reports cost, work, visit fatigue, and depression have been inhibiting he from making appointments.   She saw Dr. Jane for a 6 month follow up today.   She had CT abdomen/pelvis in the ER, but was unable to do repeat CT abdomen pelvis chest for stomach pain and surveillance d/t cost.      Taking adderrall and xanax, has had increased stress at work recently.  She is seeing a new psychiatrist who has adjusted some of her medications.   Has good support from her .   Depressed about not losing weight.  Trying to cut back on alcohol intake, but still drinking regularly    Obesity:  wt today is 269 lbs, up about 12 lbs from visit in October. Feels her psych meds are causing some of this weight gain.  Also, drinking more alcohol recently      ADD/depression/anxiety: now following with new psychiatrist here.  On vraylar, lamictal, zoloft, adderral      Hld: recent total cholesterol 277,  triglycerides high at 229, ldl 167.  Started on simvastatin 10 mg.  Tolerating well. Liver enzymes today look acceptable,  discussed cutting back on alcohol and sweets.  Drinks sugary coffee drinks daily.  See above for weight loss    Works in accounts payable, stressful job    No chest pain, sob, no major headaches.    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  Seeing dermatology for this    Bowel movements with occasional constipation, takes dulcolax as needed    Review of Systems   Constitutional:  Positive for unexpected weight change. Negative for chills, diaphoresis and fever.   HENT:  Negative for congestion, ear pain and hearing loss.         Lichen  planus on tongue   Eyes:  Negative for pain, redness and visual disturbance.   Respiratory:  Negative for cough and shortness of breath.    Cardiovascular:  Negative for chest pain and leg swelling.   Gastrointestinal:  Positive for abdominal pain. Negative for abdominal distention, constipation and diarrhea.   Genitourinary:  Positive for dysuria. Negative for difficulty urinating.   Musculoskeletal:  Negative for gait problem and joint swelling.   Skin:  Negative for rash.   Neurological:  Negative for dizziness and light-headedness.   Psychiatric/Behavioral:  Negative for confusion. The patient is not nervous/anxious.         Depression anxiety, insomnia       Review of patient's allergies indicates:  No Known Allergies    Current Outpatient Medications   Medication Instructions    acetaminophen (TYLENOL) 1,000 mg, Oral, Every 8 hours PRN    ALPRAZolam (XANAX) 1 mg, Oral, 2 times daily    dextroamphetamine-amphetamine 30 mg Tab 30 mg, Oral, 2 times daily    lamoTRIgine (LAMICTAL) 150 mg, Oral, Daily    nitrofurantoin, macrocrystal-monohydrate, (MACROBID) 100 MG capsule 100 mg, Oral, 2 times daily    sertraline (ZOLOFT) 100 mg, Oral, Daily    sertraline (ZOLOFT) 50 mg, Oral, Daily    simvastatin (ZOCOR) 10 mg, Oral, Nightly    VRAYLAR 3 mg, Oral, Daily       Patient Active Problem List    Diagnosis Date Noted    Dysuria 2024    Lichenoid drug reaction 2023    Hematochezia 2023    ADD (attention deficit disorder) 2022    Snoring 2022    Class 3 severe obesity with body mass index (BMI) of 40.0 to 44.9 in adult 2022    Alcohol consumption of more than four drinks per day 2022    Depression 2022    Hepatic steatosis 2022    Renal cell carcinoma 2022       Past Medical History:   Diagnosis Date    JEN (acute kidney injury) 2022    Depression     Renal cell carcinoma 2022        Past Surgical History:   Procedure Laterality Date     SECTION    "   LAPAROSCOPIC ROBOT-ASSISTED SURGICAL REMOVAL OF KIDNEY USING DA CAESAR XI Right 9/14/2022    Procedure: XI ROBOTIC NEPHRECTOMY;  Surgeon: Justin Riley MD;  Location: [facility] OR [address] [place] [address];  Service: Urology;  Laterality: Right;  4 hours        Objective:     Vitals:    04/24/24 0845   BP: 106/60   Pulse: 76   Resp: 18   Temp: 97 °F (36.1 °C)   TempSrc: Oral   SpO2: 98%   Weight: 119.2 kg (262 lb 12.6 oz)   Height: 5' 5" (1.651 m)   PainSc: 0-No pain       Body mass index is 43.73 kg/m².    Physical Exam  Vitals reviewed.   Constitutional:       General: She is not in acute distress.     Appearance: She is well-developed. She is not diaphoretic.   HENT:      Head: Normocephalic and atraumatic.   Eyes:      General: No scleral icterus.     Conjunctiva/sclera: Conjunctivae normal.   Cardiovascular:      Rate and Rhythm: Normal rate and regular rhythm.      Pulses: Normal pulses.      Heart sounds: Normal heart sounds.   Pulmonary:      Effort: Pulmonary effort is normal. No respiratory distress.      Breath sounds: Normal breath sounds.   Abdominal:      General: Bowel sounds are normal. There is no distension.      Palpations: Abdomen is soft. There is no mass.      Tenderness: There is no guarding or rebound.      Hernia: No hernia is present.   Musculoskeletal:         General: Normal range of motion.      Cervical back: Normal range of motion and neck supple.   Skin:     General: Skin is warm and dry.   Neurological:      Mental Status: She is alert and oriented to person, place, and time.   Psychiatric:         Behavior: Behavior normal.       Assessment:     1. Renal cell carcinoma of right kidney    2. Major depressive disorder in partial remission, unspecified whether recurrent    3. Attention deficit disorder, unspecified hyperactivity presence    4. Morbid obesity with BMI of 40.0-44.9, adult    5. Pyelonephritis    6. Bladder pain    7. Hyperlipidemia, unspecified hyperlipidemia type      Plan: "   Intervention: education, labs ordered, and additional referrals placed  Follow up: follow up with primary care oncology     Teagan was seen today for renal cell carcinoma of right kidney.  Diagnoses and all orders for this visit:    1. Renal cell carcinoma of right kidney (Primary)  Followed by Dr. Jane  On every 6 months scans  Did not tolerate keytruda: side effects  Had 6 month follow up today  Recent CT abdomen/pelvis reviewed.    Per Dr. Mackey note: to do CT chest non contrast now, if unable to do so then chest xray  Follow up in 6 months with Dr. Jane with CT torso    2. Major depressive disorder in partial remission, unspecified whether recurrent  Continue current meds  Continue to follow with [facility]    3. Attention deficit disorder, unspecified hyperactivity presence  See number 2  -     Ambulatory referral/consult to Psychiatry; Future; Expected date: 11/02/2023    5. Morbid obesity with BMI of 40.0-44.9, adult  Has met with nutrition.  Agree with mediterranean diet  Continue to cut back on alcohol consumption  Increase exercise: 30 minutes, 5 days per week  Will refer to Genoveva Starks for weight loss, has appt scheduled    6. Pyelonephritis  7. Bladder pain  No longer with back pain  Still having dysuria and bladder pain  Will repeat ua and urine culture today  If unrevealing, will need referral to urology and gyn    8. HLD  Continue statin  Dec sweets and alcohol consumption to help with triglycerides  Weight loss encouraged  Will repeat lipid panel in 3 months    Screening for colon cancer  -     Ambulatory referral/consult to Endo Procedure ; Future    Mammogram ordered  Due for colonoscopy (referral placed one year ago for blood in stool), will place referral again given pt will be 44  Due for gyn visit      Med Onc Chart Routing      Follow up with physician    Follow up with RAUL 3 months. in prim/onc clinic   Infusion scheduling note    Injection scheduling note     Labs   Scheduling:  Preferred lab:  Lab interval:  Fasting cmp and lipid panel one day before prim/onc visit   Imaging    Pharmacy appointment    Other referrals                Charmaine Adrian NP    Total time of this visit, including time spent face to face with patient and/or via video/audio, and also in preparing for today's visit for MDM and documentation. (Medical Decision Making, including consideration of possible diagnoses, management options, complex medical record review, review of diagnostic tests and information, consideration and discussion of significant complications based on comorbidities, and discussion with providers involved with the care of the patient) is 30 minutes. Greater than 50% was spent face to face with the patient counseling and coordinating care.    There are no Patient Instructions on file for this visit.

## 2024-04-24 NOTE — ASSESSMENT & PLAN NOTE
No evidence of recurrence on CT a/p 03/2024. Declined outpatient CT torso given financial concerns. She will look into costs of CT chest. If prohibitive can do CXR, but not ideal. Otherwise will follow up in 6 months with repeat CT Torso  - CT chest non con now (ordered by Charmaine Adrian); CXR if she cannot do  - FU in 6 months for CT torso surveillance

## 2024-04-24 NOTE — PROGRESS NOTES
Protocol: [id number], Disparities in Results of Immune Checkpoint Inhibitor Treatment (DIRECT): A Prospective Cohort Study of Cancer Survivors Treated with anti-PD-L1 Immunotherapy in a Community Oncology Setting    IRB# 2022.126  Version Date: 1/11/2022  Study ID# [id number]  24 April 2024     Specimen collection (Visit A4) :  aCRC meet with Pt in [facility] lab. Blood drawn by Phlebotomist and send-outs processed.   Blood Requisition Form on file.     Participant Questionnaire Link :  Pt completed PRO's electronically from home. Checked off on portal.

## 2024-04-24 NOTE — Clinical Note
Good afternoon!  This patient is seeing you for a weight loss follow up soon.  If at all possible, can you do a gyn visit with her at the same time.  I had asked your staff if they could schedule to give you enough time. She has significant depression and stress surrounding multiple appointments and taking off of work and has been having bladder/pelivc pain. Getting a ua and urine culture on her and reviewed her most recent CT, but I think she needs a gyn exam and I will also refer to urology.  Thanks.  Charmaine

## 2024-04-24 NOTE — PROGRESS NOTES
MEDICAL ONCOLOGY FOLLOW-UP VISIT.     Best Contact Phone Number(s): [phone] (home)      Cancer/Stage/TNM:    Cancer Staging   Renal cell carcinoma  Staging form: Kidney, AJCC 8th Edition  - Pathologic: Stage Unknown (pT2b, pNX, cM0) - Signed by Moshe Jane MD on 10/6/2022  - Pathologic: Stage II (pT2, pN0, cM0) - Signed by Moshe Jane MD on 11/9/2022      Reason for visit: Ms. 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 She presents to medical oncology clinic for ongoing surveillance.    Interval History:     Presented to ED 3/7/24 with abdominal pain, up to 10/10 and radiaing to left flank.     Ongoing dysuria - has suprapubic discomfort with urination. Describes it as a 'gas' type discomfort. Recently seen in ED when pain was more constant pain radiating into her back.  No fevers or chills. No urethritis.  Had e. Coli UTI 03/2024 treated with Bactrim and macrobid. Ongoing leukocyturia. Awaiting repeat UA/UCX.    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 No longer using hydroxchloroquine or topical clobetasol. Has not seen dermatology recently. Inquiring if she should resume hydroxychloroquine.     Has had ongoing adjustments of her psychiatric medications. Started cariprazine. Increased sertraline. Continues on lamictal. Xanax prn. Following with psychiatry. She reports ongoing fatigue and generalized apathy. Reports being tired of all her medical interventions, and has had some real concern regarding financial implications of her testing.       Oncology History   Renal cell carcinoma   8/6/2022 Imaging Significant Findings    CTA performed for chest pain incidentally found a  large partially visualized enhancing mass in the right kidney measuring at least 12 cm with adjacent fat stranding and prominent vessels highly concerning for renal cell carcinoma.    Subsequent non contrast CT a/p confirms 13.7 x 12.2 x 14.3 (AP x TV x CC) intermediate density solid right renal mass with central necrosis and scattered punctate calcifications are concerning for malignancy.     8/9/2022 Imaging Significant Findings    MRI a/p shows eterogeneously enhancing solid right renal mass highly concerning for RCC. No evidence of filling defect or enhancing tumor thrombus in the right renal artery or vein.  Mildly prominent mesenteric lymph nodes, as above.  Metastatic disease difficult to definitively exclude     9/14/2022 Surgery    Right nephrectomy - pathology with ccRCC nuclear grade 4 up to 13.3 cm. Tumor confined to the kidney. Margins negative Rhabdoid features present with associated necrosis. [id number]     11/1/2022 Imaging Significant Findings    CT torso with several up to 3mm micronodules in the lung and prominent but small mesenteric nodes overall felt generally stable.     11/9/2022 - 1/11/2023 Chemotherapy    Treatment Summary   Plan Name: OP PEMBROLIZUMAB 200MG Q3W  Treatment Goal: Curative  Status: Inactive  Start Date: 11/9/2022  End Date: 1/11/2023  Provider: Moshe Jane MD  Chemotherapy: [No matching medication found in this treatment plan]     11/9/2022 Cancer Staged    Staging form: Kidney, AJCC 8th Edition  - Pathologic: Stage II (pT2, pN0, cM0)     12/14/2022 Notable Event    Pembrolizumab held in setting of severe mucositis. Start prednisone taper starting at 60mg po daily.     1/11/2023 -  Immunotherapy    Restart immunotherapy with pembrolizumab 200mg IV q3 weeks     1/30/2023 Notable Event    Stop pembrolizumab for second time due to recurrent stomatitis/mucositis with associated rash. Restart steroid taper at 60mg prednisone.     1/31/2023 Imaging Significant Findings    CT  torso with no evidence of recurrent disease. Stable mesenteric nodes up to 1.0cm. Also 0.9cm thyroid nodule.     3/17/2023 Imaging Significant Findings    CT torso  - Postsurgical change of right nephrectomy in this patient with reported clear cell RCC.  Few prominent lymph nodes in the right renal fossa and para-aortic region, similar to the prior. No findings to suggest local neoplasm recurrence.  No evidence of distant metastatic disease.  - Few stable bilateral subcentimeter pulmonary nodules, as above.  - Hepatomegaly with fatty infiltration; no focal lesion.  - Noncalcified cholelithiasis with no surrounding acute inflammatory process.    MR Brain:  Normal MRI of the brain with no evidence of metastasis.     6/19/2023 Imaging Significant Findings    6/19/23 CT Torso:  - Surgical change of right nephrectomy in this patient with a history of renal cell carcinoma.  There are a few stable lymph nodes in the right renal fossa.  Small stable periaortic lymph nodes are present, not pathologically enlarged.  - Mild hepatomegaly and hepatic steatosis  - Few stable small pulmonary nodules.     9/14/2023 Imaging Significant Findings    CT torso  Impression:  - Prior right nephrectomy.  Few stable appearing lymph nodes at the right renal fossa and periaortic area.  Otherwise, no evidence for local recurrent or metastatic disease.  - Few stable small pulmonary nodules.  - Hepatic steatosis and additional findings as above.     3/7/2024 Imaging Significant Findings    3/7/24 CT a/p  Impression:  Abdomen CT and pelvis CT:  1. No evidence of obstructive uropathy.  2. Right nephrectomy for RCC.  No abnormal soft tissue nodularity or abnormal enhancement to suggest local disease recurrence.  Normal adrenals.  3. Additional findings as detailed in the body of the report.            Physical Exam:   /60 (BP Location: Left arm, Patient Position: Sitting, BP Method: Medium (Automatic))   Pulse 76   Temp 97.7 °F (36.5 °C)  "(Oral)   Ht 5' 5" (1.651 m)   Wt 119.2 kg (262 lb 12.6 oz)   LMP 04/21/2024 (Approximate)   SpO2 98%   BMI 43.73 kg/m²      ECOG Performance Status: (foot note - ECOG PS provided by Eastern Cooperative Oncology Group) 1 - Symptomatic but completely ambulatory    Physical Exam  Constitutional:       General: She is not in acute distress.  HENT:      Head: Normocephalic.      Mouth/Throat:      Mouth: Mucous membranes are moist.      Pharynx: Posterior oropharyngeal erythema: with lacy white plaques over buccal mucosa.      Comments: Lacy white appearance persists over tongue  Eyes:      Conjunctiva/sclera: Conjunctivae normal.      Pupils: Pupils are equal, round, and reactive to light.   Cardiovascular:      Rate and Rhythm: Normal rate.   Pulmonary:      Effort: Pulmonary effort is normal. No respiratory distress.   Abdominal:      General: There is no distension.      Palpations: Abdomen is soft.      Tenderness: There is no abdominal tenderness.   Musculoskeletal:         General: Normal range of motion.      Cervical back: Normal range of motion and neck supple.   Skin:     General: Skin is warm.   Neurological:      General: No focal deficit present.      Mental Status: She is alert and oriented to person, place, and time.      Motor: No weakness.   Psychiatric:         Mood and Affect: Mood normal.         Behavior: Behavior normal.         Thought Content: Thought content normal.           Labs:   Recent Results (from the past 48 hour(s))   Comprehensive Metabolic Panel    Collection Time: 04/24/24  7:13 AM   Result Value Ref Range    Sodium 138 136 - 145 mmol/L    Potassium 4.4 3.5 - 5.1 mmol/L    Chloride 105 95 - 110 mmol/L    CO2 25 23 - 29 mmol/L    Glucose 124 (H) 70 - 110 mg/dL    BUN 15 6 - 20 mg/dL    Creatinine 1.1 0.5 - 1.4 mg/dL    Calcium 9.4 8.7 - 10.5 mg/dL    Total Protein 6.9 6.0 - 8.4 g/dL    Albumin 3.7 3.5 - 5.2 g/dL    Total Bilirubin 0.2 0.1 - 1.0 mg/dL    Alkaline Phosphatase 71 55 " - 135 U/L    AST 20 10 - 40 U/L    ALT 29 10 - 44 U/L    eGFR >60.0 >60 mL/min/1.73 m^2    Anion Gap 8 8 - 16 mmol/L                      Imaging:     CT Abdomen Pelvis With IV Contrast NO Oral Contrast  Narrative: EXAMINATION:  CT ABDOMEN PELVIS WITH IV CONTRAST    CLINICAL HISTORY:  Flank pain, kidney stone suspected;    TECHNIQUE:  Low dose axial images were obtained from the lung bases to the pubic symphysis following the intravenous administration of 100 mL of Omnipaque 350.    COMPARISON:  CT chest abdomen pelvis 09/14/2023, 06/19/2023, 03/17/2023    Chest CTA 08/06/2022    FINDINGS:  Abdomen CT and pelvis CT:    Heart: Visualized heart appears unremarkable.    Lung bases: Symmetrically expanded.  No consolidation or pleural effusion.    Right lower lobe lateral subpleural pulmonary micronodule (series 2 image 14), stable since at least 08/06/2022.    Liver: Liver is enlarged measuring 21 cm in craniocaudal dimension.  No focal hepatic abnormality.    Gallbladder: Normal in appearance without evidence for cholecystitis.    Bile Ducts: No intra or extrahepatic biliary ductal dilation.    Pancreas: No pancreatic mass lesion or peripancreatic inflammatory change.    Spleen: Unremarkable.    Adrenals: Unremarkable.    Kidneys/ Ureters: Postoperative changes of nephrectomy for RCC.  No abnormal soft tissue nodularity or enhancement in the right renal fossa.-operative bed.    No soft tissue abnormality in the operative bed.  The left kidney is normal in size and enhances appropriately.  Evaluation for nephrolithiasis limited due to intravenous contrast.  No  hydroureteronephrosis.    Bladder: Smooth contours without bladder wall thickening.    Reproductive organs: Bladder is unremarkable.    GI Tract/Mesentery: The stomach is unremarkable.  Visualized loops of small and large bowel are normal in caliber without evidence for obstruction or inflammation.   Appendix appears unremarkable.    No abdominopelvic ascites,  intraperitoneal free air, or significant adenopathy.    Abdominal wall/extraperitoneal soft tissues: Unremarkable.    Vasculature: Abdominal aorta is normal in caliber, contour, and course without significant calcific atherosclerosis.    Bones: No acute displaced fracture.  Impression: Abdomen CT and pelvis CT:    1. No evidence of obstructive uropathy.  2. Right nephrectomy for RCC.  No abnormal soft tissue nodularity or abnormal enhancement to suggest local disease recurrence.  Normal adrenals.  3. Additional findings as detailed in the body of the report.    Electronically signed by resident: Myrna Rey  Date:    03/07/2024  Time:    05:55    Electronically signed by: Aurelio Crabtree  Date:    03/07/2024  Time:    06:22              Diagnoses:       1. Renal cell carcinoma of right kidney    2. Lichenoid drug reaction    3. Major depressive disorder in partial remission, unspecified whether recurrent    4. Dysuria                        Assessment and Plan:     1. Renal cell carcinoma of right kidney  Overview:  Patient found to have high risk likely stage II ([id number]) with nuclear grade 4 and rhabdoid features on nephrectomy 9/14/22. Started adjuvant pembrolizumab 11/9/22. Stopped 1/31/23 due to recurrent ICI side effects.    Assessment & Plan:  No evidence of recurrence on CT a/p 03/2024. Declined outpatient CT torso given financial concerns. She will look into costs of CT chest. If prohibitive can do CXR, but not ideal. Otherwise will follow up in 6 months with repeat CT Torso  - CT chest non con now (ordered by Charmaine Adrian); CXR if she cannot do  - FU in 6 months for CT torso surveillance    Orders:  -     CT Chest Abdomen Pelvis With IV Contrast (XPD) Routine Oral Contrast; Future; Expected date: 04/24/2024    2. Lichenoid drug reaction  Overview:  Developed following intiation of pembrolizumab. Primarily affecting the mouth and the anogenital region. Previously on hydroxychloriquine and topical  clobetasol.    Assessment & Plan:  Patient self discontinued her HCQ and clobetasol. She was inquiring if she should restart given ongoing symptoms. Will reach out to dermatology.      3. Major depressive disorder in partial remission, unspecified whether recurrent  Assessment & Plan:  Continues to be quite symptomatic. Following closely with psychiatry      4. Dysuria  Assessment & Plan:  Ongoing pain and suprapubic discomfort with urination despite recent UTI treatment. Also with ongoing leukocyturia. Repeating UA/UCX today. If remains symptomatic would refer back to urology.                Route Chart for Scheduling    Med Onc Chart Routing      Follow up with physician 6 months.   Follow up with RAUL    Infusion scheduling note    Injection scheduling note    Labs CBC and CMP   Scheduling:  Preferred lab:  Lab interval:     Imaging CT chest abdomen pelvis   [place] CT chest non-con now (Nguy[facility]n); CT torso in 6 months   Pharmacy appointment    Other referrals                      Moshe Jane MD

## 2024-04-24 NOTE — ASSESSMENT & PLAN NOTE
Ongoing pain and suprapubic discomfort with urination despite recent UTI treatment. Also with ongoing leukocyturia. Repeating UA/UCX today. If remains symptomatic would refer back to urology.

## 2024-04-25 DIAGNOSIS — C64.1 RENAL CELL CARCINOMA OF RIGHT KIDNEY: Primary | ICD-10-CM

## 2024-04-26 ENCOUNTER — PATIENT MESSAGE (OUTPATIENT)
Dept: HEMATOLOGY/ONCOLOGY | Facility: CLINIC | Age: 44
End: 2024-04-26
Payer: COMMERCIAL

## 2024-04-26 ENCOUNTER — TELEPHONE (OUTPATIENT)
Dept: OBSTETRICS AND GYNECOLOGY | Facility: CLINIC | Age: 44
End: 2024-04-26
Payer: COMMERCIAL

## 2024-04-26 ENCOUNTER — TELEPHONE (OUTPATIENT)
Dept: HEMATOLOGY/ONCOLOGY | Facility: CLINIC | Age: 44
End: 2024-04-26
Payer: COMMERCIAL

## 2024-04-26 DIAGNOSIS — B95.1 GROUP B STREPTOCOCCAL UTI: Primary | ICD-10-CM

## 2024-04-26 DIAGNOSIS — N39.0 GROUP B STREPTOCOCCAL UTI: Primary | ICD-10-CM

## 2024-04-26 RX ORDER — AMOXICILLIN 500 MG/1
500 CAPSULE ORAL EVERY 8 HOURS
Qty: 21 CAPSULE | Refills: 0 | Status: SHIPPED | OUTPATIENT
Start: 2024-04-26 | End: 2024-05-03

## 2024-04-26 NOTE — TELEPHONE ENCOUNTER
Patient with worsening uti symptoms including some back pain.  No fever.  Urine culture grew group b strep.  Per up to date, treated with amoxicillin drug of choice.  Rx for amoxicillin sent to pharmacy.  Advised patient that if symptoms worsen or she starts developing fever to go the ER.  Will get patient in with gynecology as well and if no improvement of symptoms or return of symptoms, with urology.  She also said the Chest CT is almost as expensive as the CT cap, so she would prefer to do a full chest abdomen pelvis now.  She canceled the chest CT.   Will get this scheduled for her.  Has another scan for Dr. Jane in 6 months.      Charmaine Adrian, NP

## 2024-04-29 ENCOUNTER — HOSPITAL ENCOUNTER (EMERGENCY)
Facility: HOSPITAL | Age: 44
Discharge: HOME | End: 2024-04-29
Attending: EMERGENCY MEDICINE
Payer: COMMERCIAL

## 2024-04-29 VITALS
WEIGHT: 260 LBS | BODY MASS INDEX: 43.32 KG/M2 | RESPIRATION RATE: 23 BRPM | OXYGEN SATURATION: 96 % | HEIGHT: 65 IN | SYSTOLIC BLOOD PRESSURE: 119 MMHG | DIASTOLIC BLOOD PRESSURE: 57 MMHG | TEMPERATURE: 98 F | HEART RATE: 74 BPM

## 2024-04-29 DIAGNOSIS — R10.9 ABDOMINAL PAIN: ICD-10-CM

## 2024-04-29 DIAGNOSIS — R10.9 FLANK PAIN: ICD-10-CM

## 2024-04-29 DIAGNOSIS — T14.8XXA MUSCLE STRAIN: Primary | ICD-10-CM

## 2024-04-29 LAB
ALBUMIN SERPL BCP-MCNC: 3.6 G/DL (ref 3.5–5.2)
ALP SERPL-CCNC: 74 U/L (ref 55–135)
ALT SERPL W/O P-5'-P-CCNC: 26 U/L (ref 10–44)
ANION GAP SERPL CALC-SCNC: 8 MMOL/L (ref 8–16)
AST SERPL-CCNC: 18 U/L (ref 10–40)
B-HCG UR QL: NEGATIVE
BASOPHILS # BLD AUTO: 0.06 K/UL (ref 0–0.2)
BASOPHILS NFR BLD: 0.6 % (ref 0–1.9)
BILIRUB SERPL-MCNC: 0.2 MG/DL (ref 0.1–1)
BILIRUB UR QL STRIP: NEGATIVE
BUN SERPL-MCNC: 21 MG/DL (ref 6–20)
CALCIUM SERPL-MCNC: 9.9 MG/DL (ref 8.7–10.5)
CHLORIDE SERPL-SCNC: 105 MMOL/L (ref 95–110)
CLARITY UR REFRACT.AUTO: CLEAR
CO2 SERPL-SCNC: 23 MMOL/L (ref 23–29)
COLOR UR AUTO: YELLOW
CREAT SERPL-MCNC: 1.1 MG/DL (ref 0.5–1.4)
DIFFERENTIAL METHOD BLD: ABNORMAL
EOSINOPHIL # BLD AUTO: 0.3 K/UL (ref 0–0.5)
EOSINOPHIL NFR BLD: 3 % (ref 0–8)
ERYTHROCYTE [DISTWIDTH] IN BLOOD BY AUTOMATED COUNT: 12.8 % (ref 11.5–14.5)
EST. GFR  (NO RACE VARIABLE): >60 ML/MIN/1.73 M^2
GLUCOSE SERPL-MCNC: 96 MG/DL (ref 70–110)
GLUCOSE UR QL STRIP: NEGATIVE
HCT VFR BLD AUTO: 36.1 % (ref 37–48.5)
HGB BLD-MCNC: 11.6 G/DL (ref 12–16)
HGB UR QL STRIP: NEGATIVE
IMM GRANULOCYTES # BLD AUTO: 0.04 K/UL (ref 0–0.04)
IMM GRANULOCYTES NFR BLD AUTO: 0.4 % (ref 0–0.5)
KETONES UR QL STRIP: NEGATIVE
LEUKOCYTE ESTERASE UR QL STRIP: NEGATIVE
LIPASE SERPL-CCNC: 27 U/L (ref 4–60)
LYMPHOCYTES # BLD AUTO: 2.7 K/UL (ref 1–4.8)
LYMPHOCYTES NFR BLD: 27.4 % (ref 18–48)
MAGNESIUM SERPL-MCNC: 1.9 MG/DL (ref 1.6–2.6)
MCH RBC QN AUTO: 29.2 PG (ref 27–31)
MCHC RBC AUTO-ENTMCNC: 32.1 G/DL (ref 32–36)
MCV RBC AUTO: 91 FL (ref 82–98)
MONOCYTES # BLD AUTO: 0.6 K/UL (ref 0.3–1)
MONOCYTES NFR BLD: 6 % (ref 4–15)
NEUTROPHILS # BLD AUTO: 6.1 K/UL (ref 1.8–7.7)
NEUTROPHILS NFR BLD: 62.6 % (ref 38–73)
NITRITE UR QL STRIP: NEGATIVE
NRBC BLD-RTO: 0 /100 WBC
OHS QRS DURATION: 108 MS
OHS QTC CALCULATION: 442 MS
PH UR STRIP: 5 [PH] (ref 5–8)
PLATELET # BLD AUTO: 346 K/UL (ref 150–450)
PMV BLD AUTO: 10.4 FL (ref 9.2–12.9)
POTASSIUM SERPL-SCNC: 4.7 MMOL/L (ref 3.5–5.1)
PROT SERPL-MCNC: 6.8 G/DL (ref 6–8.4)
PROT UR QL STRIP: NEGATIVE
RBC # BLD AUTO: 3.97 M/UL (ref 4–5.4)
SODIUM SERPL-SCNC: 136 MMOL/L (ref 136–145)
SP GR UR STRIP: 1.02 (ref 1–1.03)
URN SPEC COLLECT METH UR: NORMAL
WBC # BLD AUTO: 9.8 K/UL (ref 3.9–12.7)

## 2024-04-29 PROCEDURE — 80053 COMPREHEN METABOLIC PANEL: CPT

## 2024-04-29 PROCEDURE — 81003 URINALYSIS AUTO W/O SCOPE: CPT

## 2024-04-29 PROCEDURE — 25500020 PHARM REV CODE 255: Performed by: EMERGENCY MEDICINE

## 2024-04-29 PROCEDURE — 83690 ASSAY OF LIPASE: CPT

## 2024-04-29 PROCEDURE — 87040 BLOOD CULTURE FOR BACTERIA: CPT

## 2024-04-29 PROCEDURE — 96361 HYDRATE IV INFUSION ADD-ON: CPT

## 2024-04-29 PROCEDURE — 85025 COMPLETE CBC W/AUTO DIFF WBC: CPT

## 2024-04-29 PROCEDURE — 99285 EMERGENCY DEPT VISIT HI MDM: CPT | Mod: 25

## 2024-04-29 PROCEDURE — 81025 URINE PREGNANCY TEST: CPT

## 2024-04-29 PROCEDURE — 25000003 PHARM REV CODE 250

## 2024-04-29 PROCEDURE — 93005 ELECTROCARDIOGRAM TRACING: CPT

## 2024-04-29 PROCEDURE — 83735 ASSAY OF MAGNESIUM: CPT

## 2024-04-29 PROCEDURE — 96374 THER/PROPH/DIAG INJ IV PUSH: CPT

## 2024-04-29 PROCEDURE — 63600175 PHARM REV CODE 636 W HCPCS: Performed by: EMERGENCY MEDICINE

## 2024-04-29 PROCEDURE — 93010 ELECTROCARDIOGRAM REPORT: CPT | Mod: ,,, | Performed by: INTERNAL MEDICINE

## 2024-04-29 RX ORDER — MORPHINE SULFATE 4 MG/ML
4 INJECTION, SOLUTION INTRAMUSCULAR; INTRAVENOUS
Status: COMPLETED | OUTPATIENT
Start: 2024-04-29 | End: 2024-04-29

## 2024-04-29 RX ORDER — CYCLOBENZAPRINE HCL 10 MG
10 TABLET ORAL 3 TIMES DAILY PRN
Qty: 20 TABLET | Refills: 0 | Status: SHIPPED | OUTPATIENT
Start: 2024-04-29 | End: 2024-05-09

## 2024-04-29 RX ORDER — MORPHINE SULFATE 15 MG/1
15 TABLET ORAL EVERY 4 HOURS PRN
Qty: 10 TABLET | Refills: 0 | Status: SHIPPED | OUTPATIENT
Start: 2024-04-29 | End: 2024-05-17

## 2024-04-29 RX ORDER — LIDOCAINE 50 MG/G
1 PATCH TOPICAL
Status: DISCONTINUED | OUTPATIENT
Start: 2024-04-29 | End: 2024-04-29 | Stop reason: HOSPADM

## 2024-04-29 RX ADMIN — MORPHINE SULFATE 4 MG: 4 INJECTION INTRAVENOUS at 11:04

## 2024-04-29 RX ADMIN — IOHEXOL 100 ML: 350 INJECTION, SOLUTION INTRAVENOUS at 11:04

## 2024-04-29 RX ADMIN — SODIUM CHLORIDE 1000 ML: 9 INJECTION, SOLUTION INTRAVENOUS at 10:04

## 2024-04-29 NOTE — ED NOTES
Patient identifiers for Teagan Griffith 44 y.o. female checked and correct.  Chief Complaint   Patient presents with    Female  Problem     R flank pain,hx nephrectomy on r side, on antibiotics 3d day for uti, pale , has strep in urine     Past Medical History:   Diagnosis Date    JEN (acute kidney injury) 12/21/2022    Depression     Renal cell carcinoma 8/6/2022     Allergies reported: Review of patient's allergies indicates:  No Known Allergies      LOC: Patient is awake, alert, and aware of environment with an appropriate affect. Patient is oriented x 4 and speaking appropriately.  APPEARANCE: Patient resting comfortably and in no acute distress. Patient is clean and well groomed, patient's clothing is properly fastened.  HEENT: - JVD, + midline trach  SKIN: The skin is warm and dry. Patient has normal skin turgor and moist mucus membranes.   MUSKULOSKELETAL: Patient is moving all extremities well, no obvious deformities noted. Pulses intact.   RESPIRATORY: Airway is open and patent. Respirations are spontaneous and non-labored with normal effort and rate.  CARDIAC: Patient has a normal rate and rhythm. 85 on cardiac monitor. No peripheral edema noted.   ABDOMEN: No distention noted. Soft and tender to touch in RLQ. Endorses 10/10 R flank pain and bladder pain. Endorses polyuria.  NEUROLOGICAL: pupils 4 mm, PERRL. Facial expression is symmetrical. Hand grasps are equal bilaterally. Normal sensation in all extremities when touched with finger.

## 2024-04-29 NOTE — ED TRIAGE NOTES
43 y/o F presents to ER with c/c R flank pain rated 10/10 with polyuria. H/x R nephrostomy secondary kidney cancer. Been taking antibiotics for UTI in last 4 days, strep in urine.

## 2024-04-29 NOTE — Clinical Note
"Teagan "Teaganpaco Griffith was seen and treated in our emergency department on 4/29/2024.  She may return to work on 04/30/2024.   Please accommodate for patient back strain     If you have any questions or concerns, please don't hesitate to call.      Shanika Sharp, DO"

## 2024-04-29 NOTE — ED PROVIDER NOTES
Encounter Date: 2024       History     Chief Complaint   Patient presents with    Female  Problem     R flank pain,hx nephrectomy on r side, on antibiotics 3d day for uti, pale , has strep in urine     Patient is a 44-year-old female with past medical history of renal cell carcinoma status post nephrectomy in , depression, and frequent UTIs that presents to emergency department with flank pain.  Patient states that she was diagnosed with a UTI about 4 days ago.  She gave a sample of urine to her primary hematology oncology doctor who called her 3 days ago and prescribed her an antibiotic.  Patient has had 4 doses totals of her oral amoxicillin ever since they were called.  She states that she woke up 2 days ago with severe right flank pain.  Patient initially not concerned, but spoke with the nurse hotline who told her to come to the ER for evaluation for worsening symptoms given concern for non sufficient treatment of UTI.  Patient points to her right flank, pain is worse with exertion, movement or palpation.  Of note patient had right-sided nephrectomy on the same side that she was having flank pain.  Denies dysuria however admits increased urinary frequency.    Denies fevers, denies alteration of mental status, denies chest pain or shortness a breath.  Denies any abdominal pain.    The history is provided by the patient and medical records.     Review of patient's allergies indicates:  No Known Allergies  Past Medical History:   Diagnosis Date    JEN (acute kidney injury) 2022    Depression     Renal cell carcinoma 2022     Past Surgical History:   Procedure Laterality Date     SECTION      LAPAROSCOPIC ROBOT-ASSISTED SURGICAL REMOVAL OF KIDNEY USING DA CAESAR XI Right 2022    Procedure: XI ROBOTIC NEPHRECTOMY;  Surgeon: Justin Riley MD;  Location: [facility] OR [address] [place] [address];  Service: Urology;  Laterality: Right;  4 hours     Family History   Problem Relation Name Age of Onset    Drug  abuse Mother      Prostate cancer Father      Lymphoma Paternal Grandfather      Kidney cancer Neg Hx       Social History     Tobacco Use    Smoking status: Former     Current packs/day: 0.00     Types: Cigarettes     Start date: 1998     Quit date: 2018     Years since quittin.3    Smokeless tobacco: Never   Substance Use Topics    Alcohol use: Yes     Alcohol/week: 42.0 standard drinks of alcohol     Types: 42 Cans of beer per week     Review of Systems  ROS negative except as noted in HPI     Physical Exam     Initial Vitals [24 0850]   BP Pulse Resp Temp SpO2   (!) 147/70 83 16 98.5 °F (36.9 °C) 97 %      MAP       --         Physical Exam    Constitutional: She appears well-developed and well-nourished. No distress.   HENT:   Head: Normocephalic.   Right Ear: External ear normal.   Left Ear: External ear normal.   Nose: Nose normal.   Mouth/Throat: Oropharynx is clear and moist.   Eyes: Conjunctivae are normal. Right eye exhibits no discharge. Left eye exhibits no discharge.   Neck: No JVD present.   Cardiovascular:  Normal rate, regular rhythm and normal heart sounds.           Pulmonary/Chest: Breath sounds normal. She has no wheezes. She has no rales.   Abdominal: Abdomen is soft. She exhibits no distension. There is no abdominal tenderness.   There is right CVA tenderness.There is no rebound and no guarding.   Musculoskeletal:         General: No edema.     Neurological: She is alert and oriented to person, place, and time. She has normal strength.   Skin: Skin is warm. Capillary refill takes less than 2 seconds.   Psychiatric: She has a normal mood and affect.         ED Course   Procedures  Labs Reviewed   CBC W/ AUTO DIFFERENTIAL - Abnormal; Notable for the following components:       Result Value    RBC 3.97 (*)     Hemoglobin 11.6 (*)     Hematocrit 36.1 (*)     All other components within normal limits   COMPREHENSIVE METABOLIC PANEL - Abnormal; Notable for the following components:     BUN 21 (*)     All other components within normal limits   CULTURE, BLOOD   CULTURE, BLOOD   URINALYSIS, REFLEX TO URINE CULTURE    Narrative:     Specimen Source->Urine   PREGNANCY TEST, URINE RAPID    Narrative:     Specimen Source->Urine   LIPASE   MAGNESIUM     EKG Readings: (Independently Interpreted)   EKG demonstrates normal sinus rhythm, no ST elevations or depressions.  Intervals are within normal limits.      ECG Results              EKG 12-lead (Final result)        Collection Time Result Time QRS Duration OHS QTC Calculation    04/29/24 09:59:18 04/29/24 13:24:08 108 442                     Final result by Interface, Lab In [facility] (04/29/24 13:24:12)                   Narrative:    Test Reason : R10.9,    Vent. Rate : 075 BPM     Atrial Rate : 075 BPM     P-R Int : 200 ms          QRS Dur : 108 ms      QT Int : 396 ms       P-R-T Axes : 068 033 054 degrees     QTc Int : 442 ms    Normal sinus rhythm  Normal ECG  When compared with ECG of 06-AUG-2022 02:32,  No significant change was found  Confirmed by Beka PEDERSEN, Madeleine ([id number]) on 4/29/2024 1:24:04 PM    Referred By: [email or web address]   SELF           Confirmed By:Madeleine Ireland MD                                  Imaging Results              CT Abdomen Pelvis With IV Contrast NO Oral Contrast (Final result)  Result time 04/29/24 11:36:21      Final result by Arnulfo Shetty MD (04/29/24 11:36:21)                   Impression:      No acute abdominopelvic abnormality.    Prior right nephrectomy without evidence of local recurrent or new distant metastatic disease.    Hepatomegaly with steatosis.    Electronically signed by resident: Justin Álvarez  Date:    04/29/2024  Time:    11:15    Electronically signed by: Arnulfo Shetty MD  Date:    04/29/2024  Time:    11:36               Narrative:    EXAMINATION:  CT ABDOMEN PELVIS WITH IV CONTRAST    CLINICAL HISTORY:  Abdominal abscess/infection suspected;Flank pain, kidney stone  suspected;    TECHNIQUE:  Low dose axial images, sagittal and coronal reformations were obtained from the lung bases to the pubic symphysis.  100 mL of IV contrast was administered.    COMPARISON:  Comparison made with multiple prior studies, the most recent CT from 03/07/2024.    FINDINGS:  Lower chest: Heart size is normal. Lung bases are essentially clear. No pleural or pericardial effusion.    Abdomen:    Liver is enlarged.  Allowing for single phase study, suspected low attenuating parenchyma suspicious of steatosis.  Portal vasculature is patent.  Gallbladder is decompressed, not well evaluated.  No intra-or extrahepatic biliary ductal dilatation.    Spleen, adrenals, and pancreas are unremarkable.    Right kidney is surgically absent.  No abnormal soft tissue thickening to suggest local recurrent or residual disease.  Left kidney is normal in size and positioning.  No hydronephrosis.  No ureteral dilatation.    No distended loops of small bowel. Normal appendix.    Abdominal aorta is normal in course and caliber.  Minimal calcific atherosclerosis.    No abdominal lymphadenopathy.    No abdominal free fluid or pneumoperitoneum.    Pelvis: No bladder wall thickening.  Uterus is present.  Adnexal cysts, largest measuring 2.7 cm on the left, within normal limits for premenopausal patients.  No free fluid in the pelvis.    Bones and soft tissues: No aggressive osseous lesions. Tiny fat containing umbilical hernia.                                       Medications   sodium chloride 0.9% bolus 1,000 mL 1,000 mL (0 mLs Intravenous Stopped 4/29/24 1138)   morphine injection 4 mg (4 mg Intravenous Given 4/29/24 1108)   iohexoL (OMNIPAQUE 350) injection 100 mL (100 mLs Intravenous Given 4/29/24 1112)     Medical Decision Making  Patient is a 44-year-old female with past medical history of renal cell carcinoma status post nephrectomy in 2022, depression, and frequent UTIs that presents to emergency department with flank  pain.     On initial evaluation patient's vitals are within normal limits, she was cooperative with the history and physical examination.  There was no alteration in mental status and she was speaking in full sentences.    Differential for patient's flank plain at this time is broad.  Patient does have history of nephrectomy on the right side so it would be unusual to have flank pain associated with ascending urinary tract infection, however can not rule out abscess or stone or atypical ACS presentation.  EKG was reassuring so less likely ACS at this time.  Performed CT of the abdomen and pelvis which did not demonstrate any abscess, infection or stone.  Also considered cutaneous presentations for patient's pain such as shingles however there was no rash or pain at the skin.  Patient had improvement in her symptoms after supportive treatment however with any exertion such as ambulating to the bathroom her flank pain returned.  High suspicion at this time for MSK involvement of her symptomatology.  Suspect that she has a low back strain with right flank involvement.    We will discharge the patient at this time with a short course of muscle relaxants, instructions about supportive treatment with NSAIDs, Tylenol and heat/ice.  We will refer patient to outpatient physical therapy for evaluation and treatment patient expressed understanding and has no other questions at this time.  Instructed patient to continue taking her amoxicillin until course is completed and to follow up with Hematology-Oncology regarding her UTI.    Amount and/or Complexity of Data Reviewed  Labs: ordered. Decision-making details documented in ED Course.  Radiology: ordered.    Risk  Prescription drug management.               ED Course as of 04/29/24 1634   Mon Apr 29, 2024   1113 Comprehensive metabolic panel(!)  Normal CMP, no kidney dysfunction, no electrolyte disturbance. [TK]   1113 CBC auto differential(!)  CBC demonstrates stable anemia,  no other leukocytosis or thrombocytopenia [TK]   1114 Pregnancy, urine rapid  Pregnancy negative, less likely cause of patient's symptomatology [TK]   1114 Urinalysis, Reflex to Urine Culture Urine, Clean Catch  No evidence of UTI on today's urinalysis. [TK]      ED Course User Index  [TK] Shanika Sharp DO                           Clinical Impression:  Final diagnoses:  [R10.9] Abdominal pain  [R10.9] Flank pain  [T14.8XXA] Muscle strain (Primary)          ED Disposition Condition    Discharge Stable          ED Prescriptions       Medication Sig Dispense Start Date End Date Auth. Provider    cyclobenzaprine (FLEXERIL) 10 MG tablet Take 1 tablet (10 mg total) by mouth 3 (three) times daily as needed for Muscle spasms. 20 tablet 4/29/2024 5/9/2024 Shanika Sharp DO    morphine (MSIR) 15 MG tablet Take 1 tablet (15 mg total) by mouth every 4 (four) hours as needed for Pain. 10 tablet 4/29/2024 -- Shanika Sharp DO          Follow-up Information       Follow up With Specialties Details Why Contact Info    [facility] - Emergency Dept Emergency Medicine  If symptoms worsen [id number]  [facility] [address] [phone]             Shanika Sharp DO  Resident  04/29/24 9144

## 2024-04-29 NOTE — Clinical Note
NOTIFICATION OF ADMISSION DISCHARGE   This is a Notification of Discharge from [facility]. Please be advised that this patient has been discharge from our facility. Below you will find the admission and discharge date and time including the patient’s disposition.   UTILIZATION REVIEW CONTACT:  Danuta Byrne  Utilization   Network Utilization Review Department  Phone: [phone] x carefully listen to the prompts. All voicemails are confidential.  Email: [email or web address]     ADMISSION INFORMATION  PRESENTATION DATE: 4/24/2024  6:34 PM  OBERVATION ADMISSION DATE:   INPATIENT ADMISSION DATE: 4/25/24  4:35 PM   DISCHARGE DATE: 4/26/2024  1:11 PM   DISPOSITION:Home/Self Care    Network Utilization Review Department  ATTENTION: Please call with any questions or concerns to [phone] and carefully listen to the prompts so that you are directed to the right person. All voicemails are confidential.   For Discharge needs, contact Care Management DC Support Team at [phone] opt. 2  Send all requests for admission clinical reviews, approved or denied determinations and any other requests to dedicated fax number below belonging to the campus where the patient is receiving treatment. List of dedicated fax numbers for the Facilities:  FACILITY NAME UR FAX NUMBER   ADMISSION DENIALS (Administrative/Medical Necessity) [phone]   DISCHARGE SUPPORT TEAM ([facility]) [phone]   PARENT CHILD HEALTH (Maternity/NICU/Pediatrics) [phone]   [facility] [phone]   [facility] [phone]   [facility] [phone]   [facility] [phone]   [facility] [phone]   [facility] [phone]   [facility] [phone]   [facility] [phone]  Please schedule patient CT chest sometime in the next 4 weeks prior to her visit with Dr. Jane.  Thanks.  Charmaine   [facility] [phone]   [facility] [phone]   [facility] [phone]   [facility] [phone]

## 2024-05-01 DIAGNOSIS — C64.1 RENAL CELL CARCINOMA OF RIGHT KIDNEY: Primary | ICD-10-CM

## 2024-05-01 DIAGNOSIS — N39.0 RECURRENT UTI: ICD-10-CM

## 2024-05-03 ENCOUNTER — OFFICE VISIT (OUTPATIENT)
Dept: PSYCHIATRY | Facility: CLINIC | Age: 44
End: 2024-05-03
Payer: COMMERCIAL

## 2024-05-03 VITALS
HEART RATE: 94 BPM | WEIGHT: 271.38 LBS | SYSTOLIC BLOOD PRESSURE: 143 MMHG | BODY MASS INDEX: 45.16 KG/M2 | DIASTOLIC BLOOD PRESSURE: 82 MMHG

## 2024-05-03 DIAGNOSIS — F43.10 PTSD (POST-TRAUMATIC STRESS DISORDER): ICD-10-CM

## 2024-05-03 DIAGNOSIS — F39 UNSPECIFIED MOOD (AFFECTIVE) DISORDER: ICD-10-CM

## 2024-05-03 DIAGNOSIS — F32.4 MAJOR DEPRESSIVE DISORDER IN PARTIAL REMISSION, UNSPECIFIED WHETHER RECURRENT: ICD-10-CM

## 2024-05-03 DIAGNOSIS — F41.1 GENERALIZED ANXIETY DISORDER WITH PANIC ATTACKS: Primary | ICD-10-CM

## 2024-05-03 DIAGNOSIS — F41.0 GENERALIZED ANXIETY DISORDER WITH PANIC ATTACKS: Primary | ICD-10-CM

## 2024-05-03 PROCEDURE — 3008F BODY MASS INDEX DOCD: CPT | Mod: CPTII,S$GLB,, | Performed by: STUDENT IN AN ORGANIZED HEALTH CARE EDUCATION/TRAINING PROGRAM

## 2024-05-03 PROCEDURE — 3077F SYST BP >= 140 MM HG: CPT | Mod: CPTII,S$GLB,, | Performed by: STUDENT IN AN ORGANIZED HEALTH CARE EDUCATION/TRAINING PROGRAM

## 2024-05-03 PROCEDURE — 3044F HG A1C LEVEL LT 7.0%: CPT | Mod: CPTII,S$GLB,, | Performed by: STUDENT IN AN ORGANIZED HEALTH CARE EDUCATION/TRAINING PROGRAM

## 2024-05-03 PROCEDURE — 3079F DIAST BP 80-89 MM HG: CPT | Mod: CPTII,S$GLB,, | Performed by: STUDENT IN AN ORGANIZED HEALTH CARE EDUCATION/TRAINING PROGRAM

## 2024-05-03 PROCEDURE — 99999 PR PBB SHADOW E&M-EST. PATIENT-LVL II: CPT | Mod: PBBFAC,,, | Performed by: STUDENT IN AN ORGANIZED HEALTH CARE EDUCATION/TRAINING PROGRAM

## 2024-05-03 PROCEDURE — 99215 OFFICE O/P EST HI 40 MIN: CPT | Mod: S$GLB,,, | Performed by: STUDENT IN AN ORGANIZED HEALTH CARE EDUCATION/TRAINING PROGRAM

## 2024-05-03 RX ORDER — SERTRALINE HYDROCHLORIDE 50 MG/1
50 TABLET, FILM COATED ORAL DAILY
Qty: 30 TABLET | Refills: 1 | Status: SHIPPED | OUTPATIENT
Start: 2024-05-03 | End: 2024-05-17 | Stop reason: SDUPTHER

## 2024-05-03 RX ORDER — CARIPRAZINE 4.5 MG/1
4.5 CAPSULE, GELATIN COATED ORAL DAILY
Qty: 30 CAPSULE | Refills: 1 | Status: SHIPPED | OUTPATIENT
Start: 2024-05-03 | End: 2024-07-02

## 2024-05-03 RX ORDER — LAMOTRIGINE 150 MG/1
150 TABLET ORAL DAILY
Qty: 30 TABLET | Refills: 1 | Status: SHIPPED | OUTPATIENT
Start: 2024-05-03 | End: 2024-05-17 | Stop reason: SDUPTHER

## 2024-05-03 RX ORDER — SERTRALINE HYDROCHLORIDE 100 MG/1
100 TABLET, FILM COATED ORAL DAILY
Qty: 30 TABLET | Refills: 1 | Status: SHIPPED | OUTPATIENT
Start: 2024-05-03 | End: 2024-05-17 | Stop reason: SDUPTHER
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
## 2024-05-04 LAB
BACTERIA BLD CULT: NORMAL
BACTERIA BLD CULT: NORMAL

## 2024-05-05 ENCOUNTER — PATIENT MESSAGE (OUTPATIENT)
Dept: PSYCHIATRY | Facility: CLINIC | Age: 44
End: 2024-05-05
Payer: COMMERCIAL

## 2024-05-05 DIAGNOSIS — E78.5 HYPERLIPIDEMIA, UNSPECIFIED HYPERLIPIDEMIA TYPE: ICD-10-CM

## 2024-05-06 RX ORDER — SIMVASTATIN 10 MG/1
10 TABLET, FILM COATED ORAL NIGHTLY
Qty: 30 TABLET | Refills: 11 | Status: SHIPPED | OUTPATIENT
Start: 2024-05-06 | End: 2025-05-06

## 2024-05-08 ENCOUNTER — PATIENT MESSAGE (OUTPATIENT)
Dept: HEMATOLOGY/ONCOLOGY | Facility: CLINIC | Age: 44
End: 2024-05-08
Payer: COMMERCIAL

## 2024-05-10 RX ORDER — ALPRAZOLAM 1 MG/1
1 TABLET ORAL 2 TIMES DAILY
Qty: 60 TABLET | Refills: 0 | Status: SHIPPED | OUTPATIENT
Start: 2024-05-10 | End: 2024-06-14 | Stop reason: SDUPTHER

## 2024-05-14 ENCOUNTER — OFFICE VISIT (OUTPATIENT)
Dept: OBSTETRICS AND GYNECOLOGY | Facility: CLINIC | Age: 44
End: 2024-05-14
Payer: COMMERCIAL

## 2024-05-14 VITALS
BODY MASS INDEX: 44.35 KG/M2 | SYSTOLIC BLOOD PRESSURE: 131 MMHG | HEIGHT: 65 IN | DIASTOLIC BLOOD PRESSURE: 93 MMHG | WEIGHT: 266.19 LBS

## 2024-05-14 DIAGNOSIS — E66.01 MORBID OBESITY WITH BMI OF 40.0-44.9, ADULT: ICD-10-CM

## 2024-05-14 DIAGNOSIS — R73.03 PRE-DIABETES: Primary | ICD-10-CM

## 2024-05-14 DIAGNOSIS — R10.2 PELVIC PAIN: ICD-10-CM

## 2024-05-14 DIAGNOSIS — C64.1 RENAL CELL CARCINOMA OF RIGHT KIDNEY: ICD-10-CM

## 2024-05-14 PROCEDURE — 1159F MED LIST DOCD IN RCRD: CPT | Mod: CPTII,S$GLB,, | Performed by: PHYSICIAN ASSISTANT

## 2024-05-14 PROCEDURE — 3044F HG A1C LEVEL LT 7.0%: CPT | Mod: CPTII,S$GLB,, | Performed by: PHYSICIAN ASSISTANT

## 2024-05-14 PROCEDURE — 99204 OFFICE O/P NEW MOD 45 MIN: CPT | Mod: S$GLB,,, | Performed by: PHYSICIAN ASSISTANT

## 2024-05-14 PROCEDURE — 3080F DIAST BP >= 90 MM HG: CPT | Mod: CPTII,S$GLB,, | Performed by: PHYSICIAN ASSISTANT

## 2024-05-14 PROCEDURE — 99999 PR PBB SHADOW E&M-EST. PATIENT-LVL V: CPT | Mod: PBBFAC,,, | Performed by: PHYSICIAN ASSISTANT

## 2024-05-14 PROCEDURE — 3008F BODY MASS INDEX DOCD: CPT | Mod: CPTII,S$GLB,, | Performed by: PHYSICIAN ASSISTANT

## 2024-05-14 PROCEDURE — 3075F SYST BP GE 130 - 139MM HG: CPT | Mod: CPTII,S$GLB,, | Performed by: PHYSICIAN ASSISTANT

## 2024-05-14 PROCEDURE — 1160F RVW MEDS BY RX/DR IN RCRD: CPT | Mod: CPTII,S$GLB,, | Performed by: PHYSICIAN ASSISTANT

## 2024-05-14 NOTE — PROGRESS NOTES
Subjective:      Teagan Griffith is a 44 y.o. female who presents to discuss weight loss. Started on psychiatric medications about 10 years ago and started to gain weight with these. She did lose significant amounts of weight a few years ago but admits that she was only eating yogurt and walking throughout the day. She does not over eat or having cravings. Thinks she is just eating the wrong things and drinking too much beer and soda. She has never been on weight loss medication in the past and would like to avoid if possible. Recently had A1c of 5.7 and worries about diabetes. She would like to get below 200lb as a short term goal.  She has a history or renal cell carcinoma of the right kidney. She underwent right nephrectomy 22 and started adjuvant pembrolizumab 22. She stopped adjuvant treatment in 2023 due to complications.     Sleep: Waking about 3am every night; getting about 5 hours per night. Seeing psychiatry     Stress: OK. Some stress with work but improving    Exercise: Some walking, slowly building on the treadmill     A typical day consists of:  Breakfast: Skips; 2 coke zeros  Lunch: Sometimes skips; Granita and almond croissant  Dinner: [employer]fil[employer]- chicken sandwich with coke  Snack: rare, sometimes cheese its  Beverages: 4-5 sodas in the day. Alcohol- working to reduce. 2-3 beers per day      Past Medical History:   Diagnosis Date    JEN (acute kidney injury) 2022    Depression     Renal cell carcinoma 2022     Past Surgical History:   Procedure Laterality Date     SECTION      LAPAROSCOPIC ROBOT-ASSISTED SURGICAL REMOVAL OF KIDNEY USING DA CAESAR XI Right 2022    Procedure: XI ROBOTIC NEPHRECTOMY;  Surgeon: Justin Riley MD;  Location: [address];  Service: Urology;  Laterality: Right;  4 hours     Social History     Tobacco Use    Smoking status: Former     Current packs/day: 0.00     Types: Cigarettes     Start date: 1998     Quit date: 2018      Years since quittin.3    Smokeless tobacco: Never   Substance Use Topics    Alcohol use: Yes     Alcohol/week: 42.0 standard drinks of alcohol     Types: 42 Cans of beer per week     Family History   Problem Relation Name Age of Onset    Drug abuse Mother      Prostate cancer Father      Lymphoma Paternal Grandfather      Kidney cancer Neg Hx       OB History   No obstetric history on file.       Current Outpatient Medications:     acetaminophen (TYLENOL) 500 MG tablet, Take 2 tablets (1,000 mg total) by mouth every 8 (eight) hours as needed for Pain., Disp: 90 tablet, Rfl: 0    ALPRAZolam (XANAX) 1 MG tablet, Take 1 tablet (1 mg total) by mouth 2 (two) times a day., Disp: 60 tablet, Rfl: 0    cariprazine (VRAYLAR) 4.5 mg Cap, Take 1 capsule (4.5 mg total) by mouth once daily., Disp: 30 capsule, Rfl: 1    dextroamphetamine-amphetamine 30 mg Tab, Take 1 tablet (30 mg total) by mouth 2 (two) times a day., Disp: 60 tablet, Rfl: 0    lamoTRIgine (LAMICTAL) 150 MG Tab, Take 1 tablet (150 mg total) by mouth once daily., Disp: 30 tablet, Rfl: 1    sertraline (ZOLOFT) 100 MG tablet, Take 1 tablet (100 mg total) by mouth once daily., Disp: 30 tablet, Rfl: 1    sertraline (ZOLOFT) 50 MG tablet, Take 1 tablet (50 mg total) by mouth once daily., Disp: 30 tablet, Rfl: 1    simvastatin (ZOCOR) 10 MG tablet, Take 1 tablet (10 mg total) by mouth every evening., Disp: 30 tablet, Rfl: 11    morphine (MSIR) 15 MG tablet, Take 1 tablet (15 mg total) by mouth every 4 (four) hours as needed for Pain., Disp: 10 tablet, Rfl: 0    nitrofurantoin, macrocrystal-monohydrate, (MACROBID) 100 MG capsule, Take 1 capsule (100 mg total) by mouth 2 (two) times daily., Disp: 10 capsule, Rfl: 0  No current facility-administered medications for this visit.    Facility-Administered Medications Ordered in Other Visits:     diphenhydrAMINE injection 25 mg, 25 mg, Intravenous, Q6H PRN, Elias Doss MD    fentaNYL 50 mcg/mL injection 25 mcg, 25  "mcg, Intravenous, Q5 Min PRN, Elias Doss MD    fentaNYL 50 mcg/mL injection  mcg,  mcg, Intravenous, PRN, Matt Ellison, DO, 50 mcg at 09/14/22 0735    HYDROmorphone injection 0.2 mg, 0.2 mg, Intravenous, Q5 Min PRN, Elias Doss MD, 0.2 mg at 09/14/22 1455    midazolam (VERSED) 1 mg/mL injection 2 mg, 2 mg, Intravenous, PRN, Matt Ellison, DO, 2 mg at 09/14/22 0735    prochlorperazine injection Soln 5 mg, 5 mg, Intravenous, Q30 Min PRN, Elias Doss MD    sodium chloride 0.9% flush 10 mL, 10 mL, Intravenous, PRN, Elias Doss MD    Review of Systems:  General: No fever, chills, or weight loss.  Chest: No chest pain, shortness of breath, or palpitations.  Breast: No pain, masses, or nipple discharge.  Vulva: No pain, lesions, or itching.  Vagina: No relaxation, itching, discharge, or lesions.  Abdomen: No pain, nausea, vomiting, diarrhea, or constipation.  Urinary: No incontinence, nocturia, frequency, or dysuria.  Extremities:  No leg cramps, edema, or calf pain.  Neurologic: No headaches, dizziness, or visual changes.    Objective:     Vitals:    05/14/24 0926   BP: (!) 131/93   Weight: 120.7 kg (266 lb 3.2 oz)   Height: 5' 5" (1.651 m)   PainSc: 0-No pain     Body mass index is 44.3 kg/m².    PHYSICAL EXAM:  APPEARANCE: Well nourished, well developed, in no acute distress.  AFFECT: WNL, alert and oriented x 3  SKIN: No acne or hirsutism  CHEST: Good respiratory effect    Assessment:    Renal cell carcinoma of right kidney  -     Ambulatory referral/consult to [facility]    Morbid obesity with BMI of 40.0-44.9, adult  -     Ambulatory referral/consult to [facility]    Pelvic pain  -     Ambulatory referral/consult to Gynecology      BMR calculated at 1830 calories per day.  Plan:     Developed meal plan with handout of preferred foods.  Encouraged lean protein with every meal.  3 meals a day with lean protein at each meal- reviewed " options for this.  Wide variety of fruits and vegetables.   Log in landy with goal of 1600 calories a day (35% protein, 35% carbs (mostly vegetables/fruits), 30% fat)  90-100g protein daily.  Work to reduce beer to no more then 1 per day  Drink one refillable water cup daily at work  Continue to increase walking as can tolerate for exercise.  We did discuss healthier soda options to try.  Reviewed medication options but declines for now.  Follow up in 8 weeks.    Instructed patient to call if she experiences any side effects or has any questions.    I spent a total of 35 minutes on the day of the visit.This includes face to face time and non-face to face time preparing to see the patient (eg, review of tests), obtaining and/or reviewing separately obtained history, documenting clinical information in the electronic or other health record, independently interpreting results and communicating results to the patient/family/caregiver, or care coordinator.

## 2024-05-17 ENCOUNTER — OFFICE VISIT (OUTPATIENT)
Dept: PSYCHIATRY | Facility: CLINIC | Age: 44
End: 2024-05-17
Payer: COMMERCIAL

## 2024-05-17 VITALS
BODY MASS INDEX: 44.7 KG/M2 | SYSTOLIC BLOOD PRESSURE: 143 MMHG | HEART RATE: 97 BPM | DIASTOLIC BLOOD PRESSURE: 91 MMHG | WEIGHT: 268.63 LBS

## 2024-05-17 DIAGNOSIS — F39 UNSPECIFIED MOOD (AFFECTIVE) DISORDER: ICD-10-CM

## 2024-05-17 DIAGNOSIS — F32.4 MAJOR DEPRESSIVE DISORDER IN PARTIAL REMISSION, UNSPECIFIED WHETHER RECURRENT: ICD-10-CM

## 2024-05-17 PROCEDURE — 99214 OFFICE O/P EST MOD 30 MIN: CPT | Mod: S$GLB,,, | Performed by: STUDENT IN AN ORGANIZED HEALTH CARE EDUCATION/TRAINING PROGRAM

## 2024-05-17 PROCEDURE — 3008F BODY MASS INDEX DOCD: CPT | Mod: CPTII,S$GLB,, | Performed by: STUDENT IN AN ORGANIZED HEALTH CARE EDUCATION/TRAINING PROGRAM

## 2024-05-17 PROCEDURE — 3077F SYST BP >= 140 MM HG: CPT | Mod: CPTII,S$GLB,, | Performed by: STUDENT IN AN ORGANIZED HEALTH CARE EDUCATION/TRAINING PROGRAM

## 2024-05-17 PROCEDURE — 3044F HG A1C LEVEL LT 7.0%: CPT | Mod: CPTII,S$GLB,, | Performed by: STUDENT IN AN ORGANIZED HEALTH CARE EDUCATION/TRAINING PROGRAM

## 2024-05-17 PROCEDURE — 3080F DIAST BP >= 90 MM HG: CPT | Mod: CPTII,S$GLB,, | Performed by: STUDENT IN AN ORGANIZED HEALTH CARE EDUCATION/TRAINING PROGRAM

## 2024-05-17 PROCEDURE — 99999 PR PBB SHADOW E&M-EST. PATIENT-LVL II: CPT | Mod: PBBFAC,,, | Performed by: STUDENT IN AN ORGANIZED HEALTH CARE EDUCATION/TRAINING PROGRAM

## 2024-05-17 RX ORDER — SERTRALINE HYDROCHLORIDE 50 MG/1
50 TABLET, FILM COATED ORAL DAILY
Qty: 30 TABLET | Refills: 1 | Status: SHIPPED | OUTPATIENT
Start: 2024-05-17 | End: 2024-07-16

## 2024-05-17 RX ORDER — CLONIDINE HYDROCHLORIDE 0.1 MG/1
0.1 TABLET ORAL 2 TIMES DAILY
Qty: 60 TABLET | Refills: 1 | Status: SHIPPED | OUTPATIENT
Start: 2024-05-17 | End: 2024-07-16

## 2024-05-17 RX ORDER — SERTRALINE HYDROCHLORIDE 100 MG/1
100 TABLET, FILM COATED ORAL DAILY
Qty: 30 TABLET | Refills: 1 | Status: SHIPPED | OUTPATIENT
Start: 2024-05-17 | End: 2024-07-16

## 2024-05-17 RX ORDER — LAMOTRIGINE 150 MG/1
150 TABLET ORAL DAILY
Qty: 30 TABLET | Refills: 1 | Status: SHIPPED | OUTPATIENT
Start: 2024-05-17 | End: 2024-07-16
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
## 2024-05-20 ENCOUNTER — OFFICE VISIT (OUTPATIENT)
Dept: UROLOGY | Facility: CLINIC | Age: 44
End: 2024-05-20
Payer: COMMERCIAL

## 2024-05-20 VITALS
WEIGHT: 260 LBS | DIASTOLIC BLOOD PRESSURE: 79 MMHG | BODY MASS INDEX: 43.32 KG/M2 | HEIGHT: 65 IN | SYSTOLIC BLOOD PRESSURE: 139 MMHG | HEART RATE: 79 BPM

## 2024-05-20 DIAGNOSIS — N39.0 RECURRENT UTI: ICD-10-CM

## 2024-05-20 DIAGNOSIS — C64.1 RENAL CELL CARCINOMA OF RIGHT KIDNEY: ICD-10-CM

## 2024-05-20 PROCEDURE — 3078F DIAST BP <80 MM HG: CPT | Mod: CPTII,S$GLB,,

## 2024-05-20 PROCEDURE — 3008F BODY MASS INDEX DOCD: CPT | Mod: CPTII,S$GLB,,

## 2024-05-20 PROCEDURE — 3075F SYST BP GE 130 - 139MM HG: CPT | Mod: CPTII,S$GLB,,

## 2024-05-20 PROCEDURE — 99999 PR PBB SHADOW E&M-EST. PATIENT-LVL V: CPT | Mod: PBBFAC,,,

## 2024-05-20 PROCEDURE — 1159F MED LIST DOCD IN RCRD: CPT | Mod: CPTII,S$GLB,,

## 2024-05-20 PROCEDURE — 3044F HG A1C LEVEL LT 7.0%: CPT | Mod: CPTII,S$GLB,,

## 2024-05-20 PROCEDURE — 99214 OFFICE O/P EST MOD 30 MIN: CPT | Mod: S$GLB,,,

## 2024-05-20 NOTE — PATIENT INSTRUCTIONS
UTI precautions:  Wipe front to back   Avoid constipation  Drink at least 1 liter of water daily  Void every 3-4 hrs  Expel urine from vagina post void  Void soon after urge arises  No dryer sheets or harsh detergents with the undergarments (no dyes or scents)  No bubble baths  Void before/shower and after intercourse  Avoid hot tub use  Avoid tight fitting clothes and panty hose    D-Mannose 2 grams- helps to block bacteria from attaching to the bladder wall. I gram in the AM, 1 gram in the PM. [employer] or [employer].     Probiotic- [employer] Ultra 25 Billion CFU Probiotic Complex, Multi Strain. The Multi Strain is specifically the one that is important as the greater variety of strains is better.      Estrace apply a pea sized amount to urethra 3 x weekly at night   Estrogen depletion at any age has direct implications on genitourinary health and lower urinary tract function. Vaginal estrogen therapy is safe and extremely efficacious in treating these symptoms and lowering the risk of UTIs.Vaginal estrogen helps improve vaginal dryness, dyspareunia, and vaginal burning.    Avoid Bladder Irritants: Tea, coffee, caffeine, alcohol, artificial sweeteners, citrus, spicy foods, acidic foods,chocolate, tomato-based foods, smoking.

## 2024-05-20 NOTE — PROGRESS NOTES
CHIEF COMPLAINT:  Urinary frequency       HISTORY OF PRESENTING ILLINESS:  Teagan Griffith is a 44 y.o. female established with urology. She is a referral from ELIZABETH Adrian NP for recurrent UTI. Patient has 2 proven UTI on file, 3/7/2024 E. Coli 50,000 - 99,999 cfu/ml, 2024 GBS > 100,000 cfu/ml. She is asymptomatic for UTI today. She does have baseline urinary frequency (hourly) with occasional nocturia, 0-2x depending on liquid intake. Reports feeling of incomplete bladder emptying. Urinary frequency has been present for years. She reports chronic constipation, uses dulcolax chewables for this problem. She has R lower quad pain, ovarian cyst present. She is sexually active, feels that she has increased urgency following intercourse. No vaginal dryness reported.     Pt underwent R nephrectomy with Dr. Riley 2022. Following up with Dr. Jane now. Pathology, high grade, Stage 2 w/ rhabdoid features.     CT abd pelvis with IV contrast reviewed from 2024.   Right kidney is surgically absent. No abnormal soft tissue thickening to suggest local recurrent or residual disease. Left kidney is normal in size and positioning. No hydronephrosis. No ureteral dilatation. No bladder wall thickening.        REVIEW OF SYSTEMS:  Review of Systems   Constitutional:  Negative for chills and fever.   HENT:  Negative for congestion and sore throat.    Respiratory:  Negative for cough and shortness of breath.    Cardiovascular:  Negative for chest pain and palpitations.   Gastrointestinal:  Negative for nausea and vomiting.   Genitourinary:  Positive for frequency and urgency. Negative for dysuria, flank pain and hematuria.   Neurological:  Negative for dizziness and headaches.         PATIENT HISTORY:    Past Medical History:   Diagnosis Date    JEN (acute kidney injury) 2022    Depression     Renal cell carcinoma 2022       Past Surgical History:   Procedure Laterality Date     SECTION       LAPAROSCOPIC ROBOT-ASSISTED SURGICAL REMOVAL OF KIDNEY USING DA CAESAR XI Right 2022    Procedure: XI ROBOTIC NEPHRECTOMY;  Surgeon: Justin Riley MD;  Location: [facility] [address] [place] [address];  Service: Urology;  Laterality: Right;  4 hours       Family History   Problem Relation Name Age of Onset    Drug abuse Mother      Prostate cancer Father      Lymphoma Paternal Grandfather      Kidney cancer Neg Hx         Social History     Socioeconomic History    Marital status:    Tobacco Use    Smoking status: Former     Current packs/day: 0.00     Types: Cigarettes     Start date: 1998     Quit date: 2018     Years since quittin.3    Smokeless tobacco: Never   Substance and Sexual Activity    Alcohol use: Yes     Alcohol/week: 42.0 standard drinks of alcohol     Types: 42 Cans of beer per week   Social History Narrative    Lives with  Matt in [place]. Has one 20 year old son. 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     Social Determinants of Health     Financial Resource Strain: Low Risk  (3/26/2024)    Overall Financial Resource Strain (CARDIA)     Difficulty of Paying Living Expenses: Not very hard   Food Insecurity: No Food Insecurity (3/26/2024)    Hunger Vital Sign     Worried About Running Out of Food in the Last Year: Never true     Ran Out of Food in the Last Year: Never true   Transportation Needs: No Transportation Needs (3/26/2024)    PRAPARE - Transportation     Lack of Transportation (Medical): No     Lack of Transportation (Non-Medical): No   Physical Activity: Inactive (3/26/2024)    Exercise Vital Sign     Days of Exercise per Week: 0 days     Minutes of Exercise per Session: 0 min   Stress: Stress Concern Present (3/26/2024)    [employer] - Occupational Stress Questionnaire     Feeling of Stress : Very much   Housing Stability: Low Risk  (3/26/2024)    Housing Stability Vital Sign     Unable to Pay for Housing in the Last Year: No     Number of Places  Lived in the Last Year: 1     Unstable Housing in the Last Year: No       Allergies:  Patient has no known allergies.    Medications:    Current Outpatient Medications:     acetaminophen (TYLENOL) 500 MG tablet, Take 2 tablets (1,000 mg total) by mouth every 8 (eight) hours as needed for Pain., Disp: 90 tablet, Rfl: 0    ALPRAZolam (XANAX) 1 MG tablet, Take 1 tablet (1 mg total) by mouth 2 (two) times a day., Disp: 60 tablet, Rfl: 0    cariprazine (VRAYLAR) 4.5 mg Cap, Take 1 capsule (4.5 mg total) by mouth once daily., Disp: 30 capsule, Rfl: 1    cloNIDine (CATAPRES) 0.1 MG tablet, Take 1 tablet (0.1 mg total) by mouth 2 (two) times daily., Disp: 60 tablet, Rfl: 1    lamoTRIgine (LAMICTAL) 150 MG Tab, Take 1 tablet (150 mg total) by mouth once daily., Disp: 30 tablet, Rfl: 1    sertraline (ZOLOFT) 100 MG tablet, Take 1 tablet (100 mg total) by mouth once daily., Disp: 30 tablet, Rfl: 1    sertraline (ZOLOFT) 50 MG tablet, Take 1 tablet (50 mg total) by mouth once daily., Disp: 30 tablet, Rfl: 1    simvastatin (ZOCOR) 10 MG tablet, Take 1 tablet (10 mg total) by mouth every evening., Disp: 30 tablet, Rfl: 11    dextroamphetamine-amphetamine 30 mg Tab, Take 1 tablet (30 mg total) by mouth 2 (two) times a day., Disp: 60 tablet, Rfl: 0  No current facility-administered medications for this visit.    Facility-Administered Medications Ordered in Other Visits:     diphenhydrAMINE injection 25 mg, 25 mg, Intravenous, Q6H PRN, Elias Doss MD    fentaNYL 50 mcg/mL injection 25 mcg, 25 mcg, Intravenous, Q5 Min PRN, Elias Doss MD    fentaNYL 50 mcg/mL injection  mcg,  mcg, Intravenous, PRN, Matt Ellison DO, 50 mcg at 09/14/22 0735    HYDROmorphone injection 0.2 mg, 0.2 mg, Intravenous, Q5 Min PRN, Elias Doss MD, 0.2 mg at 09/14/22 1455    midazolam (VERSED) 1 mg/mL injection 2 mg, 2 mg, Intravenous, PRN, Matt Ellison, DO, 2 mg at 09/14/22 0735    prochlorperazine injection Soln 5 mg, 5  mg, Intravenous, Q30 Min PRN, Elias Doss MD    sodium chloride 0.9% flush 10 mL, 10 mL, Intravenous, PRN, Elias Doss MD    PHYSICAL EXAMINATION:  Physical Exam  Constitutional:       Appearance: Normal appearance.   HENT:      Head: Normocephalic and atraumatic.      Right Ear: External ear normal.      Left Ear: External ear normal.      Nose: Nose normal.      Mouth/Throat:      Mouth: Mucous membranes are moist.   Pulmonary:      Effort: Pulmonary effort is normal. No respiratory distress.   Skin:     General: Skin is warm and dry.   Neurological:      General: No focal deficit present.      Mental Status: She is alert and oriented to person, place, and time.   Psychiatric:         Mood and Affect: Mood normal.         Behavior: Behavior normal.           LABS:  UA clean catch ++ leuks, otherwise normal  PVR 12 ml       Lab Results   Component Value Date    CREATININE 1.1 04/29/2024    EGFRNORACEVR >60.0 04/29/2024               IMPRESSION:  Encounter Diagnoses   Name Primary?    Renal cell carcinoma of right kidney     Recurrent UTI          Assessment:       1. Renal cell carcinoma of right kidney    2. Recurrent UTI        Plan:   - OAB 1st line therapy is lifestyle modifications, behavioral therapies and oral medications such as antimuscarinics. Discussed decreasing bladder irritants, controlling constipation and increasing water intake. Patient's work schedule would not allow for pelvic floor PT at this time. Patient is not interested in an oral medication for OAB at this time.   - UTI recommendations and precautions provided. Recommended cystoscopy in the future for recurrent culture proven UTIs.     RTC PRN    I spent 30 minutes with the patient of which more than half was spent in direct consultation with the patient in regards to our treatment and plan.  We addressed the office findings and recent labs; any need to go ER today.   Education and recommendations of today's plan of care  including home remedies and needed follow up with PCP.   We discussed the chief complaints; reviewed the LUTS and the possible contributory factors.   Reassurance no infection or visible blood seen in today's urine sample

## 2024-05-24 ENCOUNTER — TELEPHONE (OUTPATIENT)
Dept: PSYCHIATRY | Facility: CLINIC | Age: 44
End: 2024-05-24

## 2024-05-30 RX ORDER — DEXTROAMPHETAMINE SACCHARATE, AMPHETAMINE ASPARTATE, DEXTROAMPHETAMINE SULFATE AND AMPHETAMINE SULFATE 7.5; 7.5; 7.5; 7.5 MG/1; MG/1; MG/1; MG/1
30 TABLET ORAL 2 TIMES DAILY
Qty: 60 TABLET | Refills: 0 | Status: SHIPPED | OUTPATIENT
Start: 2024-05-30 | End: 2024-06-29

## 2024-06-04 ENCOUNTER — PATIENT MESSAGE (OUTPATIENT)
Dept: PSYCHIATRY | Facility: CLINIC | Age: 44
End: 2024-06-04
Payer: COMMERCIAL

## 2024-06-07 NOTE — PROGRESS NOTES
STAFF COMMENTS: I have discussed pt with Dr. Boyce and reviewed the history and exam. I agree and concur with the assessment and plan.

## 2024-06-14 RX ORDER — ALPRAZOLAM 1 MG/1
1 TABLET ORAL 2 TIMES DAILY
Qty: 60 TABLET | Refills: 0 | Status: SHIPPED | OUTPATIENT
Start: 2024-06-14 | End: 2024-07-14

## 2024-06-17 ENCOUNTER — TELEPHONE (OUTPATIENT)
Dept: PSYCHIATRY | Facility: CLINIC | Age: 44
End: 2024-06-17
Payer: COMMERCIAL

## 2024-06-25 ENCOUNTER — PATIENT MESSAGE (OUTPATIENT)
Dept: PSYCHIATRY | Facility: CLINIC | Age: 44
End: 2024-06-25
Payer: COMMERCIAL

## 2024-06-28 ENCOUNTER — TELEPHONE (OUTPATIENT)
Dept: HEMATOLOGY/ONCOLOGY | Facility: CLINIC | Age: 44
End: 2024-06-28
Payer: COMMERCIAL

## 2024-07-02 RX ORDER — SERTRALINE HYDROCHLORIDE 50 MG/1
50 TABLET, FILM COATED ORAL DAILY
Qty: 30 TABLET | Refills: 1 | Status: SHIPPED | OUTPATIENT
Start: 2024-07-02 | End: 2024-08-31

## 2024-07-02 RX ORDER — CLONIDINE HYDROCHLORIDE 0.1 MG/1
0.1 TABLET ORAL 2 TIMES DAILY
Qty: 60 TABLET | Refills: 1 | Status: SHIPPED | OUTPATIENT
Start: 2024-07-02 | End: 2024-08-31

## 2024-07-09 RX ORDER — DEXTROAMPHETAMINE SACCHARATE, AMPHETAMINE ASPARTATE, DEXTROAMPHETAMINE SULFATE AND AMPHETAMINE SULFATE 7.5; 7.5; 7.5; 7.5 MG/1; MG/1; MG/1; MG/1
30 TABLET ORAL 2 TIMES DAILY
Qty: 60 TABLET | Refills: 0 | Status: CANCELLED | OUTPATIENT
Start: 2024-07-09 | End: 2024-08-08

## 2024-07-10 ENCOUNTER — OFFICE VISIT (OUTPATIENT)
Dept: OBSTETRICS AND GYNECOLOGY | Facility: CLINIC | Age: 44
End: 2024-07-10
Payer: COMMERCIAL

## 2024-07-10 VITALS — WEIGHT: 260 LBS | BODY MASS INDEX: 43.32 KG/M2 | HEIGHT: 65 IN

## 2024-07-10 DIAGNOSIS — E66.01 MORBID OBESITY WITH BMI OF 40.0-44.9, ADULT: ICD-10-CM

## 2024-07-10 DIAGNOSIS — R73.03 PRE-DIABETES: ICD-10-CM

## 2024-07-10 DIAGNOSIS — C64.1 RENAL CELL CARCINOMA OF RIGHT KIDNEY: Primary | ICD-10-CM

## 2024-07-10 PROCEDURE — 99213 OFFICE O/P EST LOW 20 MIN: CPT | Mod: 95,,, | Performed by: PHYSICIAN ASSISTANT

## 2024-07-10 PROCEDURE — 3008F BODY MASS INDEX DOCD: CPT | Mod: CPTII,95,, | Performed by: PHYSICIAN ASSISTANT

## 2024-07-10 PROCEDURE — 1159F MED LIST DOCD IN RCRD: CPT | Mod: CPTII,95,, | Performed by: PHYSICIAN ASSISTANT

## 2024-07-10 PROCEDURE — 3044F HG A1C LEVEL LT 7.0%: CPT | Mod: CPTII,95,, | Performed by: PHYSICIAN ASSISTANT

## 2024-07-10 PROCEDURE — 1160F RVW MEDS BY RX/DR IN RCRD: CPT | Mod: CPTII,95,, | Performed by: PHYSICIAN ASSISTANT

## 2024-07-10 RX ORDER — SEMAGLUTIDE 0.25 MG/.5ML
0.25 INJECTION, SOLUTION SUBCUTANEOUS
Qty: 2 ML | Refills: 0 | Status: SHIPPED | OUTPATIENT
Start: 2024-07-10

## 2024-07-11 ENCOUNTER — PATIENT MESSAGE (OUTPATIENT)
Dept: OBSTETRICS AND GYNECOLOGY | Facility: CLINIC | Age: 44
End: 2024-07-11
Payer: COMMERCIAL

## 2024-07-11 DIAGNOSIS — F32.4 MAJOR DEPRESSIVE DISORDER IN PARTIAL REMISSION, UNSPECIFIED WHETHER RECURRENT: ICD-10-CM

## 2024-07-12 DIAGNOSIS — F98.8 ATTENTION DEFICIT DISORDER, UNSPECIFIED HYPERACTIVITY PRESENCE: Primary | ICD-10-CM

## 2024-07-12 RX ORDER — DEXTROAMPHETAMINE SACCHARATE, AMPHETAMINE ASPARTATE, DEXTROAMPHETAMINE SULFATE AND AMPHETAMINE SULFATE 7.5; 7.5; 7.5; 7.5 MG/1; MG/1; MG/1; MG/1
30 TABLET ORAL 2 TIMES DAILY
Qty: 60 TABLET | Refills: 0 | Status: SHIPPED | OUTPATIENT
Start: 2024-07-12

## 2024-07-12 RX ORDER — CARIPRAZINE 4.5 MG/1
4.5 CAPSULE, GELATIN COATED ORAL DAILY
Qty: 30 CAPSULE | Refills: 1 | OUTPATIENT
Start: 2024-07-12 | End: 2024-09-10

## 2024-07-12 NOTE — TELEPHONE ENCOUNTER
Former pt of resident Dr. Boyce requesting Adderall refill. L/s May 2024 when plan made to continue med unchanged. Has f/u with new provider Dr. Moreno 7/19/24. [place] PDMP reviewed- no irregularities noted, last filled 6/4/24. Will consult with Dr. Vyas for review/approval, as deemed appropriate, of one monthd supply of requested med.

## 2024-07-15 DIAGNOSIS — C64.1 RENAL CELL CARCINOMA OF RIGHT KIDNEY: Primary | ICD-10-CM

## 2024-07-15 DIAGNOSIS — Z00.6 CLINICAL TRIAL PARTICIPANT: ICD-10-CM

## 2024-07-15 RX ORDER — SERTRALINE HYDROCHLORIDE 100 MG/1
100 TABLET, FILM COATED ORAL DAILY
Qty: 30 TABLET | Refills: 0 | Status: SHIPPED | OUTPATIENT
Start: 2024-07-15 | End: 2024-07-19 | Stop reason: SDUPTHER

## 2024-07-15 NOTE — TELEPHONE ENCOUNTER
Former pt of resident Dr. Soto requesting sertraline 100mg refill (gets both 50mg and 100mg strengths). L/s May 2024 when plan made to continue meds unchanged. Has f/u with new provider Dr. Moreno 7/19/24. Will consult with Dr. Hood for review/approval, as deemed appropriate, of one month supply of requested med.

## 2024-07-17 DIAGNOSIS — F39 UNSPECIFIED MOOD (AFFECTIVE) DISORDER: ICD-10-CM

## 2024-07-17 RX ORDER — ALPRAZOLAM 1 MG/1
1 TABLET ORAL 2 TIMES DAILY
Qty: 60 TABLET | Refills: 0 | Status: CANCELLED | OUTPATIENT
Start: 2024-07-17 | End: 2024-08-16

## 2024-07-17 RX ORDER — LAMOTRIGINE 150 MG/1
150 TABLET ORAL DAILY
Qty: 30 TABLET | Refills: 1 | Status: CANCELLED | OUTPATIENT
Start: 2024-07-17 | End: 2024-09-15

## 2024-07-19 ENCOUNTER — OFFICE VISIT (OUTPATIENT)
Dept: PSYCHIATRY | Facility: CLINIC | Age: 44
End: 2024-07-19
Payer: COMMERCIAL

## 2024-07-19 VITALS
HEART RATE: 72 BPM | SYSTOLIC BLOOD PRESSURE: 131 MMHG | WEIGHT: 264.75 LBS | BODY MASS INDEX: 44.11 KG/M2 | HEIGHT: 65 IN | DIASTOLIC BLOOD PRESSURE: 70 MMHG

## 2024-07-19 DIAGNOSIS — F39 UNSPECIFIED MOOD (AFFECTIVE) DISORDER: Primary | ICD-10-CM

## 2024-07-19 DIAGNOSIS — F41.0 GENERALIZED ANXIETY DISORDER WITH PANIC ATTACKS: ICD-10-CM

## 2024-07-19 DIAGNOSIS — F41.1 GENERALIZED ANXIETY DISORDER WITH PANIC ATTACKS: ICD-10-CM

## 2024-07-19 DIAGNOSIS — F98.8 ATTENTION DEFICIT DISORDER, UNSPECIFIED HYPERACTIVITY PRESENCE: ICD-10-CM

## 2024-07-19 DIAGNOSIS — F32.4 MAJOR DEPRESSIVE DISORDER IN PARTIAL REMISSION, UNSPECIFIED WHETHER RECURRENT: ICD-10-CM

## 2024-07-19 PROCEDURE — 99999 PR PBB SHADOW E&M-EST. PATIENT-LVL III: CPT | Mod: PBBFAC,,,

## 2024-07-19 RX ORDER — DEXTROAMPHETAMINE SACCHARATE, AMPHETAMINE ASPARTATE, DEXTROAMPHETAMINE SULFATE AND AMPHETAMINE SULFATE 7.5; 7.5; 7.5; 7.5 MG/1; MG/1; MG/1; MG/1
30 TABLET ORAL 2 TIMES DAILY
Qty: 60 TABLET | Refills: 0 | Status: SHIPPED | OUTPATIENT
Start: 2024-07-19

## 2024-07-19 RX ORDER — LAMOTRIGINE 25 MG/1
25 TABLET ORAL DAILY
Qty: 14 TABLET | Refills: 0 | Status: SHIPPED | OUTPATIENT
Start: 2024-07-19 | End: 2024-08-02

## 2024-07-19 RX ORDER — LAMOTRIGINE 150 MG/1
150 TABLET ORAL DAILY
Qty: 30 TABLET | Refills: 1 | Status: SHIPPED | OUTPATIENT
Start: 2024-08-26

## 2024-07-19 RX ORDER — CARIPRAZINE 4.5 MG/1
4.5 CAPSULE, GELATIN COATED ORAL DAILY
Qty: 30 CAPSULE | Refills: 0 | Status: SHIPPED | OUTPATIENT
Start: 2024-07-19 | End: 2024-08-18

## 2024-07-19 RX ORDER — SERTRALINE HYDROCHLORIDE 100 MG/1
100 TABLET, FILM COATED ORAL DAILY
Qty: 30 TABLET | Refills: 2 | Status: SHIPPED | OUTPATIENT
Start: 2024-07-19

## 2024-07-19 RX ORDER — ALPRAZOLAM 1 MG/1
1 TABLET ORAL 2 TIMES DAILY PRN
Qty: 60 TABLET | Refills: 0 | Status: SHIPPED | OUTPATIENT
Start: 2024-07-19 | End: 2024-08-18

## 2024-07-19 RX ORDER — SERTRALINE HYDROCHLORIDE 50 MG/1
50 TABLET, FILM COATED ORAL DAILY
Qty: 30 TABLET | Refills: 2 | Status: SHIPPED | OUTPATIENT
Start: 2024-07-19 | End: 2024-10-17

## 2024-07-19 RX ORDER — LAMOTRIGINE 100 MG/1
100 TABLET ORAL DAILY
Qty: 7 TABLET | Refills: 0 | Status: SHIPPED | OUTPATIENT
Start: 2024-08-18 | End: 2024-08-25

## 2024-07-19 RX ORDER — LAMOTRIGINE 25 MG/1
50 TABLET ORAL DAILY
Qty: 28 TABLET | Refills: 0 | Status: SHIPPED | OUTPATIENT
Start: 2024-08-03 | End: 2024-08-17

## 2024-07-19 NOTE — PROGRESS NOTES
OUTPATIENT PSYCHIATRY FOLLOW UP VISIT    ENCOUNTER DATE:  7/19/2024  SITE:  [facility]  LENGTH OF SESSION:  60 minutes      CHIEF COMPLAINT:  No chief complaint on file.      HISTORY OF PRESENTING ILLNESS:  Teagan Griffith is a 43 y/o female with PMHx of obesity, hepatic steatosis, and renal cell carcinoma who underwent right nephrectomy 9/14/22 and started adjuvant pembrolizumab 11/9/22 complicated by recurrent severe mucositis/stomatitis presumably lichenoid reaction requiring prolonged steroid taper and stopping adjuvant treatment 02/2023 (now getting every 6 months scans for surveillance) as well as psychiatric Hx of Bipolar Disorder (per chart, patient denies), ADHD, CHEYENNE who presents for follow up appointment. Patient was last seen in clinic by Dr. Boyce.      Plan at last appointment on   Continue Zoloft to 150 mg PO daily for anxiety/PTSD/OCD  Continue Lamictal 150 mg PO daily for mood stability--does not want to increase due to nausea, unsure if effective   Increase Vraylar to 4.5 mg PO daily for depression in Bipolar Disorder vs adjunct to unipolar depression. AIMS 0.  Weight gain of 10 lbs.  Discussed risk of metabolic syndrome. She plans to follow up with weight loss clinic and manage with diet and lifestyle changes.   Start Clonidine 0.1 mg PO daily for irritability/ADHD/PTSD/insomnia.   Continue Adderall 30 mg BID for ADHD and adjunct for depression--will continue for now. Awaiting formal diagnostic testing by psychology for ADHD.   Continue Xanax 2 mg PO PRN for insomnia/anxiety (does not need refill at this time, refilled 5/11/2024).  Will attempt to wean when symptoms of anxiety are optimally managed.  Hx of xanax addiction in the past.  Discussed risk/benefits and plan for temporary use only.      Interval history as told by Patient - & or family/friend/spouse/caregiver with pts permission    Patient stated that prior to initiating care with Dr. Boyce, she was  followed by psychiatrist, Dr. Keita. She stated that Dr. Keita diagnosed her with MDD, CHEYENNE, and borderline personality disorder was diagnosed when she went to [facility]. Patient stated that she does not think that she has bipolar disorder. She stated that she had one manic episode where she slept around and did not think that she was acting like herself and had to be admitted to inpatient psych. During this period, she had substance use of cocaine, alcohol, and marijuana use and was was drinking heavily, partying, promiscuous behavior and self-harm.     Stated that when she first came to the clinic she had significant apathy. However, with changes to the medication regimen, she has an an improved mood.     Lamictal was started because she has issues with anger management. She was started on the risperdal because she was confused and angry. Patient stated that she has been doing better and not getting angry at work. Stated that she was not taking the lamictal regularly but restarted it today. She reported that she was off for 2 weeks.    Risperdal was discontinued in favor of the vraylar.   ADHD - diagnosed by Dr. Keita via history taking, no formal diagnostic testing.    Xanax - patient stated that she takes it for panic attacks and to help with sleep - has been on it since her 20s. Was addicted to it and weaned but was put back on by Dr. Keita. Was up to 4 mg daily.    Clonidine - Pt reports taking the medication as needed at night but is unsure if it is helping with insomnia or irritability but notes that she feels better overall.    Family hx: Paternal Great-grandfather killed himself and mom and dad were alcoholics and mom used drugs. Grandfather killed himself    PSYCHIATRIC REVIEW OF SYSTEMS:(none/ yes- better/worse/stable/& what symptoms)    Symptoms of Depression: Patient endorsed irritability.     She denied diminished energy, change in sleep, change in appetite, diminished concentration or cognition  or indecisiveness, PMA/R, excessive guilt or hopelessness or worthlessness, suicidal ideations, diminished mood or loss of interest/anhedonia    Symptoms of CHEYENNE: Endorsed irritability, sleep disturbance    Symptoms of Panic Attacks: Patient denied recent panic attacks    Symptoms of tiff or hypomania: Denied all    Symptoms of psychosis: Denied all     Sleep: has tried tylenol PM and melatonin    Alcohol: Was drinking heavily a couple of weeks ago, about 2-8 beers nightly     Illicit Drugs: Denied recent use - smoked pot months ago      Risk Parameters:  Patient reports no suicidal ideation  Patient reports no homicidal ideation  Patient reports no self-injurious behavior  Patient reports no violent behavior    PATIENT HEALTH QUESTIONNAIRE-9 ( P H Q - 9 )  Over the last 2 weeks, how often have you been bothered by any of the following problems?  0-Not at all  1- Several days  2- More than half the days  3- Nearly every day    Little interest or pleasure in doing things 0 - started reading again  Feeling down, depressed, or hopeless 0   Trouble falling or staying asleep, or sleeping too much- 3; falls asleep easily but wakes up throughout the night.   Feeling tired or having little energy 1 - has been able  Poor appetite or overeating 0 - goes to a nutritionist  Feeling bad about yourself -- or that you are a failure or have let yourself or your family down - 3  Trouble concentrating on things, such as reading the newspaper or watching television - 1  Moving or speaking so slowly that other people could have noticed? Or the opposite -- being so fidgety or restless that you have been moving around a lot more than usual - 2 fidgety  Thoughts that you would be better off dead or of hurting yourself in some way - 0 In the past, had constant feelings of wanting to die, none recently    Total Score: 10,10-14 Moderate depression    If you checked off any problems, how difficult have these problems made it for you to do  your  work, take care of things at home, or get along with other people?  Not difficult at all  Somewhat difficult      GENERALIZED ANXIETY DISORDER SCALE (CHEYENNE -7)  Over the last two weeks, how often have you been bothered by the following problems?  0-Not at all  1- Several days  2- More than half the days  3- Nearly every day    1. Feeling nervous, anxious, or on edge- 0  2. Not being able to stop or control worrying-0  3. Worrying too much about different things- 0  4. Trouble relaxing-2  5. Being so restless that it is hard to sit still- 3  6. Becoming easily annoyed or irritable- 3  7. Feeling afraid, as if something awful  might happen- 3    If you checked any problems, how difficult have they made it for you to do your work, take care of things at home, or get along with other people?  Somewhat difficult    Scoring CHEYENNE-7 Anxiety Severity =  11/21  10-14: moderate anxiety      PSYCHIATRIC MED REVIEW    Current psych meds  Vraylar   Lamictal  Xanax  Adderall  Zoloft   Clonidine    Medication side effects:  Patient denied  Medication compliance:  Often forgets lamictal and clonidine    Previous psych meds trials  Risperidone    PAST PSYCHIATRIC, MEDICAL, AND SOCIAL HISTORY REVIEWED  The patient's past medical, family and social history have been reviewed and updated as appropriate within the electronic medical record - see encounter notes.    MEDICAL REVIEW OF SYSTEMS:  Complete review of systems performed covering Constitutional, Musculoskeletal, Neurologic.  All systems negative    ALL MEDICATIONS:    Current Outpatient Medications:     acetaminophen (TYLENOL) 500 MG tablet, Take 2 tablets (1,000 mg total) by mouth every 8 (eight) hours as needed for Pain., Disp: 90 tablet, Rfl: 0    ALPRAZolam (XANAX) 1 MG tablet, Take 1 tablet (1 mg total) by mouth 2 (two) times a day., Disp: 60 tablet, Rfl: 0    cariprazine (VRAYLAR) 4.5 mg Cap, Take 1 capsule (4.5 mg total) by mouth once daily., Disp: 30 capsule, Rfl: 1     cloNIDine (CATAPRES) 0.1 MG tablet, Take 1 tablet (0.1 mg total) by mouth 2 (two) times daily., Disp: 60 tablet, Rfl: 1    dextroamphetamine-amphetamine 30 mg Tab, Take 1 tablet (30 mg total) by mouth 2 (two) times a day., Disp: 60 tablet, Rfl: 0    lamoTRIgine (LAMICTAL) 150 MG Tab, Take 1 tablet (150 mg total) by mouth once daily., Disp: 30 tablet, Rfl: 1    semaglutide, weight loss, (WEGOVY) 0.25 mg/0.5 mL PnIj, Inject 0.25 mg into the skin every 7 days., Disp: 2 mL, Rfl: 0    sertraline (ZOLOFT) 100 MG tablet, Take 1 tablet (100 mg total) by mouth once daily. Take along with one sertraline 50mg tablet for total dose of 150mg daily., Disp: 30 tablet, Rfl: 0    sertraline (ZOLOFT) 50 MG tablet, Take 1 tablet (50 mg total) by mouth once daily., Disp: 30 tablet, Rfl: 1    simvastatin (ZOCOR) 10 MG tablet, Take 1 tablet (10 mg total) by mouth every evening., Disp: 30 tablet, Rfl: 11  No current facility-administered medications for this visit.    Facility-Administered Medications Ordered in Other Visits:     diphenhydrAMINE injection 25 mg, 25 mg, Intravenous, Q6H PRN, Elias Doss MD    fentaNYL 50 mcg/mL injection 25 mcg, 25 mcg, Intravenous, Q5 Min PRN, Elias Doss MD    fentaNYL 50 mcg/mL injection  mcg,  mcg, Intravenous, PRN, Matt Ellison, DO, 50 mcg at 09/14/22 0735    HYDROmorphone injection 0.2 mg, 0.2 mg, Intravenous, Q5 Min PRN, Elias Doss MD, 0.2 mg at 09/14/22 1455    midazolam (VERSED) 1 mg/mL injection 2 mg, 2 mg, Intravenous, PRN, Matt Ellison, DO, 2 mg at 09/14/22 0735    prochlorperazine injection Soln 5 mg, 5 mg, Intravenous, Q30 Min PRN, Elias Doss MD    sodium chloride 0.9% flush 10 mL, 10 mL, Intravenous, PRN, Elias Doss MD    ALLERGIES:  Review of patient's allergies indicates:  No Known Allergies    RELEVANT LABS/STUDIES:    Lab Results   Component Value Date    WBC 9.80 04/29/2024    HGB 11.6 (L) 04/29/2024    HCT 36.1 (L) 04/29/2024    MCV 91  "04/29/2024     04/29/2024     BMP  Lab Results   Component Value Date     04/29/2024    K 4.7 04/29/2024     04/29/2024    CO2 23 04/29/2024    BUN 21 (H) 04/29/2024    CREATININE 1.1 04/29/2024    CALCIUM 9.9 04/29/2024    ANIONGAP 8 04/29/2024     Lab Results   Component Value Date    ALT 26 04/29/2024    AST 18 04/29/2024    ALKPHOS 74 04/29/2024    BILITOT 0.2 04/29/2024     Lab Results   Component Value Date    TSH 0.734 03/27/2024     Lab Results   Component Value Date    HGBA1C 5.7 (H) 03/27/2024       VITALS  Vitals:    07/19/24 0758   BP: 131/70   Pulse: 72   Weight: 120.1 kg (264 lb 12.4 oz)   Height: 5' 5" (1.651 m)       PHYSICAL EXAM  General: well developed, well nourished  Neurologic:   Gait: Normal   Psychomotor signs:  No involuntary movements or tremor  AIMS: none    PSYCHIATRIC EXAM:     Mental Status Exam:  Appearance: unremarkable, age appropriate  Behavior/Cooperation: normal, cooperative  Speech: normal tone, normal rate, normal pitch, normal volume  Language: uses words appropriately; NO aphasia or dysarthria  Mood:  good  Affect  Thought Process: normal and logical  Thought Content: normal, no suicidality, no homicidality, delusions, or paranoia  Level of Consciousness: Alert and Oriented x3  Memory:  Intact   3/3 immediate, 3/3 at 5 minutes    Recent:  Impaired/  Limited/ Intact; able to report recent events   Remote:  Impaired/  Limited/ Intact; Named 4/4 past presidents   Attention/concentration: appropriate for age/education.   Fund of Knowledge: appears adequate  Insight:  Impaired/  Limited/ Intact  Judgment: Impaired/  Limited/ Intact       IMPRESSION:    Teagan Griffith is a 45 y/o female who presents for follow up appointment. Patient was last seen in clinic by Dr. Boyce, 5/2024. Patient was calm and cooperative with linear thoughts. She reported improvement in her overall mood. She stated that she has not had any outbursts of anger. She did stated that she is " still irritable at times but she is able to control the symptoms. She endorsed continued trouble with sleep onset and maintenance. Discussed taking clonidine nightly for insomnia and if that does not work, will consider other treatment options in the future visits. Also, patient reported forgetting to take her lamictal for about 2 weeks so will restart the medication with the initial dose titration.     Status/Progress:  Based on the examination today, the patient's problem(s) is/are improved.  New problems have not been presented today.     DIAGNOSES:    ICD-10-CM ICD-9-CM   1. Unspecified mood (affective) disorder  F39 296.90   2. Generalized anxiety disorder with panic attacks  F41.1 300.02    F41.0 300.01   3. Major depressive disorder in partial remission, unspecified whether recurrent  F32.4 296.25   4. Attention deficit disorder, unspecified hyperactivity presence  F98.8 314.00       PLAN:  Psych Med:  Discontinue lamictal 150 mg daily  Plan to reinitiate lamictal at a lower dose and slowly titrate up to 150 mg daily-(Medication sent to pharmacy)  Start lamictal 25 mg/day for 2 weeks,   At week 3, increase to lamictal 50 mg/day for 2 weeks  At week 5, increase to lamictal 100 mg /day for 1 week  At week 6, increase to lamictal 150 mg/day  Continue Zoloft 150 mg PO daily for anxiety/PTSD/OCD  Continue Vraylar 4.5 mg PO daily for depression in Bipolar Disorder vs adjunct to unipolar depression.   AIMS 0.  Discussed risk of metabolic syndrome. She plans to follow up with weight loss clinic and manage with diet and lifestyle changes.   Continue Clonidine 0.1 mg PO nightly PRN  irritability/ADHD/PTSD/insomnia.   Continue Adderall 30 mg BID for ADHD and adjunct for depression  Will continue for now.   Awaiting formal diagnostic testing by psychology for ADHD.   Continue Xanax 2 mg PO PRN for insomnia/anxiety   Will attempt to wean when symptoms of anxiety are optimally managed.  Hx of xanax addiction in the past.   Discussed risk/benefits and plan for temporary use only.  Patient agrees.   Obtain records from Dr. Keita   Follow-up referral to psychology for diagnostic testing    reviewed. No discrepancies noted.      Discussed with patient informed consent, risks vs. benefits, alternative treatments, side effect profile and the inherent unpredictability of individual responses to these treatments. Answered any questions patient may have had. The patient expresses understanding of the above and displays the capacity to agree with this current plan       RETURN TO CLINIC:  1 month      Naresh Moreno MD  [facility] PGY-III

## 2024-07-26 ENCOUNTER — TELEPHONE (OUTPATIENT)
Dept: HEMATOLOGY/ONCOLOGY | Facility: CLINIC | Age: 44
End: 2024-07-26
Payer: COMMERCIAL

## 2024-08-01 ENCOUNTER — PATIENT MESSAGE (OUTPATIENT)
Dept: OBSTETRICS AND GYNECOLOGY | Facility: CLINIC | Age: 44
End: 2024-08-01
Payer: COMMERCIAL

## 2024-08-15 ENCOUNTER — OFFICE VISIT (OUTPATIENT)
Dept: PSYCHIATRY | Facility: CLINIC | Age: 44
End: 2024-08-15
Payer: COMMERCIAL

## 2024-08-15 DIAGNOSIS — F43.10 PTSD (POST-TRAUMATIC STRESS DISORDER): ICD-10-CM

## 2024-08-15 DIAGNOSIS — F41.1 GENERALIZED ANXIETY DISORDER WITH PANIC ATTACKS: ICD-10-CM

## 2024-08-15 DIAGNOSIS — F42.9 OBSESSIVE-COMPULSIVE DISORDER, UNSPECIFIED TYPE: ICD-10-CM

## 2024-08-15 DIAGNOSIS — F32.4 MAJOR DEPRESSIVE DISORDER IN PARTIAL REMISSION, UNSPECIFIED WHETHER RECURRENT: Primary | ICD-10-CM

## 2024-08-15 DIAGNOSIS — F98.8 ATTENTION DEFICIT DISORDER, UNSPECIFIED HYPERACTIVITY PRESENCE: ICD-10-CM

## 2024-08-15 DIAGNOSIS — F41.0 GENERALIZED ANXIETY DISORDER WITH PANIC ATTACKS: ICD-10-CM

## 2024-08-15 RX ORDER — SERTRALINE HYDROCHLORIDE 50 MG/1
50 TABLET, FILM COATED ORAL DAILY
Qty: 30 TABLET | Refills: 2 | Status: SHIPPED | OUTPATIENT
Start: 2024-08-15 | End: 2024-11-13

## 2024-08-15 RX ORDER — ALPRAZOLAM 1 MG/1
1 TABLET ORAL 2 TIMES DAILY PRN
Qty: 60 TABLET | Refills: 0 | Status: SHIPPED | OUTPATIENT
Start: 2024-08-15 | End: 2024-09-14

## 2024-08-15 RX ORDER — DEXTROAMPHETAMINE SACCHARATE, AMPHETAMINE ASPARTATE, DEXTROAMPHETAMINE SULFATE AND AMPHETAMINE SULFATE 7.5; 7.5; 7.5; 7.5 MG/1; MG/1; MG/1; MG/1
30 TABLET ORAL 2 TIMES DAILY
Qty: 60 TABLET | Refills: 0 | Status: SHIPPED | OUTPATIENT
Start: 2024-08-15

## 2024-08-15 RX ORDER — CARIPRAZINE 4.5 MG/1
4.5 CAPSULE, GELATIN COATED ORAL DAILY
Qty: 30 CAPSULE | Refills: 2 | Status: SHIPPED | OUTPATIENT
Start: 2024-08-15 | End: 2024-11-13

## 2024-08-15 RX ORDER — SERTRALINE HYDROCHLORIDE 100 MG/1
100 TABLET, FILM COATED ORAL DAILY
Qty: 30 EACH | Refills: 2 | Status: SHIPPED | OUTPATIENT
Start: 2024-08-15

## 2024-08-15 NOTE — PROGRESS NOTES
OUTPATIENT PSYCHIATRY FOLLOW UP VISIT    ENCOUNTER DATE:  8/15/2024  The patient location is: [place], [place]  The chief complaint leading to consultation is: follow-up for continuing medical treatment for MDD, ADHD, CHEYENNE    LENGTH OF SESSION:  15 minutes    Visit type: audio only - unable to connect via zoom. Assessment completed via telephone    30 minutes of total time spent on the encounter, which includes face to face time and non-face to face time preparing to see the patient (eg, review of tests), Obtaining and/or reviewing separately obtained history, Documenting clinical information in the electronic or other health record, Independently interpreting results (not separately reported) and communicating results to the patient/family/caregiver, or Care coordination (not separately reported).     Each patient to whom he or she provides medical services by telemedicine is:  (1) informed of the relationship between the physician and patient and the respective role of any other health care provider with respect to management of the patient; and (2) notified that he or she may decline to receive medical services by telemedicine and may withdraw from such care at any time.    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 Patient was last seen in clinic on 7/19/2024.      Plan at last appointment on  7/19/2024:  Continue Zoloft to 150 mg PO daily for anxiety/PTSD/OCD  Reinitiate lamictal at a lower dose and slowly titrate up to 150 mg daily-   Continue Vraylar 4.5 mg PO daily   Continue Clonidine 0.1 mg PO daily for irritability/ADHD/PTSD/insomnia.   Continue Adderall 30 mg BID for ADHD and adjunct for depression--will continue for now. Awaiting formal diagnostic testing by psychology for ADHD.   Continue Xanax 2 mg PO PRN for insomnia/anxiety     Interval history as told by Patient - & or family/friend/spouse/caregiver with pts permission    Patient stated that she has been feeling pretty good. She did mention have some stress due to recently buying a house and is currently in the process of moving. However, she does think that she is handling the move well. She reports that she is still having issues with sleep, typically sleeping about 6 hours nightly.    Patient also discussed wanting to discontinue the lamictal because she does not feel that it is helping and she is concerned about polypharmacy. She stated that she was most recently taking the lamictal 50 mg and that she did not notice a difference from when she was taking the 50 mg  dose compared to the 150 mg dose. Lamictal was started because she has issues with anger management. Patient stated that she has been doing better and not having episodes of increased anger.        PSYCHIATRIC REVIEW OF SYSTEMS:(none/ yes- better/worse/stable/& what symptoms)    Symptoms of Depression: Patient denied    She denied diminished energy, change in sleep, change in appetite, diminished concentration or cognition or indecisiveness, PMA/R, excessive guilt or hopelessness or worthlessness, suicidal ideations, diminished mood or loss of interest/anhedonia    Symptoms of CHEYENNE: Endorsed sleep disturbance    Symptoms of Panic Attacks: Patient denied recent panic attacks    Symptoms of tiff or hypomania: Denied all    Symptoms of psychosis: Denied all     Sleep: currently  taking as needed tylenol PM       Risk Parameters:  Patient reports no suicidal ideation  Patient reports no homicidal ideation  Patient reports no self-injurious behavior  Patient reports no violent behavior      Current psych meds  Vraylar   Lamictal  Xanax  Adderall  Zoloft   Clonidine    Medication side effects:  Patient denied  Medication compliance:  Often forgets lamictal and clonidine    Medication Trials  Risperdal was discontinued in favor of the vraylar.       Xanax - patient stated that she takes it for panic attacks and to help with sleep - has been on it since her 20s. Was addicted to it and weaned but was put back on by Dr. Keita. Was up to 4 mg daily in the past.    Clonidine - Pt reports taking the medication as needed at night but is unsure if it is helping with insomnia or irritability but notes that she feels better overall.    Family hx: Paternal Great-grandfather killed himself and mom and dad were alcoholics and mom used drugs. Grandfather killed himself    PAST PSYCHIATRIC, MEDICAL, AND SOCIAL HISTORY REVIEWED  The patient's past medical, family and social history have been reviewed and updated as appropriate within the electronic medical record - see encounter notes.  ADHD - diagnosed by Dr. Keita via history taking, no formal diagnostic testing.  Bipolar Disorder - Patient stated that she does not think that she has  She stated that she had one manic episode where she slept around and did not think that she was acting like herself and had to be admitted to inpatient psych. During this period, she had substance use of cocaine, alcohol, and marijuana use and was was drinking heavily, partying, promiscuous behavior and self-harm.     MEDICAL REVIEW OF SYSTEMS:  Complete review of systems performed covering Constitutional, Musculoskeletal, Neurologic.  All systems negative    ALL MEDICATIONS:    Current Outpatient Medications:     acetaminophen (TYLENOL) 500 MG tablet, Take 2 tablets (1,000  mg total) by mouth every 8 (eight) hours as needed for Pain., Disp: 90 tablet, Rfl: 0    ALPRAZolam (XANAX) 1 MG tablet, Take 1 tablet (1 mg total) by mouth 2 (two) times daily as needed for Anxiety or Insomnia., Disp: 60 tablet, Rfl: 0    cariprazine (VRAYLAR) 4.5 mg Cap, Take 1 capsule (4.5 mg total) by mouth once daily., Disp: 30 capsule, Rfl: 0    cloNIDine (CATAPRES) 0.1 MG tablet, Take 1 tablet (0.1 mg total) by mouth 2 (two) times daily., Disp: 60 tablet, Rfl: 1    dextroamphetamine-amphetamine 30 mg Tab, Take 1 tablet (30 mg total) by mouth 2 (two) times a day., Disp: 60 tablet, Rfl: 0    [START ON 8/18/2024] lamoTRIgine (LAMICTAL) 100 MG tablet, Take 1 tablet (100 mg total) by mouth once daily. for 7 days, Disp: 7 tablet, Rfl: 0    [START ON 8/26/2024] lamoTRIgine (LAMICTAL) 150 MG Tab, Take 1 tablet (150 mg total) by mouth once daily., Disp: 30 tablet, Rfl: 1    lamoTRIgine (LAMICTAL) 25 MG tablet, Take 2 tablets (50 mg total) by mouth once daily. for 14 days, Disp: 28 tablet, Rfl: 0    semaglutide, weight loss, (WEGOVY) 0.25 mg/0.5 mL PnIj, Inject 0.25 mg into the skin every 7 days., Disp: 2 mL, Rfl: 0    sertraline (ZOLOFT) 100 MG tablet, Take 1 tablet (100 mg total) by mouth once daily. Take along with one sertraline 50mg tablet for total dose of 150mg daily., Disp: 30 tablet, Rfl: 2    sertraline (ZOLOFT) 50 MG tablet, Take 1 tablet (50 mg total) by mouth once daily., Disp: 30 tablet, Rfl: 2    simvastatin (ZOCOR) 10 MG tablet, Take 1 tablet (10 mg total) by mouth every evening., Disp: 30 tablet, Rfl: 11  No current facility-administered medications for this visit.    Facility-Administered Medications Ordered in Other Visits:     diphenhydrAMINE injection 25 mg, 25 mg, Intravenous, Q6H PRN, Elias Doss MD    fentaNYL 50 mcg/mL injection 25 mcg, 25 mcg, Intravenous, Q5 Min PRN, Elias Doss MD    fentaNYL 50 mcg/mL injection  mcg,  mcg, Intravenous, PRN, Matt Ellison M, DO, 50  mcg at 09/14/22 0735    HYDROmorphone injection 0.2 mg, 0.2 mg, Intravenous, Q5 Min PRN, Elias Doss MD, 0.2 mg at 09/14/22 1455    midazolam (VERSED) 1 mg/mL injection 2 mg, 2 mg, Intravenous, PRN, Matt Ellison DO, 2 mg at 09/14/22 0735    prochlorperazine injection Soln 5 mg, 5 mg, Intravenous, Q30 Min PRN, Elias Doss MD    sodium chloride 0.9% flush 10 mL, 10 mL, Intravenous, PRN, Elias Doss MD    ALLERGIES:  Review of patient's allergies indicates:  No Known Allergies    RELEVANT LABS/STUDIES:    Lab Results   Component Value Date    WBC 9.80 04/29/2024    HGB 11.6 (L) 04/29/2024    HCT 36.1 (L) 04/29/2024    MCV 91 04/29/2024     04/29/2024     BMP  Lab Results   Component Value Date     04/29/2024    K 4.7 04/29/2024     04/29/2024    CO2 23 04/29/2024    BUN 21 (H) 04/29/2024    CREATININE 1.1 04/29/2024    CALCIUM 9.9 04/29/2024    ANIONGAP 8 04/29/2024     Lab Results   Component Value Date    ALT 26 04/29/2024    AST 18 04/29/2024    ALKPHOS 74 04/29/2024    BILITOT 0.2 04/29/2024     Lab Results   Component Value Date    TSH 0.734 03/27/2024     Lab Results   Component Value Date    HGBA1C 5.7 (H) 03/27/2024       VITALS  There were no vitals filed for this visit. Virtual visit      PHYSICAL EXAM  Telephone visit      PSYCHIATRIC EXAM:     Mental Status Exam:  Appearance: telephone visit, voice only  Behavior/Cooperation: normal, cooperative  Speech: normal tone, normal rate, normal pitch, normal volume  Language: uses words appropriately; NO aphasia or dysarthria  Mood:  good  Affect: Full, appropriate  Thought Process: normal and logical  Thought Content: normal, no suicidality, no homicidality, delusions, or paranoia  Level of Consciousness: Alert and Oriented x3  Memory:  Intact      Recent: Intact; able to report recent events   Remote:  Intact;Attention/concentration: appropriate for age/education.   Fund of Knowledge: appears adequate  Insight:    Intact  Judgment:  Intact       IMPRESSION:    Teagan Griffith is a 43 y/o female who presents for follow up appointment. Patient was last seen in clinic on 7/19/2024. She reported continued improvement in her overall mood and stated that she has been doing pretty well. She stated that she has not had any outbursts of anger. She did state that she has been stress due to moving but that she is overall managing well. She endorsed continued trouble with sleep onset and maintenance. She reported taking tylenol PM as needed, will consider other treatment options in the future visits. When patient was seen on the last appointment, she reported often forgetting her lamictal for weeks at a time and often restarts the medication on her own. She reported that after the most recent visit, she restarted lamictal 50 mg but she does not thinks that it is helping and that she does not notice any changes in her mood while on lamictal. Patient requested to discontinue lamictal.    Status/Progress:  Based on the examination today, the patient's problem(s) is/are improved.  New problems have not been presented today.     DIAGNOSES:    ICD-10-CM ICD-9-CM   1. Major depressive disorder in partial remission, unspecified whether recurrent  F32.4 296.25   2. Attention deficit disorder, unspecified hyperactivity presence  F98.8 314.00   3. Obsessive-compulsive disorder, unspecified type  F42.9 300.3   4. PTSD (post-traumatic stress disorder)  F43.10 309.81   5. Generalized anxiety disorder with panic attacks  F41.1 300.02    F41.0 300.01         PLAN:  Psych Med:  Patient reported that since her last appointment, she has been taking lamictal 50 mg but she does not think the medication is helping and she is worried about the side effects of being prescribed too many medications. Recommend patient discontinue lamictal 50 mg daily.  Monitor for lamictal withdrawal symptoms including but not limited to Tachycardia (racing heart rate),  profuse sweating, tiff  Continue Zoloft 150 mg PO daily for anxiety/PTSD/OCD    Continue Vraylar 4.5 mg PO daily for adjunct to unipolar depression.   Continue Clonidine 0.1 mg PO nightly PRN  irritability/ADHD/PTSD/insomnia.   Discussed techniques of sleep hygiene  Stop pre-sleep electronic use  Remove naps  Keep fixed bedtime and wake-up time  Avoid caffeine and alcohol  Improve your sleeping environment  Continue Adderall 30 mg BID for ADHD and adjunct for depression  Will continue for now.   Awaiting formal diagnostic testing by psychology for ADHD.   Continue Xanax 2 mg PO PRN for insomnia/anxiety   Will attempt to wean when symptoms of anxiety are optimally managed.    Hx of xanax addiction in the past.  Discussed risk/benefits and plan for temporary use only.  Patient agrees.    reviewed. No discrepancies noted.      Discussed with patient informed consent, risks vs. benefits, alternative treatments, side effect profile and the inherent unpredictability of individual responses to these treatments. Answered any questions patient may have had. The patient expresses understanding of the above and displays the capacity to agree with this current plan       RETURN TO CLINIC:  2 months      Naresh Moreno MD  [facility] Psychiatry PGY-III

## 2024-08-22 ENCOUNTER — OFFICE VISIT (OUTPATIENT)
Dept: OBSTETRICS AND GYNECOLOGY | Facility: CLINIC | Age: 44
End: 2024-08-22
Payer: COMMERCIAL

## 2024-08-22 VITALS — BODY MASS INDEX: 43.15 KG/M2 | WEIGHT: 259 LBS | HEIGHT: 65 IN

## 2024-08-22 DIAGNOSIS — C64.1 RENAL CELL CARCINOMA OF RIGHT KIDNEY: Primary | ICD-10-CM

## 2024-08-22 DIAGNOSIS — E66.01 MORBID OBESITY WITH BMI OF 40.0-44.9, ADULT: ICD-10-CM

## 2024-08-22 PROCEDURE — 1159F MED LIST DOCD IN RCRD: CPT | Mod: CPTII,95,, | Performed by: PHYSICIAN ASSISTANT

## 2024-08-22 PROCEDURE — 3008F BODY MASS INDEX DOCD: CPT | Mod: CPTII,95,, | Performed by: PHYSICIAN ASSISTANT

## 2024-08-22 PROCEDURE — 99213 OFFICE O/P EST LOW 20 MIN: CPT | Mod: 95,,, | Performed by: PHYSICIAN ASSISTANT

## 2024-08-22 PROCEDURE — 1160F RVW MEDS BY RX/DR IN RCRD: CPT | Mod: CPTII,95,, | Performed by: PHYSICIAN ASSISTANT

## 2024-08-22 PROCEDURE — 3044F HG A1C LEVEL LT 7.0%: CPT | Mod: CPTII,95,, | Performed by: PHYSICIAN ASSISTANT

## 2024-08-22 NOTE — PROGRESS NOTES
The patient location is: car  The chief complaint leading to consultation is: follow up for weight loss    Visit type: audiovisual    Face to Face time with patient: 10 minutes  15 minutes of total time spent on the encounter, which includes face to face time and non-face to face time preparing to see the patient (eg, review of tests), Obtaining and/or reviewing separately obtained history, Documenting clinical information in the electronic or other health record, Independently interpreting results (not separately reported) and communicating results to the patient/family/caregiver, or Care coordination (not separately reported).         Each patient to whom he or she provides medical services by telemedicine is:  (1) informed of the relationship between the physician and patient and the respective role of any other health care provider with respect to management of the patient; and (2) notified that he or she may decline to receive medical services by telemedicine and may withdraw from such care at any time.    Notes:    Subjective:      Teagan Griffith is a 44 y.o. female with history of renal cell carcinoma s/p right nephrectomy on 9/14/22 who presents for weight loss follow up. She is taking compounded semaglutide 0.25mg SC weekly. She is tolerating the medication well without side effects. Reports decreased appetite and less interest in drinking alcohol. Continues to work on increasing her protein and staying hydrated.  was just diagnosed with type 2 DM so now the whole family is working on eating better. Just moved and treadmill has been packed away, so no exercising as much. Lost about 10lbs on her scale and clothes are fitting better.     Routine Screening Labs: 3/27/2024     No visits with results within 3 Month(s) from this visit.   Latest known visit with results is:   Admission on 04/29/2024, Discharged on 04/29/2024   Component Date Value Ref Range Status    QRS Duration 04/29/2024 108  ms  Final    [facility] QTC Calculation 04/29/2024 442  ms Final    WBC 04/29/2024 9.80  3.90 - 12.70 K/uL Final    RBC 04/29/2024 3.97 (L)  4.00 - 5.40 M/uL Final    Hemoglobin 04/29/2024 11.6 (L)  12.0 - 16.0 g/dL Final    Hematocrit 04/29/2024 36.1 (L)  37.0 - 48.5 % Final    MCV 04/29/2024 91  82 - 98 fL Final    MCH 04/29/2024 29.2  27.0 - 31.0 pg Final    MCHC 04/29/2024 32.1  32.0 - 36.0 g/dL Final    RDW 04/29/2024 12.8  11.5 - 14.5 % Final    Platelets 04/29/2024 346  150 - 450 K/uL Final    MPV 04/29/2024 10.4  9.2 - 12.9 fL Final    Immature Granulocytes 04/29/2024 0.4  0.0 - 0.5 % Final    Gran # (ANC) 04/29/2024 6.1  1.8 - 7.7 K/uL Final    Immature Grans (Abs) 04/29/2024 0.04  0.00 - 0.04 K/uL Final    Lymph # 04/29/2024 2.7  1.0 - 4.8 K/uL Final    Mono # 04/29/2024 0.6  0.3 - 1.0 K/uL Final    Eos # 04/29/2024 0.3  0.0 - 0.5 K/uL Final    Baso # 04/29/2024 0.06  0.00 - 0.20 K/uL Final    nRBC 04/29/2024 0  0 /100 WBC Final    Gran % 04/29/2024 62.6  38.0 - 73.0 % Final    Lymph % 04/29/2024 27.4  18.0 - 48.0 % Final    Mono % 04/29/2024 6.0  4.0 - 15.0 % Final    Eosinophil % 04/29/2024 3.0  0.0 - 8.0 % Final    Basophil % 04/29/2024 0.6  0.0 - 1.9 % Final    Differential Method 04/29/2024 Automated   Final    Sodium 04/29/2024 136  136 - 145 mmol/L Final    Potassium 04/29/2024 4.7  3.5 - 5.1 mmol/L Final    Chloride 04/29/2024 105  95 - 110 mmol/L Final    CO2 04/29/2024 23  23 - 29 mmol/L Final    Glucose 04/29/2024 96  70 - 110 mg/dL Final    BUN 04/29/2024 21 (H)  6 - 20 mg/dL Final    Creatinine 04/29/2024 1.1  0.5 - 1.4 mg/dL Final    Calcium 04/29/2024 9.9  8.7 - 10.5 mg/dL Final    Total Protein 04/29/2024 6.8  6.0 - 8.4 g/dL Final    Albumin 04/29/2024 3.6  3.5 - 5.2 g/dL Final    Total Bilirubin 04/29/2024 0.2  0.1 - 1.0 mg/dL Final    Alkaline Phosphatase 04/29/2024 74  55 - 135 U/L Final    AST 04/29/2024 18  10 - 40 U/L Final    ALT 04/29/2024 26  10 - 44 U/L Final    eGFR 04/29/2024 >60.0  >60  mL/min/1.73 m^2 Final    Anion Gap 2024 8  8 - 16 mmol/L Final    Specimen UA 2024 Urine, Clean Catch   Final    Color, UA 2024 Yellow  Yellow, Straw, [place] Final    Appearance, UA 2024 Clear  Clear Final    pH, UA 2024 5.0  5.0 - 8.0 Final    Specific Gravity, UA 2024 1.020  1.005 - 1.030 Final    Protein, UA 2024 Negative  Negative Final    Glucose, UA 2024 Negative  Negative Final    Ketones, UA 2024 Negative  Negative Final    Bilirubin (UA) 2024 Negative  Negative Final    Occult Blood UA 2024 Negative  Negative Final    Nitrite, UA 2024 Negative  Negative Final    Leukocytes, UA 2024 Negative  Negative Final    Preg Test, Ur 2024 Negative   Final    Lipase 2024 27  4 - 60 U/L Final    Magnesium 2024 1.9  1.6 - 2.6 mg/dL Final    Blood Culture, Routine 2024 No growth after 5 days.   Final    Blood Culture, Routine 2024 No growth after 5 days.   Final       Past Medical History:   Diagnosis Date    JEN (acute kidney injury) 2022    Depression     Renal cell carcinoma 2022     Past Surgical History:   Procedure Laterality Date     SECTION      LAPAROSCOPIC ROBOT-ASSISTED SURGICAL REMOVAL OF KIDNEY USING DA CAESAR XI Right 2022    Procedure: XI ROBOTIC NEPHRECTOMY;  Surgeon: Justin Riley MD;  Location: [address];  Service: Urology;  Laterality: Right;  4 hours     Social History     Tobacco Use    Smoking status: Former     Current packs/day: 0.00     Types: Cigarettes     Start date: 1998     Quit date: 2018     Years since quittin.6    Smokeless tobacco: Never   Substance Use Topics    Alcohol use: Yes     Alcohol/week: 42.0 standard drinks of alcohol     Types: 42 Cans of beer per week     Family History   Problem Relation Name Age of Onset    Drug abuse Mother      Prostate cancer Father      Lymphoma Paternal Grandfather      Kidney cancer Neg Hx       OB  History   No obstetric history on file.       Current Outpatient Medications:     acetaminophen (TYLENOL) 500 MG tablet, Take 2 tablets (1,000 mg total) by mouth every 8 (eight) hours as needed for Pain., Disp: 90 tablet, Rfl: 0    ALPRAZolam (XANAX) 1 MG tablet, Take 1 tablet (1 mg total) by mouth 2 (two) times daily as needed for Anxiety or Insomnia., Disp: 60 tablet, Rfl: 0    ALPRAZolam (XANAX) 1 MG tablet, Take 1 tablet (1 mg total) by mouth 2 (two) times daily as needed for Anxiety or Insomnia., Disp: 60 tablet, Rfl: 0    cariprazine (VRAYLAR) 4.5 mg Cap, Take 1 capsule (4.5 mg total) by mouth once daily., Disp: 30 capsule, Rfl: 2    cloNIDine (CATAPRES) 0.1 MG tablet, Take 1 tablet (0.1 mg total) by mouth 2 (two) times daily., Disp: 60 tablet, Rfl: 1    dextroamphetamine-amphetamine 30 mg Tab, Take 1 tablet (30 mg total) by mouth 2 (two) times a day., Disp: 60 tablet, Rfl: 0    dextroamphetamine-amphetamine 30 mg Tab, Take 1 tablet (30 mg total) by mouth 2 (two) times a day., Disp: 60 tablet, Rfl: 0    lamoTRIgine (LAMICTAL) 25 MG tablet, Take 2 tablets (50 mg total) by mouth once daily. for 14 days, Disp: 28 tablet, Rfl: 0    semaglutide, weight loss, 0.25 mg/0.5 mL PnIj, Inject 0.25 mg into the skin every 7 days., Disp: , Rfl:     sertraline (ZOLOFT) 100 MG tablet, Take 1 tablet (100 mg total) by mouth once daily. Take along with one sertraline 50mg tablet for total dose of 150mg daily., Disp: 30 each, Rfl: 2    sertraline (ZOLOFT) 50 MG tablet, Take 1 tablet (50 mg total) by mouth once daily., Disp: 30 tablet, Rfl: 2    simvastatin (ZOCOR) 10 MG tablet, Take 1 tablet (10 mg total) by mouth every evening., Disp: 30 tablet, Rfl: 11  No current facility-administered medications for this visit.    Facility-Administered Medications Ordered in Other Visits:     diphenhydrAMINE injection 25 mg, 25 mg, Intravenous, Q6H PRN, Elias Doss MD    fentaNYL 50 mcg/mL injection 25 mcg, 25 mcg, Intravenous, Q5 Min  "PRN, Elias Doss MD    fentaNYL 50 mcg/mL injection  mcg,  mcg, Intravenous, PRN, Matt Ellison, DO, 50 mcg at 09/14/22 0735    HYDROmorphone injection 0.2 mg, 0.2 mg, Intravenous, Q5 Min PRN, Elias Doss MD, 0.2 mg at 09/14/22 1455    midazolam (VERSED) 1 mg/mL injection 2 mg, 2 mg, Intravenous, PRN, Matt Ellison, DO, 2 mg at 09/14/22 0735    prochlorperazine injection Soln 5 mg, 5 mg, Intravenous, Q30 Min PRNSelam Alexis, MD    sodium chloride 0.9% flush 10 mL, 10 mL, Intravenous, PRSelam PADGETT Alexis, MD    Review of Systems:  General: No fever, chills.  Chest: No chest pain, shortness of breath, or palpitations.  Breast: No pain, masses, or nipple discharge.  Vulva: No pain, lesions, or itching.  Vagina: No relaxation, itching, discharge, or lesions.  Abdomen: No pain, nausea, vomiting, diarrhea, or constipation.  Urinary: No incontinence, nocturia, frequency, or dysuria.  Extremities:  No leg cramps, edema, or calf pain.  Neurologic: No headaches, dizziness, or visual changes.    Objective:     Vitals:    08/22/24 0842   Weight: 117.5 kg (259 lb)   Height: 5' 5" (1.651 m)     Body mass index is 43.1 kg/m².    PHYSICAL EXAM:  APPEARANCE: Well nourished, well developed, in no acute distress.  AFFECT: WNL, alert and oriented x 3      Assessment:      Renal cell carcinoma of right kidney    Morbid obesity with BMI of 40.0-44.9, adult  -     semaglutide, weight loss, 0.25 mg/0.5 mL PnIj; Inject 0.25 mg into the skin every 7 days.        Plan:   Continue low glycemic diet with lean protein at each meal.  Maintain hydration.  Continue compounded semaglutide 0.25mg SC weekly  Will restart walking when gets treadmill unpacked  Labs for renal function are scheduled with oncology.  Follow up in 3 months    Instructed patient to call if she experiences any side effects or has any questions.    I spent a total of 15 minutes on the day of the visit.This includes face to face time and " non-face to face time preparing to see the patient (eg, review of tests), obtaining and/or reviewing separately obtained history, documenting clinical information in the electronic or other health record, independently interpreting results and communicating results to the patient/family/caregiver, or care coordinator.

## 2024-09-03 ENCOUNTER — TELEPHONE (OUTPATIENT)
Dept: HEMATOLOGY/ONCOLOGY | Facility: CLINIC | Age: 44
End: 2024-09-03
Payer: COMMERCIAL

## 2024-09-03 NOTE — TELEPHONE ENCOUNTER
Called patient regarding a few appointments she had canceled to assist in rescheduling, patient did not answer, I left a voicemail on how to contact us back to get her rescheduled.

## 2024-09-23 ENCOUNTER — PATIENT MESSAGE (OUTPATIENT)
Dept: OBSTETRICS AND GYNECOLOGY | Facility: CLINIC | Age: 44
End: 2024-09-23
Payer: COMMERCIAL

## 2024-09-23 DIAGNOSIS — F98.8 ATTENTION DEFICIT DISORDER, UNSPECIFIED HYPERACTIVITY PRESENCE: ICD-10-CM

## 2024-09-23 DIAGNOSIS — E66.01 MORBID OBESITY WITH BMI OF 40.0-44.9, ADULT: Primary | ICD-10-CM

## 2024-09-24 ENCOUNTER — PATIENT MESSAGE (OUTPATIENT)
Dept: HEMATOLOGY/ONCOLOGY | Facility: CLINIC | Age: 44
End: 2024-09-24
Payer: COMMERCIAL

## 2024-09-24 ENCOUNTER — HOSPITAL ENCOUNTER (EMERGENCY)
Facility: HOSPITAL | Age: 44
Discharge: HOME | End: 2024-09-24
Attending: EMERGENCY MEDICINE
Payer: COMMERCIAL

## 2024-09-24 VITALS
HEIGHT: 65 IN | OXYGEN SATURATION: 99 % | DIASTOLIC BLOOD PRESSURE: 89 MMHG | HEART RATE: 60 BPM | SYSTOLIC BLOOD PRESSURE: 128 MMHG | BODY MASS INDEX: 42.82 KG/M2 | RESPIRATION RATE: 18 BRPM | WEIGHT: 257 LBS | TEMPERATURE: 98 F

## 2024-09-24 DIAGNOSIS — R10.31 RIGHT LOWER QUADRANT ABDOMINAL PAIN: Primary | ICD-10-CM

## 2024-09-24 DIAGNOSIS — M54.50 ACUTE RIGHT-SIDED LOW BACK PAIN WITHOUT SCIATICA: ICD-10-CM

## 2024-09-24 DIAGNOSIS — N39.0 URINARY TRACT INFECTION WITHOUT HEMATURIA, SITE UNSPECIFIED: ICD-10-CM

## 2024-09-24 LAB
ALBUMIN SERPL BCP-MCNC: 3.9 G/DL (ref 3.5–5.2)
ALP SERPL-CCNC: 57 U/L (ref 55–135)
ALT SERPL W/O P-5'-P-CCNC: 20 U/L (ref 10–44)
ANION GAP SERPL CALC-SCNC: 9 MMOL/L (ref 8–16)
AST SERPL-CCNC: 17 U/L (ref 10–40)
B-HCG UR QL: NEGATIVE
BACTERIA #/AREA URNS AUTO: ABNORMAL /HPF
BASOPHILS # BLD AUTO: 0.06 K/UL (ref 0–0.2)
BASOPHILS NFR BLD: 0.6 % (ref 0–1.9)
BILIRUB SERPL-MCNC: 0.2 MG/DL (ref 0.1–1)
BILIRUB UR QL STRIP: NEGATIVE
BUN SERPL-MCNC: 19 MG/DL (ref 6–20)
CALCIUM SERPL-MCNC: 10.1 MG/DL (ref 8.7–10.5)
CHLORIDE SERPL-SCNC: 106 MMOL/L (ref 95–110)
CLARITY UR REFRACT.AUTO: CLEAR
CO2 SERPL-SCNC: 20 MMOL/L (ref 23–29)
COLOR UR AUTO: ABNORMAL
CREAT SERPL-MCNC: 0.9 MG/DL (ref 0.5–1.4)
CTP QC/QA: YES
DIFFERENTIAL METHOD BLD: ABNORMAL
EOSINOPHIL # BLD AUTO: 0.3 K/UL (ref 0–0.5)
EOSINOPHIL NFR BLD: 2.8 % (ref 0–8)
ERYTHROCYTE [DISTWIDTH] IN BLOOD BY AUTOMATED COUNT: 13 % (ref 11.5–14.5)
EST. GFR  (NO RACE VARIABLE): >60 ML/MIN/1.73 M^2
GLUCOSE SERPL-MCNC: 95 MG/DL (ref 70–110)
GLUCOSE UR QL STRIP: NEGATIVE
HCT VFR BLD AUTO: 35.6 % (ref 37–48.5)
HGB BLD-MCNC: 11.8 G/DL (ref 12–16)
HGB UR QL STRIP: NEGATIVE
IMM GRANULOCYTES # BLD AUTO: 0.03 K/UL (ref 0–0.04)
IMM GRANULOCYTES NFR BLD AUTO: 0.3 % (ref 0–0.5)
KETONES UR QL STRIP: NEGATIVE
LEUKOCYTE ESTERASE UR QL STRIP: ABNORMAL
LYMPHOCYTES # BLD AUTO: 2.9 K/UL (ref 1–4.8)
LYMPHOCYTES NFR BLD: 30.1 % (ref 18–48)
MCH RBC QN AUTO: 28.7 PG (ref 27–31)
MCHC RBC AUTO-ENTMCNC: 33.1 G/DL (ref 32–36)
MCV RBC AUTO: 87 FL (ref 82–98)
MICROSCOPIC COMMENT: ABNORMAL
MONOCYTES # BLD AUTO: 0.5 K/UL (ref 0.3–1)
MONOCYTES NFR BLD: 5.1 % (ref 4–15)
NEUTROPHILS # BLD AUTO: 5.9 K/UL (ref 1.8–7.7)
NEUTROPHILS NFR BLD: 61.1 % (ref 38–73)
NITRITE UR QL STRIP: NEGATIVE
NRBC BLD-RTO: 0 /100 WBC
PH UR STRIP: 5 [PH] (ref 5–8)
PLATELET # BLD AUTO: 343 K/UL (ref 150–450)
PMV BLD AUTO: 10.8 FL (ref 9.2–12.9)
POTASSIUM SERPL-SCNC: 4.2 MMOL/L (ref 3.5–5.1)
PROT SERPL-MCNC: 6.8 G/DL (ref 6–8.4)
PROT UR QL STRIP: NEGATIVE
RBC # BLD AUTO: 4.11 M/UL (ref 4–5.4)
RBC #/AREA URNS AUTO: 1 /HPF (ref 0–4)
SODIUM SERPL-SCNC: 135 MMOL/L (ref 136–145)
SP GR UR STRIP: 1.01 (ref 1–1.03)
SQUAMOUS #/AREA URNS AUTO: 2 /HPF
URN SPEC COLLECT METH UR: ABNORMAL
WBC # BLD AUTO: 9.6 K/UL (ref 3.9–12.7)
WBC #/AREA URNS AUTO: 75 /HPF (ref 0–5)

## 2024-09-24 PROCEDURE — 87086 URINE CULTURE/COLONY COUNT: CPT | Performed by: EMERGENCY MEDICINE

## 2024-09-24 PROCEDURE — 81001 URINALYSIS AUTO W/SCOPE: CPT | Performed by: EMERGENCY MEDICINE

## 2024-09-24 PROCEDURE — 96376 TX/PRO/DX INJ SAME DRUG ADON: CPT

## 2024-09-24 PROCEDURE — 96375 TX/PRO/DX INJ NEW DRUG ADDON: CPT

## 2024-09-24 PROCEDURE — 25500020 PHARM REV CODE 255: Performed by: EMERGENCY MEDICINE

## 2024-09-24 PROCEDURE — 96365 THER/PROPH/DIAG IV INF INIT: CPT

## 2024-09-24 PROCEDURE — 87147 CULTURE TYPE IMMUNOLOGIC: CPT | Performed by: EMERGENCY MEDICINE

## 2024-09-24 PROCEDURE — 87088 URINE BACTERIA CULTURE: CPT | Performed by: EMERGENCY MEDICINE

## 2024-09-24 PROCEDURE — 96361 HYDRATE IV INFUSION ADD-ON: CPT

## 2024-09-24 PROCEDURE — 81025 URINE PREGNANCY TEST: CPT | Performed by: EMERGENCY MEDICINE

## 2024-09-24 PROCEDURE — 63600175 PHARM REV CODE 636 W HCPCS: Performed by: EMERGENCY MEDICINE

## 2024-09-24 PROCEDURE — 80053 COMPREHEN METABOLIC PANEL: CPT | Performed by: EMERGENCY MEDICINE

## 2024-09-24 PROCEDURE — 99285 EMERGENCY DEPT VISIT HI MDM: CPT | Mod: 25

## 2024-09-24 PROCEDURE — 85025 COMPLETE CBC W/AUTO DIFF WBC: CPT | Performed by: EMERGENCY MEDICINE

## 2024-09-24 PROCEDURE — 25000003 PHARM REV CODE 250: Performed by: EMERGENCY MEDICINE

## 2024-09-24 RX ORDER — ALPRAZOLAM 1 MG/1
1 TABLET ORAL 2 TIMES DAILY PRN
Qty: 60 TABLET | Refills: 0 | Status: SHIPPED | OUTPATIENT
Start: 2024-09-24 | End: 2024-10-24

## 2024-09-24 RX ORDER — DEXTROAMPHETAMINE SACCHARATE, AMPHETAMINE ASPARTATE, DEXTROAMPHETAMINE SULFATE AND AMPHETAMINE SULFATE 7.5; 7.5; 7.5; 7.5 MG/1; MG/1; MG/1; MG/1
30 TABLET ORAL 2 TIMES DAILY
Qty: 60 TABLET | Refills: 0 | Status: SHIPPED | OUTPATIENT
Start: 2024-09-24

## 2024-09-24 RX ORDER — MORPHINE SULFATE 4 MG/ML
4 INJECTION, SOLUTION INTRAMUSCULAR; INTRAVENOUS
Status: COMPLETED | OUTPATIENT
Start: 2024-09-24 | End: 2024-09-24

## 2024-09-24 RX ORDER — AMOXICILLIN AND CLAVULANATE POTASSIUM 875; 125 MG/1; MG/1
1 TABLET, FILM COATED ORAL 2 TIMES DAILY
Qty: 14 TABLET | Refills: 0 | Status: SHIPPED | OUTPATIENT
Start: 2024-09-24

## 2024-09-24 RX ADMIN — SODIUM CHLORIDE, POTASSIUM CHLORIDE, SODIUM LACTATE AND CALCIUM CHLORIDE 1000 ML: 600; 310; 30; 20 INJECTION, SOLUTION INTRAVENOUS at 12:09

## 2024-09-24 RX ADMIN — MORPHINE SULFATE 4 MG: 4 INJECTION INTRAVENOUS at 12:09

## 2024-09-24 RX ADMIN — IOHEXOL 100 ML: 350 INJECTION, SOLUTION INTRAVENOUS at 12:09

## 2024-09-24 RX ADMIN — CEFTRIAXONE 1 G: 1 INJECTION, POWDER, FOR SOLUTION INTRAMUSCULAR; INTRAVENOUS at 02:09

## 2024-09-24 RX ADMIN — MORPHINE SULFATE 4 MG: 4 INJECTION INTRAVENOUS at 01:09

## 2024-09-24 NOTE — ED NOTES
Patient states right abd pain that rotates to back, was told to come for scans, pain worse with mvmt , h/o nephrectomy to right

## 2024-09-24 NOTE — ED NOTES
Patient identifiers verified and correct for  MS Griffith  C/C: Right abd pain SEE NN  APPEARANCE: awake and alert in NAD. PAIN  10/10  SKIN: warm, dry and intact. No breakdown or bruising.  MUSCULOSKELETAL: Patient moving all extremities spontaneously, no obvious swelling or deformities noted. Ambulates independently.  RESPIRATORY: Denies shortness of breath.Respirations unlabored.   CARDIAC: Denies CP, 2+ distal pulses; no peripheral edema  ABDOMEN: ABdomen round, reports right abd pain that radiates to back   : voids spontaneously, denies difficulty  Neurologic: AAO x 4; follows commands equal strength in all extremities; denies numbness/tingling. Denies dizziness  denie new weakness,

## 2024-09-24 NOTE — ED PROVIDER NOTES
Encounter Date: 2024       History     Chief Complaint   Patient presents with    Abdominal Pain     Lower R  abd pain and goes to my back, started3 weeks ago     Patient is a 44-year-old female with a history of renal cell carcinoma status post right nephrectomy 2 years ago who presents to the emergency department with right flank and lower back pain that radiates into her right lower quadrant.  She states that the pain began 3 weeks ago.  She states the pain has been constant.  It is moderate in severity.  She denies any associated dysuria, hematuria, nausea, vomiting, diarrhea, fever, vaginal bleeding, vaginal discharge, melena, or hematochezia.  She is unclear of the etiology.  She does report that she has some concern secondary to her history of malignancy.  She has no other complaints.      Review of patient's allergies indicates:  No Known Allergies  Past Medical History:   Diagnosis Date    JEN (acute kidney injury) 2022    Depression     Renal cell carcinoma 2022     Past Surgical History:   Procedure Laterality Date     SECTION      LAPAROSCOPIC ROBOT-ASSISTED SURGICAL REMOVAL OF KIDNEY USING DA CAESAR XI Right 2022    Procedure: XI ROBOTIC NEPHRECTOMY;  Surgeon: Justin Riley MD;  Location: [address];  Service: Urology;  Laterality: Right;  4 hours     Family History   Problem Relation Name Age of Onset    Drug abuse Mother      Prostate cancer Father      Lymphoma Paternal Grandfather      Kidney cancer Neg Hx       Social History     Tobacco Use    Smoking status: Former     Current packs/day: 0.00     Types: Cigarettes     Start date: 1998     Quit date: 2018     Years since quittin.7    Smokeless tobacco: Never   Substance Use Topics    Alcohol use: Yes     Alcohol/week: 42.0 standard drinks of alcohol     Types: 42 Cans of beer per week     Review of Systems  See HPI     Physical Exam     Initial Vitals [24 1052]   BP Pulse Resp Temp SpO2    129/62 81 18 97.9 °F (36.6 °C) 98 %      MAP       --         Physical Exam  General: No apparent distress.  Well-nourished.  Well-developed.  Alert and oriented x3.  HENT: Moist mucous membranes.  Normocephalic atraumatic.  Oropharynx clear.    Eyes: Pupils equally round and reactive to light.  Extraocular movements intact.  No scleral icterus.  No conjunctival pallor.  Cardiovascular: Regular rate and rhythm.  No murmurs, rubs, or gallops.  Brisk capillary fill.  2+ distal pulses.  Respiratory: Clear to auscultation bilaterally.  No wheezes, rales, or rhonchi.  No respiratory distress.  Abdomen: Soft.  Mild right lower quadrant tenderness to palpation.  Nondistended.  No guarding.  No rebound.  No masses.  No abdominal bruit auscultated.  : Mild right CVA tenderness.  However, it is noted that the patient has had a right nephrectomy.  Skin: No rashes.  No lesions.  No pallor.  No jaundice.  Neuro: Cranial nerves II through XII grossly intact.  Moving all extremities equally.  No sensory deficits.  Strength 5 out of 5 in all 4 extremities.  Musculoskeletal: Neck supple.  No extremity tenderness.  Moving all extremities without pain.  Tenderness to palpation is noted to the musculature of the right lower back.  No neck tenderness.        ED Course   Procedures  Labs Reviewed   CBC W/ AUTO DIFFERENTIAL - Abnormal       Result Value    WBC 9.60      RBC 4.11      Hemoglobin 11.8 (*)     Hematocrit 35.6 (*)     MCV 87      MCH 28.7      MCHC 33.1      RDW 13.0      Platelets 343      MPV 10.8      Immature Granulocytes 0.3      Gran # (ANC) 5.9      Immature Grans (Abs) 0.03      Lymph # 2.9      Mono # 0.5      Eos # 0.3      Baso # 0.06      nRBC 0      Gran % 61.1      Lymph % 30.1      Mono % 5.1      Eosinophil % 2.8      Basophil % 0.6      Differential Method Automated     COMPREHENSIVE METABOLIC PANEL - Abnormal    Sodium 135 (*)     Potassium 4.2      Chloride 106      CO2 20 (*)     Glucose 95      BUN 19       Creatinine 0.9      Calcium 10.1      Total Protein 6.8      Albumin 3.9      Total Bilirubin 0.2      Alkaline Phosphatase 57      AST 17      ALT 20      eGFR >60.0      Anion Gap 9     URINALYSIS, REFLEX TO URINE CULTURE - Abnormal    Specimen UA Urine, Clean Catch      Color, UA Straw      Appearance, UA Clear      pH, UA 5.0      Specific Gravity, UA 1.010      Protein, UA Negative      Glucose, UA Negative      Ketones, UA Negative      Bilirubin (UA) Negative      Occult Blood UA Negative      Nitrite, UA Negative      Leukocytes, UA 3+ (*)     Narrative:     Specimen Source->Urine   URINALYSIS MICROSCOPIC - Abnormal    RBC, UA 1      WBC, UA 75 (*)     Bacteria Occasional      Squam Epithel, UA 2      Microscopic Comment SEE COMMENT      Narrative:     Specimen Source->Urine   CULTURE, URINE   POCT URINE PREGNANCY    POC Preg Test, Ur Negative       Acceptable Yes            Imaging Results              CT Chest Abdomen Pelvis With IV Contrast (XPD) NO Oral Contrast (Final result)  Result time 09/24/24 13:38:40      Final result by Pedrito Calloway MD (09/24/24 13:38:40)                   Impression:      No evidence of acute process in the chest, abdomen, or pelvis.  Etiology of right flank pain is not visualized.    Inpatient status post right nephrectomy, no evidence of local recurrence or metastatic disease.    Hepatomegaly and hepatic steatosis.    Additional findings as described above.      Electronically signed by: Pedrito Calloway MD  Date:    09/24/2024  Time:    13:38               Narrative:    EXAMINATION:  CT CHEST ABDOMEN PELVIS WITH IV CONTRAST (XPD)    CLINICAL HISTORY:  Kidney cancer, staging;Plus, RLQ abd pain and right flank pain;    TECHNIQUE:  Axial images of the chest, abdomen, and pelvis were acquired  after the use of 100 cc [id number] IV contrast.  Coronal and sagittal reconstructions were also obtained    COMPARISON:  CT abdomen pelvis from 04/29/2024. CT chest  abdomen pelvis from 09/14/2023.    FINDINGS:  Thoracic soft tissues: Normal thyroid.    Aorta: Thoracic aorta is normal in caliber and contour with no significant calcific atherosclerosis.    Heart: Normal in size. No pericardial effusion. No significant calcific coronary atherosclerosis.    Mare/Mediastinum: No significant mediastinal, hilar, or axillary lymphadenopathy    Lungs: Trachea and bronchi are patent.  Lungs are well aerated, without consolidation or pleural fluid. 0.4 cm pulmonary nodule in the left lower lobe (series 303, image 219), unchanged compared to prior exam..  A few small nodules along the fissures of the right lung likely representing lymph nodes.  These are unchanged compared to prior exam.    Liver: Mildly enlarged in size measuring 18.5 cm.  Mild diffuse hypoattenuation of the liver suggestive of hepatic steatosis.  No focal hepatic lesion.    Gallbladder: No calcified gallstones.    Bile Ducts: No evidence of dilated ducts.    Pancreas: No mass or peripancreatic fat stranding.    Spleen: Unremarkable.    Stomach and duodenum: Unremarkable.    Adrenals: Unremarkable.    Kidneys/ Ureters: Right kidney is surgically absent.  No evidence of mass in the right renal fossa.  A few small lymph nodes are noted, unchanged compared to prior exam.  Left kidney is normal in appearance.  Left kidney demonstrates normal enhancement. No hydronephrosis or nephrolithiasis. No ureteral dilatation.    Bladder: No evidence of wall thickening.    Reproductive organs: A few ovarian hypodensities consistent with follicles.  No evidence of free fluid.    Bowel/Mesentery: Small bowel is normal in caliber with no evidence of obstruction.  No evidence of inflammation or wall thickening.  Normal appendix.  Colon demonstrates no focal wall thickening.  Moderate amount of stool within the right colon.  Diverticulosis coli with no evidence of acute diverticulitis.    Lymph nodes: No lymphadenapathy.    Abdominal wall:   Unremarkable.    Vasculature: No aneurysm. No significant calcific atherosclerosis.    Bones: No acute fracture. No suspicious osseous lesions.                                       Medications   morphine injection 4 mg (has no administration in time range)   cefTRIAXone (Rocephin) 1 g in D5W 100 mL IVPB (MB+) (has no administration in time range)   lactated ringers bolus 1,000 mL (1,000 mLs Intravenous New Bag 9/24/24 1227)   morphine injection 4 mg (4 mg Intravenous Given 9/24/24 1227)   iohexoL (OMNIPAQUE 350) injection 100 mL (100 mLs Intravenous Given 9/24/24 1257)     Medical Decision Making  This is an emergent evaluation.  The patient has a very complicated medical history.  Because of her presenting symptoms, I do have concern for malignancy.  As she does have right lower quadrant abdominal pain, appendicitis is always on the differential.  However, the timeline is not consistent with this.  I will check laboratory studies, urinalysis, and a CT scan of the abdomen and pelvis.  I will reassess.      10:07 p.m.   I discussed this case with the patient's oncologist.  Because she is scheduled for a CT scan of the chest, abdomen, and pelvis with IV contrast in the very near future, he would like the scans to be done today.  This will minimize the contrast dye load.    1:50 p.m.   CT scan shows no acute abnormalities.  Of note, the appendix is within normal limits.  Laboratory studies show no clinically significant abnormalities.  Urinalysis does have some leukocytes and bacteria.  Because of the patient's complicated medical history and presenting complaints, I will provide a dose of Rocephin and discharged with Augmentin.  She has been instructed to follow up closely with her oncologist.  At this time, I feel that she is clinically stable for discharge.    Amount and/or Complexity of Data Reviewed  Labs: ordered.  Radiology: ordered.    Risk  Prescription drug management.                                       Clinical Impression:  Final diagnoses:  [M54.50] Acute right-sided low back pain without sciatica  [N39.0] Urinary tract infection without hematuria, site unspecified  [R10.31] Right lower quadrant abdominal pain (Primary)          ED Disposition Condition    Discharge Stable          ED Prescriptions       Medication Sig Dispense Start Date End Date Auth. Provider    amoxicillin-clavulanate 875-125mg (AUGMENTIN) 875-125 mg per tablet Take 1 tablet by mouth 2 (two) times daily. 14 tablet 9/24/2024 -- Oscar Cheng MD          Follow-up Information       Follow up With Specialties Details Why Contact Info    Moshe Jane MD Hematology and Oncology Call   [id number] [facility] [address] [phone]               Oscar Cheng MD  09/24/24 2308

## 2024-09-25 LAB — BACTERIA UR CULT: ABNORMAL

## 2024-10-09 ENCOUNTER — OFFICE VISIT (OUTPATIENT)
Dept: HEMATOLOGY/ONCOLOGY | Facility: CLINIC | Age: 44
End: 2024-10-09
Payer: COMMERCIAL

## 2024-10-09 VITALS
WEIGHT: 254.5 LBS | RESPIRATION RATE: 15 BRPM | HEIGHT: 65 IN | DIASTOLIC BLOOD PRESSURE: 58 MMHG | SYSTOLIC BLOOD PRESSURE: 101 MMHG | BODY MASS INDEX: 42.4 KG/M2 | HEART RATE: 83 BPM | TEMPERATURE: 98 F | OXYGEN SATURATION: 99 %

## 2024-10-09 DIAGNOSIS — C64.1 RENAL CELL CARCINOMA OF RIGHT KIDNEY: Primary | ICD-10-CM

## 2024-10-09 DIAGNOSIS — M54.9 BACK PAIN, UNSPECIFIED BACK LOCATION, UNSPECIFIED BACK PAIN LATERALITY, UNSPECIFIED CHRONICITY: ICD-10-CM

## 2024-10-09 PROCEDURE — 3044F HG A1C LEVEL LT 7.0%: CPT | Mod: CPTII,S$GLB,, | Performed by: HOSPITALIST

## 2024-10-09 PROCEDURE — G2211 COMPLEX E/M VISIT ADD ON: HCPCS | Mod: S$GLB,,, | Performed by: HOSPITALIST

## 2024-10-09 PROCEDURE — 3078F DIAST BP <80 MM HG: CPT | Mod: CPTII,S$GLB,, | Performed by: HOSPITALIST

## 2024-10-09 PROCEDURE — 99999 PR PBB SHADOW E&M-EST. PATIENT-LVL IV: CPT | Mod: PBBFAC,,, | Performed by: HOSPITALIST

## 2024-10-09 PROCEDURE — 3074F SYST BP LT 130 MM HG: CPT | Mod: CPTII,S$GLB,, | Performed by: HOSPITALIST

## 2024-10-09 PROCEDURE — 1159F MED LIST DOCD IN RCRD: CPT | Mod: CPTII,S$GLB,, | Performed by: HOSPITALIST

## 2024-10-09 PROCEDURE — 3008F BODY MASS INDEX DOCD: CPT | Mod: CPTII,S$GLB,, | Performed by: HOSPITALIST

## 2024-10-09 PROCEDURE — 99214 OFFICE O/P EST MOD 30 MIN: CPT | Mod: S$GLB,,, | Performed by: HOSPITALIST

## 2024-10-09 RX ORDER — TRAMADOL HYDROCHLORIDE 50 MG/1
50 TABLET ORAL EVERY 6 HOURS PRN
Qty: 42 EACH | Refills: 0 | Status: SHIPPED | OUTPATIENT
Start: 2024-10-09

## 2024-10-09 NOTE — PROGRESS NOTES
MEDICAL ONCOLOGY FOLLOW-UP VISIT.     Best Contact Phone Number(s): [phone] (home)      Cancer/Stage/TNM:    Cancer Staging   Renal cell carcinoma  Staging form: Kidney, AJCC 8th Edition  - Pathologic: Stage Unknown (pT2b, pNX, cM0) - Signed by Moshe Jane MD on 10/6/2022  - Pathologic: Stage II (pT2, pN0, cM0) - Signed by Moshe Jane MD on 11/9/2022      Reason for visit: Ms. 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 She presents to medical oncology clinic for ongoing surveillance.    Interval History:     Seen in ED with 3 weeks progressive lower right back pain. Constant pain; stabbing. Up to 10/10. CT imaging without issue. UA with leukocyturia. Given augemntin and pain control    Has been getting relatively frequent urinary infections. Seems to be associated with post coital urinary frequency. Follows with urology. Continues to have some right sided flank pain radiating into her hip. Has taken rare ibuprofen with fairly good benefit, but tries to avoid due to potential nephrotoxicity. Pain is primarily worse in the morning.        Oncology History   Renal cell carcinoma   8/6/2022 Imaging Significant Findings    CTA performed for chest pain incidentally found a large partially visualized enhancing mass in the right kidney measuring at least 12 cm with adjacent fat stranding and prominent vessels highly concerning for renal cell carcinoma.    Subsequent non contrast CT a/p confirms 13.7 x 12.2 x 14.3 (AP x TV x CC) intermediate density solid right renal mass with central necrosis and scattered punctate calcifications are concerning for malignancy.     8/9/2022 Imaging Significant Findings    MRI a/p shows  eterogeneously enhancing solid right renal mass highly concerning for RCC. No evidence of filling defect or enhancing tumor thrombus in the right renal artery or vein.  Mildly prominent mesenteric lymph nodes, as above.  Metastatic disease difficult to definitively exclude     9/14/2022 Surgery    Right nephrectomy - pathology with ccRCC nuclear grade 4 up to 13.3 cm. Tumor confined to the kidney. Margins negative Rhabdoid features present with associated necrosis. [id number]     11/1/2022 Imaging Significant Findings    CT torso with several up to 3mm micronodules in the lung and prominent but small mesenteric nodes overall felt generally stable.     11/9/2022 - 1/11/2023 Chemotherapy    Treatment Summary   Plan Name: OP PEMBROLIZUMAB 200MG Q3W  Treatment Goal: Curative  Status: Inactive  Start Date: 11/9/2022  End Date: 1/11/2023  Provider: Moshe Jane MD  Chemotherapy: [No matching medication found in this treatment plan]     11/9/2022 Cancer Staged    Staging form: Kidney, AJCC 8th Edition  - Pathologic: Stage II (pT2, pN0, cM0)     12/14/2022 Notable Event    Pembrolizumab held in setting of severe mucositis. Start prednisone taper starting at 60mg po daily.     1/11/2023 -  Immunotherapy    Restart immunotherapy with pembrolizumab 200mg IV q3 weeks     1/30/2023 Notable Event    Stop pembrolizumab for second time due to recurrent stomatitis/mucositis with associated rash. Restart steroid taper at 60mg prednisone.     1/31/2023 Imaging Significant Findings    CT torso with no evidence of recurrent disease. Stable mesenteric nodes up to 1.0cm. Also 0.9cm thyroid nodule.     3/17/2023 Imaging Significant Findings    CT torso  - Postsurgical change of right nephrectomy in this patient with reported clear cell RCC.  Few prominent lymph nodes in the right renal fossa and para-aortic region, similar to the prior. No findings to suggest local neoplasm recurrence.  No evidence of distant metastatic disease.  - Few  "stable bilateral subcentimeter pulmonary nodules, as above.  - Hepatomegaly with fatty infiltration; no focal lesion.  - Noncalcified cholelithiasis with no surrounding acute inflammatory process.    MR Brain:  Normal MRI of the brain with no evidence of metastasis.     6/19/2023 Imaging Significant Findings    6/19/23 CT Torso:  - Surgical change of right nephrectomy in this patient with a history of renal cell carcinoma.  There are a few stable lymph nodes in the right renal fossa.  Small stable periaortic lymph nodes are present, not pathologically enlarged.  - Mild hepatomegaly and hepatic steatosis  - Few stable small pulmonary nodules.     9/14/2023 Imaging Significant Findings    CT torso  Impression:  - Prior right nephrectomy.  Few stable appearing lymph nodes at the right renal fossa and periaortic area.  Otherwise, no evidence for local recurrent or metastatic disease.  - Few stable small pulmonary nodules.  - Hepatic steatosis and additional findings as above.     3/7/2024 Imaging Significant Findings    3/7/24 CT a/p  Impression:  Abdomen CT and pelvis CT:  1. No evidence of obstructive uropathy.  2. Right nephrectomy for RCC.  No abnormal soft tissue nodularity or abnormal enhancement to suggest local disease recurrence.  Normal adrenals.  3. Additional findings as detailed in the body of the report.            Physical Exam:   BP (!) 101/58 (BP Location: Left arm, Patient Position: Sitting)   Pulse 83   Temp 97.7 °F (36.5 °C) (Oral)   Resp 15   Ht 5' 5" (1.651 m)   Wt 115.5 kg (254 lb 8.3 oz)   LMP 09/24/2024   SpO2 99%   BMI 42.35 kg/m²      ECOG Performance Status: (foot note - ECOG PS provided by Eastern Cooperative Oncology Group) 1 - Symptomatic but completely ambulatory    Physical Exam  Constitutional:       General: She is not in acute distress.  HENT:      Head: Normocephalic.      Mouth/Throat:      Mouth: Mucous membranes are moist.      Pharynx: Posterior oropharyngeal erythema: " with lacy white plaques over buccal mucosa.      Comments: Lacy white appearance persists over tongue  Eyes:      Conjunctiva/sclera: Conjunctivae normal.      Pupils: Pupils are equal, round, and reactive to light.   Cardiovascular:      Rate and Rhythm: Normal rate.   Pulmonary:      Effort: Pulmonary effort is normal. No respiratory distress.   Abdominal:      General: There is no distension.      Palpations: Abdomen is soft.      Tenderness: There is no abdominal tenderness.   Musculoskeletal:         General: Normal range of motion.      Cervical back: Normal range of motion and neck supple.   Skin:     General: Skin is warm.   Neurological:      General: No focal deficit present.      Mental Status: She is alert and oriented to person, place, and time.      Motor: No weakness.   Psychiatric:         Mood and Affect: Mood normal.         Behavior: Behavior normal.         Thought Content: Thought content normal.           Labs:   No results found for this or any previous visit (from the past 48 hours).                     Imaging:     CT Chest Abdomen Pelvis With IV Contrast (XPD) NO Oral Contrast  Narrative: EXAMINATION:  CT CHEST ABDOMEN PELVIS WITH IV CONTRAST (XPD)    CLINICAL HISTORY:  Kidney cancer, staging;Plus, RLQ abd pain and right flank pain;    TECHNIQUE:  Axial images of the chest, abdomen, and pelvis were acquired  after the use of 100 cc [id number] IV contrast.  Coronal and sagittal reconstructions were also obtained    COMPARISON:  CT abdomen pelvis from 04/29/2024. CT chest abdomen pelvis from 09/14/2023.    FINDINGS:  Thoracic soft tissues: Normal thyroid.    Aorta: Thoracic aorta is normal in caliber and contour with no significant calcific atherosclerosis.    Heart: Normal in size. No pericardial effusion. No significant calcific coronary atherosclerosis.    Mare/Mediastinum: No significant mediastinal, hilar, or axillary lymphadenopathy    Lungs: Trachea and bronchi are patent.  Lungs are well  aerated, without consolidation or pleural fluid. 0.4 cm pulmonary nodule in the left lower lobe (series 303, image 219), unchanged compared to prior exam..  A few small nodules along the fissures of the right lung likely representing lymph nodes.  These are unchanged compared to prior exam.    Liver: Mildly enlarged in size measuring 18.5 cm.  Mild diffuse hypoattenuation of the liver suggestive of hepatic steatosis.  No focal hepatic lesion.    Gallbladder: No calcified gallstones.    Bile Ducts: No evidence of dilated ducts.    Pancreas: No mass or peripancreatic fat stranding.    Spleen: Unremarkable.    Stomach and duodenum: Unremarkable.    Adrenals: Unremarkable.    Kidneys/ Ureters: Right kidney is surgically absent.  No evidence of mass in the right renal fossa.  A few small lymph nodes are noted, unchanged compared to prior exam.  Left kidney is normal in appearance.  Left kidney demonstrates normal enhancement. No hydronephrosis or nephrolithiasis. No ureteral dilatation.    Bladder: No evidence of wall thickening.    Reproductive organs: A few ovarian hypodensities consistent with follicles.  No evidence of free fluid.    Bowel/Mesentery: Small bowel is normal in caliber with no evidence of obstruction.  No evidence of inflammation or wall thickening.  Normal appendix.  Colon demonstrates no focal wall thickening.  Moderate amount of stool within the right colon.  Diverticulosis coli with no evidence of acute diverticulitis.    Lymph nodes: No lymphadenapathy.    Abdominal wall:  Unremarkable.    Vasculature: No aneurysm. No significant calcific atherosclerosis.    Bones: No acute fracture. No suspicious osseous lesions.  Impression: No evidence of acute process in the chest, abdomen, or pelvis.  Etiology of right flank pain is not visualized.    Inpatient status post right nephrectomy, no evidence of local recurrence or metastatic disease.    Hepatomegaly and hepatic steatosis.    Additional findings as  described above.    Electronically signed by: Pedrito Calloway MD  Date:    09/24/2024  Time:    13:38              Diagnoses:       1. Renal cell carcinoma of right kidney    2. Back pain, unspecified back location, unspecified back pain laterality, unspecified chronicity                          Assessment and Plan:     1. Renal cell carcinoma of right kidney  Overview:  Patient found to have high risk likely stage II ([id number]) with nuclear grade 4 and rhabdoid features on nephrectomy 9/14/22. Started adjuvant pembrolizumab 11/9/22. Stopped 1/31/23 due to recurrent ICI side effects.    Assessment & Plan:  Remains DWAYNE on most recent imaging. Now >2 years out of nephrectomy. Will maintain relatively close surveillance with CT in 6 months. May consder annual imaging afterwards.  - FU 6 months CT torso and labs  - Tramdol prn back pain    Orders:  -     CT Chest Abdomen Pelvis With IV Contrast (XPD) Routine Oral Contrast; Future; Expected date: 10/09/2024  -     CT Chest Abdomen Pelvis With IV Contrast (XPD) Routine Oral Contrast; Future; Expected date: 10/09/2024    2. Back pain, unspecified back location, unspecified back pain laterality, unspecified chronicity  -     traMADoL (ULTRAM) 50 mg tablet; Take 1 tablet (50 mg total) by mouth every 6 (six) hours as needed for Pain.  Dispense: 42 each; Refill: 0                Route Chart for Scheduling    Med Onc Chart Routing      Follow up with physician 6 months.   Follow up with RAUL    Infusion scheduling note    Injection scheduling note    Labs CBC and CMP   Scheduling:  Preferred lab:  Lab interval:     Imaging    Pharmacy appointment    Other referrals                      Moshe Jane MD

## 2024-10-09 NOTE — ASSESSMENT & PLAN NOTE
Remains DWAYNE on most recent imaging. Now >2 years out of nephrectomy. Will maintain relatively close surveillance with CT in 6 months. May consder annual imaging afterwards.  - FU 6 months CT torso and labs  - Tramdol prn back pain

## 2024-10-17 ENCOUNTER — TELEPHONE (OUTPATIENT)
Dept: RESEARCH | Facility: HOSPITAL | Age: 44
End: 2024-10-17
Payer: COMMERCIAL

## 2024-10-24 ENCOUNTER — PATIENT MESSAGE (OUTPATIENT)
Dept: RESEARCH | Facility: HOSPITAL | Age: 44
End: 2024-10-24
Payer: COMMERCIAL

## 2024-10-30 ENCOUNTER — TELEPHONE (OUTPATIENT)
Dept: HEMATOLOGY/ONCOLOGY | Facility: CLINIC | Age: 44
End: 2024-10-30
Payer: COMMERCIAL

## 2024-10-31 ENCOUNTER — LAB VISIT (OUTPATIENT)
Dept: LAB | Facility: HOSPITAL | Age: 44
End: 2024-10-31
Attending: HOSPITALIST
Payer: COMMERCIAL

## 2024-10-31 ENCOUNTER — OFFICE VISIT (OUTPATIENT)
Dept: HEMATOLOGY/ONCOLOGY | Facility: CLINIC | Age: 44
End: 2024-10-31
Payer: COMMERCIAL

## 2024-10-31 ENCOUNTER — RESEARCH ENCOUNTER (OUTPATIENT)
Dept: RESEARCH | Facility: HOSPITAL | Age: 44
End: 2024-10-31
Payer: COMMERCIAL

## 2024-10-31 VITALS
HEART RATE: 78 BPM | SYSTOLIC BLOOD PRESSURE: 118 MMHG | BODY MASS INDEX: 42.28 KG/M2 | HEIGHT: 65 IN | OXYGEN SATURATION: 95 % | TEMPERATURE: 97 F | WEIGHT: 253.75 LBS | DIASTOLIC BLOOD PRESSURE: 66 MMHG

## 2024-10-31 DIAGNOSIS — E66.01 CLASS 3 SEVERE OBESITY DUE TO EXCESS CALORIES WITH BODY MASS INDEX (BMI) OF 40.0 TO 44.9 IN ADULT, UNSPECIFIED WHETHER SERIOUS COMORBIDITY PRESENT: ICD-10-CM

## 2024-10-31 DIAGNOSIS — M54.9 BACK PAIN, UNSPECIFIED BACK LOCATION, UNSPECIFIED BACK PAIN LATERALITY, UNSPECIFIED CHRONICITY: ICD-10-CM

## 2024-10-31 DIAGNOSIS — F32.4 MAJOR DEPRESSIVE DISORDER IN PARTIAL REMISSION, UNSPECIFIED WHETHER RECURRENT: ICD-10-CM

## 2024-10-31 DIAGNOSIS — D64.9 NORMOCYTIC ANEMIA: ICD-10-CM

## 2024-10-31 DIAGNOSIS — R20.0 RIGHT ARM NUMBNESS: ICD-10-CM

## 2024-10-31 DIAGNOSIS — R53.83 FATIGUE, UNSPECIFIED TYPE: ICD-10-CM

## 2024-10-31 DIAGNOSIS — Z12.31 ENCOUNTER FOR SCREENING MAMMOGRAM FOR MALIGNANT NEOPLASM OF BREAST: ICD-10-CM

## 2024-10-31 DIAGNOSIS — E78.5 HYPERLIPIDEMIA, UNSPECIFIED HYPERLIPIDEMIA TYPE: ICD-10-CM

## 2024-10-31 DIAGNOSIS — N39.0 RECURRENT UTI: ICD-10-CM

## 2024-10-31 DIAGNOSIS — F98.8 ATTENTION DEFICIT DISORDER, UNSPECIFIED TYPE: ICD-10-CM

## 2024-10-31 DIAGNOSIS — E66.813 CLASS 3 SEVERE OBESITY DUE TO EXCESS CALORIES WITH BODY MASS INDEX (BMI) OF 40.0 TO 44.9 IN ADULT, UNSPECIFIED WHETHER SERIOUS COMORBIDITY PRESENT: ICD-10-CM

## 2024-10-31 DIAGNOSIS — C64.1 RENAL CELL CARCINOMA OF RIGHT KIDNEY: Primary | ICD-10-CM

## 2024-10-31 DIAGNOSIS — C64.1 RENAL CELL CARCINOMA OF RIGHT KIDNEY: ICD-10-CM

## 2024-10-31 DIAGNOSIS — Z00.6 CLINICAL TRIAL PARTICIPANT: ICD-10-CM

## 2024-10-31 LAB
ALBUMIN SERPL BCP-MCNC: 3.9 G/DL (ref 3.5–5.2)
ALP SERPL-CCNC: 64 U/L (ref 40–150)
ALT SERPL W/O P-5'-P-CCNC: 24 U/L (ref 10–44)
ANION GAP SERPL CALC-SCNC: 6 MMOL/L (ref 8–16)
AST SERPL-CCNC: 16 U/L (ref 10–40)
BILIRUB SERPL-MCNC: 0.3 MG/DL (ref 0.1–1)
BUN SERPL-MCNC: 16 MG/DL (ref 6–20)
CALCIUM SERPL-MCNC: 10 MG/DL (ref 8.7–10.5)
CHLORIDE SERPL-SCNC: 107 MMOL/L (ref 95–110)
CHOLEST SERPL-MCNC: 215 MG/DL (ref 120–199)
CHOLEST/HDLC SERPL: 4.1 {RATIO} (ref 2–5)
CO2 SERPL-SCNC: 23 MMOL/L (ref 23–29)
CREAT SERPL-MCNC: 1.1 MG/DL (ref 0.5–1.4)
DRUG STUDY SPECIMEN TYPE: NORMAL
DRUG STUDY TEST NAME: NORMAL
DRUG STUDY TEST RESULT: NORMAL
ERYTHROCYTE [DISTWIDTH] IN BLOOD BY AUTOMATED COUNT: 12.7 % (ref 11.5–14.5)
EST. GFR  (NO RACE VARIABLE): >60 ML/MIN/1.73 M^2
FERRITIN SERPL-MCNC: 133 NG/ML (ref 20–300)
FOLATE SERPL-MCNC: 5.2 NG/ML (ref 4–24)
GLUCOSE SERPL-MCNC: 91 MG/DL (ref 70–110)
HCT VFR BLD AUTO: 38.3 % (ref 37–48.5)
HDLC SERPL-MCNC: 53 MG/DL (ref 40–75)
HDLC SERPL: 24.7 % (ref 20–50)
HGB BLD-MCNC: 12.7 G/DL (ref 12–16)
IMM GRANULOCYTES # BLD AUTO: 0.02 K/UL (ref 0–0.04)
IRON SERPL-MCNC: 72 UG/DL (ref 30–160)
LDLC SERPL CALC-MCNC: 137.4 MG/DL (ref 63–159)
MCH RBC QN AUTO: 28.8 PG (ref 27–31)
MCHC RBC AUTO-ENTMCNC: 33.2 G/DL (ref 32–36)
MCV RBC AUTO: 87 FL (ref 82–98)
NEUTROPHILS # BLD AUTO: 3.7 K/UL (ref 1.8–7.7)
NONHDLC SERPL-MCNC: 162 MG/DL
PLATELET # BLD AUTO: 308 K/UL (ref 150–450)
PMV BLD AUTO: 11 FL (ref 9.2–12.9)
POTASSIUM SERPL-SCNC: 5 MMOL/L (ref 3.5–5.1)
PROT SERPL-MCNC: 7.1 G/DL (ref 6–8.4)
RBC # BLD AUTO: 4.41 M/UL (ref 4–5.4)
SATURATED IRON: 19 % (ref 20–50)
SODIUM SERPL-SCNC: 136 MMOL/L (ref 136–145)
TOTAL IRON BINDING CAPACITY: 383 UG/DL (ref 250–450)
TRANSFERRIN SERPL-MCNC: 259 MG/DL (ref 200–375)
TRIGL SERPL-MCNC: 123 MG/DL (ref 30–150)
VIT B12 SERPL-MCNC: 355 PG/ML (ref 210–950)
WBC # BLD AUTO: 8.16 K/UL (ref 3.9–12.7)

## 2024-10-31 PROCEDURE — 3008F BODY MASS INDEX DOCD: CPT | Mod: CPTII,S$GLB,, | Performed by: NURSE PRACTITIONER

## 2024-10-31 PROCEDURE — 36415 COLL VENOUS BLD VENIPUNCTURE: CPT | Performed by: NURSE PRACTITIONER

## 2024-10-31 PROCEDURE — 82746 ASSAY OF FOLIC ACID SERUM: CPT | Performed by: NURSE PRACTITIONER

## 2024-10-31 PROCEDURE — 3044F HG A1C LEVEL LT 7.0%: CPT | Mod: CPTII,S$GLB,, | Performed by: NURSE PRACTITIONER

## 2024-10-31 PROCEDURE — G2211 COMPLEX E/M VISIT ADD ON: HCPCS | Mod: S$GLB,,, | Performed by: NURSE PRACTITIONER

## 2024-10-31 PROCEDURE — 3078F DIAST BP <80 MM HG: CPT | Mod: CPTII,S$GLB,, | Performed by: NURSE PRACTITIONER

## 2024-10-31 PROCEDURE — 80053 COMPREHEN METABOLIC PANEL: CPT | Performed by: NURSE PRACTITIONER

## 2024-10-31 PROCEDURE — 99214 OFFICE O/P EST MOD 30 MIN: CPT | Mod: S$GLB,,, | Performed by: NURSE PRACTITIONER

## 2024-10-31 PROCEDURE — 99000 SPECIMEN HANDLING OFFICE-LAB: CPT | Performed by: HOSPITALIST

## 2024-10-31 PROCEDURE — 3074F SYST BP LT 130 MM HG: CPT | Mod: CPTII,S$GLB,, | Performed by: NURSE PRACTITIONER

## 2024-10-31 PROCEDURE — 99999 PR PBB SHADOW E&M-EST. PATIENT-LVL III: CPT | Mod: PBBFAC,,, | Performed by: NURSE PRACTITIONER

## 2024-10-31 PROCEDURE — 80061 LIPID PANEL: CPT | Performed by: NURSE PRACTITIONER

## 2024-10-31 PROCEDURE — 85027 COMPLETE CBC AUTOMATED: CPT | Performed by: NURSE PRACTITIONER

## 2024-10-31 PROCEDURE — 82728 ASSAY OF FERRITIN: CPT | Performed by: NURSE PRACTITIONER

## 2024-10-31 PROCEDURE — 82607 VITAMIN B-12: CPT | Performed by: NURSE PRACTITIONER

## 2024-10-31 PROCEDURE — 83540 ASSAY OF IRON: CPT | Performed by: NURSE PRACTITIONER

## 2024-11-22 ENCOUNTER — OFFICE VISIT (OUTPATIENT)
Dept: OBSTETRICS AND GYNECOLOGY | Facility: CLINIC | Age: 44
End: 2024-11-22
Payer: COMMERCIAL

## 2024-11-22 ENCOUNTER — PATIENT MESSAGE (OUTPATIENT)
Dept: RESEARCH | Facility: HOSPITAL | Age: 44
End: 2024-11-22
Payer: COMMERCIAL

## 2024-11-22 VITALS — WEIGHT: 242 LBS | BODY MASS INDEX: 40.32 KG/M2 | HEIGHT: 65 IN

## 2024-11-22 DIAGNOSIS — C64.1 RENAL CELL CARCINOMA OF RIGHT KIDNEY: ICD-10-CM

## 2024-11-22 DIAGNOSIS — E66.01 MORBID OBESITY WITH BMI OF 40.0-44.9, ADULT: ICD-10-CM

## 2024-11-22 DIAGNOSIS — R73.03 PRE-DIABETES: Primary | ICD-10-CM

## 2024-11-22 NOTE — PROGRESS NOTES
The patient location is: home  The chief complaint leading to consultation is: follow up weight loss    Visit type: audiovisual    Face to Face time with patient: 10 minutes  15 minutes of total time spent on the encounter, which includes face to face time and non-face to face time preparing to see the patient (eg, review of tests), Obtaining and/or reviewing separately obtained history, Documenting clinical information in the electronic or other health record, Independently interpreting results (not separately reported) and communicating results to the patient/family/caregiver, or Care coordination (not separately reported).         Each patient to whom he or she provides medical services by telemedicine is:  (1) informed of the relationship between the physician and patient and the respective role of any other health care provider with respect to management of the patient; and (2) notified that he or she may decline to receive medical services by telemedicine and may withdraw from such care at any time.    Notes:    Subjective:      Teagan Griffith is a 44 y.o. female with history of renal cell carcinoma s/p right nephrectomy on 9/14/22 who presents for weight loss follow up. She is taking compounded semaglutide 0.5mg SC weekly. Initially some nausea, but now resolved. She has lost another 17lbs since her last visit, about 30lbs over all. Clothes are fitting better. She continues a low glycemic diet and working to increase her protein. Walking some for exercise. Just did screening labs and cholesterol has improved. Would like to continue 0.5mg for now.     Routine Screening Labs: 10/31/2024    Lab Visit on 10/31/2024   Component Date Value Ref Range Status    Cholesterol 10/31/2024 215 (H)  120 - 199 mg/dL Final    Triglycerides 10/31/2024 123  30 - 150 mg/dL Final    HDL 10/31/2024 53  40 - 75 mg/dL Final    LDL Cholesterol 10/31/2024 137.4  63.0 - 159.0 mg/dL Final    HDL/Cholesterol Ratio 10/31/2024 24.7   20.0 - 50.0 % Final    Total Cholesterol/HDL Ratio 10/31/2024 4.1  2.0 - 5.0 Final    Non-HDL Cholesterol 10/31/2024 162  mg/dL Final    Sodium 10/31/2024 136  136 - 145 mmol/L Final    Potassium 10/31/2024 5.0  3.5 - 5.1 mmol/L Final    Chloride 10/31/2024 107  95 - 110 mmol/L Final    CO2 10/31/2024 23  23 - 29 mmol/L Final    Glucose 10/31/2024 91  70 - 110 mg/dL Final    BUN 10/31/2024 16  6 - 20 mg/dL Final    Creatinine 10/31/2024 1.1  0.5 - 1.4 mg/dL Final    Calcium 10/31/2024 10.0  8.7 - 10.5 mg/dL Final    Total Protein 10/31/2024 7.1  6.0 - 8.4 g/dL Final    Albumin 10/31/2024 3.9  3.5 - 5.2 g/dL Final    Total Bilirubin 10/31/2024 0.3  0.1 - 1.0 mg/dL Final    Alkaline Phosphatase 10/31/2024 64  40 - 150 U/L Final    AST 10/31/2024 16  10 - 40 U/L Final    ALT 10/31/2024 24  10 - 44 U/L Final    eGFR 10/31/2024 >60.0  >60 mL/min/1.73 m^2 Final    Anion Gap 10/31/2024 6 (L)  8 - 16 mmol/L Final    Drug Study Test Name 10/31/2024 Drug Study   Final    Drug Study Specimen Type 10/31/2024 BLOOD   Final    Drug Study Test Result 10/31/2024 Result sent directly to physician   Final    WBC 10/31/2024 8.16  3.90 - 12.70 K/uL Final    RBC 10/31/2024 4.41  4.00 - 5.40 M/uL Final    Hemoglobin 10/31/2024 12.7  12.0 - 16.0 g/dL Final    Hematocrit 10/31/2024 38.3  37.0 - 48.5 % Final    MCV 10/31/2024 87  82 - 98 fL Final    MCH 10/31/2024 28.8  27.0 - 31.0 pg Final    MCHC 10/31/2024 33.2  32.0 - 36.0 g/dL Final    RDW 10/31/2024 12.7  11.5 - 14.5 % Final    Platelets 10/31/2024 308  150 - 450 K/uL Final    MPV 10/31/2024 11.0  9.2 - 12.9 fL Final    Gran # (ANC) 10/31/2024 3.7  1.8 - 7.7 K/uL Final    Immature Grans (Abs) 10/31/2024 0.02  0.00 - 0.04 K/uL Final    Iron 10/31/2024 72  30 - 160 ug/dL Final    Transferrin 10/31/2024 259  200 - 375 mg/dL Final    TIBC 10/31/2024 383  250 - 450 ug/dL Final    Saturated Iron 10/31/2024 19 (L)  20 - 50 % Final    Ferritin 10/31/2024 133  20.0 - 300.0 ng/mL Final     Folate 10/31/2024 5.2  4.0 - 24.0 ng/mL Final    Vitamin B-12 10/31/2024 355  210 - 950 pg/mL Final   Admission on 09/24/2024, Discharged on 09/24/2024   Component Date Value Ref Range Status    WBC 09/24/2024 9.60  3.90 - 12.70 K/uL Final    RBC 09/24/2024 4.11  4.00 - 5.40 M/uL Final    Hemoglobin 09/24/2024 11.8 (L)  12.0 - 16.0 g/dL Final    Hematocrit 09/24/2024 35.6 (L)  37.0 - 48.5 % Final    MCV 09/24/2024 87  82 - 98 fL Final    MCH 09/24/2024 28.7  27.0 - 31.0 pg Final    MCHC 09/24/2024 33.1  32.0 - 36.0 g/dL Final    RDW 09/24/2024 13.0  11.5 - 14.5 % Final    Platelets 09/24/2024 343  150 - 450 K/uL Final    MPV 09/24/2024 10.8  9.2 - 12.9 fL Final    Immature Granulocytes 09/24/2024 0.3  0.0 - 0.5 % Final    Gran # (ANC) 09/24/2024 5.9  1.8 - 7.7 K/uL Final    Immature Grans (Abs) 09/24/2024 0.03  0.00 - 0.04 K/uL Final    Lymph # 09/24/2024 2.9  1.0 - 4.8 K/uL Final    Mono # 09/24/2024 0.5  0.3 - 1.0 K/uL Final    Eos # 09/24/2024 0.3  0.0 - 0.5 K/uL Final    Baso # 09/24/2024 0.06  0.00 - 0.20 K/uL Final    nRBC 09/24/2024 0  0 /100 WBC Final    Gran % 09/24/2024 61.1  38.0 - 73.0 % Final    Lymph % 09/24/2024 30.1  18.0 - 48.0 % Final    Mono % 09/24/2024 5.1  4.0 - 15.0 % Final    Eosinophil % 09/24/2024 2.8  0.0 - 8.0 % Final    Basophil % 09/24/2024 0.6  0.0 - 1.9 % Final    Differential Method 09/24/2024 Automated   Final    Sodium 09/24/2024 135 (L)  136 - 145 mmol/L Final    Potassium 09/24/2024 4.2  3.5 - 5.1 mmol/L Final    Chloride 09/24/2024 106  95 - 110 mmol/L Final    CO2 09/24/2024 20 (L)  23 - 29 mmol/L Final    Glucose 09/24/2024 95  70 - 110 mg/dL Final    BUN 09/24/2024 19  6 - 20 mg/dL Final    Creatinine 09/24/2024 0.9  0.5 - 1.4 mg/dL Final    Calcium 09/24/2024 10.1  8.7 - 10.5 mg/dL Final    Total Protein 09/24/2024 6.8  6.0 - 8.4 g/dL Final    Albumin 09/24/2024 3.9  3.5 - 5.2 g/dL Final    Total Bilirubin 09/24/2024 0.2  0.1 - 1.0 mg/dL Final    Alkaline Phosphatase  2024 57  55 - 135 U/L Final    AST 2024 17  10 - 40 U/L Final    ALT 2024 20  10 - 44 U/L Final    eGFR 2024 >60.0  >60 mL/min/1.73 m^2 Final    Anion Gap 2024 9  8 - 16 mmol/L Final    Specimen UA 2024 Urine, Clean Catch   Final    Color, UA 2024 Straw  Yellow, Straw, [place] Final    Appearance, UA 2024 Clear  Clear Final    pH, UA 2024 5.0  5.0 - 8.0 Final    Specific Gravity, UA 2024 1.010  1.005 - 1.030 Final    Protein, UA 2024 Negative  Negative Final    Glucose, UA 2024 Negative  Negative Final    Ketones, UA 2024 Negative  Negative Final    Bilirubin (UA) 2024 Negative  Negative Final    Occult Blood UA 2024 Negative  Negative Final    Nitrite, UA 2024 Negative  Negative Final    Leukocytes, UA 2024 3+ (A)  Negative Final    POC Preg Test, Ur 2024 Negative  Negative Final     Acceptable 2024 Yes   Final    RBC, UA 2024 1  0 - 4 /hpf Final    WBC, UA 2024 75 (H)  0 - 5 /hpf Final    Bacteria 2024 Occasional  None-Occ /hpf Final    Squam Epithel, UA 2024 2  /hpf Final    Microscopic Comment 2024 SEE COMMENT   Final    Urine Culture, Routine 2024  (A)   Final                    Value:STREPTOCOCCUS AGALACTIAE (GROUP B)  50,000 - 99,999 cfu/ml  In case of Penicillin allergy, call lab for further testing.  Beta-hemolytic streptococci are routinely susceptible to   penicillins,cephalosporins and carbapenems.  No other significant isolate         Past Medical History:   Diagnosis Date    JEN (acute kidney injury) 2022    Depression     Renal cell carcinoma 2022     Past Surgical History:   Procedure Laterality Date     SECTION      LAPAROSCOPIC ROBOT-ASSISTED SURGICAL REMOVAL OF KIDNEY USING DA CAEASR XI Right 2022    Procedure: XI ROBOTIC NEPHRECTOMY;  Surgeon: Justin Riley MD;  Location: [facility] OR 2ND FLR;  Service: Urology;   Laterality: Right;  4 hours     Social History     Tobacco Use    Smoking status: Former     Current packs/day: 0.00     Types: Cigarettes     Start date: 1998     Quit date: 2018     Years since quittin.8    Smokeless tobacco: Never   Substance Use Topics    Alcohol use: Yes     Alcohol/week: 42.0 standard drinks of alcohol     Types: 42 Cans of beer per week     Family History   Problem Relation Name Age of Onset    Drug abuse Mother      Prostate cancer Father      Lymphoma Paternal Grandfather      Kidney cancer Neg Hx       OB History   No obstetric history on file.       Current Outpatient Medications:     acetaminophen (TYLENOL) 500 MG tablet, Take 2 tablets (1,000 mg total) by mouth every 8 (eight) hours as needed for Pain., Disp: 90 tablet, Rfl: 0    ALPRAZolam (XANAX) 1 MG tablet, Take 1 tablet (1 mg total) by mouth 2 (two) times daily as needed for Anxiety or Insomnia., Disp: 60 tablet, Rfl: 0    dextroamphetamine-amphetamine 30 mg Tab, Take 1 tablet (30 mg total) by mouth 2 (two) times a day., Disp: 60 tablet, Rfl: 0    dextroamphetamine-amphetamine 30 mg Tab, Take 1 tablet (30 mg total) by mouth 2 (two) times a day., Disp: 60 tablet, Rfl: 0    semaglutide, weight loss, 0.5 mg/0.5 mL PnIj, Inject 0.5 mg into the skin every 7 days., Disp: , Rfl:     traMADoL (ULTRAM) 50 mg tablet, Take 1 tablet (50 mg total) by mouth every 6 (six) hours as needed for Pain. (Patient not taking: Reported on 10/31/2024), Disp: 42 each, Rfl: 0  No current facility-administered medications for this visit.    Facility-Administered Medications Ordered in Other Visits:     diphenhydrAMINE injection 25 mg, 25 mg, Intravenous, Q6H PRN, Elias Doss MD    fentaNYL 50 mcg/mL injection 25 mcg, 25 mcg, Intravenous, Q5 Min PRN, Elias Doss MD    fentaNYL 50 mcg/mL injection  mcg,  mcg, Intravenous, PRN, Matt Ellison DO, 50 mcg at 22 0735    HYDROmorphone injection 0.2 mg, 0.2 mg, Intravenous,  "Q5 Min PRN, Elias Doss MD, 0.2 mg at 09/14/22 1455    midazolam (VERSED) 1 mg/mL injection 2 mg, 2 mg, Intravenous, PRN, Matt Ellison DO, 2 mg at 09/14/22 0735    prochlorperazine injection Soln 5 mg, 5 mg, Intravenous, Q30 Min PRSelam PADGETT Alexis, MD    sodium chloride 0.9% flush 10 mL, 10 mL, Intravenous, PRNSelam Alexis, MD    Review of Systems:  General: No fever, chills.  Chest: No chest pain, shortness of breath, or palpitations.  Breast: No pain, masses, or nipple discharge.  Vulva: No pain, lesions, or itching.  Vagina: No relaxation, itching, discharge, or lesions.  Abdomen: No pain, nausea, vomiting, diarrhea, or constipation.  Urinary: No incontinence, nocturia, frequency, or dysuria.  Extremities:  No leg cramps, edema, or calf pain.  Neurologic: No headaches, dizziness, or visual changes.    Objective:     Vitals:    11/22/24 0736   Weight: 109.8 kg (242 lb)   Height: 5' 5" (1.651 m)     Body mass index is 40.27 kg/m².    PHYSICAL EXAM:  APPEARANCE: Well nourished, well developed, in no acute distress.  AFFECT: WNL, alert and oriented x 3      Assessment:      Pre-diabetes    Renal cell carcinoma of right kidney    Morbid obesity with BMI of 40.0-44.9, adult  -     semaglutide, weight loss, 0.5 mg/0.5 mL PnIj; Inject 0.5 mg into the skin every 7 days.        Plan:   Continue low glycemic diet with lean protein at each meal.  Maintain hydration.  Continue compounded semaglutide 0.5mg SC weekly- faxed to [email or web address]  Fran for exercise  Follow up in 3 months    Instructed patient to call if she experiences any side effects or has any questions.    I spent a total of 15 minutes on the day of the visit.This includes face to face time and non-face to face time preparing to see the patient (eg, review of tests), obtaining and/or reviewing separately obtained history, documenting clinical information in the electronic or other health record, independently interpreting results and " communicating results to the patient/family/caregiver, or care coordinator.

## 2024-12-10 ENCOUNTER — TELEPHONE (OUTPATIENT)
Dept: RESEARCH | Facility: HOSPITAL | Age: 44
End: 2024-12-10
Payer: COMMERCIAL

## 2024-12-16 NOTE — TELEPHONE ENCOUNTER
[facility] encounter  HEMATOLOGY/ONCOLOGY OFFICE VISIT    CHIEF COMPLAINT:   No chief complaint on file.      HISTORY OF PRESENT ILLNESS:  Beto Nath is a 65 year old male who presents for evaluation of adenocarcinoma of probable colorectal origin. Beto had a number of comorbidities which led to a delay in a definitive diagnosis. He has a h/o retoperitoneal fibrosis with some mildly enlarged para aortic LN's going back several years. He had an evaluation of these by his previous oncologist and these were thought not to be malignant. He has also had bladder stones and kidney stones and bleeding hemmorrhoids treated surgically, ( path from this surgical hemorrhoidectomy was not sent). During the course of his evaluation for these problems he was found to have some progressive inguinal adenopathy, and again embarked upon an evaluation. The inguinal LN returned with positive pathology for adenocarcinoma but a primary in the colon or rectum, although suspected, was not found. He did then have biopsy of some perianal skin returning as adenocarcinoma ( pagets?). At this the thought is that he had a low rectal adenocarcinoma that may have been removed in the \" hemorrhoidectomy\" that has metastasized to LN's.  He has started FOLFOX chemotherapy.       He is seen today with his wife present by his side. He is feeling well today without major concerns. Neuropathy still present but unchanged. His pain is well controlled. Denies other symptoms at the moment. Eating and drinking well.     ONCOLOGIC HISTORY:   01/20/2020 Colonoscopy normal except external hemorrhoids.   11/21/2022 Grade 2 internal hemorrhoids treated with banding and anoscopy ( for a 1 year h/o anal bleeding and pain)   06/01/2023 Colonoscopy: Internal hemorrhoids, grade 3-4 status post band ligation x2 on the right side Colonoscopy was otherwise unremarkable  01/29/2024 Excisional hemorrhoidectomy of right anterior and right posterior  Attempted to schedule genetic consult, no answer. Left a brief message with my direct call back number.   hemorrhoids  Anoscopy  04/24/2024 CT Abdomen Pelvis: Interval enlargement of right inguinal and periaortic lymphadenopathy.  05/30/2024 CT Abdomen Pelvis:  Unchanged right inguinal, external iliac and para-aortic lymphadenopathy  with soft tissue thickening about the abdominal aorta consistent with known  retroperitoneal fibrosis. Lymph nodes are better characterized on prior  PET/CT  05/07/2024 Lymph node, right inguinal, needle core biopsy: Metastatic adenocarcinoma  PDL1 CPS =5, pMMR, RASwt ( see [facility] NGS report at bottom of note.   06/03/2024 PET/CT: FDG avid right pelvic lymphadenopathy extending into the abdomen. Intense  anal activity is slightly decreased from prior and most commonly related to  hemorrhoids as identified on colonoscopy and anoscopy. Otherwise no  potential primary identified  06/17/2024 MRI Rectum: Abnormal circumferential wall thickening of the anal canal is present.  The finding consists of unusual relative uniform circumferential wall  thickening involving primarily the submucosal tissues and internal  sphincter along the entire length of the anal sphincter complex. This  measures up to approximately 4 cm in length but with a linear region of  abnormal signal extending to the anus and perianal tissues posteriorly on  the right. These findings correlate with the site of hypermetabolic  activity on recent CT PET study and are new/progressive since 2022 06/19/2024 CEA = 13.5  07/02/2024 Anorectal exam under anesthesia and biopsy of right perianal skin. Circumferential erythematous excoriated perianal skin changes with irregular borders and a 1 cm area that is more firm/thickened in the right lateral location. This is consistent with Pagets or Bowens disease in appearance. Given adenocarcinoma in the regional lymph node, favor Pagets disease   07/02/2024 Right perianal skin, biopsy: Poorly differentiated adenocarcinoma with focal signet ring cell features, most consistent with a primary  rectal adenocarcinoma.   07/09/2024 Signatera ctDNA = 79.93  07/09/2024 CEA = 13.3  07/09/2024 C#1 FOLFOX   07/23/2024 C#2 FOLFOX   08/06/2024 C#3 FOLFOX   08/20/2024 C#4 FOLFOX   09/03/2024 C#5 FOLFOX   09/16/2024 C#6 FOLFOX   10/01/2024 C#7 FOLFOX  10/15/2024 CEA = 2.7  10/15/2024 C#8 FOLFOX  10/25/2024 MRI Rectum: Decrease in volume of anal tumor, as demonstrated by the decreased  thickness and length of the enhancing signal abnormality involving the  internal anal sphincter. No evidence of invasion into the intersphincteric  space or external sphincter. Decrease in size of right pelvic dionne metastases  10/28/2024 PET/CT: Persistent/mild progression intense anorectal avidity. Favorphysiologic/inflammatory etiology. Cannot exclude  underlying malignancy.Decreased/resolved avidity associated with abdominopelvic dionne involvement.No new avid metastasis.  10/29/2024 signatera ctDNA = 0  11/12/2024 CEA <2.0  11/24/2024 - present concurrent Capecitabine + Radiation    PAST HISTORIES:  Past medical history, surgical history, family history, and social history were reviewed and updated.    MEDICATIONS / ALLERGIES:  Medications and allergies were reviewed and updated.    Review of systems:  No notes on file    PHYSICAL EXAM:  Vital Signs:   Oncology Encounter Vitals   ONC OP Encounter Vitals Group      BP       Pulse       Resp       Temp       Temp src       SpO2       Weight       Height       Pain Score       Pain Location       Pain Education?       BSA (Calculated - m2) - Josué & Josué       BSA (Calculated - sq m)       BMI (Calculated)      ECOG Performance Status:   ECOG   ECOG Performance Status      General: The patient is alert, well-developed, well-nourished, no distress.    Psychiatric: Cooperative. Appropriate mood and affect. Normal judgment.    DATA REVIEWED:  Laboratory Data:  Recent Labs   Lab 12/09/24  0808   WBC 1.7*   RBC 3.15*   HGB 10.9*   HCT 31.7*   .6*   PLT 93*   Absolute Neutrophils  0.8*   Absolute Lymphocytes 0.6*   Absolute Monocytes 0.2*   Absolute Eosinophils  0.1   Absolute Basophils 0.0     Recent Labs   Lab 12/09/24  0808 11/25/24  0806 11/12/24  1051   Glucose 136*   < > 170*   Sodium 145   < > 141   Potassium 3.7   < > 3.3*   Chloride 108   < > 105   Carbon Dioxide 26   < > 26   BUN 12   < > 11   Creatinine 0.83   < > 0.85   Calcium 9.0   < > 9.5   Magnesium  --   --  1.4*   Protein, Total 6.2*   < > 6.1*   Albumin 3.4   < > 3.2*   GOT/AST 16   < > 23   Alkaline Phosphatase 43*   < > 50   GPT 21   < > 23    < > = values in this interval not displayed.     Recent Labs   Lab 12/09/24  0808   Anion Gap 15   Globulin 2.8   Bilirubin, Total 0.4       Imaging Studies:  Imaging studies reviewed.     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 Inguinal LN biopsy adenocarcinoma, pMMR PDL1 CPS = 5 Tumor of origin testing 48% probability rectal origin. Perianal tissue adenocarcinoma (possible pagets) no clear primary but presumed colorectal. Cystoscopy negative.  He did have a hemorrhoidectomy but no tissue sent to path, could there have been malignancy in this specimen?   3. [facility] NGS testing no therepeutic targets,    4. We have started therapy with systemic chemotherapy for rectal cancer following a JOSE approach. .   5. The final plan of treatment is somewhat in flux. He does have a para aortic LN which would be non regional, therefore an argument for systemic therapy alone could be made. However he has long standing retroperitoneal fibrosis and this node could be related to a different process, it was felt that it would be a very difficult biopsy. We could also treat with chemotherapy followed by radiation therapy and then reassess and make any surgical decisions at that time. Depending then on the extent of disease, watch and wait or surgery. The plan was to complete 8 cycles of FOLFOX and then reassess, then plan chemotherapy XRT and again reassess.   6. Labs  reviewed and discussed  ANC slightly decreased to 1.5. Continue to monitor.   7. Symptoms from cancer and its treatment and clinical condition discussed and reviewed, Chemotherapy has not been easy for Beto. He has required potassium replacement magnesium replacement and GCSF support with peg figrastim and additional IVF for supportive care. Recieiving home PO potassium and magnesium. Continue sx management with narcotics. Continue mag and potassium supplements   8.  signatera  was elevated at diagnosis, (79.93) this is now zero.   9. Continue XRT with capecitabine. Started 11/24/2024 plans to end on 01/02/2024, check labs weekly                          The patient indicated understanding of the diagnosis and agreed with the plan of care. The patient is encouraged to call between now and next visit with any problems, questions or concerns that arise.    I spent a total of 17 minutes with this patient and chart

## 2024-12-26 ENCOUNTER — OFFICE VISIT (OUTPATIENT)
Dept: PSYCHIATRY | Facility: CLINIC | Age: 44
End: 2024-12-26
Payer: COMMERCIAL

## 2024-12-26 DIAGNOSIS — G47.00 INSOMNIA, UNSPECIFIED TYPE: ICD-10-CM

## 2024-12-26 DIAGNOSIS — F41.1 GAD (GENERALIZED ANXIETY DISORDER): ICD-10-CM

## 2024-12-26 DIAGNOSIS — F90.0 ATTENTION DEFICIT HYPERACTIVITY DISORDER (ADHD), PREDOMINANTLY INATTENTIVE TYPE: ICD-10-CM

## 2024-12-26 DIAGNOSIS — F33.1 MODERATE EPISODE OF RECURRENT MAJOR DEPRESSIVE DISORDER: Primary | ICD-10-CM

## 2024-12-26 RX ORDER — DEXTROAMPHETAMINE SACCHARATE, AMPHETAMINE ASPARTATE, DEXTROAMPHETAMINE SULFATE AND AMPHETAMINE SULFATE 7.5; 7.5; 7.5; 7.5 MG/1; MG/1; MG/1; MG/1
30 TABLET ORAL 2 TIMES DAILY
Qty: 60 TABLET | Refills: 0 | Status: SHIPPED | OUTPATIENT
Start: 2025-01-26 | End: 2025-02-25

## 2024-12-26 RX ORDER — DEXTROAMPHETAMINE SACCHARATE, AMPHETAMINE ASPARTATE, DEXTROAMPHETAMINE SULFATE AND AMPHETAMINE SULFATE 7.5; 7.5; 7.5; 7.5 MG/1; MG/1; MG/1; MG/1
30 TABLET ORAL 2 TIMES DAILY
Qty: 60 TABLET | Refills: 0 | Status: SHIPPED | OUTPATIENT
Start: 2025-02-26 | End: 2025-03-28

## 2024-12-26 RX ORDER — ALPRAZOLAM 1 MG/1
1 TABLET ORAL 2 TIMES DAILY PRN
Qty: 60 TABLET | Refills: 2 | Status: SHIPPED | OUTPATIENT
Start: 2024-12-26 | End: 2025-03-26

## 2024-12-26 RX ORDER — SERTRALINE HYDROCHLORIDE 50 MG/1
50 TABLET, FILM COATED ORAL DAILY
Qty: 30 TABLET | Refills: 2 | Status: SHIPPED | OUTPATIENT
Start: 2024-12-26 | End: 2025-03-26

## 2024-12-26 RX ORDER — DEXTROAMPHETAMINE SACCHARATE, AMPHETAMINE ASPARTATE, DEXTROAMPHETAMINE SULFATE AND AMPHETAMINE SULFATE 7.5; 7.5; 7.5; 7.5 MG/1; MG/1; MG/1; MG/1
30 TABLET ORAL 2 TIMES DAILY
Qty: 60 TABLET | Refills: 0 | Status: SHIPPED | OUTPATIENT
Start: 2024-12-26

## 2024-12-26 NOTE — PROGRESS NOTES
OUTPATIENT PSYCHIATRY FOLLOW UP VISIT    ENCOUNTER DATE:  12/26/2024  The patient location is: [address]  The chief complaint leading to consultation is: follow-up for continuing medical treatment for MDD, ADHD, CHEYENNE    LENGTH OF SESSION:  30 minutes    Visit type: audio and visual    30 minutes of total time spent on the encounter, which includes face to face time and non-face to face time preparing to see the patient (eg, review of tests), Obtaining and/or reviewing separately obtained history, Documenting clinical information in the electronic or other health record, Independently interpreting results (not separately reported) and communicating results to the patient/family/caregiver, or Care coordination (not separately reported).     Each patient to whom he or she provides medical services by telemedicine is:  (1) informed of the relationship between the physician and patient and the respective role of any other health care provider with respect to management of the patient; and (2) notified that he or she may decline to receive medical services by telemedicine and may withdraw from such care at any time.    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 Patient was last seen in clinic on 8/15/2024.      Plan at last appointment on 8/15/2024:  Continue Zoloft to 150 mg PO daily for  anxiety/PTSD/OCD  Recommend patient discontinue lamictal 50 mg daily   Continue Vraylar 4.5 mg PO daily   Continue Clonidine 0.1 mg PO daily for irritability/ADHD/PTSD/insomnia.   Continue Adderall 30 mg BID for ADHD and adjunct for depression--will continue for now. Awaiting formal diagnostic testing by psychology for ADHD.   Continue Xanax 2 mg PO PRN for insomnia/anxiety     Interval history as told by Patient - & or family/friend/spouse/caregiver with pts permission    The patient reports feeling depressed and has only been taking Adderall and Xanax, having discontinued other medications due to difficulty determining which were most effective. Stated that she is unsure when she discontinued the medications but was probably around September.  She cited family issues during the holidays, leading to strained relationships and a decision to stop speaking with her family. Although the depression has been challenging, it is not as severe as it was previously. The patient believes the holidays contributed to an increase in depressive symptoms. She has been taking Xanax daily and reports improved sleep (6-8 hours per night), attributing this improvement to the medication.    The patient is currently working and pursuing an English degree but had to drop this semester due to family issues. She plans to resume with a reduced course load. Despite hoping her depression would improve, she has been struggling with getting out of bed, managing to rise only about 10 minutes before work. The depression began around September, but she is still managing self-care and denies any SI/HI/AVH.    The patient mentioned experiencing intrusive thoughts in the past while living near her father who she does not have a good relationship with, but reports feeling less stressed now that she is not in contact with her family. She has been taking Adderall consistently for 15 years, which she feels is still effective.    Other relevant  details:  Alcohol: Social use only  Appetite: Reduced appetite due to Wegovy - still consuming protein drinks and adequate daily hydration  Drug use: Denied          PSYCHIATRIC REVIEW OF SYSTEMS:(none/ yes- better/worse/stable/& what symptoms)    Symptoms of Depression: Patient endorsed low mood, change in appetite, apathy, does not want to read, not doing the things that she enjoys doing      Symptoms of CHEYENNE: Endorsed low grade anxiety but is improving, not excessive    Symptoms of Panic Attacks: Patient denied recent panic attacks    Symptoms of tiff or hypomania: Denied all    Symptoms of psychosis: Denied all     Sleep: improved with xanax      Risk Parameters:  Patient reports no suicidal ideation  Patient reports no homicidal ideation  Patient reports no self-injurious behavior  Patient reports no violent behavior      Current psych meds  Vraylar   Lamictal  Xanax  Adderall  Zoloft   Clonidine    Medication side effects:  Patient denied  Medication compliance:  Often forgets lamictal and clonidine    Medication Trials  Risperdal was discontinued in favor of the vraylar.       Xanax - patient stated that she takes it for panic attacks and to help with sleep - has been on it since her 20s. Was addicted to it and weaned but was put back on by Dr. Keita. Was up to 4 mg daily in the past.    Clonidine - Pt reports taking the medication as needed at night but is unsure if it is helping with insomnia or irritability but notes that she feels better overall.    Family hx: Paternal Great-grandfather killed himself and mom and dad were alcoholics and mom used drugs. Grandfather killed himself    PAST PSYCHIATRIC, MEDICAL, AND SOCIAL HISTORY REVIEWED  The patient's past medical, family and social history have been reviewed and updated as appropriate within the electronic medical record - see encounter notes.    ADHD - diagnosed by Dr. Keita via history taking, no formal diagnostic testing.  Bipolar Disorder - Patient  stated that she does not think that she has.  She stated that she had one manic episode where she slept around and did not think that she was acting like herself and had to be admitted to inpatient psych. During this period, she had substance use of cocaine, alcohol, and marijuana use and was was drinking heavily, partying, promiscuous behavior and self-harm.     MEDICAL REVIEW OF SYSTEMS:  Complete review of systems performed covering Constitutional, Musculoskeletal, Neurologic.  All systems negative    ALL MEDICATIONS:    Current Outpatient Medications:     acetaminophen (TYLENOL) 500 MG tablet, Take 2 tablets (1,000 mg total) by mouth every 8 (eight) hours as needed for Pain., Disp: 90 tablet, Rfl: 0    ALPRAZolam (XANAX) 1 MG tablet, Take 1 tablet (1 mg total) by mouth 2 (two) times daily as needed for Anxiety or Insomnia., Disp: 60 tablet, Rfl: 0    dextroamphetamine-amphetamine 30 mg Tab, Take 1 tablet (30 mg total) by mouth 2 (two) times a day., Disp: 60 tablet, Rfl: 0    dextroamphetamine-amphetamine 30 mg Tab, Take 1 tablet (30 mg total) by mouth 2 (two) times a day., Disp: 60 tablet, Rfl: 0    semaglutide, weight loss, 0.5 mg/0.5 mL PnIj, Inject 0.5 mg into the skin every 7 days., Disp: , Rfl:     traMADoL (ULTRAM) 50 mg tablet, Take 1 tablet (50 mg total) by mouth every 6 (six) hours as needed for Pain. (Patient not taking: Reported on 10/31/2024), Disp: 42 each, Rfl: 0  No current facility-administered medications for this visit.    Facility-Administered Medications Ordered in Other Visits:     diphenhydrAMINE injection 25 mg, 25 mg, Intravenous, Q6H PRN, Elias Doss MD    fentaNYL 50 mcg/mL injection 25 mcg, 25 mcg, Intravenous, Q5 Min PRN, Elias Doss MD    fentaNYL 50 mcg/mL injection  mcg,  mcg, Intravenous, PRN, Matt Ellison DO, 50 mcg at 09/14/22 0735    HYDROmorphone injection 0.2 mg, 0.2 mg, Intravenous, Q5 Min PRN, Elias Doss MD, 0.2 mg at 09/14/22 1450     midazolam (VERSED) 1 mg/mL injection 2 mg, 2 mg, Intravenous, PRN, Matt Ellison, DO, 2 mg at 09/14/22 0735    prochlorperazine injection Soln 5 mg, 5 mg, Intravenous, Q30 Min PRN, Elias Doss MD    sodium chloride 0.9% flush 10 mL, 10 mL, Intravenous, PRN, Elias Doss MD    ALLERGIES:  Review of patient's allergies indicates:  No Known Allergies    RELEVANT LABS/STUDIES:    Lab Results   Component Value Date    WBC 8.16 10/31/2024    HGB 12.7 10/31/2024    HCT 38.3 10/31/2024    MCV 87 10/31/2024     10/31/2024     BMP  Lab Results   Component Value Date     10/31/2024    K 5.0 10/31/2024     10/31/2024    CO2 23 10/31/2024    BUN 16 10/31/2024    CREATININE 1.1 10/31/2024    CALCIUM 10.0 10/31/2024    ANIONGAP 6 (L) 10/31/2024     Lab Results   Component Value Date    ALT 24 10/31/2024    AST 16 10/31/2024    ALKPHOS 64 10/31/2024    BILITOT 0.3 10/31/2024     Lab Results   Component Value Date    TSH 0.734 03/27/2024     Lab Results   Component Value Date    HGBA1C 5.7 (H) 03/27/2024       VITALS  There were no vitals filed for this visit. Virtual visit      PHYSICAL EXAM  Telephone visit      PSYCHIATRIC EXAM:     Mental Status Exam:  Appearance: telephone visit, voice only  Behavior/Cooperation: normal, cooperative  Speech: normal tone, normal rate, normal pitch, normal volume  Language: uses words appropriately; NO aphasia or dysarthria  Mood:  depressed  Affect: Full, appropriate, anxious, expressive  Thought Process: normal and logical  Thought Content: normal, no suicidality, no homicidality, delusions, or paranoia  Level of Consciousness: Alert and Oriented x3  Memory:  Intact      Recent: Intact; able to report recent events   Remote:  Intact;Attention/concentration: appropriate for age/education.   Fund of Knowledge: appears adequate  Insight:   Intact  Judgment:  Intact       IMPRESSION:    Teagan Griffith is a 45 y/o female who presents for follow up appointment.  Patient was last seen in clinic on 8/15/2024.Since her last visity, she reports that she has been struggling with depression, worsened by family issues during the holidays. She is only taking Adderall and Xanax, having stopped other medications shortly after the last visit. While her depression isnt as severe as before, she still has trouble getting out of bed. She is currently working and studying, though she had to drop a semester due to family stress. The patient sleeps better with Xanax and has no thoughts of self-harm. She uses alcohol socially, her appetite is reduced due to Wegovy, and denies drug use. Will restart zoloft and continue to monitor for improvement.    Status/Progress:  Based on the examination today, the patient's problem(s) is/are inadequately controlled.  New problems have not been presented today.     DIAGNOSES:    ICD-10-CM ICD-9-CM   1. Moderate episode of recurrent major depressive disorder  F33.1 296.32   2. CHEYENNE (generalized anxiety disorder)  F41.1 300.02   3. Attention deficit hyperactivity disorder (ADHD), predominantly inattentive type  F90.0 314.00   4. Insomnia, unspecified type  G47.00 780.52           PLAN:  Psych Med:  Restart Zoloft at 25 mg daily and increase to 50 mg daily in one week  Patient reported discontinuing her zoloft on her own and reports return of depressive sypmtoms    Continue Adderall 30 mg BID for ADHD and adjunct for depression  Patient reports being stable on this dose for approx 15 yrs    Continue Xanax 2 mg PO PRN for insomnia/anxiety   Will attempt to wean when symptoms of anxiety are optimally managed.    Hx of xanax addiction in the past.  Discussed risk/benefits and plan for temporary use only.  Patient agrees.     Discussed techniques of sleep hygiene  Stop pre-sleep electronic use  Remove naps  Keep fixed bedtime and wake-up time  Avoid caffeine and alcohol  Improve your sleeping environment     reviewed. No discrepancies noted.      Discussed  with patient informed consent, risks vs. benefits, alternative treatments, side effect profile and the inherent unpredictability of individual responses to these treatments. Answered any questions patient may have had. The patient expresses understanding of the above and displays the capacity to agree with this current plan       RETURN TO CLINIC:  1 month    Instructions:  Take all medications as prescribed.    Abstain from recreational drugs and alcohol.  Present to ED or call 911 for SI/HI plan or intent, psychosis, or medical emergency.      Case to be discussed with supervising staff: Scooby Vyas MD    Total of 30 minutes spent today on encounter, including chart review,  review, seeing patient, documenting encounter, ordering medications.     Naresh Moreno MD  [facility] PGY-III

## 2025-02-05 ENCOUNTER — TELEPHONE (OUTPATIENT)
Dept: HEMATOLOGY/ONCOLOGY | Facility: CLINIC | Age: 45
End: 2025-02-05
Payer: COMMERCIAL

## 2025-02-05 NOTE — PROGRESS NOTES
Primary Care Oncology Visit    Subjective:   Patient ID: Teagan Griffith is a 44 y.o. female.    Chief Complaint: Renal cell carcinoma of right kidney    Teagan Griffith comes in today for a 3 month primary care/oncology follow up    She does not have a primary care provider currently or has not seen her primary care provider in the last 6 months.     Cancer Staging   Renal cell carcinoma  Staging form: Kidney, AJCC 8th Edition  - Pathologic: Stage Unknown (pT2b, pNX, cM0) - Signed by Moshe Jane MD on 10/6/2022  - Pathologic: Stage II (pT2, pN0, cM0) - Signed by Moshe Jane MD on 11/9/2022    Ms. 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  Now getting every 6 months scans for surveillance    BP Readings from Last 5 Encounters:   02/06/25 (!) 118/58   10/31/24 118/66   10/09/24 (!) 101/58   09/24/24 128/89   05/20/24 139/79     Lab Results   Component Value Date    WBC 8.16 10/31/2024    HGB 12.7 10/31/2024    HCT 38.3 10/31/2024    MCV 87 10/31/2024     10/31/2024    CHOL 215 (H) 10/31/2024    TRIG 123 10/31/2024    HDL 53 10/31/2024    ALT 24 10/31/2024    AST 16 10/31/2024    TSH 0.734 03/27/2024    HGBA1C 5.7 (H) 03/27/2024      Dexa results: No results found for this or any previous visit.    HPI  Being seen today for a prim/onc follow up.  She is generally felling well today.  Had an increase in her depression over the holidays.  Is being followed by psychiatry and psychology.  She now is on zoloft and xanax as needed and is doing well with this.  Taking her adderrall as well.  She moved homes recently and is not currently speaking with her parents.  She continues to lose weight on ozempic (has lost  about 40 lbs).  Continues to work with Genoveva GRACE for this.  Not currently exercising or following a particular diet, but plans to start.  She has increased her protein intake.  Says she has more energy recently    Also notes worsening right arm pain/numbness in wrist and arm.  Says she has been told she has carpal tunnel in the past and has worn braces for this.  This is stable from last visit.      ADD/depression/anxiety: now following with new psychiatrist here.  On adderall, now on lowest dose of zoloft and xanax as needed    Hld: last lipid panel is improved with weight loss.  No longer taking as statin, not drinking as much alcohol as she was in the past    Works in accounts payable, stressful job    No chest pain, sob, no major headaches.    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  Seeing dermatology for this    Bowel movements with occasional constipation, takes dulcolax as needed    New mattress has helped with back pain. Still notices this intermittently, but it is tolerable.  Still with urinary frequency, has seen urology for this.       Review of Systems   Constitutional:  Negative for chills, diaphoresis, fever and unexpected weight change.        Expected weight loss   HENT:  Negative for congestion, ear pain and hearing loss.         Lichen planus on tongue   Eyes:  Negative for pain, redness and visual disturbance.   Respiratory:  Negative for cough and shortness of breath.    Cardiovascular:  Negative for chest pain and leg swelling.   Gastrointestinal:  Negative for abdominal distention, abdominal pain, constipation and diarrhea.   Genitourinary:  Positive for frequency. Negative for difficulty urinating, dysuria, flank pain, hematuria and pelvic pain.   Musculoskeletal:  Positive for back pain. Negative for gait problem and joint swelling.   Skin:  Negative for rash.   Neurological:  Negative for dizziness and light-headedness.   Psychiatric/Behavioral:  Positive for dysphoric mood. Negative for  "confusion. The patient is not nervous/anxious.         Depression anxiety, insomnia       Review of patient's allergies indicates:  No Known Allergies    Current Outpatient Medications   Medication Instructions    acetaminophen (TYLENOL) 1,000 mg, Oral, Every 8 hours PRN    ALPRAZolam (XANAX) 1 mg, Oral, 2 times daily PRN    dextroamphetamine-amphetamine 30 mg Tab 30 mg, Oral, 2 times daily    dextroamphetamine-amphetamine 30 mg Tab 30 mg, Oral, 2 times daily    dextroamphetamine-amphetamine 30 mg Tab 30 mg, Oral, 2 times daily    [START ON 2025] dextroamphetamine-amphetamine 30 mg Tab 30 mg, Oral, 2 times daily    semaglutide (weight loss) 0.5 mg, Subcutaneous, Every 7 days    sertraline (ZOLOFT) 50 mg, Oral, Daily    traMADoL (ULTRAM) 50 mg, Oral, Every 6 hours PRN       Patient Active Problem List    Diagnosis Date Noted    Dysuria 2024    Lichenoid drug reaction 2023    Hematochezia 2023    ADD (attention deficit disorder) 2022    Snoring 2022    Class 3 severe obesity with body mass index (BMI) of 40.0 to 44.9 in adult 2022    Alcohol consumption of more than four drinks per day 2022    Depression 2022    Hepatic steatosis 2022    Renal cell carcinoma 2022       Past Medical History:   Diagnosis Date    JEN (acute kidney injury) 2022    Depression     Renal cell carcinoma 2022        Past Surgical History:   Procedure Laterality Date     SECTION      LAPAROSCOPIC ROBOT-ASSISTED SURGICAL REMOVAL OF KIDNEY USING DA CAESAR XI Right 2022    Procedure: XI ROBOTIC NEPHRECTOMY;  Surgeon: Justin Riley MD;  Location: [address];  Service: Urology;  Laterality: Right;  4 hours        Objective:     Vitals:    25 0803   BP: (!) 118/58   Pulse: 77   Resp: 18   Temp: 98.1 °F (36.7 °C)   TempSrc: Oral   SpO2: 97%   Weight: 108.5 kg (239 lb 3.2 oz)   Height: 5' 5" (1.651 m)   PainSc: 0-No pain       Body mass index is 39.8 " kg/m².    Physical Exam  Vitals reviewed.   Constitutional:       General: She is not in acute distress.     Appearance: She is well-developed. She is not diaphoretic.   HENT:      Head: Normocephalic and atraumatic.   Eyes:      General: No scleral icterus.     Conjunctiva/sclera: Conjunctivae normal.   Cardiovascular:      Rate and Rhythm: Normal rate and regular rhythm.      Pulses: Normal pulses.      Heart sounds: Normal heart sounds.   Pulmonary:      Effort: Pulmonary effort is normal. No respiratory distress.      Breath sounds: Normal breath sounds.   Abdominal:      General: Bowel sounds are normal. There is no distension.      Palpations: Abdomen is soft. There is no mass.      Tenderness: There is no abdominal tenderness. There is no guarding or rebound.      Hernia: No hernia is present.   Musculoskeletal:         General: No swelling, tenderness or deformity. Normal range of motion.      Cervical back: Normal range of motion and neck supple.   Skin:     General: Skin is warm and dry.   Neurological:      Mental Status: She is alert and oriented to person, place, and time.   Psychiatric:         Behavior: Behavior normal.       Assessment:     1. Renal cell carcinoma of right kidney    2. Major depressive disorder in partial remission, unspecified whether recurrent    3. Attention deficit disorder, unspecified type    4. Severe obesity (BMI 35.0-39.9) with comorbidity    5. Pyelonephritis    6. Bladder pain    7. Hyperlipidemia, unspecified hyperlipidemia type    8. Fatigue, unspecified type    9. Normocytic anemia    10. Right arm numbness          Plan:   Intervention: education, labs ordered, and additional referrals placed  Follow up: follow up with primary care oncology     Teagan was seen today for renal cell carcinoma of right kidney.  Diagnoses and all orders for this visit:    1. Renal cell carcinoma of right kidney (Primary)  Followed by Dr. Jane  On every 6 months scans  Did not  tolerate keytruda: side effects  Seeing Dr. Jane every 6 months, next due in April  Last CT chest/abdomen/pelvis clear, next due in April    2. Major depressive disorder in partial remission, unspecified whether recurrent  Continue current meds  Continue to follow with [facility]    3. Attention deficit disorder, unspecified hyperactivity presence  See number 2  -     Ambulatory referral/consult to Psychiatry; Future; Expected date: 11/02/2023    5. Severe obesity bmi 35 to 39  Has met with nutrition.  Agree with mediterranean diet  On ozpemic, has lost about 40 lbs  Doing well  Continue to work with Genoveva GRACE on weight loss    6. Pyelonephritis  7. Bladder pain  Back pain has improved some and is intermittent, worse after a long day of sitting  Ct scan negative  No bladder pain currently, has seen urology    8. HLD  Improving with weight loss  Will recheck in april  Dec sweets and alcohol consumption to help with triglycerides  Weight loss encouraged    9. Fatigue, unspecified type  improving  - start exercise routine  - last labs stable    10. Normocytic anemia  Labs checked at last visit      Encounter for screening mammogram for malignant neoplasm of breast  -     Mammo Digital Screening Bilat w/ [facility]; Future    11. Right arm numbness  Offered visit to ortho hand clinic for possibel emg studies and treatment of carpal tunnel, pt will weight on this.   Use brace at night  Discussed proper ergonomics while at desk  Diclofenac gel or occasional ibuprofen ok    Screening for colon cancer  -     Ambulatory referral/consult to Endo Procedure ; Future  Wants to hold off on colonoscopy now, but will get mammogram    Mammogram ordered      Med Onc Chart Routing      Follow up with physician . Dr. Jane in April   Follow up with RAUL 6 months. with Charmaine in Prim/onc   Infusion scheduling note    Injection scheduling note    Labs   Scheduling:  Preferred lab:  Lab interval:  Fasting labs,  same day as Dr. Jane visit (cbc, cmp, lipid panel, hemoglobin A1C, vit D)   Imaging CT chest abdomen pelvis and mammogram   CT scan: Dr. Mackey order, get done prior to his visit in April,  mammogram now   Pharmacy appointment    Other referrals                  Charmaine Adrian NP    Total time of this visit, including time spent face to face with patient and/or via video/audio, and also in preparing for today's visit for MDM and documentation. (Medical Decision Making, including consideration of possible diagnoses, management options, complex medical record review, review of diagnostic tests and information, consideration and discussion of significant complications based on comorbidities, and discussion with providers involved with the care of the patient) is 30 minutes. Greater than 50% was spent face to face with the patient counseling and coordinating care.    There are no Patient Instructions on file for this visit.

## 2025-02-06 ENCOUNTER — OFFICE VISIT (OUTPATIENT)
Dept: HEMATOLOGY/ONCOLOGY | Facility: CLINIC | Age: 45
End: 2025-02-06
Payer: COMMERCIAL

## 2025-02-06 VITALS
OXYGEN SATURATION: 97 % | SYSTOLIC BLOOD PRESSURE: 118 MMHG | TEMPERATURE: 98 F | BODY MASS INDEX: 39.85 KG/M2 | HEART RATE: 77 BPM | WEIGHT: 239.19 LBS | HEIGHT: 65 IN | RESPIRATION RATE: 18 BRPM | DIASTOLIC BLOOD PRESSURE: 58 MMHG

## 2025-02-06 DIAGNOSIS — R20.0 RIGHT ARM NUMBNESS: ICD-10-CM

## 2025-02-06 DIAGNOSIS — F32.4 MAJOR DEPRESSIVE DISORDER IN PARTIAL REMISSION, UNSPECIFIED WHETHER RECURRENT: ICD-10-CM

## 2025-02-06 DIAGNOSIS — R73.03 PREDIABETES: ICD-10-CM

## 2025-02-06 DIAGNOSIS — R53.83 FATIGUE, UNSPECIFIED TYPE: ICD-10-CM

## 2025-02-06 DIAGNOSIS — R39.89 BLADDER PAIN: ICD-10-CM

## 2025-02-06 DIAGNOSIS — F98.8 ATTENTION DEFICIT DISORDER, UNSPECIFIED TYPE: ICD-10-CM

## 2025-02-06 DIAGNOSIS — E66.01 SEVERE OBESITY (BMI 35.0-39.9) WITH COMORBIDITY: ICD-10-CM

## 2025-02-06 DIAGNOSIS — D64.9 NORMOCYTIC ANEMIA: ICD-10-CM

## 2025-02-06 DIAGNOSIS — C64.1 RENAL CELL CARCINOMA OF RIGHT KIDNEY: Primary | ICD-10-CM

## 2025-02-06 DIAGNOSIS — E78.5 HYPERLIPIDEMIA, UNSPECIFIED HYPERLIPIDEMIA TYPE: ICD-10-CM

## 2025-02-06 DIAGNOSIS — N12 PYELONEPHRITIS: ICD-10-CM

## 2025-02-06 PROCEDURE — 3078F DIAST BP <80 MM HG: CPT | Mod: CPTII,S$GLB,, | Performed by: NURSE PRACTITIONER

## 2025-02-06 PROCEDURE — G2211 COMPLEX E/M VISIT ADD ON: HCPCS | Mod: S$GLB,,, | Performed by: NURSE PRACTITIONER

## 2025-02-06 PROCEDURE — 3074F SYST BP LT 130 MM HG: CPT | Mod: CPTII,S$GLB,, | Performed by: NURSE PRACTITIONER

## 2025-02-06 PROCEDURE — 99999 PR PBB SHADOW E&M-EST. PATIENT-LVL IV: CPT | Mod: PBBFAC,,, | Performed by: NURSE PRACTITIONER

## 2025-02-06 PROCEDURE — 1159F MED LIST DOCD IN RCRD: CPT | Mod: CPTII,S$GLB,, | Performed by: NURSE PRACTITIONER

## 2025-02-06 PROCEDURE — 3008F BODY MASS INDEX DOCD: CPT | Mod: CPTII,S$GLB,, | Performed by: NURSE PRACTITIONER

## 2025-02-06 PROCEDURE — 99214 OFFICE O/P EST MOD 30 MIN: CPT | Mod: S$GLB,,, | Performed by: NURSE PRACTITIONER

## 2025-02-10 ENCOUNTER — OFFICE VISIT (OUTPATIENT)
Dept: OBSTETRICS AND GYNECOLOGY | Facility: CLINIC | Age: 45
End: 2025-02-10
Payer: COMMERCIAL

## 2025-02-10 VITALS — WEIGHT: 232 LBS | BODY MASS INDEX: 38.65 KG/M2 | HEIGHT: 65 IN

## 2025-02-10 DIAGNOSIS — C64.1 RENAL CELL CARCINOMA OF RIGHT KIDNEY: Primary | ICD-10-CM

## 2025-02-10 DIAGNOSIS — E66.01 MORBID OBESITY WITH BMI OF 40.0-44.9, ADULT: ICD-10-CM

## 2025-02-10 DIAGNOSIS — R73.03 PRE-DIABETES: ICD-10-CM

## 2025-02-10 NOTE — PROGRESS NOTES
The patient location is: work  The chief complaint leading to consultation is: follow up weight loss    Visit type: audiovisual    Face to Face time with patient: 10 minutes  15 minutes of total time spent on the encounter, which includes face to face time and non-face to face time preparing to see the patient (eg, review of tests), Obtaining and/or reviewing separately obtained history, Documenting clinical information in the electronic or other health record, Independently interpreting results (not separately reported) and communicating results to the patient/family/caregiver, or Care coordination (not separately reported).         Each patient to whom he or she provides medical services by telemedicine is:  (1) informed of the relationship between the physician and patient and the respective role of any other health care provider with respect to management of the patient; and (2) notified that he or she may decline to receive medical services by telemedicine and may withdraw from such care at any time.    Notes:    Subjective:      Teagan Griffith is a 44 y.o. female with history of renal cell carcinoma s/p right nephrectomy on 9/14/22 who presents for weight loss follow up. She is taking compounded semaglutide 0.5mg SC weekly. She is tolerating well without side effects. She has hit plateau with her weight loss. She has gotten stuck at this same weight before and always struggles to get past 232lb. She is interested in increasing the dose. Stress has been high recently and restarted an antidepressant which has helped. She is walking about 20 minutes a day and adding resistance work with bands. Eating high protein low glycemic diet. Denies skipping meals.    Routine Screening Labs: 10/31/2024     No visits with results within 3 Month(s) from this visit.   Latest known visit with results is:   Lab Visit on 10/31/2024   Component Date Value Ref Range Status    Cholesterol 10/31/2024 215 (H)  120 - 199 mg/dL  Final    Triglycerides 10/31/2024 123  30 - 150 mg/dL Final    HDL 10/31/2024 53  40 - 75 mg/dL Final    LDL Cholesterol 10/31/2024 137.4  63.0 - 159.0 mg/dL Final    HDL/Cholesterol Ratio 10/31/2024 24.7  20.0 - 50.0 % Final    Total Cholesterol/HDL Ratio 10/31/2024 4.1  2.0 - 5.0 Final    Non-HDL Cholesterol 10/31/2024 162  mg/dL Final    Sodium 10/31/2024 136  136 - 145 mmol/L Final    Potassium 10/31/2024 5.0  3.5 - 5.1 mmol/L Final    Chloride 10/31/2024 107  95 - 110 mmol/L Final    CO2 10/31/2024 23  23 - 29 mmol/L Final    Glucose 10/31/2024 91  70 - 110 mg/dL Final    BUN 10/31/2024 16  6 - 20 mg/dL Final    Creatinine 10/31/2024 1.1  0.5 - 1.4 mg/dL Final    Calcium 10/31/2024 10.0  8.7 - 10.5 mg/dL Final    Total Protein 10/31/2024 7.1  6.0 - 8.4 g/dL Final    Albumin 10/31/2024 3.9  3.5 - 5.2 g/dL Final    Total Bilirubin 10/31/2024 0.3  0.1 - 1.0 mg/dL Final    Alkaline Phosphatase 10/31/2024 64  40 - 150 U/L Final    AST 10/31/2024 16  10 - 40 U/L Final    ALT 10/31/2024 24  10 - 44 U/L Final    eGFR 10/31/2024 >60.0  >60 mL/min/1.73 m^2 Final    Anion Gap 10/31/2024 6 (L)  8 - 16 mmol/L Final    Drug Study Test Name 10/31/2024 Drug Study   Final    Drug Study Specimen Type 10/31/2024 BLOOD   Final    Drug Study Test Result 10/31/2024 Result sent directly to physician   Final    WBC 10/31/2024 8.16  3.90 - 12.70 K/uL Final    RBC 10/31/2024 4.41  4.00 - 5.40 M/uL Final    Hemoglobin 10/31/2024 12.7  12.0 - 16.0 g/dL Final    Hematocrit 10/31/2024 38.3  37.0 - 48.5 % Final    MCV 10/31/2024 87  82 - 98 fL Final    MCH 10/31/2024 28.8  27.0 - 31.0 pg Final    MCHC 10/31/2024 33.2  32.0 - 36.0 g/dL Final    RDW 10/31/2024 12.7  11.5 - 14.5 % Final    Platelets 10/31/2024 308  150 - 450 K/uL Final    MPV 10/31/2024 11.0  9.2 - 12.9 fL Final    Gran # (ANC) 10/31/2024 3.7  1.8 - 7.7 K/uL Final    Immature Grans (Abs) 10/31/2024 0.02  0.00 - 0.04 K/uL Final    Iron 10/31/2024 72  30 - 160 ug/dL Final     Transferrin 10/31/2024 259  200 - 375 mg/dL Final    TIBC 10/31/2024 383  250 - 450 ug/dL Final    Saturated Iron 10/31/2024 19 (L)  20 - 50 % Final    Ferritin 10/31/2024 133  20.0 - 300.0 ng/mL Final    Folate 10/31/2024 5.2  4.0 - 24.0 ng/mL Final    Vitamin B-12 10/31/2024 355  210 - 950 pg/mL Final       Past Medical History:   Diagnosis Date    JEN (acute kidney injury) 2022    Depression     Renal cell carcinoma 2022     Past Surgical History:   Procedure Laterality Date     SECTION      LAPAROSCOPIC ROBOT-ASSISTED SURGICAL REMOVAL OF KIDNEY USING DA CAESAR XI Right 2022    Procedure: XI ROBOTIC NEPHRECTOMY;  Surgeon: Justin Riley MD;  Location: [address];  Service: Urology;  Laterality: Right;  4 hours     Social History     Tobacco Use    Smoking status: Former     Current packs/day: 0.00     Types: Cigarettes     Start date: 1998     Quit date: 2018     Years since quittin.1    Smokeless tobacco: Never   Substance Use Topics    Alcohol use: Yes     Alcohol/week: 42.0 standard drinks of alcohol     Types: 42 Cans of beer per week     Family History   Problem Relation Name Age of Onset    Drug abuse Mother      Prostate cancer Father      Lymphoma Paternal Grandfather      Kidney cancer Neg Hx       OB History   No obstetric history on file.       Current Outpatient Medications:     acetaminophen (TYLENOL) 500 MG tablet, Take 2 tablets (1,000 mg total) by mouth every 8 (eight) hours as needed for Pain., Disp: 90 tablet, Rfl: 0    ALPRAZolam (XANAX) 1 MG tablet, Take 1 tablet (1 mg total) by mouth 2 (two) times daily as needed for Anxiety or Insomnia., Disp: 60 tablet, Rfl: 2    dextroamphetamine-amphetamine 30 mg Tab, Take 1 tablet (30 mg total) by mouth 2 (two) times a day., Disp: 60 tablet, Rfl: 0    dextroamphetamine-amphetamine 30 mg Tab, Take 1 tablet (30 mg total) by mouth 2 (two) times a day., Disp: 60 tablet, Rfl: 0    dextroamphetamine-amphetamine 30 mg  Tab, Take 1 tablet (30 mg total) by mouth 2 (two) times a day., Disp: 60 tablet, Rfl: 0    [START ON 2/26/2025] dextroamphetamine-amphetamine 30 mg Tab, Take 1 tablet (30 mg total) by mouth 2 (two) times a day., Disp: 60 tablet, Rfl: 0    semaglutide, weight loss, 1 mg/0.5 mL PnIj, Inject 1 mg into the skin every 7 days., Disp: , Rfl:     sertraline (ZOLOFT) 50 MG tablet, Take 1 tablet (50 mg total) by mouth once daily., Disp: 30 tablet, Rfl: 2    traMADoL (ULTRAM) 50 mg tablet, Take 1 tablet (50 mg total) by mouth every 6 (six) hours as needed for Pain. (Patient not taking: Reported on 2/6/2025), Disp: 42 each, Rfl: 0  No current facility-administered medications for this visit.    Facility-Administered Medications Ordered in Other Visits:     diphenhydrAMINE injection 25 mg, 25 mg, Intravenous, Q6H PRN, Elias Doss MD    fentaNYL 50 mcg/mL injection 25 mcg, 25 mcg, Intravenous, Q5 Min PRN, Elias Doss MD    fentaNYL 50 mcg/mL injection  mcg,  mcg, Intravenous, PRN, Matt Ellison, DO, 50 mcg at 09/14/22 0735    HYDROmorphone injection 0.2 mg, 0.2 mg, Intravenous, Q5 Min PRN, Elias Doss MD, 0.2 mg at 09/14/22 1455    midazolam (VERSED) 1 mg/mL injection 2 mg, 2 mg, Intravenous, PRN, Matt Ellison, DO, 2 mg at 09/14/22 0735    prochlorperazine injection Soln 5 mg, 5 mg, Intravenous, Q30 Min PRN, Elias Doss MD    sodium chloride 0.9% flush 10 mL, 10 mL, Intravenous, PRN, Elias Doss MD    Review of Systems:  General: No fever, chills.  Chest: No chest pain, shortness of breath, or palpitations.  Breast: No pain, masses, or nipple discharge.  Vulva: No pain, lesions, or itching.  Vagina: No relaxation, itching, discharge, or lesions.  Abdomen: No pain, nausea, vomiting, diarrhea, or constipation.  Urinary: No incontinence, nocturia, frequency, or dysuria.  Extremities:  No leg cramps, edema, or calf pain.  Neurologic: No headaches, dizziness, or visual  "changes.    Objective:     Vitals:    02/10/25 1339   Weight: 105.2 kg (232 lb)   Height: 5' 5" (1.651 m)     Body mass index is 38.61 kg/m².    PHYSICAL EXAM:  APPEARANCE: Well nourished, well developed, in no acute distress.  AFFECT: WNL, alert and oriented x 3      Assessment:      Renal cell carcinoma of right kidney    Morbid obesity with BMI of 40.0-44.9, adult  -     semaglutide, weight loss, 1 mg/0.5 mL PnIj; Inject 1 mg into the skin every 7 days.    Pre-diabetes        Plan:   Continue low glycemic diet with lean protein at each meal.  Maintain hydration.  Continue walking  Adding resistance work with bands.  Increase compounded semaglutide to 1.0mg SC weekly- faxed to [facility]  Follow up in 3 months    Instructed patient to call if she experiences any side effects or has any questions.    I spent a total of 15 minutes on the day of the visit.This includes face to face time and non-face to face time preparing to see the patient (eg, review of tests), obtaining and/or reviewing separately obtained history, documenting clinical information in the electronic or other health record, independently interpreting results and communicating results to the patient/family/caregiver, or care coordinator.     "

## 2025-02-18 ENCOUNTER — TELEPHONE (OUTPATIENT)
Dept: HEMATOLOGY/ONCOLOGY | Facility: CLINIC | Age: 45
End: 2025-02-18
Payer: COMMERCIAL

## 2025-03-10 ENCOUNTER — PATIENT MESSAGE (OUTPATIENT)
Dept: OBSTETRICS AND GYNECOLOGY | Facility: CLINIC | Age: 45
End: 2025-03-10
Payer: COMMERCIAL

## 2025-03-10 DIAGNOSIS — E66.01 MORBID OBESITY WITH BMI OF 40.0-44.9, ADULT: ICD-10-CM

## 2025-03-11 ENCOUNTER — PATIENT MESSAGE (OUTPATIENT)
Dept: PSYCHIATRY | Facility: CLINIC | Age: 45
End: 2025-03-11
Payer: COMMERCIAL

## 2025-04-12 DIAGNOSIS — F98.8 ATTENTION DEFICIT DISORDER, UNSPECIFIED HYPERACTIVITY PRESENCE: ICD-10-CM

## 2025-04-14 DIAGNOSIS — F41.1 GAD (GENERALIZED ANXIETY DISORDER): ICD-10-CM

## 2025-04-14 RX ORDER — DEXTROAMPHETAMINE SACCHARATE, AMPHETAMINE ASPARTATE, DEXTROAMPHETAMINE SULFATE AND AMPHETAMINE SULFATE 7.5; 7.5; 7.5; 7.5 MG/1; MG/1; MG/1; MG/1
30 TABLET ORAL 2 TIMES DAILY
Qty: 60 TABLET | Refills: 0 | Status: SHIPPED | OUTPATIENT
Start: 2025-04-14

## 2025-04-14 RX ORDER — ALPRAZOLAM 1 MG/1
1 TABLET ORAL 2 TIMES DAILY PRN
Qty: 60 TABLET | Refills: 0 | Status: SHIPPED | OUTPATIENT
Start: 2025-04-14 | End: 2025-07-13

## 2025-04-18 ENCOUNTER — PATIENT MESSAGE (OUTPATIENT)
Dept: HEMATOLOGY/ONCOLOGY | Facility: CLINIC | Age: 45
End: 2025-04-18
Payer: COMMERCIAL

## 2025-04-22 ENCOUNTER — TELEPHONE (OUTPATIENT)
Dept: HEMATOLOGY/ONCOLOGY | Facility: CLINIC | Age: 45
End: 2025-04-22
Payer: COMMERCIAL

## 2025-04-22 NOTE — TELEPHONE ENCOUNTER
I called the pt from a portal message. No answer. I left a message with my number for her to call me back to get her appts rescheduled.

## 2025-05-02 ENCOUNTER — OFFICE VISIT (OUTPATIENT)
Dept: PSYCHIATRY | Facility: CLINIC | Age: 45
End: 2025-05-02
Payer: COMMERCIAL

## 2025-05-02 DIAGNOSIS — F98.8 ATTENTION DEFICIT DISORDER, UNSPECIFIED TYPE: ICD-10-CM

## 2025-05-02 DIAGNOSIS — G47.00 INSOMNIA, UNSPECIFIED TYPE: ICD-10-CM

## 2025-05-02 DIAGNOSIS — F41.1 GAD (GENERALIZED ANXIETY DISORDER): ICD-10-CM

## 2025-05-02 DIAGNOSIS — F90.0 ATTENTION DEFICIT HYPERACTIVITY DISORDER (ADHD), PREDOMINANTLY INATTENTIVE TYPE: Primary | ICD-10-CM

## 2025-05-02 DIAGNOSIS — F33.41 RECURRENT MAJOR DEPRESSIVE DISORDER, IN PARTIAL REMISSION: ICD-10-CM

## 2025-05-02 RX ORDER — SERTRALINE HYDROCHLORIDE 50 MG/1
50 TABLET, FILM COATED ORAL DAILY
Qty: 30 TABLET | Refills: 2 | Status: SHIPPED | OUTPATIENT
Start: 2025-05-02 | End: 2025-07-31

## 2025-05-02 RX ORDER — DEXTROAMPHETAMINE SACCHARATE, AMPHETAMINE ASPARTATE, DEXTROAMPHETAMINE SULFATE AND AMPHETAMINE SULFATE 7.5; 7.5; 7.5; 7.5 MG/1; MG/1; MG/1; MG/1
30 TABLET ORAL 2 TIMES DAILY
Qty: 60 TABLET | Refills: 0 | Status: SHIPPED | OUTPATIENT
Start: 2025-06-14 | End: 2025-07-14

## 2025-05-02 RX ORDER — ALPRAZOLAM 1 MG/1
1 TABLET ORAL 2 TIMES DAILY PRN
Qty: 60 TABLET | Refills: 1 | Status: SHIPPED | OUTPATIENT
Start: 2025-05-14

## 2025-05-02 RX ORDER — DEXTROAMPHETAMINE SACCHARATE, AMPHETAMINE ASPARTATE, DEXTROAMPHETAMINE SULFATE AND AMPHETAMINE SULFATE 7.5; 7.5; 7.5; 7.5 MG/1; MG/1; MG/1; MG/1
30 TABLET ORAL 2 TIMES DAILY
Qty: 60 TABLET | Refills: 0 | Status: SHIPPED | OUTPATIENT
Start: 2025-07-15 | End: 2025-08-14

## 2025-05-02 RX ORDER — DEXTROAMPHETAMINE SACCHARATE, AMPHETAMINE ASPARTATE, DEXTROAMPHETAMINE SULFATE AND AMPHETAMINE SULFATE 7.5; 7.5; 7.5; 7.5 MG/1; MG/1; MG/1; MG/1
30 TABLET ORAL 2 TIMES DAILY
Qty: 60 TABLET | Refills: 0 | Status: SHIPPED | OUTPATIENT
Start: 2025-05-14 | End: 2025-06-13

## 2025-05-02 NOTE — PROGRESS NOTES
OUTPATIENT PSYCHIATRY FOLLOW UP VISIT    ENCOUNTER DATE:  5/2/2025  The patient location is: [place], [place]     The chief complaint leading to consultation is: follow-up for continuing medical treatment for MDD, ADHD, [place]YENNE    LENGTH OF SESSION:  30 minutes    Visit type: audio and visual    20 minutes of total time spent on the encounter, which includes face to face time and non-face to face time preparing to see the patient (eg, review of tests), Obtaining and/or reviewing separately obtained history, Documenting clinical information in the electronic or other health record, Independently interpreting results (not separately reported) and communicating results to the patient/family/caregiver, or Care coordination (not separately reported).     Each patient to whom he or she provides medical services by telemedicine is:  (1) informed of the relationship between the physician and patient and the respective role of any other health care provider with respect to management of the patient; and (2) notified that he or she may decline to receive medical services by telemedicine and may withdraw from such care at any time.    Clinical Status of Patient:  Outpatient (Ambulatory)    CHIEF COMPLAINT:  Depression, ADHD, med refills      HISTORY OF PRESENTING ILLNESS:  Teagan Griffith is a 45 y.o. 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 Currently undergoing q6mo surveillance scans. Psych Hx per chart: Bipolar Disorder (pt denies), ADHD, CHEYENNE, MDD. Presents for f/u; last seen 12/26/2025.    Plan at last appointment on 12/26/2025:  Restart Zoloft at 25 mg daily and increase to 50 mg daily in one week  Continue Adderall 30 mg BID   Continue Xanax 2 mg PO PRN for insomnia/anxiety   Discussed techniques of sleep  hygiene      Interval history as told by Patient - & or family/friend/spouse/caregiver with pts permission    Pt reports cutting back on xanax use, alternating with Tylenol PM. Longstanding insomnia; initially started on xanax by Dr. Meade. No Hx of sleep study; not interested in sleep med referral at this time. Reports reducing caffeine (now drinking Coke Zero) and making other dietary changes. Previously had unhealthy eating habits but has since lost >50 lbs and is working with a dietician.    Recently estranged from family, still processing emotionally but maintains social engagement and participates in enjoyable activities. Endorses 10-year Hx of therapy but not interested in resuming currently.     Reports stability on Adderall and Zoloft; occasional low mood but feels symptoms are manageable.          Risk Parameters:  Patient reports no suicidal ideation  Patient reports no homicidal ideation  Patient reports no self-injurious behavior  Patient reports no violent behavior      Current psych meds  Xanax  Adderall  Zoloft       Medication side effects:  Patient denied  Medication compliance:  yes    Medication Trials  Risperdal was discontinued in favor of the vraylar.     Xanax - patient stated that she takes it for panic attacks and to help with sleep - has been on it since her 20s. Was addicted to it and weaned but was put back on by Dr. Keita. Was up to 4 mg daily in the past.    Clonidine - Pt reported taking the medication as needed at night but was unsure if it was helping with insomnia or irritability but noted that she felt better overall while taking it.      PAST PSYCHIATRIC, MEDICAL, AND SOCIAL HISTORY REVIEWED  The patient's past medical, family and social history have been reviewed and updated as appropriate within the electronic medical record - see encounter notes.    MEDICAL REVIEW OF SYSTEMS:  Complete review of systems performed covering Constitutional, Musculoskeletal, Neurologic.  All  systems negative    ALL MEDICATIONS:    Current Outpatient Medications:     acetaminophen (TYLENOL) 500 MG tablet, Take 2 tablets (1,000 mg total) by mouth every 8 (eight) hours as needed for Pain., Disp: 90 tablet, Rfl: 0    ALPRAZolam (XANAX) 1 MG tablet, Take 1 tablet (1 mg total) by mouth 2 (two) times daily as needed for Anxiety or Insomnia., Disp: 60 tablet, Rfl: 2    dextroamphetamine-amphetamine 30 mg Tab, Take 1 tablet (30 mg total) by mouth 2 (two) times a day., Disp: 60 tablet, Rfl: 0    dextroamphetamine-amphetamine 30 mg Tab, Take 1 tablet (30 mg total) by mouth 2 (two) times a day., Disp: 60 tablet, Rfl: 0    dextroamphetamine-amphetamine 30 mg Tab, Take 1 tablet (30 mg total) by mouth 2 (two) times a day., Disp: 60 tablet, Rfl: 0    [START ON 2/26/2025] dextroamphetamine-amphetamine 30 mg Tab, Take 1 tablet (30 mg total) by mouth 2 (two) times a day., Disp: 60 tablet, Rfl: 0    semaglutide, weight loss, 1 mg/0.5 mL PnIj, Inject 1 mg into the skin every 7 days., Disp: , Rfl:     sertraline (ZOLOFT) 50 MG tablet, Take 1 tablet (50 mg total) by mouth once daily., Disp: 30 tablet, Rfl: 2    traMADoL (ULTRAM) 50 mg tablet, Take 1 tablet (50 mg total) by mouth every 6 (six) hours as needed for Pain. (Patient not taking: Reported on 2/6/2025), Disp: 42 each, Rfl: 0  No current facility-administered medications for this visit.      ALLERGIES:  Review of patient's allergies indicates:  No Known Allergies    RELEVANT LABS/STUDIES:    Lab Results   Component Value Date    WBC 8.16 10/31/2024    HGB 12.7 10/31/2024    HCT 38.3 10/31/2024    MCV 87 10/31/2024     10/31/2024     BMP  Lab Results   Component Value Date     10/31/2024    K 5.0 10/31/2024     10/31/2024    CO2 23 10/31/2024    BUN 16 10/31/2024    CREATININE 1.1 10/31/2024    CALCIUM 10.0 10/31/2024    ANIONGAP 6 (L) 10/31/2024     Lab Results   Component Value Date    ALT 24 10/31/2024    AST 16 10/31/2024    ALKPHOS 64 10/31/2024     BILITOT 0.3 10/31/2024     Lab Results   Component Value Date    TSH 0.734 03/27/2024     Lab Results   Component Value Date    HGBA1C 5.7 (H) 03/27/2024       VITALS  There were no vitals filed for this visit. Virtual visit      PHYSICAL EXAM  General: well developed, well nourished, no distress  Neurologic:   Gait: Not observed, virtual  Psychomotor signs:  No involuntary movements or tremor noted  AIMS: none      PSYCHIATRIC EXAM:     Mental Status Exam:  Appearance: casually dressed, well groomed, no acute distress  Behavior/Cooperation: normal, cooperative  Speech: normal tone, normal rate, normal pitch, normal volume  Language: uses words appropriately; NO aphasia or dysarthria  Mood: good  Affect: Full, appropriate, engaged, mood-congruent  Thought Process: normal and logical  Thought Content: normal, no suicidality, no homicidality, delusions, or paranoia  Level of Consciousness: Alert and Oriented x3  Memory:  Intact    Recent: Intact; able to report recent events   Remote:  Intact  Attention/concentration: appropriate for age/education.   Fund of Knowledge: appears adequate  Insight:   Intact  Judgment:  Intact       IMPRESSION:    Teagan Griffith is a 43 y/o female who presents for follow up appointment. Patient was last seen in clinic on 12/26/2025. Since her last visit, she has continued to take Zoloft, Adderall, and Xanax as prescribed. She  reports improved sleep hygiene, reduced caffeine intake, and decreased Xanax use with Tylenol PM substitution. No interest in therapy or sleep med referral at this time. Mood stable, occasional low days but overall manageable. Will continue current regimen and monitor. She uses alcohol socially, her appetite is reduced due to Wegovy, and denies drug use.     Status/Progress:  Based on the examination today, the patient's problem(s) is/are well controlled.  New problems have not been presented today.     DIAGNOSES:    ICD-10-CM ICD-9-CM   1. Attention deficit  hyperactivity disorder (ADHD), predominantly inattentive type  F90.0 314.00   2. Recurrent major depressive disorder, in partial remission  F33.41 296.35   3. [place]YENNE (generalized anxiety disorder)  F41.1 300.02   4. Insomnia, unspecified type  G47.00 780.52   5. Attention deficit disorder, unspecified type  F98.8 314.00             PLAN:  Psych Med:  Continue Zoloft 50 mg daily   Continue Adderall 30 mg BID for ADHD and adjunct for depression  Patient reports being stable on this dose for approx 15 yrs    Continue Xanax 2 mg PO PRN for insomnia/anxiety   Will attempt to wean when symptoms of anxiety are optimally managed.    Hx of xanax addiction in the past.  Discussed risk/benefits and plan for temporary use only.  Patient agrees.     Discussed techniques of sleep hygiene  Stop pre-sleep electronic use  Remove naps  Keep fixed bedtime and wake-up time  Avoid caffeine and alcohol  Improve your sleeping environment     reviewed. No discrepancies noted.      Discussed with patient informed consent, risks vs. benefits, alternative treatments, side effect profile and the inherent unpredictability of individual responses to these treatments. Answered any questions patient may have had. The patient expresses understanding of the above and displays the capacity to agree with this current plan       RETURN TO CLINIC:  3 months    Instructions:  Take all medications as prescribed.    Abstain from recreational drugs and alcohol.  Present to ED or call 911 for SI/HI plan or intent, psychosis, or medical emergency.      Case to be discussed with supervising staff: Scooby Vyas MD    Total of 20 minutes spent today on encounter, including chart review,  review, seeing patient, documenting encounter, ordering medications.     Naresh Moreno MD  [facility] PGY-III

## 2025-05-12 ENCOUNTER — OFFICE VISIT (OUTPATIENT)
Dept: OBSTETRICS AND GYNECOLOGY | Facility: CLINIC | Age: 45
End: 2025-05-12
Payer: COMMERCIAL

## 2025-05-12 VITALS — BODY MASS INDEX: 35.82 KG/M2 | WEIGHT: 215 LBS | HEIGHT: 65 IN

## 2025-05-12 DIAGNOSIS — E66.01 MORBID OBESITY WITH BMI OF 40.0-44.9, ADULT: ICD-10-CM

## 2025-05-12 DIAGNOSIS — C64.1 RENAL CELL CARCINOMA OF RIGHT KIDNEY: Primary | ICD-10-CM

## 2025-05-12 PROCEDURE — 1160F RVW MEDS BY RX/DR IN RCRD: CPT | Mod: CPTII,95,, | Performed by: PHYSICIAN ASSISTANT

## 2025-05-12 PROCEDURE — 1159F MED LIST DOCD IN RCRD: CPT | Mod: CPTII,95,, | Performed by: PHYSICIAN ASSISTANT

## 2025-05-12 PROCEDURE — 98004 SYNCH AUDIO-VIDEO EST SF 10: CPT | Mod: 95,,, | Performed by: PHYSICIAN ASSISTANT

## 2025-05-12 PROCEDURE — 3008F BODY MASS INDEX DOCD: CPT | Mod: CPTII,95,, | Performed by: PHYSICIAN ASSISTANT

## 2025-05-12 NOTE — PROGRESS NOTES
The patient location is: car  The chief complaint leading to consultation is: follow up weight loss    Visit type: audiovisual    Face to Face time with patient: 10 minutes  15 minutes of total time spent on the encounter, which includes face to face time and non-face to face time preparing to see the patient (eg, review of tests), Obtaining and/or reviewing separately obtained history, Documenting clinical information in the electronic or other health record, Independently interpreting results (not separately reported) and communicating results to the patient/family/caregiver, or Care coordination (not separately reported).         Each patient to whom he or she provides medical services by telemedicine is:  (1) informed of the relationship between the physician and patient and the respective role of any other health care provider with respect to management of the patient; and (2) notified that he or she may decline to receive medical services by telemedicine and may withdraw from such care at any time.    Notes:    Subjective:      Teagan Griffith is a 45 y.o. female with history of renal cell carcinoma s/p right nephrectomy on 9/14/22 who presents for weight loss follow up. She is taking compounded semaglutide 1.0mg SC weekly. Tolerating well without bothersome side effects. Reports decreased appetite and decreased interest in drinking alcohol. Working to get protein 3x per day and increasing vegetables. Struggles to be consistent with exercise, but working on strength and cardio.     Routine Screening Labs: 10/31/2024     No visits with results within 3 Month(s) from this visit.   Latest known visit with results is:   Lab Visit on 10/31/2024   Component Date Value Ref Range Status    Cholesterol 10/31/2024 215 (H)  120 - 199 mg/dL Final    Triglycerides 10/31/2024 123  30 - 150 mg/dL Final    HDL 10/31/2024 53  40 - 75 mg/dL Final    LDL Cholesterol 10/31/2024 137.4  63.0 - 159.0 mg/dL Final     HDL/Cholesterol Ratio 10/31/2024 24.7  20.0 - 50.0 % Final    Total Cholesterol/HDL Ratio 10/31/2024 4.1  2.0 - 5.0 Final    Non-HDL Cholesterol 10/31/2024 162  mg/dL Final    Sodium 10/31/2024 136  136 - 145 mmol/L Final    Potassium 10/31/2024 5.0  3.5 - 5.1 mmol/L Final    Chloride 10/31/2024 107  95 - 110 mmol/L Final    CO2 10/31/2024 23  23 - 29 mmol/L Final    Glucose 10/31/2024 91  70 - 110 mg/dL Final    BUN 10/31/2024 16  6 - 20 mg/dL Final    Creatinine 10/31/2024 1.1  0.5 - 1.4 mg/dL Final    Calcium 10/31/2024 10.0  8.7 - 10.5 mg/dL Final    Total Protein 10/31/2024 7.1  6.0 - 8.4 g/dL Final    Albumin 10/31/2024 3.9  3.5 - 5.2 g/dL Final    Total Bilirubin 10/31/2024 0.3  0.1 - 1.0 mg/dL Final    Alkaline Phosphatase 10/31/2024 64  40 - 150 U/L Final    AST 10/31/2024 16  10 - 40 U/L Final    ALT 10/31/2024 24  10 - 44 U/L Final    eGFR 10/31/2024 >60.0  >60 mL/min/1.73 m^2 Final    Anion Gap 10/31/2024 6 (L)  8 - 16 mmol/L Final    Drug Study Test Name 10/31/2024 Drug Study   Final    Drug Study Specimen Type 10/31/2024 BLOOD   Final    Drug Study Test Result 10/31/2024 Result sent directly to physician   Final    WBC 10/31/2024 8.16  3.90 - 12.70 K/uL Final    RBC 10/31/2024 4.41  4.00 - 5.40 M/uL Final    Hemoglobin 10/31/2024 12.7  12.0 - 16.0 g/dL Final    Hematocrit 10/31/2024 38.3  37.0 - 48.5 % Final    MCV 10/31/2024 87  82 - 98 fL Final    MCH 10/31/2024 28.8  27.0 - 31.0 pg Final    MCHC 10/31/2024 33.2  32.0 - 36.0 g/dL Final    RDW 10/31/2024 12.7  11.5 - 14.5 % Final    Platelets 10/31/2024 308  150 - 450 K/uL Final    MPV 10/31/2024 11.0  9.2 - 12.9 fL Final    Gran # (ANC) 10/31/2024 3.7  1.8 - 7.7 K/uL Final    Immature Grans (Abs) 10/31/2024 0.02  0.00 - 0.04 K/uL Final    Iron 10/31/2024 72  30 - 160 ug/dL Final    Transferrin 10/31/2024 259  200 - 375 mg/dL Final    TIBC 10/31/2024 383  250 - 450 ug/dL Final    Saturated Iron 10/31/2024 19 (L)  20 - 50 % Final    Ferritin 10/31/2024  "133  20.0 - 300.0 ng/mL Final    Folate 10/31/2024 5.2  4.0 - 24.0 ng/mL Final    Vitamin B-12 10/31/2024 355  210 - 950 pg/mL Final       Past Medical History:   Diagnosis Date    JEN (acute kidney injury) 2022    Depression     Renal cell carcinoma 2022     Past Surgical History:   Procedure Laterality Date     SECTION      LAPAROSCOPIC ROBOT-ASSISTED SURGICAL REMOVAL OF KIDNEY USING DA CAESAR XI Right 2022    Procedure: XI ROBOTIC NEPHRECTOMY;  Surgeon: Justin Riley MD;  Location: [facility] OR [address] [place] [address];  Service: Urology;  Laterality: Right;  4 hours     Social History[1]  Family History   Problem Relation Name Age of Onset    Drug abuse Mother      Prostate cancer Father      Lymphoma Paternal Grandfather      Kidney cancer Neg Hx       OB History   No obstetric history on file.     Current Medications[2]    Review of Systems:  General: No fever, chills.  Chest: No chest pain, shortness of breath, or palpitations.  Breast: No pain, masses, or nipple discharge.  Vulva: No pain, lesions, or itching.  Vagina: No relaxation, itching, discharge, or lesions.  Abdomen: No pain, nausea, vomiting, diarrhea, or constipation.  Urinary: No incontinence, nocturia, frequency, or dysuria.  Extremities:  No leg cramps, edema, or calf pain.  Neurologic: No headaches, dizziness, or visual changes.    Objective:     Vitals:    25 1505   Weight: 97.5 kg (215 lb)   Height: 5' 5" (1.651 m)     Body mass index is 35.78 kg/m².    PHYSICAL EXAM:  APPEARANCE: Well nourished, well developed, in no acute distress.  AFFECT: WNL, alert and oriented x 3      Assessment:      Renal cell carcinoma of right kidney    Morbid obesity with BMI of 40.0-44.9, adult  -     semaglutide, weight loss, 1 mg/0.5 mL PnIj; With L-Carnitine for fatigue. Patient injects 1.51mg SC weekly.  Dispense: 3 mL; Refill: 3        Plan:   Continue low glycemic diet with lean protein at each meal.  Maintain hydration.  Reviewed changes in " semaglutide concentration and dosing.   Continue compounded semaglutide 1.0mg (0.4mL) SC weekly - [place]  Screening labs scheduled.   Continue strength and cardio workouts.  Follow up in 3 months    Instructed patient to call if she experiences any side effects or has any questions.    I spent a total of 15 minutes on the day of the visit.This includes face to face time and non-face to face time preparing to see the patient (eg, review of tests), obtaining and/or reviewing separately obtained history, documenting clinical information in the electronic or other health record, independently interpreting results and communicating results to the patient/family/caregiver, or care coordinator.          [1]   Social History  Tobacco Use    Smoking status: Former     Current packs/day: 0.00     Types: Cigarettes     Start date: 1998     Quit date: 2018     Years since quittin.3    Smokeless tobacco: Never   Substance Use Topics    Alcohol use: Yes     Alcohol/week: 42.0 standard drinks of alcohol     Types: 42 Cans of beer per week   [2]   Current Outpatient Medications:     acetaminophen (TYLENOL) 500 MG tablet, Take 2 tablets (1,000 mg total) by mouth every 8 (eight) hours as needed for Pain., Disp: 90 tablet, Rfl: 0    [START ON 2025] ALPRAZolam (XANAX) 1 MG tablet, Take 1 tablet (1 mg total) by mouth 2 (two) times daily as needed for Anxiety or Insomnia., Disp: 60 tablet, Rfl: 1    dextroamphetamine-amphetamine 30 mg Tab, Take 1 tablet (30 mg total) by mouth 2 (two) times a day., Disp: 60 tablet, Rfl: 0    [START ON 2025] dextroamphetamine-amphetamine 30 mg Tab, Take 1 tablet (30 mg total) by mouth 2 (two) times a day., Disp: 60 tablet, Rfl: 0    [START ON 2025] dextroamphetamine-amphetamine 30 mg Tab, Take 1 tablet (30 mg total) by mouth 2 (two) times a day., Disp: 60 tablet, Rfl: 0    [START ON 7/15/2025] dextroamphetamine-amphetamine 30 mg Tab, Take 1 tablet (30 mg total) by mouth 2 (two)  times a day., Disp: 60 tablet, Rfl: 0    semaglutide, weight loss, 1 mg/0.5 mL PnIj, With L-Carnitine for fatigue. Patient injects 1.51mg SC weekly., Disp: 3 mL, Rfl: 3    sertraline (ZOLOFT) 50 MG tablet, Take 1 tablet (50 mg total) by mouth once daily., Disp: 30 tablet, Rfl: 2    traMADoL (ULTRAM) 50 mg tablet, Take 1 tablet (50 mg total) by mouth every 6 (six) hours as needed for Pain. (Patient not taking: Reported on 2/6/2025), Disp: 42 each, Rfl: 0  No current facility-administered medications for this visit.    Facility-Administered Medications Ordered in Other Visits:     diphenhydrAMINE injection 25 mg, 25 mg, Intravenous, Q6H PRN, Elias Doss MD    fentaNYL 50 mcg/mL injection 25 mcg, 25 mcg, Intravenous, Q5 Min PRN, Elias Doss MD    fentaNYL 50 mcg/mL injection  mcg,  mcg, Intravenous, PRN, Matt Ellison, DO, 50 mcg at 09/14/22 0735    HYDROmorphone injection 0.2 mg, 0.2 mg, Intravenous, Q5 Min PRN, Elias Doss MD, 0.2 mg at 09/14/22 1455    midazolam (VERSED) 1 mg/mL injection 2 mg, 2 mg, Intravenous, PRN, Matt Ellison, DO, 2 mg at 09/14/22 0735    prochlorperazine injection Soln 5 mg, 5 mg, Intravenous, Q30 Min PRN, Elias Doss MD    sodium chloride 0.9% flush 10 mL, 10 mL, Intravenous, PRN, Elias Doss MD     to increase independence

## 2025-05-20 ENCOUNTER — LAB VISIT (OUTPATIENT)
Dept: LAB | Facility: HOSPITAL | Age: 45
End: 2025-05-20
Attending: NURSE PRACTITIONER
Payer: COMMERCIAL

## 2025-05-20 DIAGNOSIS — R73.03 PREDIABETES: ICD-10-CM

## 2025-05-20 DIAGNOSIS — C64.1 RENAL CELL CARCINOMA OF RIGHT KIDNEY: ICD-10-CM

## 2025-05-20 DIAGNOSIS — E78.5 HYPERLIPIDEMIA, UNSPECIFIED HYPERLIPIDEMIA TYPE: ICD-10-CM

## 2025-05-20 LAB
25(OH)D3+25(OH)D2 SERPL-MCNC: 32 NG/ML (ref 30–96)
ABSOLUTE NEUTROPHIL COUNT (OHS): 4.86 K/UL (ref 1.8–7.7)
ALBUMIN SERPL BCP-MCNC: 4 G/DL (ref 3.5–5.2)
ALP SERPL-CCNC: 63 UNIT/L (ref 40–150)
ALT SERPL W/O P-5'-P-CCNC: 17 UNIT/L (ref 10–44)
ANION GAP (OHS): 10 MMOL/L (ref 8–16)
AST SERPL-CCNC: 15 UNIT/L (ref 11–45)
BILIRUB SERPL-MCNC: 0.4 MG/DL (ref 0.1–1)
BUN SERPL-MCNC: 16 MG/DL (ref 6–20)
CALCIUM SERPL-MCNC: 10.1 MG/DL (ref 8.7–10.5)
CHLORIDE SERPL-SCNC: 106 MMOL/L (ref 95–110)
CHOLEST SERPL-MCNC: 210 MG/DL (ref 120–199)
CHOLEST/HDLC SERPL: 4.2 {RATIO} (ref 2–5)
CO2 SERPL-SCNC: 23 MMOL/L (ref 23–29)
CREAT SERPL-MCNC: 0.8 MG/DL (ref 0.5–1.4)
EAG (OHS): 100 MG/DL (ref 68–131)
ERYTHROCYTE [DISTWIDTH] IN BLOOD BY AUTOMATED COUNT: 12.7 % (ref 11.5–14.5)
GFR SERPLBLD CREATININE-BSD FMLA CKD-EPI: >60 ML/MIN/1.73/M2
GLUCOSE SERPL-MCNC: 87 MG/DL (ref 70–110)
HBA1C MFR BLD: 5.1 % (ref 4–5.6)
HCT VFR BLD AUTO: 41.3 % (ref 37–48.5)
HDLC SERPL-MCNC: 50 MG/DL (ref 40–75)
HDLC SERPL: 23.8 % (ref 20–50)
HGB BLD-MCNC: 12.8 GM/DL (ref 12–16)
IMM GRANULOCYTES # BLD AUTO: 0.03 K/UL (ref 0–0.04)
LDLC SERPL CALC-MCNC: 133.8 MG/DL (ref 63–159)
MCH RBC QN AUTO: 28.1 PG (ref 27–31)
MCHC RBC AUTO-ENTMCNC: 31 G/DL (ref 32–36)
MCV RBC AUTO: 91 FL (ref 82–98)
NONHDLC SERPL-MCNC: 160 MG/DL
PLATELET # BLD AUTO: 395 K/UL (ref 150–450)
PMV BLD AUTO: 11 FL (ref 9.2–12.9)
POTASSIUM SERPL-SCNC: 4.4 MMOL/L (ref 3.5–5.1)
PROT SERPL-MCNC: 7.3 GM/DL (ref 6–8.4)
RBC # BLD AUTO: 4.56 M/UL (ref 4–5.4)
SODIUM SERPL-SCNC: 139 MMOL/L (ref 136–145)
TRIGL SERPL-MCNC: 131 MG/DL (ref 30–150)
WBC # BLD AUTO: 9.41 K/UL (ref 3.9–12.7)

## 2025-05-20 PROCEDURE — 82040 ASSAY OF SERUM ALBUMIN: CPT

## 2025-05-20 PROCEDURE — 83036 HEMOGLOBIN GLYCOSYLATED A1C: CPT

## 2025-05-20 PROCEDURE — 36415 COLL VENOUS BLD VENIPUNCTURE: CPT

## 2025-05-20 PROCEDURE — 82306 VITAMIN D 25 HYDROXY: CPT

## 2025-05-20 PROCEDURE — 85027 COMPLETE CBC AUTOMATED: CPT

## 2025-05-20 PROCEDURE — 80061 LIPID PANEL: CPT

## 2025-05-21 ENCOUNTER — PATIENT MESSAGE (OUTPATIENT)
Dept: HEMATOLOGY/ONCOLOGY | Facility: CLINIC | Age: 45
End: 2025-05-21
Payer: COMMERCIAL

## 2025-05-21 ENCOUNTER — RESULTS FOLLOW-UP (OUTPATIENT)
Dept: HEMATOLOGY/ONCOLOGY | Facility: CLINIC | Age: 45
End: 2025-05-21

## 2025-05-22 ENCOUNTER — PATIENT MESSAGE (OUTPATIENT)
Dept: OBSTETRICS AND GYNECOLOGY | Facility: CLINIC | Age: 45
End: 2025-05-22
Payer: COMMERCIAL

## 2025-05-27 ENCOUNTER — PATIENT MESSAGE (OUTPATIENT)
Dept: HEMATOLOGY/ONCOLOGY | Facility: CLINIC | Age: 45
End: 2025-05-27
Payer: COMMERCIAL

## 2025-05-28 ENCOUNTER — HOSPITAL ENCOUNTER (OUTPATIENT)
Dept: RADIOLOGY | Facility: HOSPITAL | Age: 45
Discharge: HOME | End: 2025-05-28
Attending: HOSPITALIST
Payer: COMMERCIAL

## 2025-05-28 DIAGNOSIS — C64.1 RENAL CELL CARCINOMA OF RIGHT KIDNEY: ICD-10-CM

## 2025-05-28 PROCEDURE — 74177 CT ABD & PELVIS W/CONTRAST: CPT | Mod: TC

## 2025-05-28 PROCEDURE — 25500020 PHARM REV CODE 255: Performed by: HOSPITALIST

## 2025-05-28 PROCEDURE — A9698 NON-RAD CONTRAST MATERIALNOC: HCPCS | Performed by: HOSPITALIST

## 2025-05-28 PROCEDURE — 71260 CT THORAX DX C+: CPT | Mod: 26,,, | Performed by: RADIOLOGY

## 2025-05-28 PROCEDURE — 74177 CT ABD & PELVIS W/CONTRAST: CPT | Mod: 26,,, | Performed by: RADIOLOGY

## 2025-05-28 RX ADMIN — IOHEXOL 1000 ML: 12 SOLUTION ORAL at 10:05

## 2025-05-28 RX ADMIN — IOHEXOL 100 ML: 350 INJECTION, SOLUTION INTRAVENOUS at 11:05

## 2025-05-29 ENCOUNTER — OFFICE VISIT (OUTPATIENT)
Dept: HEMATOLOGY/ONCOLOGY | Facility: CLINIC | Age: 45
End: 2025-05-29
Payer: COMMERCIAL

## 2025-05-29 ENCOUNTER — LAB VISIT (OUTPATIENT)
Dept: LAB | Facility: HOSPITAL | Age: 45
End: 2025-05-29
Attending: HOSPITALIST
Payer: COMMERCIAL

## 2025-05-29 VITALS
HEIGHT: 65 IN | SYSTOLIC BLOOD PRESSURE: 117 MMHG | RESPIRATION RATE: 16 BRPM | HEART RATE: 73 BPM | WEIGHT: 213.63 LBS | OXYGEN SATURATION: 98 % | BODY MASS INDEX: 35.59 KG/M2 | DIASTOLIC BLOOD PRESSURE: 58 MMHG

## 2025-05-29 DIAGNOSIS — C64.1 RENAL CELL CARCINOMA OF RIGHT KIDNEY: ICD-10-CM

## 2025-05-29 DIAGNOSIS — L43.2 LICHENOID DRUG REACTION: ICD-10-CM

## 2025-05-29 DIAGNOSIS — C64.1 RENAL CELL CARCINOMA OF RIGHT KIDNEY: Primary | ICD-10-CM

## 2025-05-29 DIAGNOSIS — Z85.528 HISTORY OF RENAL CELL CANCER: ICD-10-CM

## 2025-05-29 LAB
ABSOLUTE EOSINOPHIL (OHS): 0.22 K/UL
ABSOLUTE MONOCYTE (OHS): 0.65 K/UL (ref 0.3–1)
ABSOLUTE NEUTROPHIL COUNT (OHS): 8.02 K/UL (ref 1.8–7.7)
ALBUMIN SERPL BCP-MCNC: 4.1 G/DL (ref 3.5–5.2)
ALP SERPL-CCNC: 59 UNIT/L (ref 40–150)
ALT SERPL W/O P-5'-P-CCNC: 18 UNIT/L (ref 10–44)
ANION GAP (OHS): 8 MMOL/L (ref 8–16)
AST SERPL-CCNC: 15 UNIT/L (ref 11–45)
BASOPHILS # BLD AUTO: 0.06 K/UL
BASOPHILS NFR BLD AUTO: 0.5 %
BILIRUB SERPL-MCNC: 0.3 MG/DL (ref 0.1–1)
BUN SERPL-MCNC: 18 MG/DL (ref 6–20)
CALCIUM SERPL-MCNC: 9.4 MG/DL (ref 8.7–10.5)
CHLORIDE SERPL-SCNC: 106 MMOL/L (ref 95–110)
CO2 SERPL-SCNC: 24 MMOL/L (ref 23–29)
CREAT SERPL-MCNC: 0.8 MG/DL (ref 0.5–1.4)
ERYTHROCYTE [DISTWIDTH] IN BLOOD BY AUTOMATED COUNT: 12.3 % (ref 11.5–14.5)
GFR SERPLBLD CREATININE-BSD FMLA CKD-EPI: >60 ML/MIN/1.73/M2
GLUCOSE SERPL-MCNC: 84 MG/DL (ref 70–110)
HCT VFR BLD AUTO: 38.1 % (ref 37–48.5)
HGB BLD-MCNC: 12.1 GM/DL (ref 12–16)
IMM GRANULOCYTES # BLD AUTO: 0.05 K/UL (ref 0–0.04)
IMM GRANULOCYTES NFR BLD AUTO: 0.4 % (ref 0–0.5)
LYMPHOCYTES # BLD AUTO: 3.56 K/UL (ref 1–4.8)
MCH RBC QN AUTO: 27.9 PG (ref 27–31)
MCHC RBC AUTO-ENTMCNC: 31.8 G/DL (ref 32–36)
MCV RBC AUTO: 88 FL (ref 82–98)
NUCLEATED RBC (/100WBC) (OHS): 0 /100 WBC
PLATELET # BLD AUTO: 383 K/UL (ref 150–450)
PMV BLD AUTO: 10.8 FL (ref 9.2–12.9)
POTASSIUM SERPL-SCNC: 4.2 MMOL/L (ref 3.5–5.1)
PROT SERPL-MCNC: 6.8 GM/DL (ref 6–8.4)
RBC # BLD AUTO: 4.34 M/UL (ref 4–5.4)
RELATIVE EOSINOPHIL (OHS): 1.8 %
RELATIVE LYMPHOCYTE (OHS): 28.3 % (ref 18–48)
RELATIVE MONOCYTE (OHS): 5.2 % (ref 4–15)
RELATIVE NEUTROPHIL (OHS): 63.8 % (ref 38–73)
SODIUM SERPL-SCNC: 138 MMOL/L (ref 136–145)
WBC # BLD AUTO: 12.56 K/UL (ref 3.9–12.7)

## 2025-05-29 PROCEDURE — 99214 OFFICE O/P EST MOD 30 MIN: CPT | Mod: S$GLB,,, | Performed by: HOSPITALIST

## 2025-05-29 PROCEDURE — 36415 COLL VENOUS BLD VENIPUNCTURE: CPT

## 2025-05-29 PROCEDURE — 99999 PR PBB SHADOW E&M-EST. PATIENT-LVL IV: CPT | Mod: PBBFAC,,, | Performed by: HOSPITALIST

## 2025-05-29 PROCEDURE — 3044F HG A1C LEVEL LT 7.0%: CPT | Mod: CPTII,S$GLB,, | Performed by: HOSPITALIST

## 2025-05-29 PROCEDURE — 3074F SYST BP LT 130 MM HG: CPT | Mod: CPTII,S$GLB,, | Performed by: HOSPITALIST

## 2025-05-29 PROCEDURE — 3008F BODY MASS INDEX DOCD: CPT | Mod: CPTII,S$GLB,, | Performed by: HOSPITALIST

## 2025-05-29 PROCEDURE — 3078F DIAST BP <80 MM HG: CPT | Mod: CPTII,S$GLB,, | Performed by: HOSPITALIST

## 2025-05-29 PROCEDURE — 80053 COMPREHEN METABOLIC PANEL: CPT

## 2025-05-29 PROCEDURE — 85025 COMPLETE CBC W/AUTO DIFF WBC: CPT

## 2025-05-29 PROCEDURE — 1159F MED LIST DOCD IN RCRD: CPT | Mod: CPTII,S$GLB,, | Performed by: HOSPITALIST

## 2025-05-29 PROCEDURE — G2211 COMPLEX E/M VISIT ADD ON: HCPCS | Mod: S$GLB,,, | Performed by: HOSPITALIST

## 2025-05-29 NOTE — PROGRESS NOTES
MEDICAL ONCOLOGY FOLLOW-UP VISIT.     Best Contact Phone Number(s): [phone] (home)      Cancer/Stage/TNM:    Cancer Staging   History of renal cell cancer  Staging form: Kidney, AJCC 8th Edition  - Pathologic: Stage Unknown (pT2b, pNX, cM0) - Signed by Moshe Jane MD on 10/6/2022  - Pathologic: Stage II (pT2, pN0, cM0) - Signed by Moshe Jane MD on 11/9/2022      Reason for visit: Ms. 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 She presents to medical oncology clinic for ongoing surveillance.    Interval History:     CT Torso yesterday shows no evidence of recurrent or metastatic disease. Overall patient feels well. 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 No other rashes. Not nearly as sympomtatic as previously and not using any topical treatment. Has signficnatly weaned down her psychotropic meds - remains on sertraline and xanax with additional adderall. Has been taking semaglutide; recently with 40lb intentional weight loss.           Oncology History   History of renal cell cancer   8/6/2022 Imaging Significant Findings    CTA performed for chest pain incidentally found a large partially visualized enhancing mass in the right kidney measuring at least 12 cm with adjacent fat stranding and prominent vessels highly concerning for renal cell carcinoma.    Subsequent non contrast CT a/p confirms 13.7 x 12.2 x 14.3 (AP x TV x CC) intermediate density solid right renal mass with central necrosis and scattered punctate calcifications are concerning for malignancy.     8/9/2022 Imaging Significant Findings    MRI a/p shows eterogeneously enhancing  solid right renal mass highly concerning for RCC. No evidence of filling defect or enhancing tumor thrombus in the right renal artery or vein.  Mildly prominent mesenteric lymph nodes, as above.  Metastatic disease difficult to definitively exclude     9/14/2022 Surgery    Right nephrectomy - pathology with ccRCC nuclear grade 4 up to 13.3 cm. Tumor confined to the kidney. Margins negative Rhabdoid features present with associated necrosis. [id number]     11/1/2022 Imaging Significant Findings    CT torso with several up to 3mm micronodules in the lung and prominent but small mesenteric nodes overall felt generally stable.     11/9/2022 - 1/11/2023 Chemotherapy    Treatment Summary   Plan Name: OP PEMBROLIZUMAB 200MG Q3W  Treatment Goal: Curative  Status: Inactive  Start Date: 11/9/2022  End Date: 1/11/2023  Provider: Moshe Jane MD  Chemotherapy: [No matching medication found in this treatment plan]     11/9/2022 Cancer Staged    Staging form: Kidney, AJCC 8th Edition  - Pathologic: Stage II (pT2, pN0, cM0)     12/14/2022 Notable Event    Pembrolizumab held in setting of severe mucositis. Start prednisone taper starting at 60mg po daily.     1/11/2023 -  Immunotherapy    Restart immunotherapy with pembrolizumab 200mg IV q3 weeks     1/30/2023 Notable Event    Stop pembrolizumab for second time due to recurrent stomatitis/mucositis with associated rash. Restart steroid taper at 60mg prednisone.     1/31/2023 Imaging Significant Findings    CT torso with no evidence of recurrent disease. Stable mesenteric nodes up to 1.0cm. Also 0.9cm thyroid nodule.     3/17/2023 Imaging Significant Findings    CT torso  - Postsurgical change of right nephrectomy in this patient with reported clear cell RCC.  Few prominent lymph nodes in the right renal fossa and para-aortic region, similar to the prior. No findings to suggest local neoplasm recurrence.  No evidence of distant metastatic disease.  - Few stable bilateral  "subcentimeter pulmonary nodules, as above.  - Hepatomegaly with fatty infiltration; no focal lesion.  - Noncalcified cholelithiasis with no surrounding acute inflammatory process.    MR Brain:  Normal MRI of the brain with no evidence of metastasis.     6/19/2023 Imaging Significant Findings    6/19/23 CT Torso:  - Surgical change of right nephrectomy in this patient with a history of renal cell carcinoma.  There are a few stable lymph nodes in the right renal fossa.  Small stable periaortic lymph nodes are present, not pathologically enlarged.  - Mild hepatomegaly and hepatic steatosis  - Few stable small pulmonary nodules.     9/14/2023 Imaging Significant Findings    CT torso  Impression:  - Prior right nephrectomy.  Few stable appearing lymph nodes at the right renal fossa and periaortic area.  Otherwise, no evidence for local recurrent or metastatic disease.  - Few stable small pulmonary nodules.  - Hepatic steatosis and additional findings as above.     3/7/2024 Imaging Significant Findings    3/7/24 CT a/p  Impression:  Abdomen CT and pelvis CT:  1. No evidence of obstructive uropathy.  2. Right nephrectomy for RCC.  No abnormal soft tissue nodularity or abnormal enhancement to suggest local disease recurrence.  Normal adrenals.  3. Additional findings as detailed in the body of the report.            Physical Exam:   BP (!) 117/58 (BP Location: Left arm, Patient Position: Sitting)   Pulse 73   Resp 16   Ht 5' 5" (1.651 m)   Wt 96.9 kg (213 lb 10 oz)   SpO2 98%   BMI 35.55 kg/m²      ECOG Performance Status: (foot note - ECOG PS provided by [employer] Co[employer]cology [employer]) 1 - Symptomatic but completely ambulatory    Physical Exam  Constitutional:       General: She is not in acute distress.  HENT:      Head: Normocephalic.      Mouth/Throat:      Mouth: Mucous membranes are moist.      Pharynx: Posterior oropharyngeal erythema: with lacy white plaques over buccal mucosa.      Comments: Lacy white " appearance persists over tongue  Eyes:      Conjunctiva/sclera: Conjunctivae normal.      Pupils: Pupils are equal, round, and reactive to light.   Cardiovascular:      Rate and Rhythm: Normal rate.   Pulmonary:      Effort: Pulmonary effort is normal. No respiratory distress.   Abdominal:      General: There is no distension.      Palpations: Abdomen is soft.      Tenderness: There is no abdominal tenderness.   Musculoskeletal:         General: Normal range of motion.      Cervical back: Normal range of motion and neck supple.   Skin:     General: Skin is warm.   Neurological:      General: No focal deficit present.      Mental Status: She is alert and oriented to person, place, and time.      Motor: No weakness.   Psychiatric:         Mood and Affect: Mood normal.         Behavior: Behavior normal.         Thought Content: Thought content normal.           Labs:   Recent Results (from the past 48 hours)   CBC with Differential    Collection Time: 05/29/25  1:50 PM   Result Value Ref Range    WBC 12.56 3.90 - 12.70 K/uL    RBC 4.34 4.00 - 5.40 M/uL    HGB 12.1 12.0 - 16.0 gm/dL    HCT 38.1 37.0 - 48.5 %    MCV 88 82 - 98 fL    MCH 27.9 27.0 - 31.0 pg    MCHC 31.8 (L) 32.0 - 36.0 g/dL    RDW 12.3 11.5 - 14.5 %    Platelet Count 383 150 - 450 K/uL    MPV 10.8 9.2 - 12.9 fL    Nucleated RBC 0 <=0 /100 WBC    Neut % 63.8 38 - 73 %    Lymph % 28.3 18 - 48 %    Mono % 5.2 4 - 15 %    Eos % 1.8 <=8 %    Basophil % 0.5 <=1.9 %    Imm Grans % 0.4 0.0 - 0.5 %    Neut # 8.02 (H) 1.8 - 7.7 K/uL    Lymph # 3.56 1 - 4.8 K/uL    Mono # 0.65 0.3 - 1 K/uL    Eos # 0.22 <=0.5 K/uL    Baso # 0.06 <=0.2 K/uL    Imm Grans # 0.05 (H) 0.00 - 0.04 K/uL   Comprehensive Metabolic Panel    Collection Time: 05/29/25  1:50 PM   Result Value Ref Range    Sodium 138 136 - 145 mmol/L    Potassium 4.2 3.5 - 5.1 mmol/L    Chloride 106 95 - 110 mmol/L    CO2 24 23 - 29 mmol/L    Glucose 84 70 - 110 mg/dL    BUN 18 6 - 20 mg/dL    Creatinine 0.8 0.5  - 1.4 mg/dL    Calcium 9.4 8.7 - 10.5 mg/dL    Protein Total 6.8 6.0 - 8.4 gm/dL    Albumin 4.1 3.5 - 5.2 g/dL    Bilirubin Total 0.3 0.1 - 1.0 mg/dL    ALP 59 40 - 150 unit/L    AST 15 11 - 45 unit/L    ALT 18 10 - 44 unit/L    Anion Gap 8 8 - 16 mmol/L    eGFR >60 >60 mL/min/1.73/m2                        Imaging:     CT Chest Abdomen Pelvis With IV Contrast (XPD) Routine Oral Contrast  Narrative: EXAMINATION:  CT CHEST ABDOMEN PELVIS WITH IV CONTRAST (XPD)    CLINICAL HISTORY:  Metastatic disease evaluation;Malignant neoplasm of right kidney, except renal pelvis    TECHNIQUE:  Low dose axial images, sagittal and coronal reformations were obtained from the thoracic inlet to the pubic symphysis after IV administration of 100 cc of Omnipaque. Oral contrast was also administered.    COMPARISON:  09/24/2024    FINDINGS:  Chest:    Heart and mediastinal vasculature: Unremarkable.    Mare/Mediastinum: No significant lymphadenopathy    Lungs: Well aerated, without consolidation or pleural fluid.    Abdomen and pelvis:    Liver: Normal in size and attenuation, with no focal hepatic lesions.    Gallbladder: No calcified gallstones.    Bile Ducts: No evidence of dilated ducts.    Pancreas: No mass or peripancreatic fat stranding.    Spleen: Unremarkable.    Adrenals: Unremarkable.    Kidneys/ Ureters: Right nephrectomy changes.  Left kidney normal.  No hydronephrosis, solid mass, or nephrolithiasis.    GI Tract/Mesentery: No evidence of bowel obstruction or inflammation. Stable normal sized right lower quadrant mesenteric lymph nodes.    Peritoneal Space: No ascites. No free air.    Retroperitoneum: No significant adenopathy.    Vasculature: No significant atherosclerosis or aneurysm.    Bladder: No evidence of wall thickening.    Reproductive organs: Unremarkable.    Bones: No suspicious lytic or blastic lesions.  Impression: Status post right nephrectomy with no convincing evidence of metastatic disease in the chest  abdomen or pelvis.    Electronically signed by: Cain Spangler Jr  Date:    05/29/2025  Time:    09:02              Diagnoses:       1. Renal cell carcinoma of right kidney    2. Lichenoid drug reaction    3. History of renal cell cancer                            Assessment and Plan:     1. Renal cell carcinoma of right kidney  -     CT Chest Abdomen Pelvis With IV Contrast (XPD) Routine Oral Contrast; Future; Expected date: 05/29/2025    2. Lichenoid drug reaction  Overview:  Developed following intiation of pembrolizumab. Previously affected the mouth and the anogenital region. Previously on hydroxychloriquine and topical clobetasol.    Assessment & Plan:  - Has some ongoing recurrent mouth sores, but generally tolerable      3. History of renal cell cancer  Overview:  Patient found to have high risk likely stage II ([id number]) with nuclear grade 4 and rhabdoid features on nephrectomy 9/14/22. Started adjuvant pembrolizumab 11/9/22. Stopped 1/31/23 due to recurrent ICI side effects.    Assessment & Plan:  DWAYNE on imaging now approatching year 3 of surveillance  - FU 6 months with repeat CT torso; then consider annual imaging                    Route Chart for Scheduling    Med Onc Chart Routing      Follow up with physician 6 months.   Follow up with RAUL    Infusion scheduling note    Injection scheduling note    Labs CBC and CMP   Scheduling:  Preferred lab:  Lab interval:     Imaging CT chest abdomen pelvis      Pharmacy appointment    Other referrals   Refer to Oncology Primary [facility] -                 Moshe Jane MD

## 2025-05-29 NOTE — ASSESSMENT & PLAN NOTE
DWAYNE on imaging now approatching year 3 of surveillance  - FU 6 months with repeat CT torso; then consider annual imaging

## 2025-05-30 DIAGNOSIS — C64.1 RENAL CELL CARCINOMA OF RIGHT KIDNEY: Primary | ICD-10-CM

## 2025-07-09 ENCOUNTER — PATIENT MESSAGE (OUTPATIENT)
Dept: HEMATOLOGY/ONCOLOGY | Facility: CLINIC | Age: 45
End: 2025-07-09
Payer: COMMERCIAL

## 2025-07-10 ENCOUNTER — LAB VISIT (OUTPATIENT)
Dept: LAB | Facility: HOSPITAL | Age: 45
End: 2025-07-10
Attending: NURSE PRACTITIONER
Payer: COMMERCIAL

## 2025-07-10 DIAGNOSIS — R30.0 DYSURIA: ICD-10-CM

## 2025-07-10 DIAGNOSIS — N12 PYELONEPHRITIS: ICD-10-CM

## 2025-07-10 DIAGNOSIS — C64.1 RENAL CELL CARCINOMA OF RIGHT KIDNEY: ICD-10-CM

## 2025-07-10 DIAGNOSIS — R35.0 URINARY FREQUENCY: ICD-10-CM

## 2025-07-10 DIAGNOSIS — Z85.528 HISTORY OF RENAL CELL CANCER: ICD-10-CM

## 2025-07-10 DIAGNOSIS — C64.1 RENAL CELL CARCINOMA OF RIGHT KIDNEY: Primary | ICD-10-CM

## 2025-07-10 LAB
BACTERIA #/AREA URNS AUTO: ABNORMAL /HPF
BILIRUB UR QL STRIP.AUTO: NEGATIVE
CLARITY UR: ABNORMAL
COLOR UR AUTO: YELLOW
GLUCOSE UR QL STRIP: NEGATIVE
HGB UR QL STRIP: NEGATIVE
KETONES UR QL STRIP: ABNORMAL
LEUKOCYTE ESTERASE UR QL STRIP: ABNORMAL
MICROSCOPIC COMMENT: ABNORMAL
NITRITE UR QL STRIP: NEGATIVE
PH UR STRIP: 6 [PH]
PROT UR QL STRIP: ABNORMAL
RBC #/AREA URNS AUTO: 11 /HPF (ref 0–4)
SP GR UR STRIP: 1.03
SQUAMOUS #/AREA URNS AUTO: 4 /HPF
UROBILINOGEN UR STRIP-ACNC: NEGATIVE EU/DL
WBC #/AREA URNS AUTO: >100 /HPF (ref 0–5)

## 2025-07-10 PROCEDURE — 81001 URINALYSIS AUTO W/SCOPE: CPT

## 2025-07-10 PROCEDURE — 87086 URINE CULTURE/COLONY COUNT: CPT

## 2025-07-11 DIAGNOSIS — R30.0 DYSURIA: Primary | ICD-10-CM

## 2025-07-11 DIAGNOSIS — R35.0 URINARY FREQUENCY: ICD-10-CM

## 2025-07-11 LAB — HOLD SPECIMEN: NORMAL

## 2025-07-11 RX ORDER — SULFAMETHOXAZOLE AND TRIMETHOPRIM 800; 160 MG/1; MG/1
1 TABLET ORAL 2 TIMES DAILY
Qty: 14 TABLET | Refills: 0 | Status: SHIPPED | OUTPATIENT
Start: 2025-07-11

## 2025-07-12 LAB
BACTERIA UR CULT: ABNORMAL
BACTERIA UR CULT: ABNORMAL

## 2025-07-16 DIAGNOSIS — N39.0 GROUP B STREPTOCOCCAL UTI: Primary | ICD-10-CM

## 2025-07-16 DIAGNOSIS — B95.1 GROUP B STREPTOCOCCAL UTI: Primary | ICD-10-CM

## 2025-07-16 RX ORDER — AMOXICILLIN 500 MG/1
500 TABLET, FILM COATED ORAL EVERY 8 HOURS
Qty: 21 TABLET | Refills: 0 | Status: SHIPPED | OUTPATIENT
Start: 2025-07-16 | End: 2025-07-23

## 2025-07-17 ENCOUNTER — PATIENT MESSAGE (OUTPATIENT)
Dept: HEMATOLOGY/ONCOLOGY | Facility: CLINIC | Age: 45
End: 2025-07-17
Payer: COMMERCIAL

## 2025-07-18 DIAGNOSIS — Z12.11 SCREENING FOR COLON CANCER: Primary | ICD-10-CM

## 2025-07-18 DIAGNOSIS — C64.1 RENAL CELL CARCINOMA OF RIGHT KIDNEY: ICD-10-CM

## 2025-07-21 ENCOUNTER — TELEPHONE (OUTPATIENT)
Dept: ENDOSCOPY | Facility: HOSPITAL | Age: 45
End: 2025-07-21

## 2025-07-21 ENCOUNTER — CLINICAL SUPPORT (OUTPATIENT)
Dept: ENDOSCOPY | Facility: HOSPITAL | Age: 45
End: 2025-07-21
Payer: COMMERCIAL

## 2025-07-21 VITALS — BODY MASS INDEX: 32.49 KG/M2 | HEIGHT: 65 IN | WEIGHT: 195 LBS

## 2025-07-21 DIAGNOSIS — Z12.11 ENCOUNTER FOR SCREENING COLONOSCOPY: Primary | ICD-10-CM

## 2025-07-21 DIAGNOSIS — C64.1 RENAL CELL CARCINOMA OF RIGHT KIDNEY: ICD-10-CM

## 2025-07-21 DIAGNOSIS — Z12.11 SCREENING FOR COLON CANCER: ICD-10-CM

## 2025-07-22 NOTE — TELEPHONE ENCOUNTER
Patient is scheduled for a Colonoscopy on 08/26/2025 with Dr. Tilley  Referral for procedure from PAT appointment

## 2025-07-29 DIAGNOSIS — F41.1 GAD (GENERALIZED ANXIETY DISORDER): ICD-10-CM

## 2025-07-29 DIAGNOSIS — F90.0 ATTENTION DEFICIT HYPERACTIVITY DISORDER (ADHD), PREDOMINANTLY INATTENTIVE TYPE: ICD-10-CM

## 2025-07-29 RX ORDER — DEXTROAMPHETAMINE SACCHARATE, AMPHETAMINE ASPARTATE, DEXTROAMPHETAMINE SULFATE AND AMPHETAMINE SULFATE 7.5; 7.5; 7.5; 7.5 MG/1; MG/1; MG/1; MG/1
30 TABLET ORAL 2 TIMES DAILY
Qty: 60 TABLET | Refills: 0 | Status: SHIPPED | OUTPATIENT
Start: 2025-07-29

## 2025-07-29 RX ORDER — SERTRALINE HYDROCHLORIDE 50 MG/1
50 TABLET, FILM COATED ORAL DAILY
Qty: 30 TABLET | Refills: 1 | Status: SHIPPED | OUTPATIENT
Start: 2025-07-29 | End: 2025-10-27

## 2025-07-29 RX ORDER — ALPRAZOLAM 1 MG/1
1 TABLET ORAL 2 TIMES DAILY PRN
Qty: 60 TABLET | Refills: 1 | Status: SHIPPED | OUTPATIENT
Start: 2025-07-29

## 2025-07-31 ENCOUNTER — PATIENT MESSAGE (OUTPATIENT)
Dept: HEMATOLOGY/ONCOLOGY | Facility: CLINIC | Age: 45
End: 2025-07-31
Payer: COMMERCIAL

## 2025-07-31 ENCOUNTER — HOSPITAL ENCOUNTER (EMERGENCY)
Facility: HOSPITAL | Age: 45
Discharge: HOME | End: 2025-07-31
Attending: EMERGENCY MEDICINE
Payer: COMMERCIAL

## 2025-07-31 VITALS
DIASTOLIC BLOOD PRESSURE: 68 MMHG | HEIGHT: 65 IN | TEMPERATURE: 98 F | HEART RATE: 60 BPM | SYSTOLIC BLOOD PRESSURE: 107 MMHG | WEIGHT: 194 LBS | OXYGEN SATURATION: 98 % | RESPIRATION RATE: 16 BRPM | BODY MASS INDEX: 32.32 KG/M2

## 2025-07-31 DIAGNOSIS — R31.0 GROSS HEMATURIA: Primary | ICD-10-CM

## 2025-07-31 LAB
ABSOLUTE EOSINOPHIL (OHS): 0.33 K/UL
ABSOLUTE MONOCYTE (OHS): 0.52 K/UL (ref 0.3–1)
ABSOLUTE NEUTROPHIL COUNT (OHS): 5.13 K/UL (ref 1.8–7.7)
ALBUMIN SERPL BCP-MCNC: 4.1 G/DL (ref 3.5–5.2)
ALP SERPL-CCNC: 53 UNIT/L (ref 40–150)
ALT SERPL W/O P-5'-P-CCNC: 22 UNIT/L (ref 0–55)
ANION GAP (OHS): 6 MMOL/L (ref 8–16)
AST SERPL-CCNC: 19 UNIT/L (ref 0–50)
BACTERIA #/AREA URNS AUTO: ABNORMAL /HPF
BASOPHILS # BLD AUTO: 0.05 K/UL
BASOPHILS NFR BLD AUTO: 0.5 %
BILIRUB SERPL-MCNC: 0.3 MG/DL (ref 0.1–1)
BILIRUB UR QL STRIP.AUTO: NEGATIVE
BUN SERPL-MCNC: 18 MG/DL (ref 6–20)
CALCIUM SERPL-MCNC: 9.3 MG/DL (ref 8.7–10.5)
CHLORIDE SERPL-SCNC: 108 MMOL/L (ref 95–110)
CLARITY UR: ABNORMAL
CO2 SERPL-SCNC: 24 MMOL/L (ref 23–29)
COLOR UR AUTO: YELLOW
CREAT SERPL-MCNC: 0.8 MG/DL (ref 0.5–1.4)
ERYTHROCYTE [DISTWIDTH] IN BLOOD BY AUTOMATED COUNT: 12.5 % (ref 11.5–14.5)
GFR SERPLBLD CREATININE-BSD FMLA CKD-EPI: >60 ML/MIN/1.73/M2
GLUCOSE SERPL-MCNC: 78 MG/DL (ref 70–110)
GLUCOSE UR QL STRIP: NEGATIVE
HCT VFR BLD AUTO: 35.4 % (ref 37–48.5)
HGB BLD-MCNC: 11.6 GM/DL (ref 12–16)
HGB UR QL STRIP: ABNORMAL
HYALINE CASTS UR QL AUTO: 0 /LPF (ref 0–1)
IMM GRANULOCYTES # BLD AUTO: 0.03 K/UL (ref 0–0.04)
IMM GRANULOCYTES NFR BLD AUTO: 0.3 % (ref 0–0.5)
KETONES UR QL STRIP: NEGATIVE
LEUKOCYTE ESTERASE UR QL STRIP: ABNORMAL
LYMPHOCYTES # BLD AUTO: 4 K/UL (ref 1–4.8)
MCH RBC QN AUTO: 28.4 PG (ref 27–31)
MCHC RBC AUTO-ENTMCNC: 32.8 G/DL (ref 32–36)
MCV RBC AUTO: 87 FL (ref 82–98)
MICROSCOPIC COMMENT: ABNORMAL
NITRITE UR QL STRIP: NEGATIVE
NUCLEATED RBC (/100WBC) (OHS): 0 /100 WBC
PH UR STRIP: 5 [PH]
PLATELET # BLD AUTO: 337 K/UL (ref 150–450)
PMV BLD AUTO: 11.1 FL (ref 9.2–12.9)
POTASSIUM SERPL-SCNC: 4.3 MMOL/L (ref 3.5–5.1)
PROT SERPL-MCNC: 6.8 GM/DL (ref 6–8.4)
PROT UR QL STRIP: ABNORMAL
RBC # BLD AUTO: 4.09 M/UL (ref 4–5.4)
RBC #/AREA URNS AUTO: >100 /HPF (ref 0–4)
RELATIVE EOSINOPHIL (OHS): 3.3 %
RELATIVE LYMPHOCYTE (OHS): 39.8 % (ref 18–48)
RELATIVE MONOCYTE (OHS): 5.2 % (ref 4–15)
RELATIVE NEUTROPHIL (OHS): 50.9 % (ref 38–73)
SODIUM SERPL-SCNC: 138 MMOL/L (ref 136–145)
SP GR UR STRIP: 1.02
SQUAMOUS #/AREA URNS AUTO: 2 /HPF
UROBILINOGEN UR STRIP-ACNC: NEGATIVE EU/DL
WBC # BLD AUTO: 10.06 K/UL (ref 3.9–12.7)
WBC #/AREA URNS AUTO: 27 /HPF (ref 0–5)

## 2025-07-31 PROCEDURE — 85025 COMPLETE CBC W/AUTO DIFF WBC: CPT | Performed by: EMERGENCY MEDICINE

## 2025-07-31 PROCEDURE — 87088 URINE BACTERIA CULTURE: CPT | Performed by: EMERGENCY MEDICINE

## 2025-07-31 PROCEDURE — 84132 ASSAY OF SERUM POTASSIUM: CPT | Performed by: EMERGENCY MEDICINE

## 2025-07-31 PROCEDURE — 81003 URINALYSIS AUTO W/O SCOPE: CPT | Performed by: EMERGENCY MEDICINE

## 2025-07-31 PROCEDURE — 99283 EMERGENCY DEPT VISIT LOW MDM: CPT

## 2025-07-31 NOTE — ED PROVIDER NOTES
Encounter Date: 2025       History     Chief Complaint   Patient presents with    Hematuria     Has uti, on 2 different abx and it isnt helping symptoms worsening      HPI 45-year-old female history of renal cell carcinoma and robotic right nephrectomy presents with complaint of hematuria.  She reports she was recently seen for UTI with symptoms of dysuria and suprapubic pressure.  She subsequently had some back pain.  She was initially placed on Bactrim but her culture was positive for GBS and she was switch to a different antibiotic.  She reports she is not due to start her cycle for another 2 weeks.  She noticed a significant amount of blood in the toilet today after urinating.  She denies fever or chills.  Her dysuria has resolved.  She is not having ongoing back pain.  She is not having nausea or vomiting.  Review of patient's allergies indicates:  No Known Allergies  Past Medical History:   Diagnosis Date    JEN (acute kidney injury) 2022    Depression     Renal cell carcinoma 2022     Past Surgical History:   Procedure Laterality Date     SECTION      LAPAROSCOPIC ROBOT-ASSISTED SURGICAL REMOVAL OF KIDNEY USING DA CAESAR XI Right 2022    Procedure: XI ROBOTIC NEPHRECTOMY;  Surgeon: Justin Riley MD;  Location: [facility] OR [address] [place] [address];  Service: Urology;  Laterality: Right;  4 hours     Family History   Problem Relation Name Age of Onset    Drug abuse Mother      Prostate cancer Father      Lymphoma Paternal Grandfather      Kidney cancer Neg Hx       Social History[1]  Review of Systems  All other systems reviewed and negative except as noted in HPI    Physical Exam     Initial Vitals [25 1222]   BP Pulse Resp Temp SpO2   117/68 62 16 97.6 °F (36.4 °C) 98 %      MAP       --         Physical Exam    Nursing note and vitals reviewed.  Constitutional: She appears well-developed and well-nourished.   HENT:   Head: Normocephalic and atraumatic.   Eyes: EOM are normal. Pupils are  equal, round, and reactive to light.   Neck: Neck supple.   Normal range of motion.  Cardiovascular:  Normal rate, regular rhythm and intact distal pulses.           Pulmonary/Chest: Breath sounds normal. No respiratory distress.   Abdominal: Abdomen is soft. She exhibits no distension.   Musculoskeletal:      Cervical back: Normal range of motion and neck supple.     Neurological: She is alert and oriented to person, place, and time. She has normal strength. No sensory deficit. GCS score is 15. GCS eye subscore is 4. GCS verbal subscore is 5. GCS motor subscore is 6.   Skin: Skin is warm and dry. Capillary refill takes less than 2 seconds.   Psychiatric: She has a normal mood and affect.         ED Course   Procedures  Labs Reviewed   CULTURE, URINE - Abnormal       Result Value    Urine Culture 10,000 - 49,999 cfu/ml Gram-negative Rods (*)    COMPREHENSIVE METABOLIC PANEL - Abnormal    Sodium 138      Potassium 4.3      Chloride 108      CO2 24      Glucose 78      BUN 18      Creatinine 0.8      Calcium 9.3      Protein Total 6.8      Albumin 4.1      Bilirubin Total 0.3      ALP 53      AST 19      ALT 22      Anion Gap 6 (*)     eGFR >60     URINALYSIS, REFLEX TO URINE CULTURE - Abnormal    Color, UA Yellow      Appearance, UA Hazy (*)     pH, UA 5.0      Spec Grav UA 1.025      Protein, UA 1+ (*)     Glucose, UA Negative      Ketones, UA Negative      Bilirubin, UA Negative      Blood, UA 3+ (*)     Nitrites, UA Negative      Urobilinogen, UA Negative      Leukocyte Esterase, UA 2+ (*)    CBC WITH DIFFERENTIAL - Abnormal    WBC 10.06      RBC 4.09      HGB 11.6 (*)     HCT 35.4 (*)     MCV 87      MCH 28.4      MCHC 32.8      RDW 12.5      Platelet Count 337      MPV 11.1      Nucleated RBC 0      Neut % 50.9      Lymph % 39.8      Mono % 5.2      Eos % 3.3      Basophil % 0.5      Imm Grans % 0.3      Neut # 5.13      Lymph # 4.00      Mono # 0.52      Eos # 0.33      Baso # 0.05      Imm Grans # 0.03      URINALYSIS MICROSCOPIC - Abnormal    RBC, UA >100 (*)     WBC, UA 27 (*)     Bacteria, UA Occasional      Squamous Epithelial Cells, UA 2      Hyaline Casts, UA 0      Microscopic Comment       CBC W/ AUTO DIFFERENTIAL    Narrative:     The following orders were created for panel order CBC auto differential.  Procedure                               Abnormality         Status                     ---------                               -----------         ------                     CBC with Differential[[id number]]       Abnormal            Final result                 Please view results for these tests on the individual orders.   GREY TOP URINE HOLD    Extra Tube Hold for add-ons.     EXTRA TUBES    Narrative:     The following orders were created for panel order EXTRA TUBES.  Procedure                               Abnormality         Status                     ---------                               -----------         ------                     Light Green Top Hold[[id number]]                                                         Please view results for these tests on the individual orders.   LIGHT GREEN TOP HOLD          Imaging Results    None          Medications - No data to display  Medical Decision Making  See ED course for additional HPI, ROS, PE, lab, imaging, consultant discussion, procedure interpretation, and Medical Decision Making.  Initial concern for possible pyelonephritis, ureterolithiasis with a possible infected ureterolith.  However, became cleared with the patient and started her menstrual cycle during the course of her ED stay.  She elected to forego further testing and follow up with her Hematology-Oncology team.  I did review the patient's CT torso from May and saw no evidence of new renal mass or other etiology if hematuria.    Amount and/or Complexity of Data Reviewed  Labs: ordered. Decision-making details documented in ED Course.               [facility]02[facility]25 [facility]  2025   1430 CBC auto differential(!)  CBC without significant anemia.  Urinalysis consistent with interval report of the patient's starting her cycle.  Patient now decline CT as she feels the hematuria is secondary to normal vaginal bleeding in the setting of her cycle.  This is reasonable.  She has follow up with Hematology-Oncology and surveillance imaging planned. [DS]      ED Course User Index  [DS] Sheng Young MD                               Clinical Impression:  Final diagnoses:  [R31.0] Gross hematuria (Primary)          ED Disposition Condition    Discharge           ED Prescriptions    None       Follow-up Information    None                [1]   Social History  Tobacco Use    Smoking status: Former     Current packs/day: 0.00     Types: Cigarettes     Start date: 1998     Quit date: 2018     Years since quittin.5    Smokeless tobacco: Never   Substance Use Topics    Alcohol use: Yes     Alcohol/week: 42.0 standard drinks of alcohol     Types: 42 Cans of beer per week        Sheng Young MD  25 [id number]

## 2025-07-31 NOTE — DISCHARGE INSTRUCTIONS
Message your hematologist oncologist to notify them of your visit to see if they want to move up your surveillance imaging     Return to the emergency department if any new or concerning symptoms occur     We will contact you if your urine culture is positive

## 2025-07-31 NOTE — ED TRIAGE NOTES
PT arrives to ED with complaints of Hematuria . States it started a couple weeks ago and she thought it was a UTI and just finished her Abx. Endorses lower back pain.  She states her Cycle isnt supposed to start for another two weeks. Urine today looks like Red Gatorade. Denies fever/chills ,dysuria . Endorses Urgency and feeling of not being able to void all of her urine. HX of kidney Cancer and had right kidney removed 3 years ago.

## 2025-08-01 LAB — HOLD SPECIMEN: NORMAL

## 2025-08-02 ENCOUNTER — RESULTS FOLLOW-UP (OUTPATIENT)
Dept: EMERGENCY MEDICINE | Facility: HOSPITAL | Age: 45
End: 2025-08-02
Payer: COMMERCIAL

## 2025-08-03 LAB — BACTERIA UR CULT: ABNORMAL

## 2025-08-06 RX ORDER — SULFAMETHOXAZOLE AND TRIMETHOPRIM 800; 160 MG/1; MG/1
1 TABLET ORAL 2 TIMES DAILY
Qty: 10 TABLET | Refills: 0 | Status: SHIPPED | OUTPATIENT
Start: 2025-08-06 | End: 2025-08-11

## 2025-08-18 ENCOUNTER — OFFICE VISIT (OUTPATIENT)
Dept: OBSTETRICS AND GYNECOLOGY | Facility: CLINIC | Age: 45
End: 2025-08-18
Payer: COMMERCIAL

## 2025-08-18 VITALS — WEIGHT: 190 LBS | HEIGHT: 65 IN | BODY MASS INDEX: 31.65 KG/M2

## 2025-08-18 DIAGNOSIS — E66.01 MORBID OBESITY WITH BMI OF 40.0-44.9, ADULT: ICD-10-CM

## 2025-08-18 DIAGNOSIS — C64.1 RENAL CELL CARCINOMA OF RIGHT KIDNEY: Primary | ICD-10-CM

## 2025-08-18 PROCEDURE — 98005 SYNCH AUDIO-VIDEO EST LOW 20: CPT | Mod: 95,,, | Performed by: PHYSICIAN ASSISTANT

## 2025-08-18 PROCEDURE — 1159F MED LIST DOCD IN RCRD: CPT | Mod: CPTII,95,, | Performed by: PHYSICIAN ASSISTANT

## 2025-08-18 PROCEDURE — 3044F HG A1C LEVEL LT 7.0%: CPT | Mod: CPTII,95,, | Performed by: PHYSICIAN ASSISTANT

## 2025-08-18 PROCEDURE — 1160F RVW MEDS BY RX/DR IN RCRD: CPT | Mod: CPTII,95,, | Performed by: PHYSICIAN ASSISTANT

## 2025-08-19 ENCOUNTER — TELEPHONE (OUTPATIENT)
Dept: ENDOSCOPY | Facility: HOSPITAL | Age: 45
End: 2025-08-19
Payer: COMMERCIAL

## 2025-08-22 ENCOUNTER — TELEPHONE (OUTPATIENT)
Dept: ENDOSCOPY | Facility: HOSPITAL | Age: 45
End: 2025-08-22
Payer: COMMERCIAL

## 2025-08-29 ENCOUNTER — OFFICE VISIT (OUTPATIENT)
Dept: PSYCHIATRY | Facility: CLINIC | Age: 45
End: 2025-08-29
Payer: COMMERCIAL

## 2025-08-29 DIAGNOSIS — F90.0 ATTENTION DEFICIT HYPERACTIVITY DISORDER (ADHD), PREDOMINANTLY INATTENTIVE TYPE: ICD-10-CM

## 2025-08-29 DIAGNOSIS — F33.41 RECURRENT MAJOR DEPRESSIVE DISORDER, IN PARTIAL REMISSION: Primary | ICD-10-CM

## 2025-08-29 DIAGNOSIS — G47.00 INSOMNIA, UNSPECIFIED TYPE: ICD-10-CM

## 2025-08-29 DIAGNOSIS — F41.1 GAD (GENERALIZED ANXIETY DISORDER): ICD-10-CM

## 2025-08-29 PROCEDURE — 99999 PR PBB SHADOW E&M-EST. PATIENT-LVL II: CPT | Mod: PBBFAC,,,

## 2025-08-29 RX ORDER — DEXTROAMPHETAMINE SACCHARATE, AMPHETAMINE ASPARTATE, DEXTROAMPHETAMINE SULFATE AND AMPHETAMINE SULFATE 7.5; 7.5; 7.5; 7.5 MG/1; MG/1; MG/1; MG/1
30 TABLET ORAL 2 TIMES DAILY
Qty: 60 TABLET | Refills: 0 | Status: SHIPPED | OUTPATIENT
Start: 2025-09-26

## 2025-08-29 RX ORDER — ALPRAZOLAM 1 MG/1
1 TABLET ORAL 2 TIMES DAILY PRN
Qty: 60 TABLET | Refills: 1 | Status: SHIPPED | OUTPATIENT
Start: 2025-08-29

## 2025-08-29 RX ORDER — DEXTROAMPHETAMINE SACCHARATE, AMPHETAMINE ASPARTATE, DEXTROAMPHETAMINE SULFATE AND AMPHETAMINE SULFATE 7.5; 7.5; 7.5; 7.5 MG/1; MG/1; MG/1; MG/1
30 TABLET ORAL 2 TIMES DAILY
Qty: 60 TABLET | Refills: 0 | Status: SHIPPED | OUTPATIENT
Start: 2025-08-29

## 2025-08-29 RX ORDER — SERTRALINE HYDROCHLORIDE 50 MG/1
50 TABLET, FILM COATED ORAL DAILY
Qty: 30 TABLET | Refills: 2 | Status: SHIPPED | OUTPATIENT
Start: 2025-08-29 | End: 2025-11-27

## 2025-08-29 RX ORDER — DEXTROAMPHETAMINE SACCHARATE, AMPHETAMINE ASPARTATE, DEXTROAMPHETAMINE SULFATE AND AMPHETAMINE SULFATE 7.5; 7.5; 7.5; 7.5 MG/1; MG/1; MG/1; MG/1
30 TABLET ORAL 2 TIMES DAILY
Qty: 60 TABLET | Refills: 0 | Status: SHIPPED | OUTPATIENT
Start: 2025-10-24

## 2025-09-03 DIAGNOSIS — C64.1 RENAL CELL CARCINOMA OF RIGHT KIDNEY: Primary | ICD-10-CM

## 2025-09-03 DIAGNOSIS — Z00.6 CLINICAL TRIAL PARTICIPANT: ICD-10-CM

## (undated) DEVICE — CLIP HEMO-LOK MLX LARGE LF

## (undated) DEVICE — GOWN SURGICAL X-LARGE

## (undated) DEVICE — STAPLER SUREFORM CRVD TIP 45

## (undated) DEVICE — SOL CLEARIFY VISUALIZATION LAP

## (undated) DEVICE — SOL NS 1000CC

## (undated) DEVICE — CANNULA REDUCER 12-8MM

## (undated) DEVICE — SET TRI-LUMEN FILTERED TUBE

## (undated) DEVICE — IRRIGATOR ENDOSCOPY DISP.

## (undated) DEVICE — STAPLER SKIN PROXIMATE WIDE

## (undated) DEVICE — RELOAD SUREFORM 45 2.5 WHT 6R

## (undated) DEVICE — TAPE SILK 3IN

## (undated) DEVICE — TROCAR ENDOPATH XCEL 5MM 7.5CM

## (undated) DEVICE — DRAPE COLUMN DAVINCI XI

## (undated) DEVICE — SOL ELECTROLUBE ANTI-STIC

## (undated) DEVICE — TRAY CATH FOL SIL URIMTR 16FR

## (undated) DEVICE — ELECTRODE REM PLYHSV RETURN 9

## (undated) DEVICE — BAG TISS RETRV MONARCH 10MM

## (undated) DEVICE — COVER LIGHT HANDLE

## (undated) DEVICE — SUT 2/0 27IN PDS II VIO MO

## (undated) DEVICE — PORT AIRSEAL 12/120MM LPI

## (undated) DEVICE — SUT MCRYL PLUS 4-0 PS2 27IN

## (undated) DEVICE — KIT PROCEDURE STER INLET CLOSU

## (undated) DEVICE — DRAPE SCOPE PILLOW WARMER

## (undated) DEVICE — SEAL UNIVERSAL 5MM-8MM XI

## (undated) DEVICE — COVER TIP CURVED SCISSORS XI

## (undated) DEVICE — DRAPE ABDOMINAL TIBURON 14X11

## (undated) DEVICE — SUT PROLENE 4-0 RB-1 BL MO

## (undated) DEVICE — DRAPE ARM DAVINCI XI

## (undated) DEVICE — SYR SLIP TIP 20CC

## (undated) DEVICE — ADHESIVE DERMABOND ADVANCED

## (undated) DEVICE — TRAY MINOR GEN SURG